# Patient Record
Sex: MALE | Race: WHITE | HISPANIC OR LATINO | URBAN - METROPOLITAN AREA
[De-identification: names, ages, dates, MRNs, and addresses within clinical notes are randomized per-mention and may not be internally consistent; named-entity substitution may affect disease eponyms.]

---

## 2022-11-26 ENCOUNTER — EMERGENCY (EMERGENCY)
Facility: HOSPITAL | Age: 77
LOS: 1 days | Discharge: HOME | End: 2022-11-26
Attending: STUDENT IN AN ORGANIZED HEALTH CARE EDUCATION/TRAINING PROGRAM | Admitting: STUDENT IN AN ORGANIZED HEALTH CARE EDUCATION/TRAINING PROGRAM

## 2022-11-26 VITALS
SYSTOLIC BLOOD PRESSURE: 149 MMHG | DIASTOLIC BLOOD PRESSURE: 75 MMHG | RESPIRATION RATE: 16 BRPM | HEART RATE: 65 BPM | TEMPERATURE: 97 F | OXYGEN SATURATION: 100 %

## 2022-11-26 VITALS
HEART RATE: 72 BPM | TEMPERATURE: 97 F | RESPIRATION RATE: 16 BRPM | OXYGEN SATURATION: 100 % | SYSTOLIC BLOOD PRESSURE: 147 MMHG | DIASTOLIC BLOOD PRESSURE: 60 MMHG

## 2022-11-26 LAB
ALBUMIN SERPL ELPH-MCNC: 4 G/DL — SIGNIFICANT CHANGE UP (ref 3.3–5)
ALP SERPL-CCNC: 148 U/L — HIGH (ref 40–120)
ALT FLD-CCNC: 23 U/L — SIGNIFICANT CHANGE UP (ref 4–41)
ANION GAP SERPL CALC-SCNC: 10 MMOL/L — SIGNIFICANT CHANGE UP (ref 7–14)
AST SERPL-CCNC: 20 U/L — SIGNIFICANT CHANGE UP (ref 4–40)
BASOPHILS # BLD AUTO: 0.02 K/UL — SIGNIFICANT CHANGE UP (ref 0–0.2)
BASOPHILS NFR BLD AUTO: 0.4 % — SIGNIFICANT CHANGE UP (ref 0–2)
BILIRUB SERPL-MCNC: 0.9 MG/DL — SIGNIFICANT CHANGE UP (ref 0.2–1.2)
BUN SERPL-MCNC: 12 MG/DL — SIGNIFICANT CHANGE UP (ref 7–23)
CALCIUM SERPL-MCNC: 9.5 MG/DL — SIGNIFICANT CHANGE UP (ref 8.4–10.5)
CHLORIDE SERPL-SCNC: 102 MMOL/L — SIGNIFICANT CHANGE UP (ref 98–107)
CO2 SERPL-SCNC: 25 MMOL/L — SIGNIFICANT CHANGE UP (ref 22–31)
CREAT SERPL-MCNC: 0.83 MG/DL — SIGNIFICANT CHANGE UP (ref 0.5–1.3)
EGFR: 90 ML/MIN/1.73M2 — SIGNIFICANT CHANGE UP
EOSINOPHIL # BLD AUTO: 0.05 K/UL — SIGNIFICANT CHANGE UP (ref 0–0.5)
EOSINOPHIL NFR BLD AUTO: 1.1 % — SIGNIFICANT CHANGE UP (ref 0–6)
GLUCOSE SERPL-MCNC: 250 MG/DL — HIGH (ref 70–99)
HCT VFR BLD CALC: 34.3 % — LOW (ref 39–50)
HGB BLD-MCNC: 11.7 G/DL — LOW (ref 13–17)
IANC: 2.47 K/UL — SIGNIFICANT CHANGE UP (ref 1.8–7.4)
IMM GRANULOCYTES NFR BLD AUTO: 0.4 % — SIGNIFICANT CHANGE UP (ref 0–0.9)
LIDOCAIN IGE QN: 60 U/L — SIGNIFICANT CHANGE UP (ref 7–60)
LYMPHOCYTES # BLD AUTO: 1.6 K/UL — SIGNIFICANT CHANGE UP (ref 1–3.3)
LYMPHOCYTES # BLD AUTO: 34.2 % — SIGNIFICANT CHANGE UP (ref 13–44)
MCHC RBC-ENTMCNC: 31.4 PG — SIGNIFICANT CHANGE UP (ref 27–34)
MCHC RBC-ENTMCNC: 34.1 GM/DL — SIGNIFICANT CHANGE UP (ref 32–36)
MCV RBC AUTO: 92 FL — SIGNIFICANT CHANGE UP (ref 80–100)
MONOCYTES # BLD AUTO: 0.52 K/UL — SIGNIFICANT CHANGE UP (ref 0–0.9)
MONOCYTES NFR BLD AUTO: 11.1 % — SIGNIFICANT CHANGE UP (ref 2–14)
NEUTROPHILS # BLD AUTO: 2.47 K/UL — SIGNIFICANT CHANGE UP (ref 1.8–7.4)
NEUTROPHILS NFR BLD AUTO: 52.8 % — SIGNIFICANT CHANGE UP (ref 43–77)
NRBC # BLD: 0 /100 WBCS — SIGNIFICANT CHANGE UP (ref 0–0)
NRBC # FLD: 0 K/UL — SIGNIFICANT CHANGE UP (ref 0–0)
NT-PROBNP SERPL-SCNC: 152 PG/ML — SIGNIFICANT CHANGE UP
PLATELET # BLD AUTO: 203 K/UL — SIGNIFICANT CHANGE UP (ref 150–400)
POTASSIUM SERPL-MCNC: 3.3 MMOL/L — LOW (ref 3.5–5.3)
POTASSIUM SERPL-SCNC: 3.3 MMOL/L — LOW (ref 3.5–5.3)
PROT SERPL-MCNC: 7.4 G/DL — SIGNIFICANT CHANGE UP (ref 6–8.3)
RBC # BLD: 3.73 M/UL — LOW (ref 4.2–5.8)
RBC # FLD: 13.8 % — SIGNIFICANT CHANGE UP (ref 10.3–14.5)
SODIUM SERPL-SCNC: 137 MMOL/L — SIGNIFICANT CHANGE UP (ref 135–145)
TROPONIN T, HIGH SENSITIVITY RESULT: 21 NG/L — SIGNIFICANT CHANGE UP
WBC # BLD: 4.68 K/UL — SIGNIFICANT CHANGE UP (ref 3.8–10.5)
WBC # FLD AUTO: 4.68 K/UL — SIGNIFICANT CHANGE UP (ref 3.8–10.5)

## 2022-11-26 PROCEDURE — 99285 EMERGENCY DEPT VISIT HI MDM: CPT

## 2022-11-26 PROCEDURE — 71046 X-RAY EXAM CHEST 2 VIEWS: CPT | Mod: 26

## 2022-11-26 NOTE — ED PROVIDER NOTE - NSFOLLOWUPCLINICS_GEN_ALL_ED_FT
[facility]  Cardiology  [address] [place] [address]  Phone: [phone]  Fax:   Follow Up Time: 4-6 Days

## 2022-11-26 NOTE — ED PROVIDER NOTE - CLINICAL SUMMARY MEDICAL DECISION MAKING FREE TEXT BOX
77 year old male with a PMHx of HTN, HLD, Diabetes, CHF presenting for medical evaluation after months of CP, SOB found to have leaky valve in [facility]. Not currently having symptoms.

## 2022-11-26 NOTE — ED PROVIDER NOTE - PATIENT PORTAL LINK FT
You can access the FollowMyHealth Patient Portal offered by [facility] by registering at the following website: [email or web address]. By joining [employer] [facility]’s FollowMyHealth portal, you will also be able to view your health information using other applications (apps) compatible with our system.

## 2022-11-26 NOTE — ED ADULT NURSE NOTE - OBJECTIVE STATEMENT
Pt presents to ED ambulatory with steady gait accompanied by son c/o Pt presents to ED ambulatory with steady gait accompanied by son c/o intermittent CP and sob x6 months. He was diagnosed with a leaky valve in [facility] [place]. Pt currently denies any acute medical complaints.

## 2022-11-26 NOTE — ED ADULT NURSE NOTE - BP NONINVASIVE SYSTOLIC (MM HG)
[FreeTextEntry1] : Available notes, and pertinent labs & imaging in EMR reviewed. Pertinent labs and imaging summarized in HPI. 
149

## 2022-11-26 NOTE — ED PROVIDER NOTE - ATTENDING CONTRIBUTION TO CARE
Dr. Vanessa, Attending Physician-  I performed a face to face bedside interview with patient regarding history of present illness, review of symptoms and past medical history. I completed an independent physical exam.  I have discussed patient's plan of care with the resident.    77M, HTN, HLD, DM, "leaky valve" who presents to [facility] care. Recently came from [facility]. No acute complaints. Has chronic intermittent CP/SOB for the past 6 months. Was dxed with a "leaky valve." No headaches, fevers, chills, cough ,sputum, belly pain nvd, dysuria, hematuria, light headedness, syncope. Ambulatory without issues. Denies PND/orthopnea. Physical: afebrile, well appearing, rrr, ctabl, adbomen sfot, no le edema. Plan: EKG, screening labs - anticipate D/C with cards f/u.

## 2022-11-26 NOTE — ED PROVIDER NOTE - NSFOLLOWUPINSTRUCTIONS_ED_ALL_ED_FT
Please follow up with a cardiologist within the next 4-6 days.    There are no signs of emergency conditions requiring admission to the hospital on today's workup. Based on the evaluation, a presumptive diagnosis was made, however, further evaluation may be required by your primary care physician or a specialist for a more definitive diagnosis. Please bring a copy of your discharge paperwork (including any test results) to your doctor. Therefore, please follow-up as directed or return to the Emergency Department if your symptoms change or worsen.     Please return to the emergency department if you experience any of the following symptoms:    Fever  Chest pain  Difficulty breathing  Abdominal pain  Nausea  Vomiting

## 2022-11-26 NOTE — ED PROVIDER NOTE - OBJECTIVE STATEMENT
77 year old male with a PMHx of HTN, HLD, Diabetes, CHF presenting for medical evaluation. Patient has been experiencing intermittent CP, sob for 6 months. He was diagnosed with a leaky valve in [facility] and [place] the [place]. He currently has no acute complaints. At the moment, denies cp, sob, abdominal pain, nausea, vomiting.

## 2022-11-26 NOTE — ED ADULT NURSE NOTE - CHPI ED NUR SYMPTOMS NEG
no back pain/no chills/no diaphoresis/no fever/no nausea/no shortness of breath/no syncope/no vomiting

## 2022-11-28 PROBLEM — E11.9 TYPE 2 DIABETES MELLITUS WITHOUT COMPLICATIONS: Chronic | Status: ACTIVE | Noted: 2022-11-26

## 2022-11-28 PROBLEM — I50.9 HEART FAILURE, UNSPECIFIED: Chronic | Status: ACTIVE | Noted: 2022-11-26

## 2022-11-28 PROBLEM — Z00.00 ENCOUNTER FOR PREVENTIVE HEALTH EXAMINATION: Status: ACTIVE | Noted: 2022-11-28

## 2022-11-28 PROBLEM — I10 ESSENTIAL (PRIMARY) HYPERTENSION: Chronic | Status: ACTIVE | Noted: 2022-11-26

## 2022-12-01 ENCOUNTER — OUTPATIENT (OUTPATIENT)
Dept: OUTPATIENT SERVICES | Facility: HOSPITAL | Age: 77
LOS: 1 days | End: 2022-12-01
Payer: SELF-PAY

## 2022-12-01 ENCOUNTER — APPOINTMENT (OUTPATIENT)
Dept: CARDIOLOGY | Facility: HOSPITAL | Age: 77
End: 2022-12-01

## 2022-12-01 ENCOUNTER — INPATIENT (INPATIENT)
Facility: HOSPITAL | Age: 77
LOS: 7 days | Discharge: HOME | DRG: 286 | End: 2022-12-09
Attending: INTERNAL MEDICINE | Admitting: INTERNAL MEDICINE
Payer: MEDICAID

## 2022-12-01 VITALS
HEART RATE: 64 BPM | TEMPERATURE: 98 F | DIASTOLIC BLOOD PRESSURE: 74 MMHG | OXYGEN SATURATION: 99 % | SYSTOLIC BLOOD PRESSURE: 123 MMHG | RESPIRATION RATE: 18 BRPM

## 2022-12-01 VITALS
SYSTOLIC BLOOD PRESSURE: 131 MMHG | WEIGHT: 171 LBS | OXYGEN SATURATION: 98 % | DIASTOLIC BLOOD PRESSURE: 76 MMHG | BODY MASS INDEX: 28.49 KG/M2 | HEIGHT: 65 IN | HEART RATE: 69 BPM

## 2022-12-01 DIAGNOSIS — I25.10 ATHEROSCLEROTIC HEART DISEASE OF NATIVE CORONARY ARTERY WITHOUT ANGINA PECTORIS: ICD-10-CM

## 2022-12-01 DIAGNOSIS — R07.9 CHEST PAIN, UNSPECIFIED: ICD-10-CM

## 2022-12-01 LAB
ALBUMIN SERPL ELPH-MCNC: 4.4 G/DL — SIGNIFICANT CHANGE UP (ref 3.3–5)
ALP SERPL-CCNC: 135 U/L — HIGH (ref 40–120)
ALT FLD-CCNC: 40 U/L — SIGNIFICANT CHANGE UP (ref 10–45)
ANION GAP SERPL CALC-SCNC: 13 MMOL/L — SIGNIFICANT CHANGE UP (ref 5–17)
APTT BLD: 31.7 SEC — SIGNIFICANT CHANGE UP (ref 27.5–35.5)
AST SERPL-CCNC: 38 U/L — SIGNIFICANT CHANGE UP (ref 10–40)
BASOPHILS # BLD AUTO: 0.02 K/UL — SIGNIFICANT CHANGE UP (ref 0–0.2)
BASOPHILS NFR BLD AUTO: 0.3 % — SIGNIFICANT CHANGE UP (ref 0–2)
BILIRUB SERPL-MCNC: 1 MG/DL — SIGNIFICANT CHANGE UP (ref 0.2–1.2)
BUN SERPL-MCNC: 14 MG/DL — SIGNIFICANT CHANGE UP (ref 7–23)
CALCIUM SERPL-MCNC: 10.1 MG/DL — SIGNIFICANT CHANGE UP (ref 8.4–10.5)
CHLORIDE SERPL-SCNC: 98 MMOL/L — SIGNIFICANT CHANGE UP (ref 96–108)
CO2 SERPL-SCNC: 27 MMOL/L — SIGNIFICANT CHANGE UP (ref 22–31)
CREAT SERPL-MCNC: 0.86 MG/DL — SIGNIFICANT CHANGE UP (ref 0.5–1.3)
EGFR: 89 ML/MIN/1.73M2 — SIGNIFICANT CHANGE UP
EOSINOPHIL # BLD AUTO: 0.14 K/UL — SIGNIFICANT CHANGE UP (ref 0–0.5)
EOSINOPHIL NFR BLD AUTO: 2.3 % — SIGNIFICANT CHANGE UP (ref 0–6)
GLUCOSE SERPL-MCNC: 112 MG/DL — HIGH (ref 70–99)
HCT VFR BLD CALC: 36.9 % — LOW (ref 39–50)
HGB BLD-MCNC: 12.4 G/DL — LOW (ref 13–17)
IMM GRANULOCYTES NFR BLD AUTO: 0.3 % — SIGNIFICANT CHANGE UP (ref 0–0.9)
INR BLD: 1.08 RATIO — SIGNIFICANT CHANGE UP (ref 0.88–1.16)
LYMPHOCYTES # BLD AUTO: 2.28 K/UL — SIGNIFICANT CHANGE UP (ref 1–3.3)
LYMPHOCYTES # BLD AUTO: 36.8 % — SIGNIFICANT CHANGE UP (ref 13–44)
MCHC RBC-ENTMCNC: 31.2 PG — SIGNIFICANT CHANGE UP (ref 27–34)
MCHC RBC-ENTMCNC: 33.6 GM/DL — SIGNIFICANT CHANGE UP (ref 32–36)
MCV RBC AUTO: 92.7 FL — SIGNIFICANT CHANGE UP (ref 80–100)
MONOCYTES # BLD AUTO: 0.57 K/UL — SIGNIFICANT CHANGE UP (ref 0–0.9)
MONOCYTES NFR BLD AUTO: 9.2 % — SIGNIFICANT CHANGE UP (ref 2–14)
NEUTROPHILS # BLD AUTO: 3.17 K/UL — SIGNIFICANT CHANGE UP (ref 1.8–7.4)
NEUTROPHILS NFR BLD AUTO: 51.1 % — SIGNIFICANT CHANGE UP (ref 43–77)
NRBC # BLD: 0 /100 WBCS — SIGNIFICANT CHANGE UP (ref 0–0)
NT-PROBNP SERPL-SCNC: 89 PG/ML — SIGNIFICANT CHANGE UP (ref 0–300)
PLATELET # BLD AUTO: 222 K/UL — SIGNIFICANT CHANGE UP (ref 150–400)
POTASSIUM SERPL-MCNC: 3.7 MMOL/L — SIGNIFICANT CHANGE UP (ref 3.5–5.3)
POTASSIUM SERPL-SCNC: 3.7 MMOL/L — SIGNIFICANT CHANGE UP (ref 3.5–5.3)
PROT SERPL-MCNC: 7.6 G/DL — SIGNIFICANT CHANGE UP (ref 6–8.3)
PROTHROM AB SERPL-ACNC: 12.5 SEC — SIGNIFICANT CHANGE UP (ref 10.5–13.4)
RBC # BLD: 3.98 M/UL — LOW (ref 4.2–5.8)
RBC # FLD: 13.7 % — SIGNIFICANT CHANGE UP (ref 10.3–14.5)
SARS-COV-2 RNA SPEC QL NAA+PROBE: SIGNIFICANT CHANGE UP
SODIUM SERPL-SCNC: 138 MMOL/L — SIGNIFICANT CHANGE UP (ref 135–145)
TROPONIN T, HIGH SENSITIVITY RESULT: 19 NG/L — SIGNIFICANT CHANGE UP (ref 0–51)
TROPONIN T, HIGH SENSITIVITY RESULT: 20 NG/L — SIGNIFICANT CHANGE UP (ref 0–51)
WBC # BLD: 6.2 K/UL — SIGNIFICANT CHANGE UP (ref 3.8–10.5)
WBC # FLD AUTO: 6.2 K/UL — SIGNIFICANT CHANGE UP (ref 3.8–10.5)

## 2022-12-01 PROCEDURE — G0463: CPT

## 2022-12-01 PROCEDURE — 99285 EMERGENCY DEPT VISIT HI MDM: CPT

## 2022-12-01 PROCEDURE — 71045 X-RAY EXAM CHEST 1 VIEW: CPT | Mod: 26

## 2022-12-01 RX ORDER — SODIUM CHLORIDE 9 MG/ML
1000 INJECTION, SOLUTION INTRAVENOUS
Refills: 0 | Status: DISCONTINUED | OUTPATIENT
Start: 2022-12-01 | End: 2022-12-09

## 2022-12-01 RX ORDER — DEXTROSE 50 % IN WATER 50 %
12.5 SYRINGE (ML) INTRAVENOUS ONCE
Refills: 0 | Status: DISCONTINUED | OUTPATIENT
Start: 2022-12-01 | End: 2022-12-09

## 2022-12-01 RX ORDER — ENOXAPARIN SODIUM 100 MG/ML
40 INJECTION SUBCUTANEOUS EVERY 24 HOURS
Refills: 0 | Status: DISCONTINUED | OUTPATIENT
Start: 2022-12-01 | End: 2022-12-09

## 2022-12-01 RX ORDER — GLUCAGON INJECTION, SOLUTION 0.5 MG/.1ML
1 INJECTION, SOLUTION SUBCUTANEOUS ONCE
Refills: 0 | Status: DISCONTINUED | OUTPATIENT
Start: 2022-12-01 | End: 2022-12-09

## 2022-12-01 RX ORDER — DEXTROSE 50 % IN WATER 50 %
15 SYRINGE (ML) INTRAVENOUS ONCE
Refills: 0 | Status: DISCONTINUED | OUTPATIENT
Start: 2022-12-01 | End: 2022-12-09

## 2022-12-01 RX ORDER — INSULIN LISPRO 100/ML
VIAL (ML) SUBCUTANEOUS
Refills: 0 | Status: DISCONTINUED | OUTPATIENT
Start: 2022-12-01 | End: 2022-12-09

## 2022-12-01 RX ORDER — DEXTROSE 50 % IN WATER 50 %
25 SYRINGE (ML) INTRAVENOUS ONCE
Refills: 0 | Status: DISCONTINUED | OUTPATIENT
Start: 2022-12-01 | End: 2022-12-09

## 2022-12-01 RX ORDER — PANTOPRAZOLE SODIUM 20 MG/1
40 TABLET, DELAYED RELEASE ORAL
Refills: 0 | Status: DISCONTINUED | OUTPATIENT
Start: 2022-12-01 | End: 2022-12-09

## 2022-12-01 RX ORDER — ASPIRIN/CALCIUM CARB/MAGNESIUM 324 MG
81 TABLET ORAL DAILY
Refills: 0 | Status: DISCONTINUED | OUTPATIENT
Start: 2022-12-01 | End: 2022-12-09

## 2022-12-01 RX ORDER — METOPROLOL TARTRATE 50 MG
25 TABLET ORAL DAILY
Refills: 0 | Status: DISCONTINUED | OUTPATIENT
Start: 2022-12-01 | End: 2022-12-09

## 2022-12-01 RX ORDER — LOSARTAN POTASSIUM 100 MG/1
25 TABLET, FILM COATED ORAL DAILY
Refills: 0 | Status: DISCONTINUED | OUTPATIENT
Start: 2022-12-01 | End: 2022-12-09

## 2022-12-01 RX ORDER — ASPIRIN/CALCIUM CARB/MAGNESIUM 324 MG
162 TABLET ORAL ONCE
Refills: 0 | Status: COMPLETED | OUTPATIENT
Start: 2022-12-01 | End: 2022-12-01

## 2022-12-01 RX ADMIN — Medication 162 MILLIGRAM(S): at 14:53

## 2022-12-01 NOTE — ED PROVIDER NOTE - NS ED ROS FT
General: denies fever, chills  HENT: denies nasal congestion, rhinorrhea  Eyes: denies visual changes, blurred vision  CV: chest pain  Resp: + SOB  Abdominal: denies nausea, vomiting, diarrhea, abdominal pain  : denies urinary pain or discharge  MSK: denies muscle aches, leg swelling  Neuro: denies headaches, numbness, tingling  Skin: denies rashes, bruises

## 2022-12-01 NOTE — ED PROVIDER NOTE - ATTENDING CONTRIBUTION TO CARE
77 M w/ PMHx of HTN, HLD, Diabetes, CHF who was recently in the ED on 11/26/22, discharged, saw Cardiologist Dr. Stevens who recommended for patient to return to the ED for further evaluation due to chest pain and SOB, cards fellow, ekg with no changes, cardiac work up ordered, currently chest pain free likely here for admission.

## 2022-12-01 NOTE — ED PROVIDER NOTE - PHYSICAL EXAMINATION
GENERAL: well appearing in no acute distress, non-toxic appearing  HEAD: normocephalic, atraumatic  HENT: airway intact  EYES:  normal conjunctiva  CARDIAC: regular rate and rhythm, normal S1S2, no appreciable murmurs, 2+ pulses in UE/LE b/l  PULM: normal breath sounds, clear to ascultation bilaterally, no rales, rhonchi, wheezing  GI: abdomen nondistended, soft, nontender, no guarding, rebound tenderness  NEURO: no focal motor or sensory deficits  MSK: no peripheral edema  SKIN: well-perfused, extremities warm, no visible rashes

## 2022-12-01 NOTE — ED PROVIDER NOTE - RAPID ASSESSMENT
77 M w/ PMHx of HTN, HLD, Diabetes, CHF who was recently in the ED on 11/26/22, discharged, saw Cardiologist Dr. Stevens who recommended for patient to return to the ED for further evaluation due to chest pain and SOB, which has been worsening since last visit.     *** I, Giovany Croft MD, performed an initial face to face bedside interview with this patient regarding history of present illness and determined that the patient should be evaluated in the main ED. A physical exam was not performed due to private space availability. This patient's evaluation is NOT COMPLETE and only preliminary. The full assessment, management, and reassessment of this patient, including but not limited to the follow up of ordered laboratory and radiologic testing, is deferred to the main ED provider. ***

## 2022-12-01 NOTE — ED PROVIDER NOTE - OBJECTIVE STATEMENT
76yo M w/ history of HTN, HLD, Diabetes, CHF coming in for worsening exertional chest pain. Patient reports worsening symptoms and unable to walk more than a block without symptoms now associated w/ SOB. Was referred by outpatient [facility] cards clinic for an ischemic evaluation. Patient has otherwise been in his normal state of health.

## 2022-12-01 NOTE — H&P ADULT - NSHPPHYSICALEXAM_GEN_ALL_CORE
Vital Signs Last 24 Hrs  T(C): 36.7 (01 Dec 2022 17:48), Max: 36.8 (01 Dec 2022 15:26)  T(F): 98 (01 Dec 2022 17:48), Max: 98.3 (01 Dec 2022 16:23)  HR: 58 (01 Dec 2022 17:48) (58 - 64)  BP: 173/78 (01 Dec 2022 17:48) (102/64 - 173/78)  BP(mean): 109 (01 Dec 2022 17:48) (109 - 109)  RR: 20 (01 Dec 2022 17:48) (18 - 20)  SpO2: 99% (01 Dec 2022 17:48) (99% - 100%)    Parameters below as of 01 Dec 2022 17:48  Patient On (Oxygen Delivery Method): room air pt. seen and examined, NAD    Vital Signs Last 24 Hrs  T(C): 36.7 (01 Dec 2022 17:48), Max: 36.8 (01 Dec 2022 15:26)  T(F): 98 (01 Dec 2022 17:48), Max: 98.3 (01 Dec 2022 16:23)  HR: 58 (01 Dec 2022 17:48) (58 - 64)  BP: 173/78 (01 Dec 2022 17:48) (102/64 - 173/78)  BP(mean): 109 (01 Dec 2022 17:48) (109 - 109)  RR: 20 (01 Dec 2022 17:48) (18 - 20)  SpO2: 99% (01 Dec 2022 17:48) (99% - 100%)    Parameters below as of 01 Dec 2022 17:48  Patient On (Oxygen Delivery Method): room air    heent : nc/at   neck : supple, no JVD  lungs : B/l fair A/E , basilar rales   heart: systolic murmur , s1s2 nml  abd : soft, NABS, NT/Nd  ext : no e/c/c, pulses 1 +  neuro: aaox3 , no focal deficit

## 2022-12-01 NOTE — CONSULT NOTE ADULT - ASSESSMENT
Mr. Lopez is a 77M with a PMH T2DM and recently diagnosed CHF? who initially presented to clinic today to establish care but due to anginal symptoms was referred to the ED.     1. Exertional CP - has history of newly diagnosed T2DM and HTN EKG nonischemic with initial trop 20  2. Exertional SOB - could be ischemia vs. underlying valvular heart disease  3. Murmur - likely aortic stenosis murmur      RECOMMENDATIONS:  - No concern for ACS at this time  - Would repeat another set of troponin  - Admit to telemetry  - Formal TTE to assess LVEF and assess for underlying valvular heart disease  - Would check A1C, lipid profile and TSH  - Will likely need ischemic evaluation stress test vs. left heart catheterization      Note not final until signed by attending       Warren Villanueva MD  Cardiology Fellow PGY-5  Phone: [phone]    For all New Consults  [email or web address]   Login: [email or web address]

## 2022-12-01 NOTE — H&P ADULT - ASSESSMENT
A/P     Chest pain / CHF exacerbation   likely ac on chronic diastolic failure   seen by cardiology   -repeat TTE   -will need ischemic w/u     Cardiac murmur   -check TTE   started on lasix   serial CE     DM-2 :  -c/w Insulin / FSBS  check A1c     HLD   -check lipid profile   started on statin     HTN :  -c/w home meds

## 2022-12-01 NOTE — H&P ADULT - NSHPLABSRESULTS_GEN_ALL_CORE
12.4   6.20  )-----------( 222      ( 01 Dec 2022 16:45 )             36.9   12-01    138  |  98  |  14  ----------------------------<  112<H>  3.7   |  27  |  0.86    Ca    10.1      01 Dec 2022 16:44    TPro  7.6  /  Alb  4.4  /  TBili  1.0  /  DBili  x   /  AST  38  /  ALT  40  /  AlkPhos  135<H>  12-01

## 2022-12-01 NOTE — CONSULT NOTE ADULT - SUBJECTIVE AND OBJECTIVE BOX
HPI:  Mr. Lopez is a 77M with a PMH T2DM and recently diagnosed CHF? who initially presented to clinic today to establish care but due to anginal symptoms was referred to the ED. Patient states he has been having chest pain with exertion that has been worsening for the past couple of months that has been getting worse over the last few weeks. The chest pain is substernal, worse with exertion, better with rest. The pain starts after 2 blocks associated with shortness of breath, palpitations, dizziness, and gets better after a few minutes of relaxation. Denies orthopnea, PND, JOHANSEN and LE edema. He was evaluated in [facility] 2 months ago and had an echo and x-ray. Patient was started on Bumex and ASA. TTE report is in Hebrew but LVEF ~50% with mild diastolic dysfunction and moderate aortic insufficiency. He has never had a stress test or angiogram. His symptoms continued to worsen so [place] the [place] went to ED about 5 days ago had initial chest pain work up with troponin 21 and EKG non-ischemic and he was discharged with follow up with Cardiology.      PMH:   Chronic CHF    HTN (hypertension)    Diabetes      PSH:     Medications:     Allergies:  No Known Allergies    FAMILY HISTORY:    Social History:  Smoking: denies  Alcohol: denies  Drugs: denies    Review of Systems:  Constitutional: [ ] Fever [ ] Chills [ ] Fatigue [ ] Weight change   HEENT: [ ] Blurred vision [ ] Eye Pain [ ] Headache [ ] Runny nose [ ] Sore Throat   Respiratory: [ ] Cough [ ] Wheezing [ ] Shortness of breath  Cardiovascular: [ ] Chest Pain [ ] Palpitations [ ] JOHANSEN [ ] PND [ ] Orthopnea  Gastrointestinal: [ ] Abdominal Pain [ ] Diarrhea [ ] Constipation [ ] Hemorrhoids [ ] Nausea [ ] Vomiting  Genitourinary: [ ] Nocturia [ ] Dysuria [ ] Incontinence  Extremities: [ ] Swelling [ ] Joint Pain  Neurologic: [ ] Focal deficit [ ] Paresthesias [ ] Syncope  Lymphatic: [ ] Swelling [ ] Lymphadenopathy   Skin: [ ] Rash [ ] Ecchymoses [ ] Wounds [ ] Lesions  Psychiatry: [ ] Depression [ ] Suicidal/Homicidal Ideation [ ] Anxiety [ ] Sleep Disturbances  [ ] 10 point review of systems is otherwise negative except as mentioned above            [ ]Unable to obtain    Physical Exam:  T(C): 36.7 (12-01-22 @ 17:48), Max: 36.8 (12-01-22 @ 15:26)  HR: 58 (12-01-22 @ 17:48) (58 - 64)  BP: 173/78 (12-01-22 @ 17:48) (102/64 - 173/78)  RR: 20 (12-01-22 @ 17:48) (18 - 20)  SpO2: 99% (12-01-22 @ 17:48) (99% - 100%)  Wt(kg): --    Daily     Daily     Appearance: NAD  Eyes: PERRL, EOMI  HENT: Normal oral mucosa, NC/AT  Cardiovascular: normal S1 and S2, RRR, no m/r/g, no edema, normal JVP  Procedural Access Site:  No hematoma, non-tender to palpation, 2+ pulses distally, no bruit, no ecchymosis  Respiratory: Clear to auscultation bilaterally  Gastrointestinal: Soft, non-tender, non-distended, BS+  Musculoskeletal: No clubbing, no joint deformity   Neurologic: Non-focal  Lymphatic: No lymphadenopathy  Psychiatry: AAOx3, mood & affect appropriate  Skin: No rashes, no ecchymoses, no cyanosis    Cardiovascular Diagnostic Testing:  ECG:    Echo:    Stress Testing:    Cath:    Interpretation of Telemetry:    Imaging:    Labs:                        12.4   6.20  )-----------( 222      ( 01 Dec 2022 16:45 )             36.9     12-01    138  |  98  |  14  ----------------------------<  112<H>  3.7   |  27  |  0.86    Ca    10.1      01 Dec 2022 16:44    TPro  7.6  /  Alb  4.4  /  TBili  1.0  /  DBili  x   /  AST  38  /  ALT  40  /  AlkPhos  135<H>  12-01    PT/INR - ( 01 Dec 2022 16:43 )   PT: 12.5 sec;   INR: 1.08 ratio         PTT - ( 01 Dec 2022 16:43 )  PTT:31.7 sec      Serum Pro-Brain Natriuretic Peptide: 89 pg/mL (12-01 @ 16:44)  Serum Pro-Brain Natriuretic Peptide: 152 pg/mL (11-26 @ 10:29)           HPI:  Mr. Lopez is a 77M with a PMH T2DM and recently diagnosed CHF? who initially presented to clinic today to establish care but due to anginal symptoms was referred to the ED. Daughter is at bedside and able to translate. Patient states he has been having chest pain with exertion that has been worsening for the past couple of months that has been getting worse over the last few weeks. The chest pain is substernal, worse with exertion, better with rest. The pain starts after 2 blocks associated with shortness of breath, palpitations, dizziness, and gets better after a few minutes of relaxation. Denies orthopnea, PND, JOHANSEN and LE edema. He was evaluated in [facility] 2 months ago and had an echo and x-ray. Patient was started on Bumex and ASA. TTE report is in Irish but LVEF ~50% with mild diastolic dysfunction and moderate aortic insufficiency. He has never had a stress test or angiogram. His symptoms continued to worsen so [place] went to ED about 5 days ago had initial chest pain work up with troponin 21 and EKG non-ischemic and he was discharged with follow up with Cardiology.      PMH:   Chronic CHF    HTN (hypertension)    Diabetes      PSH:     Medications:     Allergies:  No Known Allergies    FAMILY HISTORY:    Social History:  Smoking: denies  Alcohol: denies  Drugs: denies    Review of Systems:  Constitutional: [ ] Fever [ ] Chills [ ] Fatigue [ ] Weight change   HEENT: [ ] Blurred vision [ ] Eye Pain [ ] Headache [ ] Runny nose [ ] Sore Throat   Respiratory: [ ] Cough [ ] Wheezing [ ] Shortness of breath  Cardiovascular: [ ] Chest Pain [ ] Palpitations [ ] JOHANSEN [ ] PND [ ] Orthopnea  Gastrointestinal: [ ] Abdominal Pain [ ] Diarrhea [ ] Constipation [ ] Hemorrhoids [ ] Nausea [ ] Vomiting  Genitourinary: [ ] Nocturia [ ] Dysuria [ ] Incontinence  Extremities: [ ] Swelling [ ] Joint Pain  Neurologic: [ ] Focal deficit [ ] Paresthesias [ ] Syncope  Lymphatic: [ ] Swelling [ ] Lymphadenopathy   Skin: [ ] Rash [ ] Ecchymoses [ ] Wounds [ ] Lesions  Psychiatry: [ ] Depression [ ] Suicidal/Homicidal Ideation [ ] Anxiety [ ] Sleep Disturbances  [ X] 10 point review of systems is otherwise negative except as mentioned above            [ ]Unable to obtain    Physical Exam:  T(C): 36.7 (12-01-22 @ 17:48), Max: 36.8 (12-01-22 @ 15:26)  HR: 58 (12-01-22 @ 17:48) (58 - 64)  BP: 173/78 (12-01-22 @ 17:48) (102/64 - 173/78)  RR: 20 (12-01-22 @ 17:48) (18 - 20)  SpO2: 99% (12-01-22 @ 17:48) (99% - 100%)  Wt(kg): --    Daily     Daily     Appearance: NAD  Eyes: PERRL, EOMI  HENT: Normal oral mucosa, NC/AT  Cardiovascular: normal S1 and S2, RRR, +crescendo-decresendo murmur RUSB late peaking with L carotid radiation no edema, normal JVP  Respiratory: Clear to auscultation bilaterally  Gastrointestinal: Soft, non-tender, non-distended, BS+  Musculoskeletal: No clubbing, no joint deformity   Neurologic: Non-focal  Lymphatic: No lymphadenopathy  Psychiatry: AAOx3, mood & affect appropriate  Skin: No rashes, no ecchymoses, no cyanosis    Cardiovascular Diagnostic Testing:    Labs:                        12.4   6.20  )-----------( 222      ( 01 Dec 2022 16:45 )             36.9     12-01    138  |  98  |  14  ----------------------------<  112<H>  3.7   |  27  |  0.86    Ca    10.1      01 Dec 2022 16:44    TPro  7.6  /  Alb  4.4  /  TBili  1.0  /  DBili  x   /  AST  38  /  ALT  40  /  AlkPhos  135<H>  12-01    PT/INR - ( 01 Dec 2022 16:43 )   PT: 12.5 sec;   INR: 1.08 ratio         PTT - ( 01 Dec 2022 16:43 )  PTT:31.7 sec      Serum Pro-Brain Natriuretic Peptide: 89 pg/mL (12-01 @ 16:44)  Serum Pro-Brain Natriuretic Peptide: 152 pg/mL (11-26 @ 10:29)

## 2022-12-01 NOTE — ED ADULT NURSE REASSESSMENT NOTE - NS ED NURSE REASSESS COMMENT FT1
Report received from ASMITA Sewell.  ID [id number]. Pt endorsing JOHANSEN and at rest, denies chest pain, back pain, lightheadedness at this time. Pt endorses intermittent episodes of "hiccups." VSS, NAD noted. Breathing non-labored with RR and O2 sat within normal limits on room air. Plan of care discussed with pt and verbalizes understanding. Safety and comfort measures maintained. Line study - normal.    Tolerated Chemo.  Swelling at left neck/submandibular way down.      No special treatment is required at this time.  Please call us as needed and have her check in with Dr. Cade in 2-3 weeks to check up on the wounds and make sure all is well.

## 2022-12-01 NOTE — ED ADULT NURSE NOTE - OBJECTIVE STATEMENT
77M aaox4 ambulatory German speaking was sent here from Cardiologist office for further evaluation secondary to Chest pain and SOB for 4 years now, patient was seen at [facility] a week ago and was dc and was told to ff-up with Cardiology which the patient did but instead patient was sent here to be firther evaluated. Patient with h/o Chronic CHF, HTN and DM, patient denies any chest pain at the moment but c/o JOHANSEN which has been ongoing for years. VS WDL, labs doen at [facility] area pending results. Patient pending CXR.

## 2022-12-02 ENCOUNTER — APPOINTMENT (OUTPATIENT)
Dept: CARDIOLOGY | Facility: CLINIC | Age: 77
End: 2022-12-02

## 2022-12-02 DIAGNOSIS — I20.9 ANGINA PECTORIS, UNSPECIFIED: ICD-10-CM

## 2022-12-02 DIAGNOSIS — I35.0 NONRHEUMATIC AORTIC (VALVE) STENOSIS: ICD-10-CM

## 2022-12-02 LAB
A1C WITH ESTIMATED AVERAGE GLUCOSE RESULT: 9.4 % — HIGH (ref 4–5.6)
ALBUMIN SERPL ELPH-MCNC: 4.2 G/DL — SIGNIFICANT CHANGE UP (ref 3.3–5)
ALP SERPL-CCNC: 155 U/L — HIGH (ref 40–120)
ALT FLD-CCNC: 38 U/L — SIGNIFICANT CHANGE UP (ref 10–45)
ANION GAP SERPL CALC-SCNC: 9 MMOL/L — SIGNIFICANT CHANGE UP (ref 5–17)
AST SERPL-CCNC: 32 U/L — SIGNIFICANT CHANGE UP (ref 10–40)
BILIRUB SERPL-MCNC: 1 MG/DL — SIGNIFICANT CHANGE UP (ref 0.2–1.2)
BUN SERPL-MCNC: 15 MG/DL — SIGNIFICANT CHANGE UP (ref 7–23)
CALCIUM SERPL-MCNC: 9.6 MG/DL — SIGNIFICANT CHANGE UP (ref 8.4–10.5)
CHLORIDE SERPL-SCNC: 99 MMOL/L — SIGNIFICANT CHANGE UP (ref 96–108)
CHOLEST SERPL-MCNC: 185 MG/DL — SIGNIFICANT CHANGE UP
CO2 SERPL-SCNC: 28 MMOL/L — SIGNIFICANT CHANGE UP (ref 22–31)
CREAT SERPL-MCNC: 0.76 MG/DL — SIGNIFICANT CHANGE UP (ref 0.5–1.3)
EGFR: 93 ML/MIN/1.73M2 — SIGNIFICANT CHANGE UP
ESTIMATED AVERAGE GLUCOSE: 223 MG/DL — HIGH (ref 68–114)
GLUCOSE BLDC GLUCOMTR-MCNC: 207 MG/DL — HIGH (ref 70–99)
GLUCOSE BLDC GLUCOMTR-MCNC: 244 MG/DL — HIGH (ref 70–99)
GLUCOSE BLDC GLUCOMTR-MCNC: 251 MG/DL — HIGH (ref 70–99)
GLUCOSE BLDC GLUCOMTR-MCNC: 253 MG/DL — HIGH (ref 70–99)
GLUCOSE SERPL-MCNC: 256 MG/DL — HIGH (ref 70–99)
HCT VFR BLD CALC: 37.3 % — LOW (ref 39–50)
HCV AB S/CO SERPL IA: 0.1 S/CO — SIGNIFICANT CHANGE UP (ref 0–0.99)
HCV AB SERPL-IMP: SIGNIFICANT CHANGE UP
HDLC SERPL-MCNC: 34 MG/DL — LOW
HGB BLD-MCNC: 12.7 G/DL — LOW (ref 13–17)
LIPID PNL WITH DIRECT LDL SERPL: 110 MG/DL — HIGH
MCHC RBC-ENTMCNC: 31.5 PG — SIGNIFICANT CHANGE UP (ref 27–34)
MCHC RBC-ENTMCNC: 34 GM/DL — SIGNIFICANT CHANGE UP (ref 32–36)
MCV RBC AUTO: 92.6 FL — SIGNIFICANT CHANGE UP (ref 80–100)
NON HDL CHOLESTEROL: 151 MG/DL — HIGH
NRBC # BLD: 0 /100 WBCS — SIGNIFICANT CHANGE UP (ref 0–0)
PLATELET # BLD AUTO: 196 K/UL — SIGNIFICANT CHANGE UP (ref 150–400)
POTASSIUM SERPL-MCNC: 3.7 MMOL/L — SIGNIFICANT CHANGE UP (ref 3.5–5.3)
POTASSIUM SERPL-SCNC: 3.7 MMOL/L — SIGNIFICANT CHANGE UP (ref 3.5–5.3)
PROT SERPL-MCNC: 7.4 G/DL — SIGNIFICANT CHANGE UP (ref 6–8.3)
RBC # BLD: 4.03 M/UL — LOW (ref 4.2–5.8)
RBC # FLD: 13.6 % — SIGNIFICANT CHANGE UP (ref 10.3–14.5)
SODIUM SERPL-SCNC: 136 MMOL/L — SIGNIFICANT CHANGE UP (ref 135–145)
TRIGL SERPL-MCNC: 208 MG/DL — HIGH
TSH SERPL-MCNC: 3.63 UIU/ML — SIGNIFICANT CHANGE UP (ref 0.27–4.2)
WBC # BLD: 4.68 K/UL — SIGNIFICANT CHANGE UP (ref 3.8–10.5)
WBC # FLD AUTO: 4.68 K/UL — SIGNIFICANT CHANGE UP (ref 3.8–10.5)

## 2022-12-02 PROCEDURE — 99254 IP/OBS CNSLTJ NEW/EST MOD 60: CPT

## 2022-12-02 PROCEDURE — 93880 EXTRACRANIAL BILAT STUDY: CPT | Mod: 26

## 2022-12-02 PROCEDURE — 99223 1ST HOSP IP/OBS HIGH 75: CPT

## 2022-12-02 PROCEDURE — 93306 TTE W/DOPPLER COMPLETE: CPT | Mod: 26

## 2022-12-02 RX ADMIN — Medication 81 MILLIGRAM(S): at 12:38

## 2022-12-02 RX ADMIN — ENOXAPARIN SODIUM 40 MILLIGRAM(S): 100 INJECTION SUBCUTANEOUS at 21:59

## 2022-12-02 RX ADMIN — Medication 4: at 18:18

## 2022-12-02 RX ADMIN — Medication 6: at 12:38

## 2022-12-02 RX ADMIN — LOSARTAN POTASSIUM 25 MILLIGRAM(S): 100 TABLET, FILM COATED ORAL at 05:49

## 2022-12-02 RX ADMIN — Medication 25 MILLIGRAM(S): at 05:50

## 2022-12-02 RX ADMIN — Medication 4: at 08:19

## 2022-12-02 RX ADMIN — PANTOPRAZOLE SODIUM 40 MILLIGRAM(S): 20 TABLET, DELAYED RELEASE ORAL at 06:58

## 2022-12-02 NOTE — CONSULT NOTE ADULT - ASSESSMENT
Mr Lopez is a 78y/o male with a PMHx significant for DM2 and HTN admitted with progressively worsening exertional angina.  Found on echo to have aortic stenosis.

## 2022-12-02 NOTE — PATIENT PROFILE ADULT - SAFE PLACE TO LIVE
Patient did not report to Lab on 6/22/2017 to complete testing, original orders were cancelled.  Please review pended orders, complete necessary items, and sign.  If testing is not needed, please delete order.  Please contact patient with follow-up instructions as needed.  Thank you.     
no

## 2022-12-02 NOTE — CONSULT NOTE ADULT - PROBLEM SELECTOR RECOMMENDATION 9
- reviewed TTE with Dr. Estee Mendez (Director of Structural Echocardiography)  -- AS was found to be in the moderate range with an SOURAV of 1, peak and mean gradients of 31 and 17 with a peak velocity of 2.7.  DVI calculated to be 0.29 with a normal EF and stroke volume  - recommend further cardiology workup of exertional SSCP  - will arrange for follow up with echo in Structural Heart clinic in 4 months - reviewed TTE with Dr. Estee Mendez (Director of Structural Echocardiography)  -- AS was found to be in the moderate range with an SOURAV of 1, peak and mean gradients of 31 and 17 with a peak velocity of 2.7.  DVI calculated to be 0.29 with a normal EF and stroke volume  - recommend further cardiology workup of exertional SSCP (LHC would be appropriate given his history of being told he had a "blocked artery"  - will arrange for follow up with echo in Structural Heart clinic in 4 months

## 2022-12-02 NOTE — PROGRESS NOTE ADULT - ASSESSMENT
A/P     Chest pain / CHF exacerbation   likely ac on chronic diastolic failure   seen by cardiology   -repeat TTE   structural heart team consulted     Cardiac murmur   -check TTE   started on lasix   serial CE     DM-2 :  -c/w Insulin / FSBS  check A1c     HLD   -check lipid profile   started on statin     HTN :  -c/w home meds

## 2022-12-02 NOTE — PATIENT PROFILE ADULT - FUNCTIONAL ASSESSMENT - BASIC MOBILITY 6.
Quality 110: Preventive Care And Screening: Influenza Immunization: Influenza Immunization Administered during Influenza season Detail Level: Detailed 3 = A little assistance

## 2022-12-02 NOTE — PROGRESS NOTE ADULT - SUBJECTIVE AND OBJECTIVE BOX
Patient is a 77y old  Male who presents with a chief complaint of JOHANSEN / SOB (02 Dec 2022 14:16)      INTERVAL HPI/OVERNIGHT EVENTS:  T(C): 37.1 (12-02-22 @ 12:34), Max: 37.1 (12-02-22 @ 12:34)  HR: 58 (12-02-22 @ 12:34) (56 - 60)  BP: 110/58 (12-02-22 @ 12:34) (106/63 - 128/69)  RR: 18 (12-02-22 @ 12:34) (16 - 18)  SpO2: 98% (12-02-22 @ 12:34) (97% - 98%)  Wt(kg): --  I&O's Summary      PAST MEDICAL & SURGICAL HISTORY:  Chronic CHF      HTN (hypertension)      Diabetes      No significant past surgical history          SOCIAL HISTORY  Alcohol:  Tobacco:  Illicit substance use:    FAMILY HISTORY:    REVIEW OF SYSTEMS:  CONSTITUTIONAL: No fever, weight loss, or fatigue  EYES: No eye pain, visual disturbances, or discharge  ENMT:  No difficulty hearing, tinnitus, vertigo; No sinus or throat pain  NECK: No pain or stiffness  RESPIRATORY: No cough, wheezing, chills or hemoptysis; No shortness of breath  CARDIOVASCULAR: No chest pain, palpitations, dizziness, or leg swelling  GASTROINTESTINAL: No abdominal or epigastric pain. No nausea, vomiting, or hematemesis; No diarrhea or constipation. No melena or hematochezia.  GENITOURINARY: No dysuria, frequency, hematuria, or incontinence  NEUROLOGICAL: No headaches, memory loss, loss of strength, numbness, or tremors  SKIN: No itching, burning, rashes, or lesions   LYMPH NODES: No enlarged glands  ENDOCRINE: No heat or cold intolerance; No hair loss  MUSCULOSKELETAL: No joint pain or swelling; No muscle, back, or extremity pain  PSYCHIATRIC: No depression, anxiety, mood swings, or difficulty sleeping  HEME/LYMPH: No easy bruising, or bleeding gums  ALLERY AND IMMUNOLOGIC: No hives or eczema    RADIOLOGY & ADDITIONAL TESTS:    Imaging Personally Reviewed:  [ ] YES  [ ] NO    Consultant(s) Notes Reviewed:  [ ] YES  [ ] NO    PHYSICAL EXAM:  GENERAL: NAD, well-groomed, well-developed  HEAD:  Atraumatic, Normocephalic  EYES: EOMI, PERRLA, conjunctiva and sclera clear  ENMT: No tonsillar erythema, exudates, or enlargement; Moist mucous membranes, Good dentition, No lesions  NECK: Supple, No JVD, Normal thyroid  NERVOUS SYSTEM:  Alert & Oriented X3, Good concentration; Motor Strength 5/5 B/L upper and lower extremities; DTRs 2+ intact and symmetric  CHEST/LUNG: Clear to percussion bilaterally; No rales, rhonchi, wheezing, or rubs  HEART: Regular rate and rhythm; No murmurs, rubs, or gallops  ABDOMEN: Soft, Nontender, Nondistended; Bowel sounds present  EXTREMITIES:  2+ Peripheral Pulses, No clubbing, cyanosis, or edema  LYMPH: No lymphadenopathy noted  SKIN: No rashes or lesions    LABS:                        12.7   4.68  )-----------( 196      ( 02 Dec 2022 05:54 )             37.3     12-02    136  |  99  |  15  ----------------------------<  256<H>  3.7   |  28  |  0.76    Ca    9.6      02 Dec 2022 05:54    TPro  7.4  /  Alb  4.2  /  TBili  1.0  /  DBili  x   /  AST  32  /  ALT  38  /  AlkPhos  155<H>  12-02    PT/INR - ( 01 Dec 2022 16:43 )   PT: 12.5 sec;   INR: 1.08 ratio         PTT - ( 01 Dec 2022 16:43 )  PTT:31.7 sec    CAPILLARY BLOOD GLUCOSE      POCT Blood Glucose.: 244 mg/dL (02 Dec 2022 18:16)  POCT Blood Glucose.: 251 mg/dL (02 Dec 2022 12:33)  POCT Blood Glucose.: 207 mg/dL (02 Dec 2022 08:14)            MEDICATIONS  (STANDING):  aspirin enteric coated 81 milliGRAM(s) Oral daily  dextrose 5%. 1000 milliLiter(s) (100 mL/Hr) IV Continuous <Continuous>  dextrose 5%. 1000 milliLiter(s) (50 mL/Hr) IV Continuous <Continuous>  dextrose 50% Injectable 25 Gram(s) IV Push once  dextrose 50% Injectable 12.5 Gram(s) IV Push once  dextrose 50% Injectable 25 Gram(s) IV Push once  enoxaparin Injectable 40 milliGRAM(s) SubCutaneous every 24 hours  glucagon  Injectable 1 milliGRAM(s) IntraMuscular once  insulin lispro (ADMELOG) corrective regimen sliding scale   SubCutaneous three times a day before meals  losartan 25 milliGRAM(s) Oral daily  metoprolol succinate ER 25 milliGRAM(s) Oral daily  pantoprazole    Tablet 40 milliGRAM(s) Oral before breakfast    MEDICATIONS  (PRN):  dextrose Oral Gel 15 Gram(s) Oral once PRN Blood Glucose LESS THAN 70 milliGRAM(s)/deciliter      Care Discussed with Consultants/Other Providers [ ] YES  [ ] NO Patient is a 77y old  Male who presents with a chief complaint of JOHANSEN / SOB (02 Dec 2022 14:16)      INTERVAL HPI/OVERNIGHT EVENTS: seen and examined, NAD  T(C): 37.1 (12-02-22 @ 12:34), Max: 37.1 (12-02-22 @ 12:34)  HR: 58 (12-02-22 @ 12:34) (56 - 60)  BP: 110/58 (12-02-22 @ 12:34) (106/63 - 128/69)  RR: 18 (12-02-22 @ 12:34) (16 - 18)  SpO2: 98% (12-02-22 @ 12:34) (97% - 98%)  Wt(kg): --  I&O's Summary      PAST MEDICAL & SURGICAL HISTORY:  Chronic CHF      HTN (hypertension)      Diabetes      No significant past surgical history          SOCIAL HISTORY  Alcohol:  Tobacco:  Illicit substance use:    FAMILY HISTORY:    REVIEW OF SYSTEMS:  CONSTITUTIONAL: No fever, weight loss, or fatigue  EYES: No eye pain, visual disturbances, or discharge  ENMT:  No difficulty hearing, tinnitus, vertigo; No sinus or throat pain  NECK: No pain or stiffness  RESPIRATORY: No cough, wheezing, chills or hemoptysis; No shortness of breath  CARDIOVASCULAR: No chest pain, palpitations, dizziness, or leg swelling  GASTROINTESTINAL: No abdominal or epigastric pain. No nausea, vomiting, or hematemesis; No diarrhea or constipation. No melena or hematochezia.  GENITOURINARY: No dysuria, frequency, hematuria, or incontinence  NEUROLOGICAL: No headaches, memory loss, loss of strength, numbness, or tremors  SKIN: No itching, burning, rashes, or lesions   LYMPH NODES: No enlarged glands  ENDOCRINE: No heat or cold intolerance; No hair loss  MUSCULOSKELETAL: No joint pain or swelling; No muscle, back, or extremity pain  PSYCHIATRIC: No depression, anxiety, mood swings, or difficulty sleeping  HEME/LYMPH: No easy bruising, or bleeding gums  ALLERY AND IMMUNOLOGIC: No hives or eczema    RADIOLOGY & ADDITIONAL TESTS:    Imaging Personally Reviewed:  [ ] YES  [ ] NO    Consultant(s) Notes Reviewed:  [ ] YES  [ ] NO    PHYSICAL EXAM:  GENERAL: NAD, well-groomed, well-developed  HEAD:  Atraumatic, Normocephalic  EYES: EOMI, PERRLA, conjunctiva and sclera clear  ENMT: No tonsillar erythema, exudates, or enlargement; Moist mucous membranes, Good dentition, No lesions  NECK: Supple, No JVD, Normal thyroid  NERVOUS SYSTEM:  Alert & Oriented X3, Good concentration; Motor Strength 5/5 B/L upper and lower extremities; DTRs 2+ intact and symmetric  CHEST/LUNG: Clear to percussion bilaterally; No rales, rhonchi, wheezing, or rubs  HEART: Regular rate and rhythm; No murmurs, rubs, or gallops  ABDOMEN: Soft, Nontender, Nondistended; Bowel sounds present  EXTREMITIES:  2+ Peripheral Pulses, No clubbing, cyanosis, or edema  LYMPH: No lymphadenopathy noted  SKIN: No rashes or lesions    LABS:                        12.7   4.68  )-----------( 196      ( 02 Dec 2022 05:54 )             37.3     12-02    136  |  99  |  15  ----------------------------<  256<H>  3.7   |  28  |  0.76    Ca    9.6      02 Dec 2022 05:54    TPro  7.4  /  Alb  4.2  /  TBili  1.0  /  DBili  x   /  AST  32  /  ALT  38  /  AlkPhos  155<H>  12-02    PT/INR - ( 01 Dec 2022 16:43 )   PT: 12.5 sec;   INR: 1.08 ratio         PTT - ( 01 Dec 2022 16:43 )  PTT:31.7 sec    CAPILLARY BLOOD GLUCOSE      POCT Blood Glucose.: 244 mg/dL (02 Dec 2022 18:16)  POCT Blood Glucose.: 251 mg/dL (02 Dec 2022 12:33)  POCT Blood Glucose.: 207 mg/dL (02 Dec 2022 08:14)            MEDICATIONS  (STANDING):  aspirin enteric coated 81 milliGRAM(s) Oral daily  dextrose 5%. 1000 milliLiter(s) (100 mL/Hr) IV Continuous <Continuous>  dextrose 5%. 1000 milliLiter(s) (50 mL/Hr) IV Continuous <Continuous>  dextrose 50% Injectable 25 Gram(s) IV Push once  dextrose 50% Injectable 12.5 Gram(s) IV Push once  dextrose 50% Injectable 25 Gram(s) IV Push once  enoxaparin Injectable 40 milliGRAM(s) SubCutaneous every 24 hours  glucagon  Injectable 1 milliGRAM(s) IntraMuscular once  insulin lispro (ADMELOG) corrective regimen sliding scale   SubCutaneous three times a day before meals  losartan 25 milliGRAM(s) Oral daily  metoprolol succinate ER 25 milliGRAM(s) Oral daily  pantoprazole    Tablet 40 milliGRAM(s) Oral before breakfast    MEDICATIONS  (PRN):  dextrose Oral Gel 15 Gram(s) Oral once PRN Blood Glucose LESS THAN 70 milliGRAM(s)/deciliter      Care Discussed with Consultants/Other Providers [ ] YES  [ ] NO

## 2022-12-02 NOTE — CONSULT NOTE ADULT - SUBJECTIVE AND OBJECTIVE BOX
Structural Heart Team    HPI:  77M with a PMH T2DM and recently diagnosed CHF? who initially presented to clinic today to establish care but due to anginal symptoms was referred to the ED. Daughter is at bedside and able to translate. Patient states he has been having chest pain with exertion that has been worsening for the past couple of months that has been getting worse over the last few weeks. The chest pain is substernal, worse with exertion, better with rest. The pain starts after 2 blocks associated with shortness of breath, palpitations, dizziness, and gets better after a few minutes of relaxation. Denies orthopnea, PND, JOHANSEN and LE edema. He was evaluated in [facility] 2 months ago and had an echo and x-ray. Patient was started on Bumex and ASA. TTE report is in [place]ian but LVEF ~50% with mild diastolic dysfunction and moderate aortic insufficiency. He has never had a stress test or angiogram. His symptoms continued to worsen so [place] the [place] went to ED about 5 days ago had initial chest pain work up with troponin 21 and EKG non-ischemic and he was discharged with follow up with Cardiology.      Allergies    No Known Allergies    Intolerances      PAST MEDICAL & SURGICAL HISTORY:  Chronic CHF    HTN (hypertension)    Diabetes    No significant past surgical history      MEDICATIONS  (STANDING):  aspirin enteric coated 81 milliGRAM(s) Oral daily  dextrose 5%. 1000 milliLiter(s) (100 mL/Hr) IV Continuous <Continuous>  dextrose 5%. 1000 milliLiter(s) (50 mL/Hr) IV Continuous <Continuous>  dextrose 50% Injectable 25 Gram(s) IV Push once  dextrose 50% Injectable 12.5 Gram(s) IV Push once  dextrose 50% Injectable 25 Gram(s) IV Push once  enoxaparin Injectable 40 milliGRAM(s) SubCutaneous every 24 hours  glucagon  Injectable 1 milliGRAM(s) IntraMuscular once  insulin lispro (ADMELOG) corrective regimen sliding scale   SubCutaneous three times a day before meals  losartan 25 milliGRAM(s) Oral daily  metoprolol succinate ER 25 milliGRAM(s) Oral daily  pantoprazole    Tablet 40 milliGRAM(s) Oral before breakfast      REVIEW OF SYSTEMS:    CONSTITUTIONAL: No weakness, fevers or chills  EYES/ENT: No visual changes;  No vertigo or throat pain   NECK: No pain or stiffness  RESPIRATORY: No cough, wheezing, hemoptysis; No shortness of breath  CARDIOVASCULAR: No chest pain or palpitations  GASTROINTESTINAL: No abdominal or epigastric pain. No nausea, vomiting, or hematemesis; No diarrhea or constipation. No melena or hematochezia.  GENITOURINARY: No dysuria, frequency or hematuria  NEUROLOGICAL: No numbness or weakness  SKIN: No itching, rashes      Vital Signs Last 24 Hrs  T(C): 37.1 (02 Dec 2022 12:34), Max: 37.1 (02 Dec 2022 12:34)  T(F): 98.7 (02 Dec 2022 12:34), Max: 98.7 (02 Dec 2022 12:34)  HR: 58 (02 Dec 2022 12:34) (56 - 64)  BP: 110/58 (02 Dec 2022 12:34) (102/64 - 173/78)  BP(mean): 76 (02 Dec 2022 00:03) (76 - 109)  RR: 18 (02 Dec 2022 12:34) (16 - 20)  SpO2: 98% (02 Dec 2022 12:34) (97% - 100%)    Parameters below as of 02 Dec 2022 12:34  Patient On (Oxygen Delivery Method): room air                              12.7   4.68  )-----------( 196      ( 02 Dec 2022 05:54 )             37.3   12-02    136  |  99  |  15  ----------------------------<  256<H>  3.7   |  28  |  0.76    Ca    9.6      02 Dec 2022 05:54    TPro  7.4  /  Alb  4.2  /  TBili  1.0  /  DBili  x   /  AST  32  /  ALT  38  /  AlkPhos  155<H>  12-02    PT/INR - ( 01 Dec 2022 16:43 )   PT: 12.5 sec;   INR: 1.08 ratio         PTT - ( 01 Dec 2022 16:43 )  PTT:31.7 sec    I&O's Summary        Physical Exam  General:  Cardiac: s1s2, RR,  /   murmur  Pulmonary:   Gastrointestinal: soft abdomen, nontender, nondistended, + bowel sounds throughout  Extremities:   Neuro:  EKG:      < from: Transthoracic Echocardiogram (12.02.22 @ 07:06) >  LA:     4.1    2.0 - 4.0 cm  Ao:     2.9    2.0 - 3.8 cm  SEPTUM: 0.8    0.6 - 1.2 cm  PWT:    0.9    0.6 -1.1 cm  LVIDd:  4.7    3.0 - 5.6 cm  LVIDs:  3.0    1.8 - 4.0 cm  Derived variables:  LVMI: 72 g/m2  RWT: 0.38  Fractional short: 36 %  EF (Visual Estimate):  %  EF (Campbell Rule): 60 %Doppler Peak Velocity (m/sec):  AoV=3.0  ------------------------------------------------------------------------  Observations:  Mitral Valve: Normal mitral valve. Minimal mitral  regurgitation.  Aortic Valve/Aorta: Calcified trileaflet aortic valve with  decreased opening. Peak transaortic valve gradient equals  36 mm Hg, mean transaortic valve gradient equals 20 mm Hg,  estimated aortic valve area equals 0.9 sqcm (by continuity  equation), aortic valve velocity time integral equals 78  cm, consistent with low gradient, severe aortic stenosis  with preserved ejection fraction. Calculated SVi=41 mL/sqm.  Dimensionless Index=0.28. Visually, aortic valve appears  severely stenotic with significant color flow turbulence  across the valve. Consider additional imaging of the aortic  valve such as with cardiac CT or MORIAH if clinically  indicated. Mild aortic regurgitation. Peak left ventricular  outflow tract gradient equals 3 mm Hg, mean gradient is  equal to 2 mm Hg, LVOT velocity time integral equals 22 cm.  Aortic Root: 2.9 cm.  Ascending Aorta: 3.2 cm.  LVOT diameter: 2.1 cm.  Left Atrium: Normal left atrium.  LA volume index = 23  cc/m2.  Left Ventricle: Normal left ventricular systolic function.  No segmental wall motion abnormalities. Normal left  ventricular internal dimensions and wall thicknesses.  Indeterminate diastolic function.  Right Heart: Normal right atrium. Normal right ventricular  size and function. Normal tricuspid valve. Mild tricuspid  regurgitation. Normal pulmonic valve. Minimal pulmonic  regurgitation.  Pericardium/Pleura: Normal pericardium with no pericardial  effusion.  Hemodynamic: Estimated right atrial pressure equals 3 mmHg.  Estimated right ventricular systolic pressure equals 30 mm  Hg, assuming right atrial pressure equals 3 mm Hg,  consistent with normal pulmonary pressures.  ------------------------------------------------------------------------  Conclusions:  1. Normal mitral valve. Minimal mitral regurgitation.  2. Calcified trileaflet aortic valve with decreased  opening. Peak transaortic valve gradient equals 36 mm Hg,  mean transaortic valve gradient equals 20 mm Hg, estimated  aortic valve area equals 0.9 sqcm (by continuity equation),  aortic valve velocity time integral equals 78 cm,  consistent with low gradient, severe aortic stenosis with  preserved ejection fraction. Calculated SVi=41 mL/sqm.  Dimensionless Index=0.28. Visually, aortic valve appears  severely stenotic with significant color flow turbulence  across the valve. Consider additional imaging of the aortic  valve such as with cardiac CT or MORIAH if clinically  indicated. Mild aortic regurgitation.  3. Normal left ventricular systolic function. No segmental  wall motion abnormalities.  4. Normal right ventricular size and function.  *** No previous Echo exam.    < end of copied text >   Structural Heart Team    HPI:  77M with a PMH T2DM and recently diagnosed CHF? who initially presented to clinic today to establish care but due to anginal symptoms was referred to the ED. Daughter is at bedside and able to translate. Patient states he has been having chest pain with exertion that has been worsening for the past couple of months that has been getting worse over the last few weeks. The chest pain is substernal, worse with exertion, better with rest. The pain starts after 2 blocks associated with shortness of breath, palpitations, dizziness, and gets better after a few minutes of relaxation. Denies orthopnea, PND, JOHANSEN and LE edema. He was evaluated in [facility] 2 months ago and had an echo and x-ray. Patient was started on Bumex and ASA. TTE report is in [place] but LVEF ~50% with mild diastolic dysfunction and moderate aortic insufficiency. He has never had a stress test or angiogram. His symptoms continued to worsen so [place] the [place] went to ED about 5 days ago had initial chest pain work up with troponin 21 and EKG non-ischemic and he was discharged with follow up with Cardiology.       videophone #[id number]  Extended history was taken which revealed that Mr Lopez has been experiencing sob and intermittent chest pain for 4 years, reportedly at rest and with exertion.  He was evaluated a few months ago in [facility] and told that he had a valve problem and a single artery blockage.  When asked why no intervention was performed he said that they did not have the capacity to treat him.  He is accompanied by his wife.  They state that there is paperwork with the results of the testing he has had but they do not have that paperwork with them here.        Allergies    No Known Allergies    Intolerances      PAST MEDICAL & SURGICAL HISTORY:  Chronic CHF    HTN (hypertension)    Diabetes    No significant past surgical history      MEDICATIONS  (STANDING):  aspirin enteric coated 81 milliGRAM(s) Oral daily  dextrose 5%. 1000 milliLiter(s) (100 mL/Hr) IV Continuous <Continuous>  dextrose 5%. 1000 milliLiter(s) (50 mL/Hr) IV Continuous <Continuous>  dextrose 50% Injectable 25 Gram(s) IV Push once  dextrose 50% Injectable 12.5 Gram(s) IV Push once  dextrose 50% Injectable 25 Gram(s) IV Push once  enoxaparin Injectable 40 milliGRAM(s) SubCutaneous every 24 hours  glucagon  Injectable 1 milliGRAM(s) IntraMuscular once  insulin lispro (ADMELOG) corrective regimen sliding scale   SubCutaneous three times a day before meals  losartan 25 milliGRAM(s) Oral daily  metoprolol succinate ER 25 milliGRAM(s) Oral daily  pantoprazole    Tablet 40 milliGRAM(s) Oral before breakfast      REVIEW OF SYSTEMS:    CONSTITUTIONAL: No weakness, fevers or chills  EYES/ENT: No visual changes;  No vertigo or throat pain   NECK: No pain or stiffness  RESPIRATORY: No cough, wheezing, hemoptysis; No shortness of breath  CARDIOVASCULAR: No chest pain or palpitations  GASTROINTESTINAL: No abdominal or epigastric pain. No nausea, vomiting, or hematemesis; No diarrhea or constipation. No melena or hematochezia.  GENITOURINARY: No dysuria, frequency or hematuria  NEUROLOGICAL: No numbness or weakness  SKIN: No itching, rashes      Vital Signs Last 24 Hrs  T(C): 37.1 (02 Dec 2022 12:34), Max: 37.1 (02 Dec 2022 12:34)  T(F): 98.7 (02 Dec 2022 12:34), Max: 98.7 (02 Dec 2022 12:34)  HR: 58 (02 Dec 2022 12:34) (56 - 64)  BP: 110/58 (02 Dec 2022 12:34) (102/64 - 173/78)  BP(mean): 76 (02 Dec 2022 00:03) (76 - 109)  RR: 18 (02 Dec 2022 12:34) (16 - 20)  SpO2: 98% (02 Dec 2022 12:34) (97% - 100%)    Parameters below as of 02 Dec 2022 12:34  Patient On (Oxygen Delivery Method): room air                              12.7   4.68  )-----------( 196      ( 02 Dec 2022 05:54 )             37.3   12-02    136  |  99  |  15  ----------------------------<  256<H>  3.7   |  28  |  0.76    Ca    9.6      02 Dec 2022 05:54    TPro  7.4  /  Alb  4.2  /  TBili  1.0  /  DBili  x   /  AST  32  /  ALT  38  /  AlkPhos  155<H>  12-02    PT/INR - ( 01 Dec 2022 16:43 )   PT: 12.5 sec;   INR: 1.08 ratio         PTT - ( 01 Dec 2022 16:43 )  PTT:31.7 sec    I&O's Summary        Physical Exam  General: NAD, WDWN  Cardiac: s1s2, RRR, II/VI systolic murmur  Pulmonary: CTA b/l, no w/r/r  Gastrointestinal: soft abdomen, nontender, nondistended, + bowel sounds throughout  Extremities: no edema, 2+ DP pulses  Neuro: A&Ox3, nonfocal  EKG: SR      < from: Transthoracic Echocardiogram (12.02.22 @ 07:06) >  LA:     4.1    2.0 - 4.0 cm  Ao:     2.9    2.0 - 3.8 cm  SEPTUM: 0.8    0.6 - 1.2 cm  PWT:    0.9    0.6 -1.1 cm  LVIDd:  4.7    3.0 - 5.6 cm  LVIDs:  3.0    1.8 - 4.0 cm  Derived variables:  LVMI: 72 g/m2  RWT: 0.38  Fractional short: 36 %  EF (Visual Estimate):  %  EF (Campbell Rule): 60 %Doppler Peak Velocity (m/sec):  AoV=3.0  ------------------------------------------------------------------------  Observations:  Mitral Valve: Normal mitral valve. Minimal mitral  regurgitation.  Aortic Valve/Aorta: Calcified trileaflet aortic valve with  decreased opening. Peak transaortic valve gradient equals  36 mm Hg, mean transaortic valve gradient equals 20 mm Hg,  estimated aortic valve area equals 0.9 sqcm (by continuity  equation), aortic valve velocity time integral equals 78  cm, consistent with low gradient, severe aortic stenosis  with preserved ejection fraction. Calculated SVi=41 mL/sqm.  Dimensionless Index=0.28. Visually, aortic valve appears  severely stenotic with significant color flow turbulence  across the valve. Consider additional imaging of the aortic  valve such as with cardiac CT or MORIAH if clinically  indicated. Mild aortic regurgitation. Peak left ventricular  outflow tract gradient equals 3 mm Hg, mean gradient is  equal to 2 mm Hg, LVOT velocity time integral equals 22 cm.  Aortic Root: 2.9 cm.  Ascending Aorta: 3.2 cm.  LVOT diameter: 2.1 cm.  Left Atrium: Normal left atrium.  LA volume index = 23  cc/m2.  Left Ventricle: Normal left ventricular systolic function.  No segmental wall motion abnormalities. Normal left  ventricular internal dimensions and wall thicknesses.  Indeterminate diastolic function.  Right Heart: Normal right atrium. Normal right ventricular  size and function. Normal tricuspid valve. Mild tricuspid  regurgitation. Normal pulmonic valve. Minimal pulmonic  regurgitation.  Pericardium/Pleura: Normal pericardium with no pericardial  effusion.  Hemodynamic: Estimated right atrial pressure equals 3 mmHg.  Estimated right ventricular systolic pressure equals 30 mm  Hg, assuming right atrial pressure equals 3 mm Hg,  consistent with normal pulmonary pressures.  ------------------------------------------------------------------------  Conclusions:  1. Normal mitral valve. Minimal mitral regurgitation.  2. Calcified trileaflet aortic valve with decreased  opening. Peak transaortic valve gradient equals 36 mm Hg,  mean transaortic valve gradient equals 20 mm Hg, estimated  aortic valve area equals 0.9 sqcm (by continuity equation),  aortic valve velocity time integral equals 78 cm,  consistent with low gradient, severe aortic stenosis with  preserved ejection fraction. Calculated SVi=41 mL/sqm.  Dimensionless Index=0.28. Visually, aortic valve appears  severely stenotic with significant color flow turbulence  across the valve. Consider additional imaging of the aortic  valve such as with cardiac CT or MORIAH if clinically  indicated. Mild aortic regurgitation.  3. Normal left ventricular systolic function. No segmental  wall motion abnormalities.  4. Normal right ventricular size and function.  *** No previous Echo exam.    < end of copied text >

## 2022-12-02 NOTE — PATIENT PROFILE ADULT - FALL HARM RISK - HARM RISK INTERVENTIONS

## 2022-12-02 NOTE — CONSULT NOTE ADULT - NS ATTEND AMEND GEN_ALL_CORE FT
Events that transpired leading to the patient's current hospitalization were reviewed.  The patient's hospital course was gone over.  Past medical history/surgical history/family history/social history was reviewed.  14 point review of systems and physical examination as noted above.    The patient reports that over the last 4 years he has been experiencing left-sided chest discomfort which she has primarily noted upon exertion and carrying out activities.  This left-sided chest discomfort has gradually gotten worse in nature and it can be associated with shortness of breath and fatigue.  Denies any lower extremity swelling.  Denies any change in orthopnea or paroxysmal nocturnal dyspnea.  No palpitations or lucy syncope.  He sometimes notes that when he is working or carrying something heavy he may feel lightheaded and dizzy.  TTE was reviewed by structural imager/Dr. Mendez.  Patient was found to have moderate aortic valve stenosis with aortic valve area 1 cm², DVI 0.29 with peak gradient 31 and mean gradient 17 mmHg.  Peak velocity is equal to 2.7 m/s with LV stroke-volume index of 40ml/meter squared.  Normal EF.    Mr. Lopez reports that he has undergone a thorough work-up in [facility] which includes echocardiogram imaging and stress test imaging.  He was told that he has aortic valve stenosis and severe one-vessel obstructive coronary artery disease.    At this time it is recommended that the patient undergo a MORIAH to further assess the severity of his aortic valve.  Additionally, a right and left heart cardiac catheterization should be performed.  If the patient is found to have severe obstructive coronary artery disease he should be evaluated by the cardiac surgery team for CABG/SAVR.      Carotid ultrasound from 12/2/2002 demonstrated calcified plaque in the proximal right internal carotid artery creating hemodynamically significant stenosis.  The degree of luminal narrowing is estimated at 70 to 99% by diameter.  It is recommended that the patient be seen by vascular surgery to evaluate severe stenosis of right internal carotid artery.    All question concerns of the patient and his wife were addressed.  They were asked to please bring in outpatient work-up that was performed by his cardiologist and medical team in [facility].    Findings and plan were discussed with cardiology/Dr. Joe.     #[id number] was used throughout the entire assessment/examination.

## 2022-12-03 LAB
GLUCOSE BLDC GLUCOMTR-MCNC: 209 MG/DL — HIGH (ref 70–99)
GLUCOSE BLDC GLUCOMTR-MCNC: 215 MG/DL — HIGH (ref 70–99)
GLUCOSE BLDC GLUCOMTR-MCNC: 241 MG/DL — HIGH (ref 70–99)
GLUCOSE BLDC GLUCOMTR-MCNC: 288 MG/DL — HIGH (ref 70–99)
SARS-COV-2 RNA SPEC QL NAA+PROBE: SIGNIFICANT CHANGE UP

## 2022-12-03 RX ADMIN — PANTOPRAZOLE SODIUM 40 MILLIGRAM(S): 20 TABLET, DELAYED RELEASE ORAL at 05:58

## 2022-12-03 RX ADMIN — ENOXAPARIN SODIUM 40 MILLIGRAM(S): 100 INJECTION SUBCUTANEOUS at 21:28

## 2022-12-03 RX ADMIN — Medication 25 MILLIGRAM(S): at 05:58

## 2022-12-03 RX ADMIN — Medication 81 MILLIGRAM(S): at 11:56

## 2022-12-03 RX ADMIN — Medication 4: at 09:15

## 2022-12-03 RX ADMIN — LOSARTAN POTASSIUM 25 MILLIGRAM(S): 100 TABLET, FILM COATED ORAL at 05:58

## 2022-12-03 RX ADMIN — Medication 6: at 18:01

## 2022-12-03 RX ADMIN — Medication 4: at 13:08

## 2022-12-03 NOTE — CONSULT NOTE ADULT - SUBJECTIVE AND OBJECTIVE BOX
HPI:  77M with a PMH T2DM and recently diagnosed CHF? who initially presented to clinic today to establish care but due to anginal symptoms was referred to the ED. Daughter is at bedside and able to translate. Patient states he has been having chest pain with exertion pain, recieved TTE and will now require cardiac catheterization and MORIAH in the next few days per primary team. As a part of workup, patient received  carotid duplex as a part of workup and was found to have 70-99% R ICA, vascular consulted for recommendations         PAST MEDICAL & SURGICAL HISTORY:  Chronic CHF      HTN (hypertension)      Diabetes      No significant past surgical history        Allergies    No Known Allergies    Intolerances      Home Medications:    MEDICATIONS  (STANDING):  aspirin enteric coated 81 milliGRAM(s) Oral daily  dextrose 5%. 1000 milliLiter(s) (100 mL/Hr) IV Continuous <Continuous>  dextrose 5%. 1000 milliLiter(s) (50 mL/Hr) IV Continuous <Continuous>  dextrose 50% Injectable 25 Gram(s) IV Push once  dextrose 50% Injectable 12.5 Gram(s) IV Push once  dextrose 50% Injectable 25 Gram(s) IV Push once  enoxaparin Injectable 40 milliGRAM(s) SubCutaneous every 24 hours  glucagon  Injectable 1 milliGRAM(s) IntraMuscular once  insulin lispro (ADMELOG) corrective regimen sliding scale   SubCutaneous three times a day before meals  losartan 25 milliGRAM(s) Oral daily  metoprolol succinate ER 25 milliGRAM(s) Oral daily  pantoprazole    Tablet 40 milliGRAM(s) Oral before breakfast      SOCIAL HISTORY:  FAMILY HISTORY:      ___________________________________________  OBJECTIVE:  Vital Signs Last 24 Hrs  T(C): 36.8 (03 Dec 2022 12:10), Max: 36.8 (03 Dec 2022 12:10)  T(F): 98.3 (03 Dec 2022 12:10), Max: 98.3 (03 Dec 2022 12:10)  HR: 71 (03 Dec 2022 12:10) (61 - 71)  BP: 100/61 (03 Dec 2022 12:10) (100/61 - 159/73)  BP(mean): --  RR: 18 (03 Dec 2022 12:10) (18 - 18)  SpO2: 100% (03 Dec 2022 12:10) (96% - 100%)    Parameters below as of 03 Dec 2022 12:10  Patient On (Oxygen Delivery Method): room air    CAPILLARY BLOOD GLUCOSE      POCT Blood Glucose.: 209 mg/dL (03 Dec 2022 13:03)    I&O's Detail    02 Dec 2022 07:01  -  03 Dec 2022 07:00  --------------------------------------------------------  IN:    Oral Fluid: 120 mL  Total IN: 120 mL    OUT:  Total OUT: 0 mL    Total NET: 120 mL      03 Dec 2022 07:01  -  03 Dec 2022 14:17  --------------------------------------------------------  IN:    Oral Fluid: 600 mL  Total IN: 600 mL    OUT:    Voided (mL): 200 mL  Total OUT: 200 mL    Total NET: 400 mL        General: Well developed, well nourished, NAD  Neuro: Alert and oriented, no focal deficits, moves all extremities spontaneously  CNII-IV intact  HEENT: NCAT, EOMI, anicteric, mucosa moist  Respiratory: Airway patent, respirations unlabored  CVS: Regular rate and rhythm  Abdomen: Soft, nontender, nondistended  Extremities: No edema, sensation and movement grossly intact  BL DP/PT palpable   Skin: Warm, dry, appropriate color  ____________________________________________  LABS:  CBC Full  -  ( 02 Dec 2022 05:54 )  WBC Count : 4.68 K/uL  RBC Count : 4.03 M/uL  Hemoglobin : 12.7 g/dL  Hematocrit : 37.3 %  Platelet Count - Automated : 196 K/uL  Mean Cell Volume : 92.6 fl  Mean Cell Hemoglobin : 31.5 pg  Mean Cell Hemoglobin Concentration : 34.0 gm/dL  Auto Neutrophil # : x  Auto Lymphocyte # : x  Auto Monocyte # : x  Auto Eosinophil # : x  Auto Basophil # : x  Auto Neutrophil % : x  Auto Lymphocyte % : x  Auto Monocyte % : x  Auto Eosinophil % : x  Auto Basophil % : x    12-02    136  |  99  |  15  ----------------------------<  256<H>  3.7   |  28  |  0.76    Ca    9.6      02 Dec 2022 05:54    TPro  7.4  /  Alb  4.2  /  TBili  1.0  /  DBili  x   /  AST  32  /  ALT  38  /  AlkPhos  155<H>  12-02    LIVER FUNCTIONS - ( 02 Dec 2022 05:54 )  Alb: 4.2 g/dL / Pro: 7.4 g/dL / ALK PHOS: 155 U/L / ALT: 38 U/L / AST: 32 U/L / GGT: x           PT/INR - ( 01 Dec 2022 16:43 )   PT: 12.5 sec;   INR: 1.08 ratio         PTT - ( 01 Dec 2022 16:43 )  PTT:31.7 sec          ____________________________________________  MICRO:  RECENT CULTURES:    ____________________________________________  RADIOLOGY:

## 2022-12-03 NOTE — CONSULT NOTE ADULT - ASSESSMENT
77M with a PMH T2DM and recently diagnosed CHF? who initially presented to clinic today to establish care but due to anginal symptoms was referred to the ED. Daughter is at bedside and able to translate. Patient states he has been having chest pain with exertion pain, recieved TTE and will now require cardiac catheterization and MORIAH in the next few days per primary team. As a part of workup, patient received  carotid duplex as a part of workup and was found to have 70-99% R ICA, vascular consulted for recommendations       Note incomplete  77M with a PMH T2DM and recently diagnosed CHF? who initially presented to clinic today to establish care but due to anginal symptoms was referred to the ED. Daughter is at bedside and able to translate. Patient states he has been having chest pain with exertion pain, recieved TTE and will now require cardiac catheterization and MORIAH in the next few days per primary team. As a part of workup, patient received  carotid duplex as a part of workup and was found to have 70-99% R ICA, vascular consulted for recommendations       Plan  - C/w ASA, statin  - No acute surgical intervention  - F/u outpatient    NOTE INCOMPLETE 77M with a PMH T2DM and recently diagnosed CHF? who initially presented to clinic today to establish care but due to anginal symptoms was referred to the ED. Daughter is at bedside and able to translate. Patient states he has been having chest pain with exertion pain, recieved TTE and will now require cardiac catheterization and MORIAH in the next few days per primary team. As a part of workup, patient received  carotid duplex as a part of workup and was found to have 70-99% R ICA, vascular consulted for recommendations       Plan  - C/w ASA, statin  - Please obtain BL neck CTA to assess vessel patency  - Will follow    D/w fellow on behalf of attending  Vascular  [id number]

## 2022-12-03 NOTE — PROGRESS NOTE ADULT - SUBJECTIVE AND OBJECTIVE BOX
Patient is a 77y old  Male who presents with a chief complaint of JOHANSEN / SOB (03 Dec 2022 14:16)      INTERVAL HPI/OVERNIGHT EVENTS: seen and examined, denies any CP  T(C): 36.5 (12-03-22 @ 20:09), Max: 36.8 (12-03-22 @ 12:10)  HR: 68 (12-03-22 @ 20:09) (61 - 71)  BP: 104/65 (12-03-22 @ 20:09) (100/61 - 159/73)  RR: 18 (12-03-22 @ 20:09) (18 - 18)  SpO2: 97% (12-03-22 @ 20:09) (96% - 100%)  Wt(kg): --  I&O's Summary    02 Dec 2022 07:01  -  03 Dec 2022 07:00  --------------------------------------------------------  IN: 120 mL / OUT: 0 mL / NET: 120 mL    03 Dec 2022 07:01  -  03 Dec 2022 22:19  --------------------------------------------------------  IN: 600 mL / OUT: 200 mL / NET: 400 mL        PAST MEDICAL & SURGICAL HISTORY:  Chronic CHF      HTN (hypertension)      Diabetes      No significant past surgical history          SOCIAL HISTORY  Alcohol:  Tobacco:  Illicit substance use:    FAMILY HISTORY:    REVIEW OF SYSTEMS:  CONSTITUTIONAL: No fever, weight loss, or fatigue  EYES: No eye pain, visual disturbances, or discharge  ENMT:  No difficulty hearing, tinnitus, vertigo; No sinus or throat pain  NECK: No pain or stiffness  RESPIRATORY: No cough, wheezing, chills or hemoptysis; No shortness of breath  CARDIOVASCULAR: No chest pain, palpitations, dizziness, or leg swelling  GASTROINTESTINAL: No abdominal or epigastric pain. No nausea, vomiting, or hematemesis; No diarrhea or constipation. No melena or hematochezia.  GENITOURINARY: No dysuria, frequency, hematuria, or incontinence  NEUROLOGICAL: No headaches, memory loss, loss of strength, numbness, or tremors  SKIN: No itching, burning, rashes, or lesions   LYMPH NODES: No enlarged glands  ENDOCRINE: No heat or cold intolerance; No hair loss  MUSCULOSKELETAL: No joint pain or swelling; No muscle, back, or extremity pain  PSYCHIATRIC: No depression, anxiety, mood swings, or difficulty sleeping  HEME/LYMPH: No easy bruising, or bleeding gums  ALLERY AND IMMUNOLOGIC: No hives or eczema    RADIOLOGY & ADDITIONAL TESTS:    Imaging Personally Reviewed:  [ ] YES  [ ] NO    Consultant(s) Notes Reviewed:  [ ] YES  [ ] NO    PHYSICAL EXAM:  GENERAL: NAD, well-groomed, well-developed  HEAD:  Atraumatic, Normocephalic  EYES: EOMI, PERRLA, conjunctiva and sclera clear  ENMT: No tonsillar erythema, exudates, or enlargement; Moist mucous membranes, Good dentition, No lesions  NECK: Supple, No JVD, Normal thyroid  NERVOUS SYSTEM:  Alert & Oriented X3, Good concentration; Motor Strength 5/5 B/L upper and lower extremities; DTRs 2+ intact and symmetric  CHEST/LUNG: Clear to percussion bilaterally; No rales, rhonchi, wheezing, or rubs  HEART: Regular rate and rhythm; No murmurs, rubs, or gallops  ABDOMEN: Soft, Nontender, Nondistended; Bowel sounds present  EXTREMITIES:  2+ Peripheral Pulses, No clubbing, cyanosis, or edema  LYMPH: No lymphadenopathy noted  SKIN: No rashes or lesions    LABS:                        12.7   4.68  )-----------( 196      ( 02 Dec 2022 05:54 )             37.3     12-02    136  |  99  |  15  ----------------------------<  256<H>  3.7   |  28  |  0.76    Ca    9.6      02 Dec 2022 05:54    TPro  7.4  /  Alb  4.2  /  TBili  1.0  /  DBili  x   /  AST  32  /  ALT  38  /  AlkPhos  155<H>  12-02        CAPILLARY BLOOD GLUCOSE      POCT Blood Glucose.: 241 mg/dL (03 Dec 2022 21:21)  POCT Blood Glucose.: 288 mg/dL (03 Dec 2022 17:14)  POCT Blood Glucose.: 209 mg/dL (03 Dec 2022 13:03)  POCT Blood Glucose.: 215 mg/dL (03 Dec 2022 09:07)  POCT Blood Glucose.: 253 mg/dL (02 Dec 2022 22:25)            MEDICATIONS  (STANDING):  aspirin enteric coated 81 milliGRAM(s) Oral daily  dextrose 5%. 1000 milliLiter(s) (100 mL/Hr) IV Continuous <Continuous>  dextrose 5%. 1000 milliLiter(s) (50 mL/Hr) IV Continuous <Continuous>  dextrose 50% Injectable 25 Gram(s) IV Push once  dextrose 50% Injectable 12.5 Gram(s) IV Push once  dextrose 50% Injectable 25 Gram(s) IV Push once  enoxaparin Injectable 40 milliGRAM(s) SubCutaneous every 24 hours  glucagon  Injectable 1 milliGRAM(s) IntraMuscular once  insulin lispro (ADMELOG) corrective regimen sliding scale   SubCutaneous three times a day before meals  losartan 25 milliGRAM(s) Oral daily  metoprolol succinate ER 25 milliGRAM(s) Oral daily  pantoprazole    Tablet 40 milliGRAM(s) Oral before breakfast    MEDICATIONS  (PRN):  dextrose Oral Gel 15 Gram(s) Oral once PRN Blood Glucose LESS THAN 70 milliGRAM(s)/deciliter      Care Discussed with Consultants/Other Providers [ ] YES  [ ] NO

## 2022-12-03 NOTE — PROGRESS NOTE ADULT - ASSESSMENT
A/P     Chest pain / CHF exacerbation   likely ac on chronic diastolic failure   seen by cardiology   -repeat TTE   structural heart team consulted     Rt. ICA stenosis   -seen by vascular team   advised B/l CTA neck to check patency of carotid vessels     Cardiac murmur   -check TTE   started on lasix   serial CE     DM-2 :  -c/w Insulin / FSBS  check A1c     HLD   -check lipid profile   started on statin     HTN :  -c/w home meds     dispo: CTA B/L neck

## 2022-12-03 NOTE — CONSULT NOTE ADULT - ATTENDING COMMENTS
Presents with JOHANSEN and chest pain. Exam c/w severe AS. No overt volume overload. No MI by EKG or trop. Echo, followed by R/L heart cath. Structural heart to see patient. Fully discussed with patient/family via .
Full consult note as above; discussed with vascular surgery fellow.  He has CAD and during his work-up was found to have a severe 70 to 99% right internal carotid artery stenosis.  He has never had a stroke or TIA.  He had a CTA that also noted a significant carotid stenosis.  The report is not yet available.  If the report confirms a severe stenosis then the carotid lesion could be treated with either surgery or a stent.  The final plan will be determined by the results of his cardiac work-up.

## 2022-12-04 LAB
GLUCOSE BLDC GLUCOMTR-MCNC: 174 MG/DL — HIGH (ref 70–99)
GLUCOSE BLDC GLUCOMTR-MCNC: 192 MG/DL — HIGH (ref 70–99)
GLUCOSE BLDC GLUCOMTR-MCNC: 231 MG/DL — HIGH (ref 70–99)
GLUCOSE BLDC GLUCOMTR-MCNC: 262 MG/DL — HIGH (ref 70–99)
HGB FLD-MCNC: 11.9 G/DL — LOW (ref 12.6–17.4)
OXYHGB MFR BLDMV: 68.2 % — LOW (ref 90–95)
SAO2 % BLD: 68.8 % — SIGNIFICANT CHANGE UP (ref 60–90)

## 2022-12-04 PROCEDURE — 93571 IV DOP VEL&/PRESS C FLO 1ST: CPT | Mod: 26,52,LD

## 2022-12-04 PROCEDURE — 99232 SBSQ HOSP IP/OBS MODERATE 35: CPT | Mod: GC

## 2022-12-04 PROCEDURE — 99152 MOD SED SAME PHYS/QHP 5/>YRS: CPT

## 2022-12-04 PROCEDURE — 93456 R HRT CORONARY ARTERY ANGIO: CPT | Mod: 26

## 2022-12-04 PROCEDURE — 70498 CT ANGIOGRAPHY NECK: CPT | Mod: 26

## 2022-12-04 RX ADMIN — Medication 2: at 09:18

## 2022-12-04 RX ADMIN — LOSARTAN POTASSIUM 25 MILLIGRAM(S): 100 TABLET, FILM COATED ORAL at 05:37

## 2022-12-04 RX ADMIN — PANTOPRAZOLE SODIUM 40 MILLIGRAM(S): 20 TABLET, DELAYED RELEASE ORAL at 05:37

## 2022-12-04 RX ADMIN — Medication 2: at 17:54

## 2022-12-04 RX ADMIN — Medication 25 MILLIGRAM(S): at 05:37

## 2022-12-04 RX ADMIN — Medication 81 MILLIGRAM(S): at 07:46

## 2022-12-04 RX ADMIN — Medication 4: at 13:45

## 2022-12-04 NOTE — PROGRESS NOTE ADULT - SUBJECTIVE AND OBJECTIVE BOX
Patient is a 77y old  Male who presents with a chief complaint of JOHANSEN / SOB (04 Dec 2022 06:55)      INTERVAL HPI/OVERNIGHT EVENTS: seen and examined , s/p cardiac cath : shows significant LAD dis   T(C): 36.4 (12-04-22 @ 20:08), Max: 36.5 (12-04-22 @ 05:08)  HR: 65 (12-04-22 @ 20:08) (56 - 70)  BP: 105/64 (12-04-22 @ 20:08) (105/64 - 156/67)  RR: 18 (12-04-22 @ 20:08) (11 - 18)  SpO2: 96% (12-04-22 @ 20:08) (93% - 98%)  Wt(kg): --  I&O's Summary    03 Dec 2022 07:01  -  04 Dec 2022 07:00  --------------------------------------------------------  IN: 960 mL / OUT: 200 mL / NET: 760 mL    04 Dec 2022 07:01  -  04 Dec 2022 22:21  --------------------------------------------------------  IN: 300 mL / OUT: 0 mL / NET: 300 mL        PAST MEDICAL & SURGICAL HISTORY:  Chronic CHF      HTN (hypertension)      Diabetes      No significant past surgical history          SOCIAL HISTORY  Alcohol:  Tobacco:  Illicit substance use:    FAMILY HISTORY:    REVIEW OF SYSTEMS:  CONSTITUTIONAL: No fever, weight loss, or fatigue  EYES: No eye pain, visual disturbances, or discharge  ENMT:  No difficulty hearing, tinnitus, vertigo; No sinus or throat pain  NECK: No pain or stiffness  RESPIRATORY: No cough, wheezing, chills or hemoptysis; No shortness of breath  CARDIOVASCULAR: No chest pain, palpitations, dizziness, or leg swelling  GASTROINTESTINAL: No abdominal or epigastric pain. No nausea, vomiting, or hematemesis; No diarrhea or constipation. No melena or hematochezia.  GENITOURINARY: No dysuria, frequency, hematuria, or incontinence  NEUROLOGICAL: No headaches, memory loss, loss of strength, numbness, or tremors  SKIN: No itching, burning, rashes, or lesions   LYMPH NODES: No enlarged glands  ENDOCRINE: No heat or cold intolerance; No hair loss  MUSCULOSKELETAL: No joint pain or swelling; No muscle, back, or extremity pain  PSYCHIATRIC: No depression, anxiety, mood swings, or difficulty sleeping  HEME/LYMPH: No easy bruising, or bleeding gums  ALLERY AND IMMUNOLOGIC: No hives or eczema    RADIOLOGY & ADDITIONAL TESTS:    Imaging Personally Reviewed:  [ ] YES  [ ] NO    Consultant(s) Notes Reviewed:  [ ] YES  [ ] NO    PHYSICAL EXAM:  GENERAL: NAD, well-groomed, well-developed  HEAD:  Atraumatic, Normocephalic  EYES: EOMI, PERRLA, conjunctiva and sclera clear  ENMT: No tonsillar erythema, exudates, or enlargement; Moist mucous membranes, Good dentition, No lesions  NECK: Supple, No JVD, Normal thyroid  NERVOUS SYSTEM:  Alert & Oriented X3, Good concentration; Motor Strength 5/5 B/L upper and lower extremities; DTRs 2+ intact and symmetric  CHEST/LUNG: Clear to percussion bilaterally; No rales, rhonchi, wheezing, or rubs  HEART: Regular rate and rhythm; No murmurs, rubs, or gallops  ABDOMEN: Soft, Nontender, Nondistended; Bowel sounds present  EXTREMITIES:  2+ Peripheral Pulses, No clubbing, cyanosis, or edema  LYMPH: No lymphadenopathy noted  SKIN: No rashes or lesions    LABS:              CAPILLARY BLOOD GLUCOSE      POCT Blood Glucose.: 262 mg/dL (04 Dec 2022 21:42)  POCT Blood Glucose.: 174 mg/dL (04 Dec 2022 17:29)  POCT Blood Glucose.: 231 mg/dL (04 Dec 2022 13:42)  POCT Blood Glucose.: 192 mg/dL (04 Dec 2022 08:49)            MEDICATIONS  (STANDING):  aspirin enteric coated 81 milliGRAM(s) Oral daily  dextrose 5%. 1000 milliLiter(s) (100 mL/Hr) IV Continuous <Continuous>  dextrose 5%. 1000 milliLiter(s) (50 mL/Hr) IV Continuous <Continuous>  dextrose 50% Injectable 25 Gram(s) IV Push once  dextrose 50% Injectable 12.5 Gram(s) IV Push once  dextrose 50% Injectable 25 Gram(s) IV Push once  enoxaparin Injectable 40 milliGRAM(s) SubCutaneous every 24 hours  glucagon  Injectable 1 milliGRAM(s) IntraMuscular once  insulin lispro (ADMELOG) corrective regimen sliding scale   SubCutaneous three times a day before meals  losartan 25 milliGRAM(s) Oral daily  metoprolol succinate ER 25 milliGRAM(s) Oral daily  pantoprazole    Tablet 40 milliGRAM(s) Oral before breakfast    MEDICATIONS  (PRN):  dextrose Oral Gel 15 Gram(s) Oral once PRN Blood Glucose LESS THAN 70 milliGRAM(s)/deciliter      Care Discussed with Consultants/Other Providers [ ] YES  [ ] NO

## 2022-12-04 NOTE — PROGRESS NOTE ADULT - SUBJECTIVE AND OBJECTIVE BOX
Patient seen and examined at bedside.    Overnight Events:     No AEON     Denies any chest pain, palpitations, has SOB on exertion             Current Meds:  aspirin enteric coated 81 milliGRAM(s) Oral daily  dextrose 5%. 1000 milliLiter(s) IV Continuous <Continuous>  dextrose 5%. 1000 milliLiter(s) IV Continuous <Continuous>  dextrose 50% Injectable 25 Gram(s) IV Push once  dextrose 50% Injectable 12.5 Gram(s) IV Push once  dextrose 50% Injectable 25 Gram(s) IV Push once  dextrose Oral Gel 15 Gram(s) Oral once PRN  enoxaparin Injectable 40 milliGRAM(s) SubCutaneous every 24 hours  glucagon  Injectable 1 milliGRAM(s) IntraMuscular once  insulin lispro (ADMELOG) corrective regimen sliding scale   SubCutaneous three times a day before meals  losartan 25 milliGRAM(s) Oral daily  metoprolol succinate ER 25 milliGRAM(s) Oral daily  pantoprazole    Tablet 40 milliGRAM(s) Oral before breakfast      Vitals:  T(F): 97.7 (12-04), Max: 98.3 (12-03)  HR: 69 (12-04) (68 - 71)  BP: 156/67 (12-04) (100/61 - 156/67)  RR: 18 (12-04)  SpO2: 98% (12-04)  I&O's Summary    02 Dec 2022 07:01  -  03 Dec 2022 07:00  --------------------------------------------------------  IN: 120 mL / OUT: 0 mL / NET: 120 mL    03 Dec 2022 07:01  -  04 Dec 2022 06:55  --------------------------------------------------------  IN: 960 mL / OUT: 200 mL / NET: 760 mL        Physical Exam:  Appearance: No acute distress; well appearing  Cardiovascular: RRR, S1, S2, systolic murmur; no edema; no JVD  Respiratory: Clear to auscultation bilaterally  Musculoskeletal: No clubbing; no joint deformity   Neurologic: Non-focal  Psychiatry: AAOx3, mood & affect appropriate  Skin: No rashes, ecchymoses, or cyanosis              CARDIAC MARKERS ( 01 Dec 2022 18:35 )  19 ng/L / x     / x     / x     / x     / x      CARDIAC MARKERS ( 01 Dec 2022 16:44 )  20 ng/L / x     / x     / x     / x     / x          Serum Pro-Brain Natriuretic Peptide: 89 pg/mL (12-01 @ 16:44)          New ECG(s): Personally reviewed    Echo:    Stress Testing:     Cath:    Imaging:    Interpretation of Telemetry:

## 2022-12-04 NOTE — PROGRESS NOTE ADULT - ASSESSMENT
A/P     Chest pain / CHF exacerbation   likely ac on chronic diastolic failure   seen by cardiology   -repeat TTE   structural heart team consulted     Rt. ICA stenosis   -seen by vascular team   advised B/l CTA neck to check patency of carotid vessels     Cardiac murmur   -check TTE   started on lasix   serial CE     DM-2 :  -c/w Insulin / FSBS  check A1c     HLD   -check lipid profile   started on statin     HTN :  -c/w home meds     dipso: no need for MORIAH in am , no need to be NPO. need to call CT surgery in am for significant LAD dis and aortic valve disease. Please call on call CT surgery team in morning   f/u CTA B/L neck for Carotid stenosis eval as per vascular team

## 2022-12-04 NOTE — PROGRESS NOTE ADULT - ASSESSMENT
Mr. Lopez is a 77M with a PMH T2DM and recently diagnosed CHF? who initially presented to clinic today to establish care but due to anginal symptoms was referred to the ED.     1. Severe Aortic Stenosis     RECOMMENDATIONS:  - No concern for ACS at this time  - Please order MORIAH for Monday, NPO Midnight Sunday   - Plan for RHC/LHC   - Appreciate structural and vascular recs  Mr. Lopez is a 77M with a PMH T2DM and recently diagnosed CHF? who initially presented to clinic today to establish care but due to anginal symptoms was referred to the ED.     1. Severe Aortic Stenosis     RECOMMENDATIONS:  - No concern for ACS at this time  - Please order MORIAH for Monday, NPO Midnight Sunday   - Plan for RHC/LHC today   - Appreciate structural and vascular recs  Mr. Lopez is a 77M with a PMH T2DM and recently diagnosed CHF? who initially presented to clinic today to establish care but due to anginal symptoms was referred to the ED.     1. Severe Aortic Stenosis     RECOMMENDATIONS:  - No concern for ACS at this time  - No need for MORIAH per Dr. Fung as patient is to get surgical AV replacement given LAD disease found on cath    - Plan for RHC/LHC today   - Appreciate structural and vascular recs

## 2022-12-05 DIAGNOSIS — E11.9 TYPE 2 DIABETES MELLITUS WITHOUT COMPLICATIONS: ICD-10-CM

## 2022-12-05 LAB
APPEARANCE UR: CLEAR — SIGNIFICANT CHANGE UP
BILIRUB UR-MCNC: NEGATIVE — SIGNIFICANT CHANGE UP
COLOR SPEC: SIGNIFICANT CHANGE UP
DIFF PNL FLD: NEGATIVE — SIGNIFICANT CHANGE UP
GLUCOSE BLDC GLUCOMTR-MCNC: 197 MG/DL — HIGH (ref 70–99)
GLUCOSE BLDC GLUCOMTR-MCNC: 223 MG/DL — HIGH (ref 70–99)
GLUCOSE BLDC GLUCOMTR-MCNC: 230 MG/DL — HIGH (ref 70–99)
GLUCOSE BLDC GLUCOMTR-MCNC: 253 MG/DL — HIGH (ref 70–99)
GLUCOSE UR QL: ABNORMAL
KETONES UR-MCNC: NEGATIVE — SIGNIFICANT CHANGE UP
LEUKOCYTE ESTERASE UR-ACNC: NEGATIVE — SIGNIFICANT CHANGE UP
MRSA PCR RESULT.: SIGNIFICANT CHANGE UP
NITRITE UR-MCNC: NEGATIVE — SIGNIFICANT CHANGE UP
PH UR: 6 — SIGNIFICANT CHANGE UP (ref 5–8)
PROT UR-MCNC: NEGATIVE — SIGNIFICANT CHANGE UP
S AUREUS DNA NOSE QL NAA+PROBE: SIGNIFICANT CHANGE UP
SP GR SPEC: 1.01 — SIGNIFICANT CHANGE UP (ref 1.01–1.02)
UROBILINOGEN FLD QL: NEGATIVE — SIGNIFICANT CHANGE UP

## 2022-12-05 PROCEDURE — 99232 SBSQ HOSP IP/OBS MODERATE 35: CPT

## 2022-12-05 PROCEDURE — 94010 BREATHING CAPACITY TEST: CPT | Mod: 26

## 2022-12-05 PROCEDURE — 99253 IP/OBS CNSLTJ NEW/EST LOW 45: CPT

## 2022-12-05 PROCEDURE — 99252 IP/OBS CONSLTJ NEW/EST SF 35: CPT | Mod: 57

## 2022-12-05 RX ORDER — ATORVASTATIN CALCIUM 80 MG/1
20 TABLET, FILM COATED ORAL AT BEDTIME
Refills: 0 | Status: DISCONTINUED | OUTPATIENT
Start: 2022-12-05 | End: 2022-12-09

## 2022-12-05 RX ADMIN — Medication 4: at 12:44

## 2022-12-05 RX ADMIN — Medication 2: at 08:59

## 2022-12-05 RX ADMIN — ENOXAPARIN SODIUM 40 MILLIGRAM(S): 100 INJECTION SUBCUTANEOUS at 21:29

## 2022-12-05 RX ADMIN — LOSARTAN POTASSIUM 25 MILLIGRAM(S): 100 TABLET, FILM COATED ORAL at 05:58

## 2022-12-05 RX ADMIN — Medication 25 MILLIGRAM(S): at 05:58

## 2022-12-05 RX ADMIN — ATORVASTATIN CALCIUM 20 MILLIGRAM(S): 80 TABLET, FILM COATED ORAL at 21:32

## 2022-12-05 RX ADMIN — Medication 81 MILLIGRAM(S): at 12:44

## 2022-12-05 RX ADMIN — PANTOPRAZOLE SODIUM 40 MILLIGRAM(S): 20 TABLET, DELAYED RELEASE ORAL at 05:58

## 2022-12-05 RX ADMIN — Medication 4: at 18:05

## 2022-12-05 NOTE — PHYSICAL EXAM
[Normal] : alert and oriented, normal memory [de-identified] : Crescendo/Decrescendo systolic murmur heard best in the second intercostalR parasternal space.

## 2022-12-05 NOTE — CONSULT NOTE ADULT - PROBLEM SELECTOR RECOMMENDATION 9
Continue pre-op cardiac surgery workup  Check PFTs and preop lab work  Continue asa and beta-blocker  Recommend starting statin  Pt tentatively scheduled for CABG/AVR on Wednesday with Dr. Bowles - pt states he is currently undecided on going to OR for [facility] and would like to discuss options with his family Continue pre-op cardiac surgery workup  Check PFTs and preop lab work  Continue asa and beta-blocker  Recommend starting statin  US Carotids/CTA Neck with 70 - 99% AI stenosis - Vascular Surgery following  Pt tentatively scheduled for CABG/AVR on Wednesday with Dr. Bowles - pt states he is currently undecided on going to OR for [facility] and would like to discuss options with his family

## 2022-12-05 NOTE — CONSULT NOTE ADULT - SUBJECTIVE AND OBJECTIVE BOX
History of Present Illness:  77M with a PMH T2DM and recently diagnosed CHF? who initially presented to clinic today to establish care but due to anginal symptoms was referred to the ED. Daughter is at bedside and able to translate. Patient states he has been having chest pain with exertion that has been worsening for the past couple of months that has been getting worse over the last few weeks. The chest pain is substernal, worse with exertion, better with rest. The pain starts after 2 blocks associated with shortness of breath, palpitations, dizziness, and gets better after a few minutes of relaxation. Denies orthopnea, PND, JOHANSEN and LE edema. He was evaluated in [facility] 2 months ago and had an echo and x-ray. Patient was started on Bumex and ASA. TTE report is in Slovenian but LVEF ~50% with mild diastolic dysfunction and moderate aortic insufficiency. He has never had a stress test or angiogram. His symptoms continued to worsen so [place] the [place] went to ED about 5 days ago had initial chest pain work up with troponin 21 and EKG non-ischemic and he was discharged with follow up with Cardiology.    seen by cardio in ED  (01 Dec 2022 16:42)       Past Medical History  Chronic CHF    HTN (hypertension)    Diabetes        Past Surgical History  No significant past surgical history        MEDICATIONS  (STANDING):  aspirin enteric coated 81 milliGRAM(s) Oral daily  enoxaparin Injectable 40 milliGRAM(s) SubCutaneous every 24 hours  glucagon  Injectable 1 milliGRAM(s) IntraMuscular once  insulin lispro (ADMELOG) corrective regimen sliding scale   SubCutaneous three times a day before meals  losartan 25 milliGRAM(s) Oral daily  metoprolol succinate ER 25 milliGRAM(s) Oral daily  pantoprazole    Tablet 40 milliGRAM(s) Oral before breakfast      Vital Signs Last 24 Hrs  T(C): 36.6 (12-05-22 @ 05:55), Max: 36.6 (12-05-22 @ 05:55)  T(F): 97.9 (12-05-22 @ 05:55), Max: 97.9 (12-05-22 @ 05:55)  HR: 66 (12-05-22 @ 05:55) (56 - 70)  BP: 115/71 (12-05-22 @ 05:55) (105/64 - 138/64)  RR: 18 (12-05-22 @ 05:55) (15 - 18)  SpO2: 95% (12-05-22 @ 05:55) (94% - 97%)             Height (cm): 162.6    Weight (kg): 83.1    BMI (kg/m2): 31.4   (03 Dec 2022 04:51)      Allergies: No Known Allergies      SOCIAL HISTORY:  Pt traveling from [facility], staying with daughter, independent with ADLs  Smoker: [ ] Yes  [X] No                  Pt denies  ETOH use: [ ] Yes  [X] No               Pt denies  Ilicit Drug use:  [ ] Yes  [X] No        Pt denies    Review of Systems  GENERAL:  no weakness, fatigue, fevers or chills  NEURO: no dizziness, numbness, tingling or weakness  SKIN: no itching, burning, rashes, or lesions   HEENT: no visual changes;  no headache, no vertigo, no recent colds  RESPIRATORY: no shortness of breath, no cough, sputum, wheezing  CARDIOVASCULAR:  no chest pain,  or palpitations  GI: no abd pain. no N/V/D.  PERIPHERAL VASCULAR: no swelling, no tenderness, no erythema      PHYSICAL EXAM  General: Well nourished, well developed, NAD.                                              Neuro: Normal exam oriented to person/place & time with no focal motor or sensory  deficits.                    Eyes: Normal exam of conjunctiva & lids, pupils equally reactive.   ENT: Normal exam of nasal/oral mucosa with absence of cyanosis.   Neck: Normal exam of jugular veins, trachea & thyroid.   Chest: Normal lung exam with good air movement absence of wheezes, rales, or rhonchi.                                                                         CV:  Auscultation: normal S1S2, RRR   Carotids: No Bruits[X]  Abdominal Aorta: normal [X] nonpalpable[X]                                                                         GI: Normal exam of abdomen with no noted masses or tenderness. +[id number]                                                                                            Extremities: Normal no evidence of cyanosis or deformity, Edema: none  Lower Extremity Pulses: Right[+2DP] Left[+2DP] Varicosities[none]  SKIN : Normal exam to inspection & palpation. Right femoral incision CDI AVINASH, no bleeding, no hematoma.                                                            < from: Cardiac Catheterization (12.04.22 @ 10:04) >    Coronary Angiography   The coronary circulation is left dominant.      LM   Left main artery: Angiography shows mild atherosclerosis.      LAD   Proximal left anterior descending: At the site of a large D1. There is  a 70 % calcified stenosis. EDITA Flow 3. Instant wave-free  ratio was performed with a calculated value of 0.87. Based on the  results, the lesion was judged to be significant.    CX   Circumflex: Angiography shows mild atherosclerosis.      RCA   Right coronary artery: Angiography shows mild atherosclerosis.        < from: Transthoracic Echocardiogram (12.02.22 @ 07:06) >  Observations:  Mitral Valve: Normal mitral valve. Minimal mitral  regurgitation.  Aortic Valve/Aorta: Calcified trileaflet aortic valve with  decreased opening. Peak transaortic valve gradient equals  36 mm Hg, mean transaortic valve gradient equals 20 mm Hg,  estimated aortic valve area equals 0.9 sqcm (by continuity  equation), aortic valve velocity time integral equals 78  cm, consistent with low gradient, severe aortic stenosis  with preserved ejection fraction. Calculated SVi=41 mL/sqm.  Dimensionless Index=0.28. Visually, aortic valve appears  severely stenotic with significant color flow turbulence  across the valve. Consider additional imaging of the aortic  valve such as with cardiac CT or MORIAH if clinically  indicated. Mild aortic regurgitation. Peak left ventricular  outflow tract gradient equals 3 mm Hg, mean gradient is  equal to 2 mm Hg, LVOT velocity time integral equals 22 cm.  Aortic Root: 2.9 cm.  Ascending Aorta: 3.2 cm.  LVOT diameter: 2.1 cm.  Left Atrium: Normal left atrium.  LA volume index = 23  cc/m2.  Left Ventricle: Normal left ventricular systolic function.  No segmental wall motion abnormalities. Normal left  ventricular internal dimensions and wall thicknesses.  Indeterminate diastolic function.  Right Heart: Normal right atrium. Normal right ventricular  size and function. Normal tricuspid valve. Mild tricuspid  regurgitation. Normal pulmonic valve. Minimal pulmonic  regurgitation.  Pericardium/Pleura: Normal pericardium with no pericardial  effusion.  Hemodynamic: Estimated right atrial pressure equals 3 mmHg.  Estimated right ventricular systolic pressure equals 30 mm  Hg, assuming right atrial pressure equals 3 mm Hg,  consistent with normal pulmonary pressures.

## 2022-12-05 NOTE — DISCUSSION/SUMMARY
[FreeTextEntry1] : 76 YO M with DM and ?HF who presents to establish care.\par \par #chest pain concerning for escalating angina. \par - EKG without acute ischemic changes\par - GIven concern for escalating angnal and likely cardiac chest pain, pt was walked down to the ED. and will be admitted for further  evaluation with TTE and will need an ischemic evaluation.

## 2022-12-05 NOTE — PROGRESS NOTE ADULT - SUBJECTIVE AND OBJECTIVE BOX
Subjective  No acute events reported overnight.  The patient is not having any cardiopulmonary complaints.  No fevers, chills or sweats.    Telemetry  Sinus rhythm/bradycardia with rates in the 50s to 90s    Review of systems  14 point review of systems is otherwise unremarkable separate described above in history of present illness    Physical examination  No apparent distress, alert and oriented x3, appropriate affect  JVD is not elevated, supple, no lymphadenopathy  Clear to auscultation bilaterally with no wheezing rhonchi crackles  Regular rate and rhythm with 3 out of 6 crescendo decrescendo murmur heard loudest at the right upper sternal border   Positive bowel sound, soft, nontender, nondistended, no masses guarding rebound tenderness  Moving all extremities spontaneously  2+ DP/PT pulse  No focal deficits    Assessment/plan  77-year-old man with a past medical history significant for    Type 2 diabetes mellitus    Who came [place] from [facility] as per his medical team for definitive therapy.  The patient reports that he has been experiencing increasing chest pain with associated shortness of breath upon exertion which has gradually worsened over the last 4 years.  Also notes worsening fatigue.    -- Patient has noted moderate–severe aortic valve stenosis with severe one-vessel obstructive coronary artery disease.  Multidisciplinary heart team conversation was had and it is recommended that the patient be evaluated by the cardiac surgical team for SAVR/CABG.   -- Patient is also noted to have episodes of feeling lightheaded.  Found to have a severe 70 to 99% right internal carotid artery stenosis.  Appreciate vascular surgical team assessment and recommendations.  -- Cardiac catheterization on 12/4/2022 demonstrated moderate to severe proximal LAD disease which was proven to be IFR positive at 0.87.  RA 5, RV 36/10, PA 36/12/21, PA sat 68%, cardiac index 2.98.  -- Continue aspirin 81 mg daily.  -- Continue losartan 25 mg daily metoprolol succinate 25 mg daily.  -- Would recommend the patient be started on a statin.  -- Continue telemetry monitoring.  --Aim for potassium greater than 4 magnesium greater than 2.    Findings and plan discussed with cardiac surgery/Dr. Bowles and hospitalist teams.    Attestation Statements:   Time-based billing (NON-critical care).     25 minutes spent on total encounter; more than 50% of the visit was spent counseling and / or coordinating care by the attending physician.  The necessity of the time spent during the encounter on this date of service was due to:   education, assessment and coordination of care.

## 2022-12-05 NOTE — CONSULT NOTE ADULT - ASSESSMENT
This is a 78y/o Jamaican speaking Male traveling from [facility] with PMHx of T2DM and recently diagnosed CHF? who initially presented to clinic today to establish care but due to anginal symptoms was referred to the [facility] ED. Patient states he has been having chest pain with exertion that has been worsening for the past couple of months that has been getting worse over the last few weeks. He was evaluated in [facility] 2 months ago and had an echo and x-ray. Patient was started on Bumex and ASA. TTE report is in Guinean but LVEF ~50% with mild diastolic dysfunction and moderate aortic insufficiency. Pt had repeat TTE here with NL LV/RV and severe AS SOURAV 0.9sqcm -Structural Heart was consulted for TAVR evaluation. Pt now s/p C 12/4 with prox LAD 70% stenosis and CT Surgery consulted for CABG/SAVR evaluation.  This is a 76y/o Kyrgyz speaking Male traveling from [facility] with PMHx of T2DM and recently diagnosed CHF? who initially presented to clinic today to establish care but due to anginal symptoms was referred to the [facility] ED. Patient states he has been having chest pain with exertion that has been worsening for the past couple of months that has been getting worse over the last few weeks. He was evaluated in [facility] 2 months ago and had an echo and x-ray. Patient was started on Bumex and ASA. TTE report is in Mongolian but LVEF ~50% with mild diastolic dysfunction and moderate aortic insufficiency. Pt had repeat TTE here with NL LV/RV and severe AS SOURAV 0.9sqcm -Structural Heart was consulted for TAVR evaluation. Pt now s/p C 12/4 with prox LAD 70% stenosis and CT Surgery consulted for CABG/SAVR evaluation.     Addendum:    Mr. Lopez has moderate-severe aortic stenosis and significant LAD disease that would require bifurcation PCI. I think CABG AVR is a good option for him given his presentation with chest pain.    I discussed the rationale, risks and benefits of surgery with him. He seemed a bit confused and it was apparent that he thought that his diagnostic catheterization was supposed to represent a definitive procedure to fix his AS and LAD disease. I explained to him that a definitive procedure is now required. He apparently had some tests in [facility] and knew about his condition.    I explained that our recommended course of action is surgery with AVR and CABG.     He said he would consider it and let me know in due course.    Ranjeet Bowles  Cardiovascular Surgeon

## 2022-12-05 NOTE — ASSESSMENT
[FreeTextEntry1] : 78 YO M with DM and ?HF who presents to establish care and for evaluation of chest pain\par \par #Chest pain\par -Pt with anginal chest pain, worsening over the past couple of months, even worse for the past few weeks. \par -His angina is a/w palpitations, SOB, light headedness. \par -Given his worsening anginal symptoms, pt will present to ED for further evaluation (TTE and ischemic Eval).\par \par Case discussed with Dr. Nguyen

## 2022-12-05 NOTE — PROGRESS NOTE ADULT - SUBJECTIVE AND OBJECTIVE BOX
Patient is a 77y old  Male who presents with a chief complaint of JOHANSEN / SOB (05 Dec 2022 14:01)      INTERVAL HPI/OVERNIGHT EVENTS:  T(C): 36.6 (22 @ 20:13), Max: 36.6 (22 @ 05:55)  HR: 71 (22 @ 20:13) (63 - 71)  BP: 125/68 (22 @ 20:13) (115/71 - 138/75)  RR: 18 (22 @ 20:13) (17 - 18)  SpO2: 96% (22 @ 20:13) (95% - 98%)  Wt(kg): --  I&O's Summary    04 Dec 2022 07:01  -  05 Dec 2022 07:00  --------------------------------------------------------  IN: 540 mL / OUT: 0 mL / NET: 540 mL    05 Dec 2022 07:01  -  06 Dec 2022 01:26  --------------------------------------------------------  IN: 360 mL / OUT: 0 mL / NET: 360 mL        PAST MEDICAL & SURGICAL HISTORY:  Chronic CHF      HTN (hypertension)      Diabetes      No significant past surgical history          SOCIAL HISTORY  Alcohol:  Tobacco:  Illicit substance use:    FAMILY HISTORY:    REVIEW OF SYSTEMS:  CONSTITUTIONAL: No fever, weight loss, or fatigue  EYES: No eye pain, visual disturbances, or discharge  ENMT:  No difficulty hearing, tinnitus, vertigo; No sinus or throat pain  NECK: No pain or stiffness  RESPIRATORY: No cough, wheezing, chills or hemoptysis; No shortness of breath  CARDIOVASCULAR: No chest pain, palpitations, dizziness, or leg swelling  GASTROINTESTINAL: No abdominal or epigastric pain. No nausea, vomiting, or hematemesis; No diarrhea or constipation. No melena or hematochezia.  GENITOURINARY: No dysuria, frequency, hematuria, or incontinence  NEUROLOGICAL: No headaches, memory loss, loss of strength, numbness, or tremors  SKIN: No itching, burning, rashes, or lesions   LYMPH NODES: No enlarged glands  ENDOCRINE: No heat or cold intolerance; No hair loss  MUSCULOSKELETAL: No joint pain or swelling; No muscle, back, or extremity pain  PSYCHIATRIC: No depression, anxiety, mood swings, or difficulty sleeping  HEME/LYMPH: No easy bruising, or bleeding gums  ALLERY AND IMMUNOLOGIC: No hives or eczema    RADIOLOGY & ADDITIONAL TESTS:    Imaging Personally Reviewed:  [ ] YES  [ ] NO    Consultant(s) Notes Reviewed:  [ ] YES  [ ] NO    PHYSICAL EXAM:  GENERAL: NAD, well-groomed, well-developed  HEAD:  Atraumatic, Normocephalic  EYES: EOMI, PERRLA, conjunctiva and sclera clear  ENMT: No tonsillar erythema, exudates, or enlargement; Moist mucous membranes, Good dentition, No lesions  NECK: Supple, No JVD, Normal thyroid  NERVOUS SYSTEM:  Alert & Oriented X3, Good concentration; Motor Strength 5/5 B/L upper and lower extremities; DTRs 2+ intact and symmetric  CHEST/LUNG: Clear to percussion bilaterally; No rales, rhonchi, wheezing, or rubs  HEART: Regular rate and rhythm; No murmurs, rubs, or gallops  ABDOMEN: Soft, Nontender, Nondistended; Bowel sounds present  EXTREMITIES:  2+ Peripheral Pulses, No clubbing, cyanosis, or edema  LYMPH: No lymphadenopathy noted  SKIN: No rashes or lesions    LABS:            Urinalysis Basic - ( 05 Dec 2022 15:36 )    Color: Light Yellow / Appearance: Clear / S.010 / pH: x  Gluc: x / Ketone: Negative  / Bili: Negative / Urobili: Negative   Blood: x / Protein: Negative / Nitrite: Negative   Leuk Esterase: Negative / RBC: x / WBC x   Sq Epi: x / Non Sq Epi: x / Bacteria: x      CAPILLARY BLOOD GLUCOSE      POCT Blood Glucose.: 253 mg/dL (05 Dec 2022 21:40)  POCT Blood Glucose.: 230 mg/dL (05 Dec 2022 17:12)  POCT Blood Glucose.: 223 mg/dL (05 Dec 2022 12:33)  POCT Blood Glucose.: 197 mg/dL (05 Dec 2022 08:43)        Urinalysis Basic - ( 05 Dec 2022 15:36 )    Color: Light Yellow / Appearance: Clear / S.010 / pH: x  Gluc: x / Ketone: Negative  / Bili: Negative / Urobili: Negative   Blood: x / Protein: Negative / Nitrite: Negative   Leuk Esterase: Negative / RBC: x / WBC x   Sq Epi: x / Non Sq Epi: x / Bacteria: x        MEDICATIONS  (STANDING):  aspirin enteric coated 81 milliGRAM(s) Oral daily  atorvastatin 20 milliGRAM(s) Oral at bedtime  dextrose 5%. 1000 milliLiter(s) (100 mL/Hr) IV Continuous <Continuous>  dextrose 5%. 1000 milliLiter(s) (50 mL/Hr) IV Continuous <Continuous>  dextrose 50% Injectable 25 Gram(s) IV Push once  dextrose 50% Injectable 12.5 Gram(s) IV Push once  dextrose 50% Injectable 25 Gram(s) IV Push once  enoxaparin Injectable 40 milliGRAM(s) SubCutaneous every 24 hours  glucagon  Injectable 1 milliGRAM(s) IntraMuscular once  insulin lispro (ADMELOG) corrective regimen sliding scale   SubCutaneous three times a day before meals  losartan 25 milliGRAM(s) Oral daily  metoprolol succinate ER 25 milliGRAM(s) Oral daily  pantoprazole    Tablet 40 milliGRAM(s) Oral before breakfast    MEDICATIONS  (PRN):  dextrose Oral Gel 15 Gram(s) Oral once PRN Blood Glucose LESS THAN 70 milliGRAM(s)/deciliter      Care Discussed with Consultants/Other Providers [ ] YES  [ ] NO

## 2022-12-05 NOTE — PROGRESS NOTE ADULT - ASSESSMENT
A/P     Chest pain / CHF exacerbation   likely ac on chronic diastolic failure   seen by cardiology   -repeat TTE   structural heart team consulted     Rt. ICA stenosis   -seen by vascular team   advised B/l CTA neck to check patency of carotid vessels     Cardiac murmur   -check TTE   started on lasix   serial CE     DM-2 :  -c/w Insulin / FSBS  check A1c     HLD   -check lipid profile   started on statin     HTN :  -c/w home meds     dipso: seen by CT surgery , f/u recommendation

## 2022-12-05 NOTE — REASON FOR VISIT
[Interpreters_IDNumber] : 3 [TWNoteComboBox1] : Senegalese [FreeTextEntry1] : 78 YO M with DM and ?HF who presents to establish care. Pt states that he has been chest pain with exertion that has been getting worse of the past couple of months, even worse over the past few weeks. The pain is substernal, worse with exertion, better with rest. At this time, the pain starts after 2 blocks, a/w shortness of breath, palpitations, dizziness, and gets better after 5 minutes of relaxation and heavy breathing. He states that he was evaluated in [facility] 2 months ago with TTE and xray, and he was started on bumex and aspirin. TTE report in Monegasque, but it appears LVEF =50%, mild diastolic dysfunction, mod AI. He did not have a stress test or coronary  angiography. The symptoms continued to get worse, so he went to the ED when he arrived in the [place] 5 days ago , during which he had HStrops =21, and EKG without acute ischemia. Pt was discharged to f/u with cardiology clinic. \par He denies any orthopnea, PND, JOHANSEN, or LE edema.\par \par Med Hx \par DM\par \par Meds\par Vidagliptin/metformin\par Aspirin \par vitamins\par bumex 0.5mg BID\par \par \par ALL\par none\par \par Surgeries \par None\par Fam hx\par Non-contributory\par \par Social Hx\par denies etoh, smoking, or illicit drugs. \par

## 2022-12-06 LAB
ANION GAP SERPL CALC-SCNC: 12 MMOL/L — SIGNIFICANT CHANGE UP (ref 5–17)
BUN SERPL-MCNC: 20 MG/DL — SIGNIFICANT CHANGE UP (ref 7–23)
CALCIUM SERPL-MCNC: 9.5 MG/DL — SIGNIFICANT CHANGE UP (ref 8.4–10.5)
CHLORIDE SERPL-SCNC: 101 MMOL/L — SIGNIFICANT CHANGE UP (ref 96–108)
CO2 SERPL-SCNC: 25 MMOL/L — SIGNIFICANT CHANGE UP (ref 22–31)
CREAT SERPL-MCNC: 0.78 MG/DL — SIGNIFICANT CHANGE UP (ref 0.5–1.3)
EGFR: 92 ML/MIN/1.73M2 — SIGNIFICANT CHANGE UP
GLUCOSE BLDC GLUCOMTR-MCNC: 217 MG/DL — HIGH (ref 70–99)
GLUCOSE BLDC GLUCOMTR-MCNC: 240 MG/DL — HIGH (ref 70–99)
GLUCOSE BLDC GLUCOMTR-MCNC: 246 MG/DL — HIGH (ref 70–99)
GLUCOSE BLDC GLUCOMTR-MCNC: 323 MG/DL — HIGH (ref 70–99)
GLUCOSE SERPL-MCNC: 232 MG/DL — HIGH (ref 70–99)
HCT VFR BLD CALC: 35 % — LOW (ref 39–50)
HGB BLD-MCNC: 12.4 G/DL — LOW (ref 13–17)
MCHC RBC-ENTMCNC: 32.2 PG — SIGNIFICANT CHANGE UP (ref 27–34)
MCHC RBC-ENTMCNC: 35.4 GM/DL — SIGNIFICANT CHANGE UP (ref 32–36)
MCV RBC AUTO: 90.9 FL — SIGNIFICANT CHANGE UP (ref 80–100)
NRBC # BLD: 0 /100 WBCS — SIGNIFICANT CHANGE UP (ref 0–0)
PLATELET # BLD AUTO: 195 K/UL — SIGNIFICANT CHANGE UP (ref 150–400)
POTASSIUM SERPL-MCNC: 4.1 MMOL/L — SIGNIFICANT CHANGE UP (ref 3.5–5.3)
POTASSIUM SERPL-SCNC: 4.1 MMOL/L — SIGNIFICANT CHANGE UP (ref 3.5–5.3)
RBC # BLD: 3.85 M/UL — LOW (ref 4.2–5.8)
RBC # FLD: 13.7 % — SIGNIFICANT CHANGE UP (ref 10.3–14.5)
SARS-COV-2 RNA SPEC QL NAA+PROBE: DETECTED
SARS-COV-2 RNA SPEC QL NAA+PROBE: SIGNIFICANT CHANGE UP
SODIUM SERPL-SCNC: 138 MMOL/L — SIGNIFICANT CHANGE UP (ref 135–145)
WBC # BLD: 4.7 K/UL — SIGNIFICANT CHANGE UP (ref 3.8–10.5)
WBC # FLD AUTO: 4.7 K/UL — SIGNIFICANT CHANGE UP (ref 3.8–10.5)

## 2022-12-06 RX ADMIN — PANTOPRAZOLE SODIUM 40 MILLIGRAM(S): 20 TABLET, DELAYED RELEASE ORAL at 06:15

## 2022-12-06 RX ADMIN — Medication 4: at 09:31

## 2022-12-06 RX ADMIN — ENOXAPARIN SODIUM 40 MILLIGRAM(S): 100 INJECTION SUBCUTANEOUS at 22:14

## 2022-12-06 RX ADMIN — Medication 25 MILLIGRAM(S): at 06:15

## 2022-12-06 RX ADMIN — ATORVASTATIN CALCIUM 20 MILLIGRAM(S): 80 TABLET, FILM COATED ORAL at 22:20

## 2022-12-06 RX ADMIN — LOSARTAN POTASSIUM 25 MILLIGRAM(S): 100 TABLET, FILM COATED ORAL at 06:15

## 2022-12-06 RX ADMIN — Medication 8: at 17:04

## 2022-12-06 RX ADMIN — Medication 4: at 13:17

## 2022-12-06 RX ADMIN — Medication 81 MILLIGRAM(S): at 14:13

## 2022-12-06 NOTE — PROGRESS NOTE ADULT - ASSESSMENT
A/P     Chest pain / CHF exacerbation   likely ac on chronic diastolic failure   seen by cardiology   -repeat TTE   structural heart team following     Rt. ICA stenosis   -seen by vascular team   advised B/l CTA neck to check patency of carotid vessels     Cardiac murmur   -check TTE   started on lasix   serial CE     DM-2 :  -c/w Insulin / FSBS  check A1c     HLD   -check lipid profile   started on statin     HTN :  -c/w home meds     dipso: pt. need TAVR and CEA , but not deciding on surgery , plan for d/c home if decided against surgery and f/u with cardiology as out pt

## 2022-12-06 NOTE — CHART NOTE - NSCHARTNOTEFT_GEN_A_CORE
Vascular Surgery will plan for likely CEA during CABG by CTS. Planned for 12/7 however pt currently undecided on surgery. Please contact vascular surgery if patient will proceed with surgery    vascular  [id number]

## 2022-12-06 NOTE — PROGRESS NOTE ADULT - SUBJECTIVE AND OBJECTIVE BOX
Patient is a 77y old  Male who presents with a chief complaint of JOHANSEN / SOB (05 Dec 2022 21:25)      INTERVAL HPI/OVERNIGHT EVENTS:  T(C): 36.6 (22 @ 19:56), Max: 36.6 (22 @ 04:01)  HR: 71 (22 @ 19:56) (59 - 71)  BP: 103/64 (22 @ 19:56) (103/64 - 124/73)  RR: 18 (22 @ 19:56) (18 - 18)  SpO2: 98% (22 @ 19:56) (98% - 99%)  Wt(kg): --  I&O's Summary    05 Dec 2022 07:01  -  06 Dec 2022 07:00  --------------------------------------------------------  IN: 360 mL / OUT: 0 mL / NET: 360 mL    06 Dec 2022 07:01  -  07 Dec 2022 00:19  --------------------------------------------------------  IN: 360 mL / OUT: 0 mL / NET: 360 mL        PAST MEDICAL & SURGICAL HISTORY:  Chronic CHF      HTN (hypertension)      Diabetes      No significant past surgical history          SOCIAL HISTORY  Alcohol:  Tobacco:  Illicit substance use:    FAMILY HISTORY:    REVIEW OF SYSTEMS:  CONSTITUTIONAL: No fever, weight loss, or fatigue  EYES: No eye pain, visual disturbances, or discharge  ENMT:  No difficulty hearing, tinnitus, vertigo; No sinus or throat pain  NECK: No pain or stiffness  RESPIRATORY: No cough, wheezing, chills or hemoptysis; No shortness of breath  CARDIOVASCULAR: No chest pain, palpitations, dizziness, or leg swelling  GASTROINTESTINAL: No abdominal or epigastric pain. No nausea, vomiting, or hematemesis; No diarrhea or constipation. No melena or hematochezia.  GENITOURINARY: No dysuria, frequency, hematuria, or incontinence  NEUROLOGICAL: No headaches, memory loss, loss of strength, numbness, or tremors  SKIN: No itching, burning, rashes, or lesions   LYMPH NODES: No enlarged glands  ENDOCRINE: No heat or cold intolerance; No hair loss  MUSCULOSKELETAL: No joint pain or swelling; No muscle, back, or extremity pain  PSYCHIATRIC: No depression, anxiety, mood swings, or difficulty sleeping  HEME/LYMPH: No easy bruising, or bleeding gums  ALLERY AND IMMUNOLOGIC: No hives or eczema    RADIOLOGY & ADDITIONAL TESTS:    Imaging Personally Reviewed:  [ ] YES  [ ] NO    Consultant(s) Notes Reviewed:  [ ] YES  [ ] NO    PHYSICAL EXAM:  GENERAL: NAD, well-groomed, well-developed  HEAD:  Atraumatic, Normocephalic  EYES: EOMI, PERRLA, conjunctiva and sclera clear  ENMT: No tonsillar erythema, exudates, or enlargement; Moist mucous membranes, Good dentition, No lesions  NECK: Supple, No JVD, Normal thyroid  NERVOUS SYSTEM:  Alert & Oriented X3, Good concentration; Motor Strength 5/5 B/L upper and lower extremities; DTRs 2+ intact and symmetric  CHEST/LUNG: Clear to percussion bilaterally; No rales, rhonchi, wheezing, or rubs  HEART: Regular rate and rhythm; No murmurs, rubs, or gallops  ABDOMEN: Soft, Nontender, Nondistended; Bowel sounds present  EXTREMITIES:  2+ Peripheral Pulses, No clubbing, cyanosis, or edema  LYMPH: No lymphadenopathy noted  SKIN: No rashes or lesions    LABS:                        12.4   4.70  )-----------( 195      ( 06 Dec 2022 07:04 )             35.0     12-    138  |  101  |  20  ----------------------------<  232<H>  4.1   |  25  |  0.78    Ca    9.5      06 Dec 2022 06:59        Urinalysis Basic - ( 05 Dec 2022 15:36 )    Color: Light Yellow / Appearance: Clear / S.010 / pH: x  Gluc: x / Ketone: Negative  / Bili: Negative / Urobili: Negative   Blood: x / Protein: Negative / Nitrite: Negative   Leuk Esterase: Negative / RBC: x / WBC x   Sq Epi: x / Non Sq Epi: x / Bacteria: x      CAPILLARY BLOOD GLUCOSE      POCT Blood Glucose.: 217 mg/dL (06 Dec 2022 21:20)  POCT Blood Glucose.: 323 mg/dL (06 Dec 2022 17:04)  POCT Blood Glucose.: 246 mg/dL (06 Dec 2022 12:41)  POCT Blood Glucose.: 240 mg/dL (06 Dec 2022 08:58)        Urinalysis Basic - ( 05 Dec 2022 15:36 )    Color: Light Yellow / Appearance: Clear / S.010 / pH: x  Gluc: x / Ketone: Negative  / Bili: Negative / Urobili: Negative   Blood: x / Protein: Negative / Nitrite: Negative   Leuk Esterase: Negative / RBC: x / WBC x   Sq Epi: x / Non Sq Epi: x / Bacteria: x        MEDICATIONS  (STANDING):  aspirin enteric coated 81 milliGRAM(s) Oral daily  atorvastatin 20 milliGRAM(s) Oral at bedtime  dextrose 5%. 1000 milliLiter(s) (100 mL/Hr) IV Continuous <Continuous>  dextrose 5%. 1000 milliLiter(s) (50 mL/Hr) IV Continuous <Continuous>  dextrose 50% Injectable 25 Gram(s) IV Push once  dextrose 50% Injectable 12.5 Gram(s) IV Push once  dextrose 50% Injectable 25 Gram(s) IV Push once  enoxaparin Injectable 40 milliGRAM(s) SubCutaneous every 24 hours  glucagon  Injectable 1 milliGRAM(s) IntraMuscular once  insulin lispro (ADMELOG) corrective regimen sliding scale   SubCutaneous three times a day before meals  losartan 25 milliGRAM(s) Oral daily  metoprolol succinate ER 25 milliGRAM(s) Oral daily  pantoprazole    Tablet 40 milliGRAM(s) Oral before breakfast    MEDICATIONS  (PRN):  dextrose Oral Gel 15 Gram(s) Oral once PRN Blood Glucose LESS THAN 70 milliGRAM(s)/deciliter      Care Discussed with Consultants/Other Providers [ ] YES  [ ] NO Patient is a 77y old  Male who presents with a chief complaint of JOHANSEN / SOB (05 Dec 2022 21:25)      INTERVAL HPI/OVERNIGHT EVENTS: pt. undecided about surgery   T(C): 36.6 (22 @ 19:56), Max: 36.6 (22 @ 04:01)  HR: 71 (22 @ 19:56) (59 - 71)  BP: 103/64 (22 @ 19:56) (103/64 - 124/73)  RR: 18 (22 @ 19:56) (18 - 18)  SpO2: 98% (22 @ 19:56) (98% - 99%)  Wt(kg): --  I&O's Summary    05 Dec 2022 07:01  -  06 Dec 2022 07:00  --------------------------------------------------------  IN: 360 mL / OUT: 0 mL / NET: 360 mL    06 Dec 2022 07:01  -  07 Dec 2022 00:19  --------------------------------------------------------  IN: 360 mL / OUT: 0 mL / NET: 360 mL        PAST MEDICAL & SURGICAL HISTORY:  Chronic CHF      HTN (hypertension)      Diabetes      No significant past surgical history          SOCIAL HISTORY  Alcohol:  Tobacco:  Illicit substance use:    FAMILY HISTORY:    REVIEW OF SYSTEMS:  CONSTITUTIONAL: No fever, weight loss, or fatigue  EYES: No eye pain, visual disturbances, or discharge  ENMT:  No difficulty hearing, tinnitus, vertigo; No sinus or throat pain  NECK: No pain or stiffness  RESPIRATORY: No cough, wheezing, chills or hemoptysis; No shortness of breath  CARDIOVASCULAR: No chest pain, palpitations, dizziness, or leg swelling  GASTROINTESTINAL: No abdominal or epigastric pain. No nausea, vomiting, or hematemesis; No diarrhea or constipation. No melena or hematochezia.  GENITOURINARY: No dysuria, frequency, hematuria, or incontinence  NEUROLOGICAL: No headaches, memory loss, loss of strength, numbness, or tremors  SKIN: No itching, burning, rashes, or lesions   LYMPH NODES: No enlarged glands  ENDOCRINE: No heat or cold intolerance; No hair loss  MUSCULOSKELETAL: No joint pain or swelling; No muscle, back, or extremity pain  PSYCHIATRIC: No depression, anxiety, mood swings, or difficulty sleeping  HEME/LYMPH: No easy bruising, or bleeding gums  ALLERY AND IMMUNOLOGIC: No hives or eczema    RADIOLOGY & ADDITIONAL TESTS:    Imaging Personally Reviewed:  [ ] YES  [ ] NO    Consultant(s) Notes Reviewed:  [ ] YES  [ ] NO    PHYSICAL EXAM:  GENERAL: NAD, well-groomed, well-developed  HEAD:  Atraumatic, Normocephalic  EYES: EOMI, PERRLA, conjunctiva and sclera clear  ENMT: No tonsillar erythema, exudates, or enlargement; Moist mucous membranes, Good dentition, No lesions  NECK: Supple, No JVD, Normal thyroid  NERVOUS SYSTEM:  Alert & Oriented X3, Good concentration; Motor Strength 5/5 B/L upper and lower extremities; DTRs 2+ intact and symmetric  CHEST/LUNG: Clear to percussion bilaterally; No rales, rhonchi, wheezing, or rubs  HEART: Regular rate and rhythm; No murmurs, rubs, or gallops  ABDOMEN: Soft, Nontender, Nondistended; Bowel sounds present  EXTREMITIES:  2+ Peripheral Pulses, No clubbing, cyanosis, or edema  LYMPH: No lymphadenopathy noted  SKIN: No rashes or lesions    LABS:                        12.4   4.70  )-----------( 195      ( 06 Dec 2022 07:04 )             35.0     12-    138  |  101  |  20  ----------------------------<  232<H>  4.1   |  25  |  0.78    Ca    9.5      06 Dec 2022 06:59        Urinalysis Basic - ( 05 Dec 2022 15:36 )    Color: Light Yellow / Appearance: Clear / S.010 / pH: x  Gluc: x / Ketone: Negative  / Bili: Negative / Urobili: Negative   Blood: x / Protein: Negative / Nitrite: Negative   Leuk Esterase: Negative / RBC: x / WBC x   Sq Epi: x / Non Sq Epi: x / Bacteria: x      CAPILLARY BLOOD GLUCOSE      POCT Blood Glucose.: 217 mg/dL (06 Dec 2022 21:20)  POCT Blood Glucose.: 323 mg/dL (06 Dec 2022 17:04)  POCT Blood Glucose.: 246 mg/dL (06 Dec 2022 12:41)  POCT Blood Glucose.: 240 mg/dL (06 Dec 2022 08:58)        Urinalysis Basic - ( 05 Dec 2022 15:36 )    Color: Light Yellow / Appearance: Clear / S.010 / pH: x  Gluc: x / Ketone: Negative  / Bili: Negative / Urobili: Negative   Blood: x / Protein: Negative / Nitrite: Negative   Leuk Esterase: Negative / RBC: x / WBC x   Sq Epi: x / Non Sq Epi: x / Bacteria: x        MEDICATIONS  (STANDING):  aspirin enteric coated 81 milliGRAM(s) Oral daily  atorvastatin 20 milliGRAM(s) Oral at bedtime  dextrose 5%. 1000 milliLiter(s) (100 mL/Hr) IV Continuous <Continuous>  dextrose 5%. 1000 milliLiter(s) (50 mL/Hr) IV Continuous <Continuous>  dextrose 50% Injectable 25 Gram(s) IV Push once  dextrose 50% Injectable 12.5 Gram(s) IV Push once  dextrose 50% Injectable 25 Gram(s) IV Push once  enoxaparin Injectable 40 milliGRAM(s) SubCutaneous every 24 hours  glucagon  Injectable 1 milliGRAM(s) IntraMuscular once  insulin lispro (ADMELOG) corrective regimen sliding scale   SubCutaneous three times a day before meals  losartan 25 milliGRAM(s) Oral daily  metoprolol succinate ER 25 milliGRAM(s) Oral daily  pantoprazole    Tablet 40 milliGRAM(s) Oral before breakfast    MEDICATIONS  (PRN):  dextrose Oral Gel 15 Gram(s) Oral once PRN Blood Glucose LESS THAN 70 milliGRAM(s)/deciliter      Care Discussed with Consultants/Other Providers [ ] YES  [ ] NO

## 2022-12-07 LAB
ALBUMIN SERPL ELPH-MCNC: 3.9 G/DL — SIGNIFICANT CHANGE UP (ref 3.3–5)
ALP SERPL-CCNC: 136 U/L — HIGH (ref 40–120)
ALT FLD-CCNC: 54 U/L — HIGH (ref 10–45)
ANION GAP SERPL CALC-SCNC: 11 MMOL/L — SIGNIFICANT CHANGE UP (ref 5–17)
APTT BLD: 31.8 SEC — SIGNIFICANT CHANGE UP (ref 27.5–35.5)
AST SERPL-CCNC: 42 U/L — HIGH (ref 10–40)
BILIRUB SERPL-MCNC: 1.3 MG/DL — HIGH (ref 0.2–1.2)
BLD GP AB SCN SERPL QL: NEGATIVE — SIGNIFICANT CHANGE UP
BUN SERPL-MCNC: 25 MG/DL — HIGH (ref 7–23)
CALCIUM SERPL-MCNC: 9.6 MG/DL — SIGNIFICANT CHANGE UP (ref 8.4–10.5)
CHLORIDE SERPL-SCNC: 99 MMOL/L — SIGNIFICANT CHANGE UP (ref 96–108)
CO2 SERPL-SCNC: 24 MMOL/L — SIGNIFICANT CHANGE UP (ref 22–31)
CREAT SERPL-MCNC: 0.89 MG/DL — SIGNIFICANT CHANGE UP (ref 0.5–1.3)
CRP SERPL-MCNC: 3 MG/L — SIGNIFICANT CHANGE UP (ref 0–4)
D DIMER BLD IA.RAPID-MCNC: 358 NG/ML DDU — HIGH
EGFR: 88 ML/MIN/1.73M2 — SIGNIFICANT CHANGE UP
GLUCOSE BLDC GLUCOMTR-MCNC: 195 MG/DL — HIGH (ref 70–99)
GLUCOSE BLDC GLUCOMTR-MCNC: 206 MG/DL — HIGH (ref 70–99)
GLUCOSE BLDC GLUCOMTR-MCNC: 207 MG/DL — HIGH (ref 70–99)
GLUCOSE BLDC GLUCOMTR-MCNC: 245 MG/DL — HIGH (ref 70–99)
GLUCOSE BLDC GLUCOMTR-MCNC: 259 MG/DL — HIGH (ref 70–99)
GLUCOSE SERPL-MCNC: 256 MG/DL — HIGH (ref 70–99)
HCT VFR BLD CALC: 34.5 % — LOW (ref 39–50)
HGB BLD-MCNC: 11.8 G/DL — LOW (ref 13–17)
INR BLD: 1.04 RATIO — SIGNIFICANT CHANGE UP (ref 0.88–1.16)
MCHC RBC-ENTMCNC: 31.4 PG — SIGNIFICANT CHANGE UP (ref 27–34)
MCHC RBC-ENTMCNC: 34.2 GM/DL — SIGNIFICANT CHANGE UP (ref 32–36)
MCV RBC AUTO: 91.8 FL — SIGNIFICANT CHANGE UP (ref 80–100)
NRBC # BLD: 0 /100 WBCS — SIGNIFICANT CHANGE UP (ref 0–0)
NT-PROBNP SERPL-SCNC: 63 PG/ML — SIGNIFICANT CHANGE UP (ref 0–300)
PLATELET # BLD AUTO: 195 K/UL — SIGNIFICANT CHANGE UP (ref 150–400)
POTASSIUM SERPL-MCNC: 3.8 MMOL/L — SIGNIFICANT CHANGE UP (ref 3.5–5.3)
POTASSIUM SERPL-SCNC: 3.8 MMOL/L — SIGNIFICANT CHANGE UP (ref 3.5–5.3)
PROT SERPL-MCNC: 7.2 G/DL — SIGNIFICANT CHANGE UP (ref 6–8.3)
PROTHROM AB SERPL-ACNC: 12.1 SEC — SIGNIFICANT CHANGE UP (ref 10.5–13.4)
RBC # BLD: 3.76 M/UL — LOW (ref 4.2–5.8)
RBC # FLD: 13.7 % — SIGNIFICANT CHANGE UP (ref 10.3–14.5)
RH IG SCN BLD-IMP: POSITIVE — SIGNIFICANT CHANGE UP
SARS-COV-2 RNA SPEC QL NAA+PROBE: DETECTED
SODIUM SERPL-SCNC: 134 MMOL/L — LOW (ref 135–145)
T3 SERPL-MCNC: 100 NG/DL — SIGNIFICANT CHANGE UP (ref 80–200)
T4 AB SER-ACNC: 6.7 UG/DL — SIGNIFICANT CHANGE UP (ref 4.6–12)
WBC # BLD: 5.17 K/UL — SIGNIFICANT CHANGE UP (ref 3.8–10.5)
WBC # FLD AUTO: 5.17 K/UL — SIGNIFICANT CHANGE UP (ref 3.8–10.5)

## 2022-12-07 PROCEDURE — 99232 SBSQ HOSP IP/OBS MODERATE 35: CPT

## 2022-12-07 PROCEDURE — 99253 IP/OBS CNSLTJ NEW/EST LOW 45: CPT

## 2022-12-07 RX ORDER — ASCORBIC ACID 60 MG
2000 TABLET,CHEWABLE ORAL ONCE
Refills: 0 | Status: DISCONTINUED | OUTPATIENT
Start: 2022-12-07 | End: 2022-12-07

## 2022-12-07 RX ORDER — GABAPENTIN 400 MG/1
300 CAPSULE ORAL ONCE
Refills: 0 | Status: DISCONTINUED | OUTPATIENT
Start: 2022-12-07 | End: 2022-12-07

## 2022-12-07 RX ORDER — CEFUROXIME AXETIL 250 MG
1500 TABLET ORAL ONCE
Refills: 0 | Status: DISCONTINUED | OUTPATIENT
Start: 2022-12-07 | End: 2022-12-07

## 2022-12-07 RX ORDER — CHLORHEXIDINE GLUCONATE 213 G/1000ML
1 SOLUTION TOPICAL ONCE
Refills: 0 | Status: DISCONTINUED | OUTPATIENT
Start: 2022-12-07 | End: 2022-12-07

## 2022-12-07 RX ORDER — MUPIROCIN 20 MG/G
1 OINTMENT TOPICAL
Refills: 0 | Status: DISCONTINUED | OUTPATIENT
Start: 2022-12-07 | End: 2022-12-07

## 2022-12-07 RX ORDER — CHLORHEXIDINE GLUCONATE 213 G/1000ML
30 SOLUTION TOPICAL ONCE
Refills: 0 | Status: DISCONTINUED | OUTPATIENT
Start: 2022-12-07 | End: 2022-12-07

## 2022-12-07 RX ORDER — ACETAMINOPHEN 500 MG
1000 TABLET ORAL ONCE
Refills: 0 | Status: DISCONTINUED | OUTPATIENT
Start: 2022-12-07 | End: 2022-12-07

## 2022-12-07 RX ADMIN — Medication 2: at 19:00

## 2022-12-07 RX ADMIN — ENOXAPARIN SODIUM 40 MILLIGRAM(S): 100 INJECTION SUBCUTANEOUS at 20:20

## 2022-12-07 RX ADMIN — Medication 25 MILLIGRAM(S): at 05:54

## 2022-12-07 RX ADMIN — Medication 4: at 09:29

## 2022-12-07 RX ADMIN — PANTOPRAZOLE SODIUM 40 MILLIGRAM(S): 20 TABLET, DELAYED RELEASE ORAL at 05:54

## 2022-12-07 RX ADMIN — ATORVASTATIN CALCIUM 20 MILLIGRAM(S): 80 TABLET, FILM COATED ORAL at 20:20

## 2022-12-07 RX ADMIN — LOSARTAN POTASSIUM 25 MILLIGRAM(S): 100 TABLET, FILM COATED ORAL at 05:54

## 2022-12-07 RX ADMIN — Medication 6: at 13:30

## 2022-12-07 RX ADMIN — Medication 81 MILLIGRAM(S): at 13:29

## 2022-12-07 NOTE — PROGRESS NOTE ADULT - ASSESSMENT
77M with a PMH T2DM and recently diagnosed CHF? who initially presented to clinic today to establish care but due to anginal symptoms was referred to the ED. Daughter is at bedside and able to translate. Patient states he has been having chest pain with exertion pain, recieved TTE and will now require cardiac catheterization and MORIAH in the next few days per primary team. As a part of workup, patient received  carotid duplex as a part of workup and was found to have 70-99% R ICA, vascular consulted for recommendations       Plan  - OR tomorrow for L CEA with CT surgery CABG  - Will consent  - C/w ASA, statin  - CTA noted  - Will follow      Vascular  [id number] 77M with a PMH T2DM and recently diagnosed CHF? who initially presented to clinic today to establish care but due to anginal symptoms was referred to the ED. Daughter is at bedside and able to translate. Patient states he has been having chest pain with exertion pain, recieved TTE and will now require cardiac catheterization and MORIAH in the next few days per primary team. As a part of workup, patient received  carotid duplex as a part of workup and was found to have 70-99% R ICA, vascular consulted for recommendations       Plan  - OR tomorrow for R CEA with CT surgery CABG  - Will consent  - C/w ASA, statin  - CTA noted  - Will follow      Vascular  [id number]

## 2022-12-07 NOTE — CONSULT NOTE ADULT - ASSESSMENT
77M with a PMH T2DM and recently diagnosed CHF? who initially presented to clinic today to establish care but due to anginal symptoms was referred to the ED. Daughter is at bedside and able to translate. Patient states he has been having chest pain with exertion that has been worsening for the past couple of months that has been getting worse over the last few weeks. The chest pain is substernal, worse with exertion, better with rest. The pain starts after 2 blocks associated with shortness of breath, palpitations, dizziness, and gets better after a few minutes of relaxation. Denies orthopnea, PND, JOHANSEN and LE edema. He was evaluated in [facility] 2 months ago and had an echo and x-ray. Patient was started on Bumex and ASA. TTE report is in [place] but LVEF ~50% with mild diastolic dysfunction and moderate aortic insufficiency. He has never had a stress test or angiogram. His symptoms continued to worsen so [place] the [place] went to ED about 5 days ago had initial chest pain work up with troponin 21 and EKG non-ischemic and he was discharged with follow up with Cardiology.    seen by cardio in ED  (01 Dec 2022 16:42)     TTE report is in [place] but LVEF ~50% with mild diastolic dysfunction and moderate aortic insufficiency. Pt had repeat TTE here with NL LV/RV and severe AS SOURAV 0.9sqcm -Structural Heart was consulted for TAVR evaluation. Pt now s/p [facility] 12/4 with prox LAD 70% stenosis and CT Surgery consulted for CABG/SAVR evaluation.     Pt had a pre-op Covid swab that was positive. THis was repeated and was negative and then another was positive again.   Pt denies any systemic sxs. He has no fever, vough, URI sxs, diarrhea, myalgias, or headache He said he had a bad cold in October. Pt has received two covid shots in his country. He cane to [place] on November 24th with the hope of having heart [place].       A/P    #COVID -19    Pt found to be positive for Coivd -19  Pt denies any sxs related to this diagnosis   would check d-dimer, CRP, ferritin  pt does not qualify for any treatment such as remdesivir or steroids at this point in time    As for cardiac surgery, it depends on the urgency of the procedure  If urgent, then , as patient is asymptomatic, can proceed with surgery with appropriate isolation procedures    If not urgent, and depending on the status of the patient, can discharge patient and bring him back when he is covid negative    Interesting that patient had a "bad cold" in October , perhaps he had Covid infection then and this is just viral remnants but unclear. Perhaps a cycle threshold would help     Antonietta Atkinson M.D. ,   please reach via teams   If no answer, or after 5PM/ weekends,  then please call  [phone]    Assessment and plan discussed with the primary team .

## 2022-12-07 NOTE — CHART NOTE - NSCHARTNOTEFT_GEN_A_CORE
Patient is scheduled for AVR/CABG with Dr. Bowles in AM 12/8 but now with +COVID PCR x2 on 12/6, currently asymptomatic, no fevers on Airborne Isolation, Dr. Bowles aware. ID consulted for further evaluation and medical clearance, may need to postpone OR, will follow up recs. Continue medical management per primary team. Patient is scheduled for AVR/CABG with Dr. Bowles in AM 12/8 but now with +COVID PCR x2 on 12/6, currently asymptomatic, no fevers on Airborne Isolation, Dr. Bowles aware. ID consulted for further evaluation and medical clearance, may need to postpone OR, will follow up recs. Continue medical management per primary team.    Addendum: OR cancelled for tomorrow as per Dr. Bowles, recommend waiting 10 days after positive covid and will reschedule OR date.

## 2022-12-07 NOTE — PROGRESS NOTE ADULT - SUBJECTIVE AND OBJECTIVE BOX
SUBJECTIVE  The patient was seen and examined. No acute events overnight.    OBJECTIVE  ___________________________________________________  VITAL SIGNS / I&O's   Vital Signs Last 24 Hrs  T(C): 36.6 (07 Dec 2022 05:28), Max: 36.6 (06 Dec 2022 12:02)  T(F): 97.8 (07 Dec 2022 05:28), Max: 97.9 (06 Dec 2022 19:56)  HR: 65 (07 Dec 2022 05:28) (59 - 71)  BP: 126/88 (07 Dec 2022 05:28) (103/64 - 126/88)  BP(mean): --  RR: 18 (07 Dec 2022 05:28) (18 - 18)  SpO2: 100% (07 Dec 2022 05:28) (98% - 100%)    Parameters below as of 07 Dec 2022 05:28  Patient On (Oxygen Delivery Method): room air          06 Dec 2022 07:01  -  07 Dec 2022 07:00  --------------------------------------------------------  IN:    Oral Fluid: 360 mL  Total IN: 360 mL    OUT:  Total OUT: 0 mL    Total NET: 360 mL        ___________________________________________________  PHYSICAL EXAM    General: Well developed, well nourished, NAD  Neuro: Alert and oriented, no focal deficits, moves all extremities spontaneously  CNII-IV intact  HEENT: NCAT, EOMI, anicteric, mucosa moist  Respiratory: Airway patent, respirations unlabored  CVS: Regular rate and rhythm  Abdomen: Soft, nontender, nondistended  Extremities: No edema, sensation and movement grossly intact  BL DP/PT palpable   Skin: Warm, dry, appropriate color  __________________________________________    ___________________________________________________  LABS                        12.4   4.70  )-----------( 195      ( 06 Dec 2022 07:04 )             35.0     06 Dec 2022 06:59    138    |  101    |  20     ----------------------------<  232    4.1     |  25     |  0.78     Ca    9.5        06 Dec 2022 06:59        CAPILLARY BLOOD GLUCOSE      POCT Blood Glucose.: 217 mg/dL (06 Dec 2022 21:20)  POCT Blood Glucose.: 323 mg/dL (06 Dec 2022 17:04)  POCT Blood Glucose.: 246 mg/dL (06 Dec 2022 12:41)  POCT Blood Glucose.: 240 mg/dL (06 Dec 2022 08:58)        Urinalysis Basic - ( 05 Dec 2022 15:36 )    Color: Light Yellow / Appearance: Clear / S.010 / pH: x  Gluc: x / Ketone: Negative  / Bili: Negative / Urobili: Negative   Blood: x / Protein: Negative / Nitrite: Negative   Leuk Esterase: Negative / RBC: x / WBC x   Sq Epi: x / Non Sq Epi: x / Bacteria: x      ___________________________________________________  MICRO  Recent Cultures:    ___________________________________________________  MEDICATIONS  (STANDING):  aspirin enteric coated 81 milliGRAM(s) Oral daily  atorvastatin 20 milliGRAM(s) Oral at bedtime  dextrose 5%. 1000 milliLiter(s) (100 mL/Hr) IV Continuous <Continuous>  dextrose 5%. 1000 milliLiter(s) (50 mL/Hr) IV Continuous <Continuous>  dextrose 50% Injectable 25 Gram(s) IV Push once  dextrose 50% Injectable 12.5 Gram(s) IV Push once  dextrose 50% Injectable 25 Gram(s) IV Push once  enoxaparin Injectable 40 milliGRAM(s) SubCutaneous every 24 hours  glucagon  Injectable 1 milliGRAM(s) IntraMuscular once  insulin lispro (ADMELOG) corrective regimen sliding scale   SubCutaneous three times a day before meals  losartan 25 milliGRAM(s) Oral daily  metoprolol succinate ER 25 milliGRAM(s) Oral daily  pantoprazole    Tablet 40 milliGRAM(s) Oral before breakfast    MEDICATIONS  (PRN):  dextrose Oral Gel 15 Gram(s) Oral once PRN Blood Glucose LESS THAN 70 milliGRAM(s)/deciliter

## 2022-12-07 NOTE — PROGRESS NOTE ADULT - ASSESSMENT
A/P     covid-19 pos   -asymptomatic   air borne precaution / isolation   seen by ID   no treatment indicated , monitor O2 sat  pt. do NOT require any oxygen     Chest pain / CHF exacerbation   likely ac on chronic diastolic failure   seen by cardiology   -repeat TTE   structural heart team following     Rt. ICA stenosis   -seen by vascular team   advised B/l CTA neck to check patency of carotid vessels     Cardiac murmur   -check TTE   started on lasix   serial CE     DM-2 :  -c/w Insulin / FSBS  check A1c     HLD   -check lipid profile   started on statin     HTN :  -c/w home meds     dipso: pt. was scheduled for OR tomorrow by CT surgery , but now postponed . as per ID if no urgent surgery needed then pt. can be d/c home and cane be scheduled for OR as out pt. after 10 days

## 2022-12-07 NOTE — PROGRESS NOTE ADULT - SUBJECTIVE AND OBJECTIVE BOX
Patient is a 77y old  Male who presents with a chief complaint of JOHANSEN / SOB (07 Dec 2022 13:38)      INTERVAL HPI/OVERNIGHT EVENTS: covid -19 pos , asymptomatic   T(C): 36.5 (12-07-22 @ 20:29), Max: 36.6 (12-07-22 @ 05:28)  HR: 68 (12-07-22 @ 20:29) (60 - 68)  BP: 111/53 (12-07-22 @ 20:29) (111/53 - 126/88)  RR: 18 (12-07-22 @ 20:29) (16 - 18)  SpO2: 97% (12-07-22 @ 20:29) (95% - 100%)  Wt(kg): --  I&O's Summary    06 Dec 2022 07:01  -  07 Dec 2022 07:00  --------------------------------------------------------  IN: 360 mL / OUT: 0 mL / NET: 360 mL    07 Dec 2022 07:01  -  08 Dec 2022 01:38  --------------------------------------------------------  IN: 480 mL / OUT: 300 mL / NET: 180 mL        PAST MEDICAL & SURGICAL HISTORY:  Chronic CHF      HTN (hypertension)      Diabetes      No significant past surgical history          SOCIAL HISTORY  Alcohol:  Tobacco:  Illicit substance use:    FAMILY HISTORY:    REVIEW OF SYSTEMS:  CONSTITUTIONAL: No fever, weight loss, or fatigue  EYES: No eye pain, visual disturbances, or discharge  ENMT:  No difficulty hearing, tinnitus, vertigo; No sinus or throat pain  NECK: No pain or stiffness  RESPIRATORY: No cough, wheezing, chills or hemoptysis; No shortness of breath  CARDIOVASCULAR: No chest pain, palpitations, dizziness, or leg swelling  GASTROINTESTINAL: No abdominal or epigastric pain. No nausea, vomiting, or hematemesis; No diarrhea or constipation. No melena or hematochezia.  GENITOURINARY: No dysuria, frequency, hematuria, or incontinence  NEUROLOGICAL: No headaches, memory loss, loss of strength, numbness, or tremors  SKIN: No itching, burning, rashes, or lesions   LYMPH NODES: No enlarged glands  ENDOCRINE: No heat or cold intolerance; No hair loss  MUSCULOSKELETAL: No joint pain or swelling; No muscle, back, or extremity pain  PSYCHIATRIC: No depression, anxiety, mood swings, or difficulty sleeping  HEME/LYMPH: No easy bruising, or bleeding gums  ALLERY AND IMMUNOLOGIC: No hives or eczema    RADIOLOGY & ADDITIONAL TESTS:    Imaging Personally Reviewed:  [ ] YES  [ ] NO    Consultant(s) Notes Reviewed:  [ ] YES  [ ] NO    PHYSICAL EXAM:  GENERAL: NAD, well-groomed, well-developed  HEAD:  Atraumatic, Normocephalic  EYES: EOMI, PERRLA, conjunctiva and sclera clear  ENMT: No tonsillar erythema, exudates, or enlargement; Moist mucous membranes, Good dentition, No lesions  NECK: Supple, No JVD, Normal thyroid  NERVOUS SYSTEM:  Alert & Oriented X3, Good concentration; Motor Strength 5/5 B/L upper and lower extremities; DTRs 2+ intact and symmetric  CHEST/LUNG: Clear to percussion bilaterally; No rales, rhonchi, wheezing, or rubs  HEART: Regular rate and rhythm; No murmurs, rubs, or gallops  ABDOMEN: Soft, Nontender, Nondistended; Bowel sounds present  EXTREMITIES:  2+ Peripheral Pulses, No clubbing, cyanosis, or edema  LYMPH: No lymphadenopathy noted  SKIN: No rashes or lesions    LABS:                        11.8   5.17  )-----------( 195      ( 07 Dec 2022 13:19 )             34.5     12-07    134<L>  |  99  |  25<H>  ----------------------------<  256<H>  3.8   |  24  |  0.89    Ca    9.6      07 Dec 2022 13:19    TPro  7.2  /  Alb  3.9  /  TBili  1.3<H>  /  DBili  x   /  AST  42<H>  /  ALT  54<H>  /  AlkPhos  136<H>  12-07    PT/INR - ( 07 Dec 2022 13:19 )   PT: 12.1 sec;   INR: 1.04 ratio         PTT - ( 07 Dec 2022 13:19 )  PTT:31.8 sec    CAPILLARY BLOOD GLUCOSE      POCT Blood Glucose.: 245 mg/dL (07 Dec 2022 22:25)  POCT Blood Glucose.: 195 mg/dL (07 Dec 2022 18:58)  POCT Blood Glucose.: 206 mg/dL (07 Dec 2022 17:31)  POCT Blood Glucose.: 259 mg/dL (07 Dec 2022 12:49)  POCT Blood Glucose.: 207 mg/dL (07 Dec 2022 08:49)            MEDICATIONS  (STANDING):  aspirin enteric coated 81 milliGRAM(s) Oral daily  atorvastatin 20 milliGRAM(s) Oral at bedtime  dextrose 5%. 1000 milliLiter(s) (100 mL/Hr) IV Continuous <Continuous>  dextrose 5%. 1000 milliLiter(s) (50 mL/Hr) IV Continuous <Continuous>  dextrose 50% Injectable 25 Gram(s) IV Push once  dextrose 50% Injectable 12.5 Gram(s) IV Push once  dextrose 50% Injectable 25 Gram(s) IV Push once  enoxaparin Injectable 40 milliGRAM(s) SubCutaneous every 24 hours  glucagon  Injectable 1 milliGRAM(s) IntraMuscular once  insulin lispro (ADMELOG) corrective regimen sliding scale   SubCutaneous three times a day before meals  losartan 25 milliGRAM(s) Oral daily  metoprolol succinate ER 25 milliGRAM(s) Oral daily  pantoprazole    Tablet 40 milliGRAM(s) Oral before breakfast    MEDICATIONS  (PRN):  dextrose Oral Gel 15 Gram(s) Oral once PRN Blood Glucose LESS THAN 70 milliGRAM(s)/deciliter      Care Discussed with Consultants/Other Providers [ ] YES  [ ] NO

## 2022-12-07 NOTE — CONSULT NOTE ADULT - SUBJECTIVE AND OBJECTIVE BOX
Patient is a 77y old  Male who presents with a chief complaint of JOHANSEN / SOB (07 Dec 2022 07:34)      HPI:  77M with a PMH T2DM and recently diagnosed CHF? who initially presented to clinic today to establish care but due to anginal symptoms was referred to the ED. Daughter is at bedside and able to translate. Patient states he has been having chest pain with exertion that has been worsening for the past couple of months that has been getting worse over the last few weeks. The chest pain is substernal, worse with exertion, better with rest. The pain starts after 2 blocks associated with shortness of breath, palpitations, dizziness, and gets better after a few minutes of relaxation. Denies orthopnea, PND, JOHANSEN and LE edema. He was evaluated in [facility] 2 months ago and had an echo and x-ray. Patient was started on Bumex and ASA. TTE report is in [place] but LVEF ~50% with mild diastolic dysfunction and moderate aortic insufficiency. He has never had a stress test or angiogram. His symptoms continued to worsen so [place] the [place] went to [place] about 5 days ago had initial chest pain work up with troponin 21 and EKG non-ischemic and he was discharged with follow up with Cardiology.    seen by cardio in ED  (01 Dec 2022 16:42)      PAST MEDICAL & SURGICAL HISTORY:  Chronic CHF      HTN (hypertension)      Diabetes      No significant past surgical history          Social history:   Social History:    Marital Status:   Occupation:   Lives with:     Substance Use :  Tobacco Usage:  (   ) never smoked   (   ) former smoker   (   ) current smoker  (     ) pack year  (        ) last tobacco use date  Alcohol Usage:  Travel:  Pets:          FAMILY HISTORY:      REVIEW OF SYSTEMS  General:	Denies any malaise fatigue or chills. Fevers absent    Skin:No rash  	  Ophthalmologic:Denies any visual complaints,discharge redness or photophobia  	  ENMT:No nasal discharge,headache,sinus congestion or throat pain.No dental complaints    Respiratory and Thorax:No cough,sputum or chest pain.Denies shortness of breath  	  Cardiovascular:	No chest pain,palpitaions or dizziness    Gastrointestinal:	NO nausea,abdominal pain or diarrhea.    Genitourinary:	No dysuria,frequency. No flank pain    Musculoskeletal:	No joint swelling or pain.No weakness    Neurological:No confusion,diziness.No extremity weakness.No bladder or bowel incontinence	    Psychiatric:No delusions or hallucinations	    Hematology/Lymphatics:	No LN swelling.No gum bleeding     Endocrine:	No recent weight gain or loss.No abnormal heat/cold intolerance    Allergic/Immunologic:	No hives or rash     Allergies    No Known Allergies    Intolerances        Antimicrobials:       MEDICATIONS  (prior antimicrobials ):             cefuroxime  IVPB 1500 milliGRAM(s) IV Intermittent once      MEDICATIONS  (STANDING):  acetaminophen     Tablet .. 1000 milliGRAM(s) Oral once  ascorbic acid 2000 milliGRAM(s) Oral once  aspirin enteric coated 81 milliGRAM(s) Oral daily  atorvastatin 20 milliGRAM(s) Oral at bedtime  cefuroxime  IVPB 1500 milliGRAM(s) IV Intermittent once  chlorhexidine 0.12% Liquid 30 milliLiter(s) Swish and Spit once  chlorhexidine 4% Liquid 1 Application(s) Topical once  dextrose 5%. 1000 milliLiter(s) (100 mL/Hr) IV Continuous <Continuous>  dextrose 5%. 1000 milliLiter(s) (50 mL/Hr) IV Continuous <Continuous>  dextrose 50% Injectable 25 Gram(s) IV Push once  dextrose 50% Injectable 12.5 Gram(s) IV Push once  dextrose 50% Injectable 25 Gram(s) IV Push once  enoxaparin Injectable 40 milliGRAM(s) SubCutaneous every 24 hours  gabapentin 300 milliGRAM(s) Oral once  glucagon  Injectable 1 milliGRAM(s) IntraMuscular once  insulin lispro (ADMELOG) corrective regimen sliding scale   SubCutaneous three times a day before meals  losartan 25 milliGRAM(s) Oral daily  metoprolol succinate ER 25 milliGRAM(s) Oral daily  mupirocin 2% Ointment 1 Application(s) Both Nostrils two times a day  pantoprazole    Tablet 40 milliGRAM(s) Oral before breakfast    MEDICATIONS  (PRN):  dextrose Oral Gel 15 Gram(s) Oral once PRN Blood Glucose LESS THAN 70 milliGRAM(s)/deciliter        Vital Signs Last 24 Hrs  T(C): 36.6 (07 Dec 2022 12:05), Max: 36.6 (06 Dec 2022 19:56)  T(F): 97.9 (07 Dec 2022 12:05), Max: 97.9 (06 Dec 2022 19:56)  HR: 60 (07 Dec 2022 12:05) (60 - 71)  BP: 112/64 (07 Dec 2022 12:05) (103/64 - 126/88)  BP(mean): --  RR: 18 (07 Dec 2022 12:05) (18 - 18)  SpO2: 95% (07 Dec 2022 12:05) (95% - 100%)    Parameters below as of 07 Dec 2022 12:05  Patient On (Oxygen Delivery Method): room air        PHYSICAL EXAM:Pleasant patient in no acute distress.      Constitutional:Comfortable.Awake and alert  No cachexia     Eyes:PERRL EOMI.NO discharge or conjunctival injection    ENMT:No sinus tenderness.No thrush.No pharyngeal exudate or erythema.Fair dental hygiene    Neck:Supple,No LN,no JVD      Respiratory:Good air entry bilaterally,CTA    Cardiovascular:S1 S2 wnl, No murmurs,rub or gallops    Gastrointestinal:Soft BS(+) no tenderness no masses ,No rebound or guarding    Genitourinary:No CVA tendereness     Rectal:    Extremities:No cyanosis,clubbing or edema.    Vascular:peripheral pulses felt    Neurological:AAO X 3,No grossly focal deficits    Skin:No rash     Lymph Nodes:No palpable LNs    Musculoskeletal:No joint swelling or LOM    Psychiatric:Affect normal.                                12.4   4.70  )-----------( 195      ( 06 Dec 2022 07:04 )             35.0         12-06    138  |  101  |  20  ----------------------------<  232<H>  4.1   |  25  |  0.78    Ca    9.5      06 Dec 2022 06:59            Urinalysis Basic - ( 05 Dec 2022 15:36 )    Color: Light Yellow / Appearance: Clear / S.010 / pH: x  Gluc: x / Ketone: Negative  / Bili: Negative / Urobili: Negative   Blood: x / Protein: Negative / Nitrite: Negative   Leuk Esterase: Negative / RBC: x / WBC x   Sq Epi: x / Non Sq Epi: x / Bacteria: x        RECENT CULTURES:                    MICROBIOLOGY:  COVID-19 PCR . (22 @ 02:24)    COVID-19 PCR: Detected: You can help in the fight against COVID-19. [facility] may contact  you to see if you are interested in voluntarily participating in one of  our clinical trials.  Testing is performed using polymerase chain reaction (PCR) or  transcription mediated amplification (TMA). This COVID-19 (SARS-CoV-2)  nucleic acid amplification test was validated by [employer] and is  in use under the FDA Emergency Use Authorization (EUA) for clinical labs  CLIA-certified to perform high complexity testing. Test results should be  correlated with clinical presentation, patient history, and epidemiology.      COVID-19 PCR . (22 @ 14:29)    COVID-19 PCR: NotDetec: You can help in the fight against COVID-19. [facility] may contact  you to see if you are interested in voluntarily participating in one of  our clinical trials.  Testing is performed using polymerase chain reaction (PCR) or  transcription mediated amplification (TMA). This COVID-19 (SARS-CoV-2)  nucleic acid amplification test was validated by [employer] and is  in use under the FDA Emergency Use Authorization (EUA) for clinical labs  CLIA-certified to perform high complexity testing. Test results should be  correlated with clinical presentation, patient history, and epidemiology.    COVID-19 PCR . (22 @ 07:50)    COVID-19 PCR: Detected: You can help in the fight against COVID-19. [facility] may contact  you to see if you are interested in voluntarily participating in one of  our clinical trials.  Testing is performed using polymerase chain reaction (PCR) or  transcription mediated amplification (TMA). This COVID-19 (SARS-CoV-2)  nucleic acid amplification test was validated by [employer] and is  in use under the FDA Emergency Use Authorization (EUA) for clinical labs  CLIA-certified to perform high complexity testing. Test results should be  correlated with clinical presentation, patient history, and epidemiology.            Radiology:           Patient is a 77y old  Male who presents with a chief complaint of JOHANSEN / SOB (07 Dec 2022 07:34)  HX taken with a     HPI:  77M with a PMH T2DM and recently diagnosed CHF? who initially presented to clinic today to establish care but due to anginal symptoms was referred to the ED. Daughter is at bedside and able to translate. Patient states he has been having chest pain with exertion that has been worsening for the past couple of months that has been getting worse over the last few weeks. The chest pain is substernal, worse with exertion, better with rest. The pain starts after 2 blocks associated with shortness of breath, palpitations, dizziness, and gets better after a few minutes of relaxation. Denies orthopnea, PND, JOHANSEN and LE edema. He was evaluated in [facility] 2 months ago and had an echo and x-ray. Patient was started on Bumex and ASA. TTE report is in [place] but LVEF ~50% with mild diastolic dysfunction and moderate aortic insufficiency. He has never had a stress test or angiogram. His symptoms continued to worsen so [place] about 5 days ago had initial chest pain work up with troponin 21 and EKG non-ischemic and he was discharged with follow up with Cardiology.    seen by cardio in ED  (01 Dec 2022 16:42)     TTE report is in [place] but LVEF ~50% with mild diastolic dysfunction and moderate aortic insufficiency. Pt had repeat TTE here with NL LV/RV and severe AS SOURAV 0.9sqcm -Structural Heart was consulted for TAVR evaluation. Pt now s/p [facility]  with prox LAD 70% stenosis and CT Surgery consulted for CABG/SAVR evaluation.     Pt had a pre-op Covid swab that was positive. THis was repeated and was negative and then another was positive again.   Pt denies any systemic sxs. He has no fever, vough, URI sxs, diarrhea, myalgias, or headache He said he had a bad cold in October. Pt has received two covid shots in his country. He cane to [place] on  with the hope of having heart [place].           PAST MEDICAL & SURGICAL HISTORY:  Chronic CHF      HTN (hypertension)      Diabetes      No significant past surgical history          Social history:     Marital Status:   Occupation: agriculture  Lives with: currently staying with his son    Substance Use : denies  Tobacco Usage:  ( x  ) never smoked   (   ) former smoker   (   ) current smoker  (     ) pack year  (        ) last tobacco use date  Alcohol Usage: denies  Travel: [facility]  Pets: denies           FAMILY HISTORY:  father? cancer- trouble with swallowing  mother -  from a MI    REVIEW OF SYSTEMS  General:	Denies any malaise fatigue or chills. Fevers absent    Skin:No rash  	  Ophthalmologic:Denies any visual complaints,discharge redness or photophobia  	  ENMT:No nasal discharge,headache,sinus congestion or throat pain.No dental complaints    Respiratory and Thorax:No cough,sputum   	  Cardiovascular:	chest pain as above     Gastrointestinal:	NO nausea,abdominal pain or diarrhea.    Genitourinary:	No dysuria,frequency. No flank pain    Musculoskeletal:	No joint swelling or pain.No weakness    Neurological:No confusion,diziness.No extremity weakness.No bladder or bowel incontinence	    Psychiatric:No delusions or hallucinations	    Hematology/Lymphatics:	No LN swelling.No gum bleeding     Endocrine:	No recent weight gain or loss.No abnormal heat/cold intolerance    Allergic/Immunologic:	No hives or rash     Allergies    No Known Allergies    Intolerances        Antimicrobials:       MEDICATIONS  (prior antimicrobials ):             cefuroxime  IVPB 1500 milliGRAM(s) IV Intermittent once      MEDICATIONS  (STANDING):  acetaminophen     Tablet .. 1000 milliGRAM(s) Oral once  ascorbic acid 2000 milliGRAM(s) Oral once  aspirin enteric coated 81 milliGRAM(s) Oral daily  atorvastatin 20 milliGRAM(s) Oral at bedtime  cefuroxime  IVPB 1500 milliGRAM(s) IV Intermittent once  chlorhexidine 0.12% Liquid 30 milliLiter(s) Swish and Spit once  chlorhexidine 4% Liquid 1 Application(s) Topical once  dextrose 5%. 1000 milliLiter(s) (100 mL/Hr) IV Continuous <Continuous>  dextrose 5%. 1000 milliLiter(s) (50 mL/Hr) IV Continuous <Continuous>  dextrose 50% Injectable 25 Gram(s) IV Push once  dextrose 50% Injectable 12.5 Gram(s) IV Push once  dextrose 50% Injectable 25 Gram(s) IV Push once  enoxaparin Injectable 40 milliGRAM(s) SubCutaneous every 24 hours  gabapentin 300 milliGRAM(s) Oral once  glucagon  Injectable 1 milliGRAM(s) IntraMuscular once  insulin lispro (ADMELOG) corrective regimen sliding scale   SubCutaneous three times a day before meals  losartan 25 milliGRAM(s) Oral daily  metoprolol succinate ER 25 milliGRAM(s) Oral daily  mupirocin 2% Ointment 1 Application(s) Both Nostrils two times a day  pantoprazole    Tablet 40 milliGRAM(s) Oral before breakfast    MEDICATIONS  (PRN):  dextrose Oral Gel 15 Gram(s) Oral once PRN Blood Glucose LESS THAN 70 milliGRAM(s)/deciliter        Vital Signs Last 24 Hrs  T(C): 36.6 (07 Dec 2022 12:05), Max: 36.6 (06 Dec 2022 19:56)  T(F): 97.9 (07 Dec 2022 12:05), Max: 97.9 (06 Dec 2022 19:56)  HR: 60 (07 Dec 2022 12:05) (60 - 71)  BP: 112/64 (07 Dec 2022 12:05) (103/64 - 126/88)  BP(mean): --  RR: 18 (07 Dec 2022 12:05) (18 - 18)  SpO2: 95% (07 Dec 2022 12:05) (95% - 100%)    Parameters below as of 07 Dec 2022 12:05  Patient On (Oxygen Delivery Method): room air        PHYSICAL EXAM:Pleasant patient in no acute distress.      Constitutional:Comfortable.Awake and alert  No cachexia     Eyes:PERRL EOMI.NO discharge or conjunctival injection    ENMT:No sinus tenderness.No thrush.No pharyngeal exudate or erythema.Fair dental hygiene    Neck:Supple,No LN,no JVD      Respiratory:Good air entry bilaterally,CTA    Cardiovascular:S1 S2     Gastrointestinal:Soft BS(+) no tenderness     Genitourinary:No CVA tendereness     Extremities:No cyanosis,clubbing or edema.    Vascular:peripheral pulses felt    Neurological:AAO X 3,No grossly focal deficits    Skin:No rash     Lymph Nodes:No palpable LNs    Musculoskeletal:No joint swelling or LOM    Psychiatric:Affect normal.                                12.4   4.70  )-----------( 195      ( 06 Dec 2022 07:04 )             35.0         12-    138  |  101  |  20  ----------------------------<  232<H>  4.1   |  25  |  0.78    Ca    9.5      06 Dec 2022 06:59            Urinalysis Basic - ( 05 Dec 2022 15:36 )    Color: Light Yellow / Appearance: Clear / S.010 / pH: x  Gluc: x / Ketone: Negative  / Bili: Negative / Urobili: Negative   Blood: x / Protein: Negative / Nitrite: Negative   Leuk Esterase: Negative / RBC: x / WBC x   Sq Epi: x / Non Sq Epi: x / Bacteria: x        < from: Transthoracic Echocardiogram (22 @ 07:06) >  Conclusions:  1. Normal mitral valve. Minimal mitral regurgitation.  2. Calcified trileaflet aortic valve with decreased  opening. Peak transaortic valve gradient equals 36 mm Hg,  mean transaortic valve gradient equals 20 mm Hg, estimated  aortic valve area equals 0.9 sqcm (by continuity equation),  aortic valve velocity time integral equals 78 cm,  consistent with low gradient, severe aortic stenosis with  preserved ejection fraction. Calculated SVi=41 mL/sqm.  Dimensionless Index=0.28. Visually, aortic valve appears  severely stenotic with significant color flow turbulence  across the valve. Consider additional imaging of the aortic  valve such as with cardiac CT or MORIAH if clinically  indicated. Mild aortic regurgitation.  3. Normal left ventricular systolic function. No segmental  wall motion abnormalities.  4. Normal right ventricular size and function.  *** No previous Echo exam.  ------------------------------------------------------------------------  Confirmed on  2022 - 10:15:58 by Dom Liang M.D.  ------------------------------------------------------------------------    < end of copied text >        Urinalysis (22 @ 15:36)    Glucose Qualitative, Urine: Trace    Blood, Urine: Negative    pH Urine: 6.0    Color: Light Yellow    Urine Appearance: Clear    Bilirubin: Negative    Ketone - Urine: Negative    Specific Gravity: 1.010    Protein, Urine: Negative    Urobilinogen: Negative    Nitrite: Negative    Leukocyte Esterase Concentration: Negative        < from: Xray Chest 1 View- PORTABLE-Urgent (Xray Chest 1 View- PORTABLE-Urgent .) (22 @ 18:18) >    ACC: [id number] EXAM:  XR CHEST PORTABLE URGENT 1V                          PROCEDURE DATE:  2022          INTERPRETATION:  EXAMINATION: XR CHEST URGENT    CLINICAL INDICATION: Chest pain, shortness of breath.    TECHNIQUE: Single frontal, portable view of the chest was obtained.    COMPARISON: Chest x-ray 2022.    FINDINGS:  The heart is normal in size.  The lungs are clear.  No pneumothorax or pleural effusion.  No acute osseous abnormalities.    IMPRESSION:  Clear lungs.    --- Endof Report ---           GODFREY LAND MD; Resident Radiologist  This document has been electronically signed.  MINI DE LEON MD; Attending Radiologist  This document has been electronically signed. Dec  2 2022  9:52AM    < end of copied text >            MICROBIOLOGY:  COVID-19 PCR . (22 @ 02:24)    COVID-19 PCR: Detected: You can help in the fight against COVID-19. [facility] may contact  you to see if you are interested in voluntarily participating in one of  our clinical trials.  Testing is performed using polymerase chain reaction (PCR) or  transcription mediated amplification (TMA). This COVID-19 (SARS-CoV-2)  nucleic acid amplification test was validated by [employer] and is  in use under the FDA Emergency Use Authorization (EUA) for clinical labs  CLIA-certified to perform high complexity testing. Test results should be  correlated with clinical presentation, patient history, and epidemiology.      COVID-19 PCR . (22 @ 14:29)    COVID-19 PCR: NotDetec: You can help in the fight against COVID-19. [facility] may contact  you to see if you are interested in voluntarily participating in one of  our clinical trials.  Testing is performed using polymerase chain reaction (PCR) or  transcription mediated amplification (TMA). This COVID-19 (SARS-CoV-2)  nucleic acid amplification test was validated by [facility] and is  in use under the FDA Emergency Use Authorization (EUA) for clinical labs  CLIA-certified to perform high complexity testing. Test results should be  correlated with clinical presentation, patient history, and epidemiology.    COVID-19 PCR . (22 @ 07:50)    COVID-19 PCR: Detected: You can help in the fight against COVID-19. [facility] may contact  you to see if you are interested in voluntarily participating in one of  our clinical trials.  Testing is performed using polymerase chain reaction (PCR) or  transcription mediated amplification (TMA). This COVID-19 (SARS-CoV-2)  nucleic acid amplification test was validated by [employer] and is  in use under the FDA Emergency Use Authorization (EUA) for clinical labs  CLIA-certified to perform high complexity testing. Test results should be  correlated with clinical presentation, patient history, and epidemiology.            Radiology:

## 2022-12-07 NOTE — PROGRESS NOTE ADULT - ATTENDING COMMENTS
late entry  78 yo M with high grade right ICA stenosis  pre-op for CABG   planned for combined CEA/CABG  however, pt now positive for covid  discussed with CT surgery and ID  will hold off on surgical intervention for now  will follow for dispo versus surgical plan
CTS evaluation for AS and obstructive LAD plaque (based on iFR).    25 minutes spent on total patient encounter. More than 50% of the encounter was spent counseling and/or coordination care. The necessity of the time spent on this encounter was due to: time spent evaluating the patient as well as time spent reviewing labs, imaging, and discussing the case with a multidisciplinary team.

## 2022-12-08 LAB
ALBUMIN SERPL ELPH-MCNC: 3.9 G/DL — SIGNIFICANT CHANGE UP (ref 3.3–5)
ALP SERPL-CCNC: 119 U/L — SIGNIFICANT CHANGE UP (ref 40–120)
ALT FLD-CCNC: 49 U/L — HIGH (ref 10–45)
ANION GAP SERPL CALC-SCNC: 13 MMOL/L — SIGNIFICANT CHANGE UP (ref 5–17)
AST SERPL-CCNC: 35 U/L — SIGNIFICANT CHANGE UP (ref 10–40)
BILIRUB SERPL-MCNC: 1.6 MG/DL — HIGH (ref 0.2–1.2)
BUN SERPL-MCNC: 20 MG/DL — SIGNIFICANT CHANGE UP (ref 7–23)
CALCIUM SERPL-MCNC: 9.7 MG/DL — SIGNIFICANT CHANGE UP (ref 8.4–10.5)
CHLORIDE SERPL-SCNC: 103 MMOL/L — SIGNIFICANT CHANGE UP (ref 96–108)
CO2 SERPL-SCNC: 25 MMOL/L — SIGNIFICANT CHANGE UP (ref 22–31)
CREAT SERPL-MCNC: 0.88 MG/DL — SIGNIFICANT CHANGE UP (ref 0.5–1.3)
EGFR: 89 ML/MIN/1.73M2 — SIGNIFICANT CHANGE UP
FERRITIN SERPL-MCNC: 395 NG/ML — SIGNIFICANT CHANGE UP (ref 30–400)
GLUCOSE BLDC GLUCOMTR-MCNC: 183 MG/DL — HIGH (ref 70–99)
GLUCOSE BLDC GLUCOMTR-MCNC: 190 MG/DL — HIGH (ref 70–99)
GLUCOSE BLDC GLUCOMTR-MCNC: 250 MG/DL — HIGH (ref 70–99)
GLUCOSE BLDC GLUCOMTR-MCNC: 265 MG/DL — HIGH (ref 70–99)
GLUCOSE SERPL-MCNC: 177 MG/DL — HIGH (ref 70–99)
HCT VFR BLD CALC: 33.8 % — LOW (ref 39–50)
HGB BLD-MCNC: 11.7 G/DL — LOW (ref 13–17)
MCHC RBC-ENTMCNC: 31.6 PG — SIGNIFICANT CHANGE UP (ref 27–34)
MCHC RBC-ENTMCNC: 34.6 GM/DL — SIGNIFICANT CHANGE UP (ref 32–36)
MCV RBC AUTO: 91.4 FL — SIGNIFICANT CHANGE UP (ref 80–100)
NRBC # BLD: 0 /100 WBCS — SIGNIFICANT CHANGE UP (ref 0–0)
PLATELET # BLD AUTO: 193 K/UL — SIGNIFICANT CHANGE UP (ref 150–400)
POTASSIUM SERPL-MCNC: 4 MMOL/L — SIGNIFICANT CHANGE UP (ref 3.5–5.3)
POTASSIUM SERPL-SCNC: 4 MMOL/L — SIGNIFICANT CHANGE UP (ref 3.5–5.3)
PROT SERPL-MCNC: 7.1 G/DL — SIGNIFICANT CHANGE UP (ref 6–8.3)
RBC # BLD: 3.7 M/UL — LOW (ref 4.2–5.8)
RBC # FLD: 13.8 % — SIGNIFICANT CHANGE UP (ref 10.3–14.5)
SODIUM SERPL-SCNC: 141 MMOL/L — SIGNIFICANT CHANGE UP (ref 135–145)
WBC # BLD: 5.48 K/UL — SIGNIFICANT CHANGE UP (ref 3.8–10.5)
WBC # FLD AUTO: 5.48 K/UL — SIGNIFICANT CHANGE UP (ref 3.8–10.5)

## 2022-12-08 RX ORDER — INSULIN GLARGINE 100 [IU]/ML
10 INJECTION, SOLUTION SUBCUTANEOUS AT BEDTIME
Refills: 0 | Status: DISCONTINUED | OUTPATIENT
Start: 2022-12-08 | End: 2022-12-09

## 2022-12-08 RX ORDER — INSULIN LISPRO 100/ML
5 VIAL (ML) SUBCUTANEOUS
Refills: 0 | Status: DISCONTINUED | OUTPATIENT
Start: 2022-12-08 | End: 2022-12-09

## 2022-12-08 RX ADMIN — Medication 81 MILLIGRAM(S): at 12:21

## 2022-12-08 RX ADMIN — ENOXAPARIN SODIUM 40 MILLIGRAM(S): 100 INJECTION SUBCUTANEOUS at 21:54

## 2022-12-08 RX ADMIN — Medication 6: at 12:20

## 2022-12-08 RX ADMIN — Medication 5 UNIT(S): at 12:20

## 2022-12-08 RX ADMIN — PANTOPRAZOLE SODIUM 40 MILLIGRAM(S): 20 TABLET, DELAYED RELEASE ORAL at 05:10

## 2022-12-08 RX ADMIN — INSULIN GLARGINE 10 UNIT(S): 100 INJECTION, SOLUTION SUBCUTANEOUS at 21:54

## 2022-12-08 RX ADMIN — Medication 25 MILLIGRAM(S): at 05:10

## 2022-12-08 RX ADMIN — ATORVASTATIN CALCIUM 20 MILLIGRAM(S): 80 TABLET, FILM COATED ORAL at 21:55

## 2022-12-08 RX ADMIN — LOSARTAN POTASSIUM 25 MILLIGRAM(S): 100 TABLET, FILM COATED ORAL at 05:10

## 2022-12-08 RX ADMIN — Medication 5 UNIT(S): at 17:58

## 2022-12-08 RX ADMIN — Medication 2: at 17:58

## 2022-12-08 RX ADMIN — Medication 2: at 09:23

## 2022-12-08 NOTE — CONSULT NOTE ADULT - PROBLEM SELECTOR RECOMMENDATION 9
Will start Lantus 10 units at bed time.  Will start Admelog 5 units before each meal in addition to Admelog correction scale coverage.  Patient counseled for compliance with consistent low carb diet.  .

## 2022-12-08 NOTE — CHART NOTE - NSCHARTNOTEFT_GEN_A_CORE
PT was scheduled for CEA with CTS, however covid positive and surgery will be postponed. Will monitor for dispo plan vs OR.    Vascular  [id number]

## 2022-12-08 NOTE — CONSULT NOTE ADULT - SUBJECTIVE AND OBJECTIVE BOX
HPI:  77M with a PMH T2DM and recently diagnosed CHF? who initially presented to clinic today to establish care but due to anginal symptoms was referred to the ED. Daughter is at bedside and able to translate. Patient states he has been having chest pain with exertion that has been worsening for the past couple of months that has been getting worse over the last few weeks. The chest pain is substernal, worse with exertion, better with rest. The pain starts after 2 blocks associated with shortness of breath, palpitations, dizziness, and gets better after a few minutes of relaxation. Denies orthopnea, PND, JOHANSEN and LE edema. He was evaluated in [facility] 2 months ago and had an echo and x-ray. Patient was started on Bumex and ASA. TTE report is in [place] but LVEF ~50% with mild diastolic dysfunction and moderate aortic insufficiency. He has never had a stress test or angiogram. His symptoms continued to worsen so [place] the [place] went to ED about 5 days ago had initial chest pain work up with troponin 21 and EKG non-ischemic and he was discharged with follow up with Cardiology.    seen by cardio in ED  (01 Dec 2022 16:42)  Patient has history of diabetes, on oral meds at home, no recent hypoglycemic episodes, no polyuria polydipsia. Patient follows up with PCP for diabetes management.    PAST MEDICAL & SURGICAL HISTORY:  Chronic CHF      HTN (hypertension)      Diabetes      No significant past surgical history          FAMILY HISTORY:      Social History:    Outpatient Medications:    MEDICATIONS  (STANDING):  aspirin enteric coated 81 milliGRAM(s) Oral daily  atorvastatin 20 milliGRAM(s) Oral at bedtime  dextrose 5%. 1000 milliLiter(s) (50 mL/Hr) IV Continuous <Continuous>  dextrose 5%. 1000 milliLiter(s) (100 mL/Hr) IV Continuous <Continuous>  dextrose 50% Injectable 25 Gram(s) IV Push once  dextrose 50% Injectable 12.5 Gram(s) IV Push once  dextrose 50% Injectable 25 Gram(s) IV Push once  enoxaparin Injectable 40 milliGRAM(s) SubCutaneous every 24 hours  glucagon  Injectable 1 milliGRAM(s) IntraMuscular once  insulin glargine Injectable (LANTUS) 10 Unit(s) SubCutaneous at bedtime  insulin lispro (ADMELOG) corrective regimen sliding scale   SubCutaneous three times a day before meals  insulin lispro Injectable (ADMELOG) 5 Unit(s) SubCutaneous three times a day before meals  losartan 25 milliGRAM(s) Oral daily  metoprolol succinate ER 25 milliGRAM(s) Oral daily  pantoprazole    Tablet 40 milliGRAM(s) Oral before breakfast    MEDICATIONS  (PRN):  dextrose Oral Gel 15 Gram(s) Oral once PRN Blood Glucose LESS THAN 70 milliGRAM(s)/deciliter      Allergies    No Known Allergies    Intolerances      Review of Systems:  Constitutional: No fever, no chills  Eyes: No blurry vision  Neuro: No tremors  HEENT: No pain, no neck swelling  Cardiovascular: No chest pain, no palpitations  Respiratory: No SOB, no cough  GI: No nausea, vomiting, abdominal pain  : No dysuria  Skin: no rash  MSK: Has leg swelling.  Psych: no depression  Endocrine: no polyuria, polydipsia    UNABLE TO OBTAIN    ALL OTHER SYSTEMS REVIEWED AND NEGATIVE        PHYSICAL EXAM:  VITALS: T(C): 36.4 (12-08-22 @ 05:10)  T(F): 97.6 (12-08-22 @ 05:10), Max: 97.9 (12-07-22 @ 12:05)  HR: 63 (12-08-22 @ 05:10) (60 - 68)  BP: 121/68 (12-08-22 @ 05:10) (111/53 - 121/68)  RR:  (16 - 18)  SpO2:  (95% - 99%)  Wt(kg): --  GENERAL: NAD, well-groomed, well-developed  EYES: No proptosis, no lid lag  HEENT:  Atraumatic, Normocephalic  THYROID: Normal size, no palpable nodules  RESPIRATORY: Clear to auscultation bilaterally; No rales, rhonchi, wheezing  CARDIOVASCULAR: Si S2, No murmurs;  GI: Soft, non distended, normal bowel sounds  SKIN: Dry, intact, No rashes or lesions  MUSCULOSKELETAL: Has BL lower extremity edema.  NEURO:  no tremor, sensation decreased in feet BL,    POCT Blood Glucose.: 245 mg/dL (12-07-22 @ 22:25)  POCT Blood Glucose.: 195 mg/dL (12-07-22 @ 18:58)  POCT Blood Glucose.: 206 mg/dL (12-07-22 @ 17:31)  POCT Blood Glucose.: 259 mg/dL (12-07-22 @ 12:49)  POCT Blood Glucose.: 207 mg/dL (12-07-22 @ 08:49)  POCT Blood Glucose.: 217 mg/dL (12-06-22 @ 21:20)  POCT Blood Glucose.: 323 mg/dL (12-06-22 @ 17:04)  POCT Blood Glucose.: 246 mg/dL (12-06-22 @ 12:41)  POCT Blood Glucose.: 240 mg/dL (12-06-22 @ 08:58)  POCT Blood Glucose.: 253 mg/dL (12-05-22 @ 21:40)  POCT Blood Glucose.: 230 mg/dL (12-05-22 @ 17:12)  POCT Blood Glucose.: 223 mg/dL (12-05-22 @ 12:33)  POCT Blood Glucose.: 197 mg/dL (12-05-22 @ 08:43)                            11.7   5.48  )-----------( 193      ( 08 Dec 2022 05:54 )             33.8       12-08    141  |  103  |  20  ----------------------------<  177<H>  4.0   |  25  |  0.88    eGFR: 89    Ca    9.7      12-08    TPro  7.1  /  Alb  3.9  /  TBili  1.6<H>  /  DBili  x   /  AST  35  /  ALT  49<H>  /  AlkPhos  119  12-08      Thyroid Function Tests:  12-07 @ 13:19 TSH -- FreeT4 -- T3 100 Anti TPO -- Anti Thyroglobulin Ab -- TSI --  12-02 @ 05:54 TSH 3.63 FreeT4 -- T3 -- Anti TPO -- Anti Thyroglobulin Ab -- TSI --          12-02 Chol 185 Direct LDL -- LDL calculated 110<H> HDL 34<L> Trig 208<H>    Radiology:

## 2022-12-08 NOTE — CONSULT NOTE ADULT - PROBLEM SELECTOR RECOMMENDATION 2
On medications,  no chest pain, stable, monitored and followed up by primary cardiothoracic team/cardiology team
HgbA1c 9.4 -Recommend Endocrine consult  Continue Admelog ISS and FS AC/HS  Continue diabetic diet

## 2022-12-08 NOTE — PROGRESS NOTE ADULT - SUBJECTIVE AND OBJECTIVE BOX
Patient is a 77y old  Male who presents with a chief complaint of JOHANSEN / SOB (08 Dec 2022 08:22)      INTERVAL HPI/OVERNIGHT EVENTS:  T(C): 36.5 (12-08-22 @ 12:45), Max: 36.5 (12-08-22 @ 12:45)  HR: 60 (12-08-22 @ 20:30) (59 - 63)  BP: 132/71 (12-08-22 @ 20:30) (121/65 - 132/71)  RR: 16 (12-08-22 @ 20:30) (16 - 18)  SpO2: 99% (12-08-22 @ 20:30) (97% - 99%)  Wt(kg): --  I&O's Summary    07 Dec 2022 07:01  -  08 Dec 2022 07:00  --------------------------------------------------------  IN: 480 mL / OUT: 300 mL / NET: 180 mL    08 Dec 2022 07:01  -  08 Dec 2022 23:05  --------------------------------------------------------  IN: 600 mL / OUT: 0 mL / NET: 600 mL        PAST MEDICAL & SURGICAL HISTORY:  Chronic CHF      HTN (hypertension)      Diabetes      No significant past surgical history          SOCIAL HISTORY  Alcohol:  Tobacco:  Illicit substance use:    FAMILY HISTORY:    REVIEW OF SYSTEMS:  CONSTITUTIONAL: No fever, weight loss, or fatigue  EYES: No eye pain, visual disturbances, or discharge  ENMT:  No difficulty hearing, tinnitus, vertigo; No sinus or throat pain  NECK: No pain or stiffness  RESPIRATORY: No cough, wheezing, chills or hemoptysis; No shortness of breath  CARDIOVASCULAR: No chest pain, palpitations, dizziness, or leg swelling  GASTROINTESTINAL: No abdominal or epigastric pain. No nausea, vomiting, or hematemesis; No diarrhea or constipation. No melena or hematochezia.  GENITOURINARY: No dysuria, frequency, hematuria, or incontinence  NEUROLOGICAL: No headaches, memory loss, loss of strength, numbness, or tremors  SKIN: No itching, burning, rashes, or lesions   LYMPH NODES: No enlarged glands  ENDOCRINE: No heat or cold intolerance; No hair loss  MUSCULOSKELETAL: No joint pain or swelling; No muscle, back, or extremity pain  PSYCHIATRIC: No depression, anxiety, mood swings, or difficulty sleeping  HEME/LYMPH: No easy bruising, or bleeding gums  ALLERY AND IMMUNOLOGIC: No hives or eczema    RADIOLOGY & ADDITIONAL TESTS:    Imaging Personally Reviewed:  [ ] YES  [ ] NO    Consultant(s) Notes Reviewed:  [ ] YES  [ ] NO    PHYSICAL EXAM:  GENERAL: NAD, well-groomed, well-developed  HEAD:  Atraumatic, Normocephalic  EYES: EOMI, PERRLA, conjunctiva and sclera clear  ENMT: No tonsillar erythema, exudates, or enlargement; Moist mucous membranes, Good dentition, No lesions  NECK: Supple, No JVD, Normal thyroid  NERVOUS SYSTEM:  Alert & Oriented X3, Good concentration; Motor Strength 5/5 B/L upper and lower extremities; DTRs 2+ intact and symmetric  CHEST/LUNG: Clear to percussion bilaterally; No rales, rhonchi, wheezing, or rubs  HEART: Regular rate and rhythm; No murmurs, rubs, or gallops  ABDOMEN: Soft, Nontender, Nondistended; Bowel sounds present  EXTREMITIES:  2+ Peripheral Pulses, No clubbing, cyanosis, or edema  LYMPH: No lymphadenopathy noted  SKIN: No rashes or lesions    LABS:                        11.7   5.48  )-----------( 193      ( 08 Dec 2022 05:54 )             33.8     12-08    141  |  103  |  20  ----------------------------<  177<H>  4.0   |  25  |  0.88    Ca    9.7      08 Dec 2022 05:53    TPro  7.1  /  Alb  3.9  /  TBili  1.6<H>  /  DBili  x   /  AST  35  /  ALT  49<H>  /  AlkPhos  119  12-08    PT/INR - ( 07 Dec 2022 13:19 )   PT: 12.1 sec;   INR: 1.04 ratio         PTT - ( 07 Dec 2022 13:19 )  PTT:31.8 sec    CAPILLARY BLOOD GLUCOSE      POCT Blood Glucose.: 250 mg/dL (08 Dec 2022 21:53)  POCT Blood Glucose.: 190 mg/dL (08 Dec 2022 17:17)  POCT Blood Glucose.: 265 mg/dL (08 Dec 2022 11:46)  POCT Blood Glucose.: 183 mg/dL (08 Dec 2022 08:29)            MEDICATIONS  (STANDING):  aspirin enteric coated 81 milliGRAM(s) Oral daily  atorvastatin 20 milliGRAM(s) Oral at bedtime  dextrose 5%. 1000 milliLiter(s) (100 mL/Hr) IV Continuous <Continuous>  dextrose 5%. 1000 milliLiter(s) (50 mL/Hr) IV Continuous <Continuous>  dextrose 50% Injectable 25 Gram(s) IV Push once  dextrose 50% Injectable 12.5 Gram(s) IV Push once  dextrose 50% Injectable 25 Gram(s) IV Push once  enoxaparin Injectable 40 milliGRAM(s) SubCutaneous every 24 hours  glucagon  Injectable 1 milliGRAM(s) IntraMuscular once  insulin glargine Injectable (LANTUS) 10 Unit(s) SubCutaneous at bedtime  insulin lispro (ADMELOG) corrective regimen sliding scale   SubCutaneous three times a day before meals  insulin lispro Injectable (ADMELOG) 5 Unit(s) SubCutaneous three times a day before meals  losartan 25 milliGRAM(s) Oral daily  metoprolol succinate ER 25 milliGRAM(s) Oral daily  pantoprazole    Tablet 40 milliGRAM(s) Oral before breakfast    MEDICATIONS  (PRN):  dextrose Oral Gel 15 Gram(s) Oral once PRN Blood Glucose LESS THAN 70 milliGRAM(s)/deciliter      Care Discussed with Consultants/Other Providers [ ] YES  [ ] NO Patient is a 77y old  Male who presents with a chief complaint of JOHANSEN / SOB (08 Dec 2022 08:22)      INTERVAL HPI/OVERNIGHT EVENTS: covid pos , doing well , no SOB   T(C): 36.5 (12-08-22 @ 12:45), Max: 36.5 (12-08-22 @ 12:45)  HR: 60 (12-08-22 @ 20:30) (59 - 63)  BP: 132/71 (12-08-22 @ 20:30) (121/65 - 132/71)  RR: 16 (12-08-22 @ 20:30) (16 - 18)  SpO2: 99% (12-08-22 @ 20:30) (97% - 99%)  Wt(kg): --  I&O's Summary    07 Dec 2022 07:01  -  08 Dec 2022 07:00  --------------------------------------------------------  IN: 480 mL / OUT: 300 mL / NET: 180 mL    08 Dec 2022 07:01  -  08 Dec 2022 23:05  --------------------------------------------------------  IN: 600 mL / OUT: 0 mL / NET: 600 mL        PAST MEDICAL & SURGICAL HISTORY:  Chronic CHF      HTN (hypertension)      Diabetes      No significant past surgical history          SOCIAL HISTORY  Alcohol:  Tobacco:  Illicit substance use:    FAMILY HISTORY:    REVIEW OF SYSTEMS:  CONSTITUTIONAL: No fever, weight loss, or fatigue  EYES: No eye pain, visual disturbances, or discharge  ENMT:  No difficulty hearing, tinnitus, vertigo; No sinus or throat pain  NECK: No pain or stiffness  RESPIRATORY: No cough, wheezing, chills or hemoptysis; No shortness of breath  CARDIOVASCULAR: No chest pain, palpitations, dizziness, or leg swelling  GASTROINTESTINAL: No abdominal or epigastric pain. No nausea, vomiting, or hematemesis; No diarrhea or constipation. No melena or hematochezia.  GENITOURINARY: No dysuria, frequency, hematuria, or incontinence  NEUROLOGICAL: No headaches, memory loss, loss of strength, numbness, or tremors  SKIN: No itching, burning, rashes, or lesions   LYMPH NODES: No enlarged glands  ENDOCRINE: No heat or cold intolerance; No hair loss  MUSCULOSKELETAL: No joint pain or swelling; No muscle, back, or extremity pain  PSYCHIATRIC: No depression, anxiety, mood swings, or difficulty sleeping  HEME/LYMPH: No easy bruising, or bleeding gums  ALLERY AND IMMUNOLOGIC: No hives or eczema    RADIOLOGY & ADDITIONAL TESTS:    Imaging Personally Reviewed:  [ ] YES  [ ] NO    Consultant(s) Notes Reviewed:  [ ] YES  [ ] NO    PHYSICAL EXAM:  GENERAL: NAD, well-groomed, well-developed  HEAD:  Atraumatic, Normocephalic  EYES: EOMI, PERRLA, conjunctiva and sclera clear  ENMT: No tonsillar erythema, exudates, or enlargement; Moist mucous membranes, Good dentition, No lesions  NECK: Supple, No JVD, Normal thyroid  NERVOUS SYSTEM:  Alert & Oriented X3, Good concentration; Motor Strength 5/5 B/L upper and lower extremities; DTRs 2+ intact and symmetric  CHEST/LUNG: Clear to percussion bilaterally; No rales, rhonchi, wheezing, or rubs  HEART: Regular rate and rhythm; No murmurs, rubs, or gallops  ABDOMEN: Soft, Nontender, Nondistended; Bowel sounds present  EXTREMITIES:  2+ Peripheral Pulses, No clubbing, cyanosis, or edema  LYMPH: No lymphadenopathy noted  SKIN: No rashes or lesions    LABS:                        11.7   5.48  )-----------( 193      ( 08 Dec 2022 05:54 )             33.8     12-08    141  |  103  |  20  ----------------------------<  177<H>  4.0   |  25  |  0.88    Ca    9.7      08 Dec 2022 05:53    TPro  7.1  /  Alb  3.9  /  TBili  1.6<H>  /  DBili  x   /  AST  35  /  ALT  49<H>  /  AlkPhos  119  12-08    PT/INR - ( 07 Dec 2022 13:19 )   PT: 12.1 sec;   INR: 1.04 ratio         PTT - ( 07 Dec 2022 13:19 )  PTT:31.8 sec    CAPILLARY BLOOD GLUCOSE      POCT Blood Glucose.: 250 mg/dL (08 Dec 2022 21:53)  POCT Blood Glucose.: 190 mg/dL (08 Dec 2022 17:17)  POCT Blood Glucose.: 265 mg/dL (08 Dec 2022 11:46)  POCT Blood Glucose.: 183 mg/dL (08 Dec 2022 08:29)            MEDICATIONS  (STANDING):  aspirin enteric coated 81 milliGRAM(s) Oral daily  atorvastatin 20 milliGRAM(s) Oral at bedtime  dextrose 5%. 1000 milliLiter(s) (100 mL/Hr) IV Continuous <Continuous>  dextrose 5%. 1000 milliLiter(s) (50 mL/Hr) IV Continuous <Continuous>  dextrose 50% Injectable 25 Gram(s) IV Push once  dextrose 50% Injectable 12.5 Gram(s) IV Push once  dextrose 50% Injectable 25 Gram(s) IV Push once  enoxaparin Injectable 40 milliGRAM(s) SubCutaneous every 24 hours  glucagon  Injectable 1 milliGRAM(s) IntraMuscular once  insulin glargine Injectable (LANTUS) 10 Unit(s) SubCutaneous at bedtime  insulin lispro (ADMELOG) corrective regimen sliding scale   SubCutaneous three times a day before meals  insulin lispro Injectable (ADMELOG) 5 Unit(s) SubCutaneous three times a day before meals  losartan 25 milliGRAM(s) Oral daily  metoprolol succinate ER 25 milliGRAM(s) Oral daily  pantoprazole    Tablet 40 milliGRAM(s) Oral before breakfast    MEDICATIONS  (PRN):  dextrose Oral Gel 15 Gram(s) Oral once PRN Blood Glucose LESS THAN 70 milliGRAM(s)/deciliter      Care Discussed with Consultants/Other Providers [ ] YES  [ ] NO

## 2022-12-08 NOTE — CONSULT NOTE ADULT - CONSULT REQUESTED DATE/TIME
01-Dec-2022 18:26
07-Dec-2022 13:38
03-Dec-2022 14:16
02-Dec-2022 14:16
05-Dec-2022 10:00
08-Dec-2022 08:22

## 2022-12-08 NOTE — CONSULT NOTE ADULT - ASSESSMENT
Assessment  DMT2: 77y Male with DM T2 with hyperglycemia admitted with chest pain, planing surgery, now on insulin coverage, blood sugars running high, no hypoglycemic episode,  eating meals,  compliant with low carb diet.  Patient is high risk with high level decision making due to uncontrolled diabetes, has increased risk for complications. Tight sugar control is not recommended for this patient.  CAD: Planing CABG, on medications, monitored, asymptomatic.  HTN: On antihypertensive medications, monitored, asymptomatic.            Jey Pitt MD  Cell:  [phone]  Office: [phone]

## 2022-12-09 ENCOUNTER — TRANSCRIPTION ENCOUNTER (OUTPATIENT)
Age: 77
End: 2022-12-09

## 2022-12-09 VITALS
RESPIRATION RATE: 18 BRPM | TEMPERATURE: 98 F | SYSTOLIC BLOOD PRESSURE: 111 MMHG | HEART RATE: 67 BPM | DIASTOLIC BLOOD PRESSURE: 66 MMHG

## 2022-12-09 DIAGNOSIS — R07.9 CHEST PAIN, UNSPECIFIED: ICD-10-CM

## 2022-12-09 LAB
GLUCOSE BLDC GLUCOMTR-MCNC: 190 MG/DL — HIGH (ref 70–99)
GLUCOSE BLDC GLUCOMTR-MCNC: 223 MG/DL — HIGH (ref 70–99)
GLUCOSE BLDC GLUCOMTR-MCNC: 225 MG/DL — HIGH (ref 70–99)

## 2022-12-09 PROCEDURE — 94010 BREATHING CAPACITY TEST: CPT

## 2022-12-09 PROCEDURE — 83036 HEMOGLOBIN GLYCOSYLATED A1C: CPT

## 2022-12-09 PROCEDURE — 85379 FIBRIN DEGRADATION QUANT: CPT

## 2022-12-09 PROCEDURE — 80053 COMPREHEN METABOLIC PANEL: CPT

## 2022-12-09 PROCEDURE — 87635 SARS-COV-2 COVID-19 AMP PRB: CPT

## 2022-12-09 PROCEDURE — 86850 RBC ANTIBODY SCREEN: CPT

## 2022-12-09 PROCEDURE — 93456 R HRT CORONARY ARTERY ANGIO: CPT

## 2022-12-09 PROCEDURE — 82962 GLUCOSE BLOOD TEST: CPT

## 2022-12-09 PROCEDURE — 86900 BLOOD TYPING SEROLOGIC ABO: CPT

## 2022-12-09 PROCEDURE — 93306 TTE W/DOPPLER COMPLETE: CPT

## 2022-12-09 PROCEDURE — U0005: CPT

## 2022-12-09 PROCEDURE — 84443 ASSAY THYROID STIM HORMONE: CPT

## 2022-12-09 PROCEDURE — 36415 COLL VENOUS BLD VENIPUNCTURE: CPT

## 2022-12-09 PROCEDURE — 86140 C-REACTIVE PROTEIN: CPT

## 2022-12-09 PROCEDURE — 99285 EMERGENCY DEPT VISIT HI MDM: CPT | Mod: 25

## 2022-12-09 PROCEDURE — 82728 ASSAY OF FERRITIN: CPT

## 2022-12-09 PROCEDURE — 85384 FIBRINOGEN ACTIVITY: CPT

## 2022-12-09 PROCEDURE — 84436 ASSAY OF TOTAL THYROXINE: CPT

## 2022-12-09 PROCEDURE — 87641 MR-STAPH DNA AMP PROBE: CPT

## 2022-12-09 PROCEDURE — 71045 X-RAY EXAM CHEST 1 VIEW: CPT

## 2022-12-09 PROCEDURE — C1760: CPT

## 2022-12-09 PROCEDURE — 70498 CT ANGIOGRAPHY NECK: CPT

## 2022-12-09 PROCEDURE — 81003 URINALYSIS AUTO W/O SCOPE: CPT

## 2022-12-09 PROCEDURE — 93571 IV DOP VEL&/PRESS C FLO 1ST: CPT | Mod: 52,LD

## 2022-12-09 PROCEDURE — 82803 BLOOD GASES ANY COMBINATION: CPT

## 2022-12-09 PROCEDURE — 85610 PROTHROMBIN TIME: CPT

## 2022-12-09 PROCEDURE — 80061 LIPID PANEL: CPT

## 2022-12-09 PROCEDURE — C1769: CPT

## 2022-12-09 PROCEDURE — U0003: CPT

## 2022-12-09 PROCEDURE — C1894: CPT

## 2022-12-09 PROCEDURE — 93880 EXTRACRANIAL BILAT STUDY: CPT

## 2022-12-09 PROCEDURE — 87640 STAPH A DNA AMP PROBE: CPT

## 2022-12-09 PROCEDURE — 86901 BLOOD TYPING SEROLOGIC RH(D): CPT

## 2022-12-09 PROCEDURE — 84480 ASSAY TRIIODOTHYRONINE (T3): CPT

## 2022-12-09 PROCEDURE — 85025 COMPLETE CBC W/AUTO DIFF WBC: CPT

## 2022-12-09 PROCEDURE — 85730 THROMBOPLASTIN TIME PARTIAL: CPT

## 2022-12-09 PROCEDURE — 80048 BASIC METABOLIC PNL TOTAL CA: CPT

## 2022-12-09 PROCEDURE — 86803 HEPATITIS C AB TEST: CPT

## 2022-12-09 PROCEDURE — 84484 ASSAY OF TROPONIN QUANT: CPT

## 2022-12-09 PROCEDURE — C1887: CPT

## 2022-12-09 PROCEDURE — 85027 COMPLETE CBC AUTOMATED: CPT

## 2022-12-09 PROCEDURE — 83880 ASSAY OF NATRIURETIC PEPTIDE: CPT

## 2022-12-09 RX ORDER — ASPIRIN/CALCIUM CARB/MAGNESIUM 324 MG
1 TABLET ORAL
Qty: 30 | Refills: 0
Start: 2022-12-09 | End: 2023-01-07

## 2022-12-09 RX ORDER — ATORVASTATIN CALCIUM 80 MG/1
2 TABLET, FILM COATED ORAL
Qty: 60 | Refills: 0
Start: 2022-12-09 | End: 2023-01-07

## 2022-12-09 RX ORDER — METFORMIN HYDROCHLORIDE 850 MG/1
1 TABLET ORAL
Qty: 60 | Refills: 0
Start: 2022-12-09 | End: 2023-02-09

## 2022-12-09 RX ORDER — ASPIRIN/CALCIUM CARB/MAGNESIUM 324 MG
1 TABLET ORAL
Qty: 30 | Refills: 0
Start: 2022-12-09 | End: 2023-02-09

## 2022-12-09 RX ORDER — ISOPROPYL ALCOHOL, BENZOCAINE .7; .06 ML/ML; ML/ML
1 SWAB TOPICAL
Qty: 100 | Refills: 1
Start: 2022-12-09 | End: 2023-03-01

## 2022-12-09 RX ORDER — PANTOPRAZOLE SODIUM 20 MG/1
1 TABLET, DELAYED RELEASE ORAL
Qty: 30 | Refills: 0
Start: 2022-12-09 | End: 2023-01-07

## 2022-12-09 RX ORDER — ISOPROPYL ALCOHOL, BENZOCAINE .7; .06 ML/ML; ML/ML
1 SWAB TOPICAL
Qty: 100 | Refills: 1
Start: 2022-12-09 | End: 2023-01-27

## 2022-12-09 RX ORDER — METOPROLOL TARTRATE 50 MG
1 TABLET ORAL
Qty: 30 | Refills: 0
Start: 2022-12-09 | End: 2023-02-09

## 2022-12-09 RX ORDER — METFORMIN HYDROCHLORIDE 850 MG/1
1 TABLET ORAL
Qty: 60 | Refills: 0
Start: 2022-12-09 | End: 2023-01-07

## 2022-12-09 RX ORDER — OLMESARTAN MEDOXOMIL 5 MG/1
2 TABLET, FILM COATED ORAL
Qty: 60 | Refills: 0
Start: 2022-12-09 | End: 2023-01-07

## 2022-12-09 RX ORDER — METOPROLOL TARTRATE 50 MG
1 TABLET ORAL
Qty: 30 | Refills: 0
Start: 2022-12-09 | End: 2023-01-07

## 2022-12-09 RX ADMIN — Medication 5 UNIT(S): at 12:02

## 2022-12-09 RX ADMIN — Medication 5 UNIT(S): at 08:54

## 2022-12-09 RX ADMIN — PANTOPRAZOLE SODIUM 40 MILLIGRAM(S): 20 TABLET, DELAYED RELEASE ORAL at 05:19

## 2022-12-09 RX ADMIN — Medication 81 MILLIGRAM(S): at 12:28

## 2022-12-09 RX ADMIN — Medication 4: at 17:58

## 2022-12-09 RX ADMIN — Medication 5 UNIT(S): at 17:57

## 2022-12-09 RX ADMIN — LOSARTAN POTASSIUM 25 MILLIGRAM(S): 100 TABLET, FILM COATED ORAL at 05:19

## 2022-12-09 RX ADMIN — Medication 2: at 08:55

## 2022-12-09 RX ADMIN — Medication 25 MILLIGRAM(S): at 05:19

## 2022-12-09 RX ADMIN — Medication 4: at 12:02

## 2022-12-09 NOTE — DISCHARGE NOTE PROVIDER - NSDCMRMEDTOKEN_GEN_ALL_CORE_FT
alcohol swabs : Apply topically to affected area 4 times a day   aspirin 81 mg oral delayed release tablet: 1 tab(s) orally once a day  atorvastatin 10 mg oral tablet: 2 tab(s) orally once a day (at bedtime)   glucometer (per patient&#x27;s insurance): Test blood sugars four times a day. Dispense #1 glucometer.  lancets: 1 application subcutaneously 4 times a day   metFORMIN 1000 mg oral tablet: 1 tab(s) orally 2 times a day   metoprolol succinate 25 mg oral tablet, extended release: 1 tab(s) orally once a day  olmesartan 5 mg oral tablet: 2 tab(s) orally once a day   pantoprazole 40 mg oral delayed release tablet: 1 tab(s) orally once a day (before a meal)  test strips (per patient&#x27;s insurance): 1 application subcutaneously 4 times a day. ** Compatible with patient&#x27;s glucometer **

## 2022-12-09 NOTE — DISCHARGE NOTE PROVIDER - NSDCFUADDAPPT_GEN_ALL_CORE_FT
Please follow up with CT surgery.  You will need to isolate for 10 days and a negative Covid Test before being scheduled for OR.  Follow up with your PCP after isolation.  If symptoms progress or worsen, seek immediate medical attention.  Please follow up with CT surgery.  You will need to isolate for 10 days and will need a negative Covid Test before being scheduled for OR.

## 2022-12-09 NOTE — DISCHARGE NOTE NURSING/CASE MANAGEMENT/SOCIAL WORK - NSDCPEFALRISK_GEN_ALL_CORE
For information on Fall & Injury Prevention, visit: [email or web address] OR  [email or web address] OR  https://www.cdc.gov/steadi/patient.html

## 2022-12-09 NOTE — PROGRESS NOTE ADULT - REASON FOR ADMISSION
JOHANSEN / SOB

## 2022-12-09 NOTE — DISCHARGE NOTE PROVIDER - NSDCFUSCHEDAPPT_GEN_ALL_CORE_FT
Ranjeet Bowles Physician [facility]  [facility] [address]  Scheduled Appointment: 12/15/2022

## 2022-12-09 NOTE — PROGRESS NOTE ADULT - PROVIDER SPECIALTY LIST ADULT
Internal Medicine
CT Surgery
Internal Medicine
Structural Heart
Vascular Surgery
Cardiology
Internal Medicine
Internal Medicine

## 2022-12-09 NOTE — PROGRESS NOTE ADULT - SUBJECTIVE AND OBJECTIVE BOX
Mr Lopez explained to me that he wishes to go home.    I explained to him and his granddaughter who in turn spoke to other relatives that if he was to go home he will likely not be able to access emergency medicaid and thus not be able to obtain care.    They indicated to me that they are committed to staying in hospital and wait for surgery.    Ranjeet Bowles  Cardiac Surgeon

## 2022-12-09 NOTE — DISCHARGE NOTE NURSING/CASE MANAGEMENT/SOCIAL WORK - PATIENT PORTAL LINK FT
You can access the FollowMyHealth Patient Portal offered by [facility] by registering at the following website: [email or web address]. By joining [email or web address]’s FollowMyHealth portal, you will also be able to view your health information using other applications (apps) compatible with our system.

## 2022-12-09 NOTE — DISCHARGE NOTE NURSING/CASE MANAGEMENT/SOCIAL WORK - NSDCFUADDAPPT_GEN_ALL_CORE_FT
Follow up with your PCP after isolation.  If symptoms progress or worsen, seek immediate medical attention.  Please follow up with CT surgery.  You will need to isolate for 10 days and will need a negative Covid Test before being scheduled for OR.

## 2022-12-09 NOTE — DISCHARGE NOTE NURSING/CASE MANAGEMENT/SOCIAL WORK - HAS THE PATIENT RECEIVED THE INFLUENZA VACCINE THIS SEASON?
Post Acute Care Discharge Phone Assessment     Review purpose of telephone call with: Alex Potter  Date: 10/27/17    - To evaluate the client after hospital discharge  - To ensure the client has received and understands self care instructions  - To identify and prevent potential adverse events  - To provide additional education and initiate post acute services       Nury Lechuga   : 1952 Phone #: [phone] (home)    1. Hospital Information    [place] [address]    Discharge to [place]  Discharge Date: 10/26    Admission Date: 10/24    Non-face-to-face services provided:  Scheduled appointment with PCP- ([address] [place] [address])    Hospital records: obtained and reviewed    Was instructed to follow up in: 14 days    Hospital Diagnosis: Grade 1 Endometrial cancer       Hospital Consultants:  gyn    Initial contact Date: 10/27/2017  Type of Contact:  by phone      2. Assessment of Current Condition      Questions: How have you felt since discharge from the hospital:     better    Did you receive a discharge summary from the hospital? yes    Is there any lingering or new fever? No    Are you eating and drinking OK? yes    Are there any new complaints of pain? No    If you have a wound - is the dressing clean, dry, and intact? Yes    Reinforce Education    Does the patient know -   1. What to do if her symptoms worsen? yes   2. For what symptoms she should call the doctor?  yes   3. What symptoms she should get help with right away? Yes    4. Does she know how to contact her physician?  yes    Are there any questions about the patients medications? No   Does the patient have a list of all the medications that were prescribed to be taken after discharge? yes   Does the patient have all the medications that were prescribed?  yes   Is the patient taking all the prescribed medications?   yes   Does the patient understand what all the medications are taken for? yes      ( Update medication list and refresh yes...

## 2022-12-09 NOTE — DISCHARGE NOTE PROVIDER - NSDCCPCAREPLAN_GEN_ALL_CORE_FT
PRINCIPAL DISCHARGE DIAGNOSIS  Diagnosis: Chest pain  Assessment and Plan of Treatment: You tested positive for COVID on 12/6/22.  Isolate yourself for 10 days.  After isolation, you will be scheduled for OR for coronary artery bypass graft, aortic valve replacement, and right carotid endarterectomy.          SECONDARY DISCHARGE DIAGNOSES  Diagnosis: Aortic stenosis  Assessment and Plan of Treatment: You tested positive for COVID on 12/6/22.  Isolate yourself for 10 days.  After isolation, you will be scheduled for OR for coronary artery bypass graft, aortic valve replacement, and right carotid endarterectomy.    Diagnosis: 2019 novel coronavirus disease (COVID-19)  Assessment and Plan of Treatment: You tested positive for COVID on 12/6/22.  Isolate yourself for 10 days.  You have tested POSITIVE for the novel coronavirus (COVID-19). You no longer require hospitalization.  Follow up with your doctor within 2-3 days.   - Return to the ED for any new or worsening symptoms including but not limited to difficulty breathing, severe weakness, confusion, etc.  - Take Tylenol (Acetaminophen) 650mg as needed for pain or fever  - Stay well hydrated.   - Avoid contact with anybody who is sick OR at risk populations including elderly, young, pregnant patients, immune compromised people  - Wash hands frequently in warm soapy water for at least 20 seconds   - If you would like an appointment to be tested for COVID-19 (coronavirus) at one of the testing sites, contact [phone] for an appointment.    Viral Respiratory Infection  A viral respiratory infection is an illness that affects parts of the body used for breathing, like the lungs, nose, and throat. It is caused by a germ called a virus. Symptoms can include runny nose, coughing, sneezing, fatigue, body aches, sore throat, fever, or headache. Over the counter medicine can be used to manage the symptoms but the infection typically goes away on its own in 5 to 10 days.   SEEK IMMEDIATE MEDICAL CARE IF YOU HAVE ANY OF THE FOLLOWING SYMPTOMS: shortness of breath, chest pain, fever over 10 days, or lightheadedness/dizziness.      Diagnosis: Stenosis of right carotid artery  Assessment and Plan of Treatment: You tested positive for COVID on 12/6/22.  Isolate yourself for 10 days.  After isolation, you will be scheduled for OR for coronary artery bypass graft, aortic valve replacement, and right carotid endarterectomy.     PRINCIPAL DISCHARGE DIAGNOSIS  Diagnosis: Chest pain  Assessment and Plan of Treatment: You tested positive for COVID on 12/6/22.  Isolate yourself for 10 days.  After isolation, you will  need to follow up with CT surgery for OR for coronary artery bypass graft, aortic valve replacement, and right carotid endarterectomy.          SECONDARY DISCHARGE DIAGNOSES  Diagnosis: Aortic stenosis  Assessment and Plan of Treatment: You tested positive for COVID on 12/6/22.  Isolate yourself for 10 days.  After isolation, you will need to follow up with CT surgery to be scheduled for OR for coronary artery bypass graft, aortic valve replacement, and right carotid endarterectomy.    Diagnosis: 2019 novel coronavirus disease (COVID-19)  Assessment and Plan of Treatment: You tested positive for COVID on 12/6/22.  Isolate yourself for 10 days.  You have tested POSITIVE for the novel coronavirus (COVID-19). You no longer require hospitalization.  Follow up with your doctor within 2-3 days.   - Return to the ED for any new or worsening symptoms including but not limited to difficulty breathing, severe weakness, confusion, etc.  - Take Tylenol (Acetaminophen) 650mg as needed for pain or fever  - Stay well hydrated.   - Avoid contact with anybody who is sick OR at risk populations including elderly, young, pregnant patients, immune compromised people  - Wash hands frequently in warm soapy water for at least 20 seconds   - If you would like an appointment to be tested for COVID-19 (coronavirus) at one of the testing sites, contact [phone] for an appointment.    Viral Respiratory Infection  A viral respiratory infection is an illness that affects parts of the body used for breathing, like the lungs, nose, and throat. It is caused by a germ called a virus. Symptoms can include runny nose, coughing, sneezing, fatigue, body aches, sore throat, fever, or headache. Over the counter medicine can be used to manage the symptoms but the infection typically goes away on its own in 5 to 10 days.   SEEK IMMEDIATE MEDICAL CARE IF YOU HAVE ANY OF THE FOLLOWING SYMPTOMS: shortness of breath, chest pain, fever over 10 days, or lightheadedness/dizziness.      Diagnosis: Stenosis of right carotid artery  Assessment and Plan of Treatment: You tested positive for COVID on 12/6/22.  Isolate yourself for 10 days.  After isolation, you will need to follow up with CT surgery to be scheduled for OR for coronary artery bypass graft, aortic valve replacement, and right carotid endarterectomy.

## 2022-12-09 NOTE — PHARMACOTHERAPY INTERVENTION NOTE - COMMENTS
Counseled patient's son via telephone ([place]  [place], [place] [id number]) on discharge medication doses, indications, and possible side effects. Answered all questions to the best of my ability. Patient has no insurance, so with the help of social work was enrolled in [facility]'s dispensary of hope program to get most oral medications at no cost. Appropriate therapeutic interchanges were made with current inpatient medications depending on medication availability at [place]. Informed son that atorvastatin, olmesartan, metoprolol, pantoprazole, and metformin will be ready at [facility] at no cost within an hour, and aspirin and glucometer with supplies at [facility] for a copay of $22. Son exhibited understanding of discharge medication regimen, is agreeable to pay, and will  all medications at [facility] here and at [facility] today.    Camilla Oneill, PharmD, [facility]  Clinical Pharmacy Specialist  [phone] or Teams

## 2022-12-09 NOTE — DISCHARGE NOTE PROVIDER - CARE PROVIDER_API CALL
Ranjeet Bowles)  [facility] Surgery  [address] [place] [address]  Phone: [phone]  Fax: [phone]  Follow Up Time: 2 weeks

## 2022-12-09 NOTE — DISCHARGE NOTE PROVIDER - HOSPITAL COURSE
77M with a PMH T2DM and recently diagnosed CHF? who initially presented to clinic today to establish care but due to anginal symptoms was referred to the ED. Daughter is at bedside and able to translate. Patient states he has been having chest pain with exertion that has been worsening for the past couple of months that has been getting worse over the last few weeks. The chest pain is substernal, worse with exertion, better with rest. The pain starts after 2 blocks associated with shortness of breath, palpitations, dizziness, and gets better after a few minutes of relaxation. Denies orthopnea, PND, JOHANSEN and LE edema. He was evaluated in [facility] 2 months ago and had an echo and x-ray. Patient was started on Bumex and ASA. TTE report is in Greek but LVEF ~50% with mild diastolic dysfunction and moderate aortic insufficiency. He has never had a stress test or angiogram. His symptoms continued to worsen so [place] the [place] went to ED about 5 days ago had initial chest pain work up with troponin 21 and EKG non-ischemic and he was discharged with follow up with Cardiology.    covid-19 pos   -asymptomatic   air borne precaution / isolation   seen by ID   no treatment indicated , monitor O2 sat  pt. do NOT require any oxygen     Chest pain / CHF exacerbation   likely ac on chronic diastolic failure   seen by cardiology   -repeat TTE   structural heart team following     Rt. ICA stenosis   -seen by vascular team   advised B/l CTA neck to check patency of carotid vessels     Cardiac murmur   -check TTE   started on lasix   serial CE     DM-2 :  -c/w Insulin / FSBS  check A1c     HLD   -check lipid profile   started on statin     HTN :  -c/w home meds     Medically cleared by Dr. Moura for discharge.  Dipso: pt. was scheduled for OR tomorrow by CT surgery , but now postponed . as per ID if no urgent surgery needed then pt. can be d/c home and can be scheduled for OR as out pt. after 10 days      77M with a PMH T2DM and recently diagnosed CHF? who initially presented to clinic today to establish care but due to anginal symptoms was referred to the ED. Daughter is at bedside and able to translate. Patient states he has been having chest pain with exertion that has been worsening for the past couple of months that has been getting worse over the last few weeks. The chest pain is substernal, worse with exertion, better with rest. The pain starts after 2 blocks associated with shortness of breath, palpitations, dizziness, and gets better after a few minutes of relaxation. Denies orthopnea, PND, JOHANSEN and LE edema. He was evaluated in [facility] 2 months ago and had an echo and x-ray. Patient was started on Bumex and ASA. TTE report is in Ukrainian but LVEF ~50% with mild diastolic dysfunction and moderate aortic insufficiency. He has never had a stress test or angiogram. His symptoms continued to worsen so [place] went to ED about 5 days ago had initial chest pain work up with troponin 21 and EKG non-ischemic and he was discharged with follow up with Cardiology.    covid-19 pos   -asymptomatic   air borne precaution / isolation   seen by ID   no treatment indicated , monitor O2 sat  pt. do NOT require any oxygen     Chest pain / CHF exacerbation   likely ac on chronic diastolic failure   seen by cardiology   -repeat TTE   structural heart team following     Rt. ICA stenosis   -seen by vascular team   advised B/l CTA neck to check patency of carotid vessels     Cardiac murmur   -check TTE   started on lasix   serial CE     DM-2 :  -c/w Insulin / FSBS  check A1c     HLD   -check lipid profile   started on statin     HTN :  -c/w home meds     Medically cleared by Dr. Moura for discharge.  Dispo: pt. was scheduled for OR tomorrow by CT surgery , but now postponed . as per ID if no urgent surgery needed then pt. can be d/c home and can be scheduled for OR as out pt. after 10 days

## 2022-12-19 ENCOUNTER — INPATIENT (INPATIENT)
Facility: HOSPITAL | Age: 77
LOS: 22 days | Discharge: HOME HEALTH | DRG: 219 | End: 2023-01-11
Attending: STUDENT IN AN ORGANIZED HEALTH CARE EDUCATION/TRAINING PROGRAM | Admitting: STUDENT IN AN ORGANIZED HEALTH CARE EDUCATION/TRAINING PROGRAM
Payer: MEDICAID

## 2022-12-19 VITALS
TEMPERATURE: 98 F | RESPIRATION RATE: 20 BRPM | DIASTOLIC BLOOD PRESSURE: 75 MMHG | SYSTOLIC BLOOD PRESSURE: 122 MMHG | OXYGEN SATURATION: 98 % | HEIGHT: 64 IN | WEIGHT: 171.96 LBS | HEART RATE: 89 BPM

## 2022-12-19 DIAGNOSIS — E11.9 TYPE 2 DIABETES MELLITUS WITHOUT COMPLICATIONS: ICD-10-CM

## 2022-12-19 DIAGNOSIS — I35.0 NONRHEUMATIC AORTIC (VALVE) STENOSIS: ICD-10-CM

## 2022-12-19 DIAGNOSIS — R07.9 CHEST PAIN, UNSPECIFIED: ICD-10-CM

## 2022-12-19 DIAGNOSIS — I25.10 ATHEROSCLEROTIC HEART DISEASE OF NATIVE CORONARY ARTERY WITHOUT ANGINA PECTORIS: ICD-10-CM

## 2022-12-19 LAB
ALBUMIN SERPL ELPH-MCNC: 4 G/DL — SIGNIFICANT CHANGE UP (ref 3.3–5)
ALP SERPL-CCNC: 104 U/L — SIGNIFICANT CHANGE UP (ref 40–120)
ALT FLD-CCNC: 28 U/L — SIGNIFICANT CHANGE UP (ref 10–45)
ANION GAP SERPL CALC-SCNC: 12 MMOL/L — SIGNIFICANT CHANGE UP (ref 5–17)
APTT BLD: 28.6 SEC — SIGNIFICANT CHANGE UP (ref 27.5–35.5)
AST SERPL-CCNC: 20 U/L — SIGNIFICANT CHANGE UP (ref 10–40)
BASOPHILS # BLD AUTO: 0.02 K/UL — SIGNIFICANT CHANGE UP (ref 0–0.2)
BASOPHILS NFR BLD AUTO: 0.3 % — SIGNIFICANT CHANGE UP (ref 0–2)
BILIRUB SERPL-MCNC: 1.3 MG/DL — HIGH (ref 0.2–1.2)
BUN SERPL-MCNC: 17 MG/DL — SIGNIFICANT CHANGE UP (ref 7–23)
CALCIUM SERPL-MCNC: 9.3 MG/DL — SIGNIFICANT CHANGE UP (ref 8.4–10.5)
CHLORIDE SERPL-SCNC: 103 MMOL/L — SIGNIFICANT CHANGE UP (ref 96–108)
CO2 SERPL-SCNC: 23 MMOL/L — SIGNIFICANT CHANGE UP (ref 22–31)
CREAT SERPL-MCNC: 0.87 MG/DL — SIGNIFICANT CHANGE UP (ref 0.5–1.3)
EGFR: 89 ML/MIN/1.73M2 — SIGNIFICANT CHANGE UP
EOSINOPHIL # BLD AUTO: 0.07 K/UL — SIGNIFICANT CHANGE UP (ref 0–0.5)
EOSINOPHIL NFR BLD AUTO: 1.1 % — SIGNIFICANT CHANGE UP (ref 0–6)
GLUCOSE BLDC GLUCOMTR-MCNC: 206 MG/DL — HIGH (ref 70–99)
GLUCOSE BLDC GLUCOMTR-MCNC: 231 MG/DL — HIGH (ref 70–99)
GLUCOSE SERPL-MCNC: 367 MG/DL — HIGH (ref 70–99)
HCT VFR BLD CALC: 37.6 % — LOW (ref 39–50)
HGB BLD-MCNC: 13.2 G/DL — SIGNIFICANT CHANGE UP (ref 13–17)
IMM GRANULOCYTES NFR BLD AUTO: 0.2 % — SIGNIFICANT CHANGE UP (ref 0–0.9)
INR BLD: 1.13 RATIO — SIGNIFICANT CHANGE UP (ref 0.88–1.16)
LYMPHOCYTES # BLD AUTO: 1.15 K/UL — SIGNIFICANT CHANGE UP (ref 1–3.3)
LYMPHOCYTES # BLD AUTO: 18.8 % — SIGNIFICANT CHANGE UP (ref 13–44)
MCHC RBC-ENTMCNC: 31.7 PG — SIGNIFICANT CHANGE UP (ref 27–34)
MCHC RBC-ENTMCNC: 35.1 GM/DL — SIGNIFICANT CHANGE UP (ref 32–36)
MCV RBC AUTO: 90.4 FL — SIGNIFICANT CHANGE UP (ref 80–100)
MONOCYTES # BLD AUTO: 0.36 K/UL — SIGNIFICANT CHANGE UP (ref 0–0.9)
MONOCYTES NFR BLD AUTO: 5.9 % — SIGNIFICANT CHANGE UP (ref 2–14)
NEUTROPHILS # BLD AUTO: 4.5 K/UL — SIGNIFICANT CHANGE UP (ref 1.8–7.4)
NEUTROPHILS NFR BLD AUTO: 73.7 % — SIGNIFICANT CHANGE UP (ref 43–77)
NRBC # BLD: 0 /100 WBCS — SIGNIFICANT CHANGE UP (ref 0–0)
NT-PROBNP SERPL-SCNC: 1214 PG/ML — HIGH (ref 0–300)
PLATELET # BLD AUTO: 200 K/UL — SIGNIFICANT CHANGE UP (ref 150–400)
POTASSIUM SERPL-MCNC: 3.9 MMOL/L — SIGNIFICANT CHANGE UP (ref 3.5–5.3)
POTASSIUM SERPL-SCNC: 3.9 MMOL/L — SIGNIFICANT CHANGE UP (ref 3.5–5.3)
PROT SERPL-MCNC: 6.9 G/DL — SIGNIFICANT CHANGE UP (ref 6–8.3)
PROTHROM AB SERPL-ACNC: 13.1 SEC — SIGNIFICANT CHANGE UP (ref 10.5–13.4)
RBC # BLD: 4.16 M/UL — LOW (ref 4.2–5.8)
RBC # FLD: 13.3 % — SIGNIFICANT CHANGE UP (ref 10.3–14.5)
SARS-COV-2 RNA SPEC QL NAA+PROBE: SIGNIFICANT CHANGE UP
SODIUM SERPL-SCNC: 138 MMOL/L — SIGNIFICANT CHANGE UP (ref 135–145)
TROPONIN T, HIGH SENSITIVITY RESULT: 22 NG/L — SIGNIFICANT CHANGE UP (ref 0–51)
TROPONIN T, HIGH SENSITIVITY RESULT: 22 NG/L — SIGNIFICANT CHANGE UP (ref 0–51)
WBC # BLD: 6.11 K/UL — SIGNIFICANT CHANGE UP (ref 3.8–10.5)
WBC # FLD AUTO: 6.11 K/UL — SIGNIFICANT CHANGE UP (ref 3.8–10.5)

## 2022-12-19 PROCEDURE — 93010 ELECTROCARDIOGRAM REPORT: CPT

## 2022-12-19 PROCEDURE — 99285 EMERGENCY DEPT VISIT HI MDM: CPT

## 2022-12-19 PROCEDURE — 71045 X-RAY EXAM CHEST 1 VIEW: CPT | Mod: 26

## 2022-12-19 PROCEDURE — 99223 1ST HOSP IP/OBS HIGH 75: CPT

## 2022-12-19 RX ORDER — ASPIRIN/CALCIUM CARB/MAGNESIUM 324 MG
324 TABLET ORAL ONCE
Refills: 0 | Status: COMPLETED | OUTPATIENT
Start: 2022-12-19 | End: 2022-12-19

## 2022-12-19 RX ORDER — ATORVASTATIN CALCIUM 80 MG/1
80 TABLET, FILM COATED ORAL AT BEDTIME
Refills: 0 | Status: DISCONTINUED | OUTPATIENT
Start: 2022-12-19 | End: 2022-12-22

## 2022-12-19 RX ORDER — SODIUM CHLORIDE 9 MG/ML
3 INJECTION INTRAMUSCULAR; INTRAVENOUS; SUBCUTANEOUS EVERY 8 HOURS
Refills: 0 | Status: DISCONTINUED | OUTPATIENT
Start: 2022-12-19 | End: 2022-12-22

## 2022-12-19 RX ORDER — ASPIRIN/CALCIUM CARB/MAGNESIUM 324 MG
81 TABLET ORAL DAILY
Refills: 0 | Status: DISCONTINUED | OUTPATIENT
Start: 2022-12-19 | End: 2022-12-22

## 2022-12-19 RX ORDER — METOPROLOL TARTRATE 50 MG
25 TABLET ORAL DAILY
Refills: 0 | Status: DISCONTINUED | OUTPATIENT
Start: 2022-12-19 | End: 2022-12-22

## 2022-12-19 RX ORDER — INSULIN LISPRO 100/ML
3 VIAL (ML) SUBCUTANEOUS
Refills: 0 | Status: DISCONTINUED | OUTPATIENT
Start: 2022-12-19 | End: 2022-12-20

## 2022-12-19 RX ORDER — PANTOPRAZOLE SODIUM 20 MG/1
40 TABLET, DELAYED RELEASE ORAL
Refills: 0 | Status: DISCONTINUED | OUTPATIENT
Start: 2022-12-19 | End: 2022-12-22

## 2022-12-19 RX ORDER — INSULIN GLARGINE 100 [IU]/ML
15 INJECTION, SOLUTION SUBCUTANEOUS AT BEDTIME
Refills: 0 | Status: DISCONTINUED | OUTPATIENT
Start: 2022-12-19 | End: 2022-12-20

## 2022-12-19 RX ADMIN — Medication 3 UNIT(S): at 17:09

## 2022-12-19 RX ADMIN — INSULIN GLARGINE 15 UNIT(S): 100 INJECTION, SOLUTION SUBCUTANEOUS at 22:54

## 2022-12-19 RX ADMIN — ATORVASTATIN CALCIUM 80 MILLIGRAM(S): 80 TABLET, FILM COATED ORAL at 22:54

## 2022-12-19 RX ADMIN — Medication 324 MILLIGRAM(S): at 10:57

## 2022-12-19 RX ADMIN — SODIUM CHLORIDE 3 MILLILITER(S): 9 INJECTION INTRAMUSCULAR; INTRAVENOUS; SUBCUTANEOUS at 22:08

## 2022-12-19 NOTE — H&P ADULT - NSHPLABSRESULTS_GEN_ALL_CORE
< from: Cardiac Catheterization (12.04.22 @ 10:04) >    Presentation:   77M with a PMH T2DM and severe aortic stenosis in the setting of  angina who presents to cardiac cath lab  for further assessment.      Procedure Narrative:   The risks and alternatives of the procedures and conscious sedation  were explained to the patient and informed consent was  obtained. The patient was brought to the cath lab and placed on the  exam table.  Access   Right femoral artery:   The puncture site was infiltrated with 1% Lidocaine. Vascular access  was obtained using modified seldingertechnique.  Right femoral vein:   The puncture site was infiltrated with 1% Lidocaine. Vascular access  was obtained using modified seldinger technique.      < from: VA Duplex Carotid, Bilat (12.02.22 @ 12:16) >    FINDINGS:    There is diffuse intimal thickening. Calcific plaque is present in the   distal common carotid arteries, carotid bulbs and bifurcations. The   internal carotid arteries are tortuous. Disturbed color flow and elevated   blood flow velocities are present in the proximal right internal carotid   artery.    Peak systolic velocities are as follows:    RIGHT:  PROX CCA = 59 cm/s  DIST CCA = 88 cm/s  PROX ICA = 264cm/s  DIST ICA = 73 cm/s  ECA = 54 cm/s    LEFT:  PROX CCA = 91 cm/s  DIST CCA = 75 cm/s  PROX ICA = 115 cm/s  DIST ICA = 90 cm/s  ECA = 70 cm/s    Antegrade flow is noted within both vertebral arteries.    IMPRESSION: Calcified plaque in the proximal right internal carotid   artery creating a hemodynamically significant stenosis.    The degree of luminal narrowing is estimated at 70-99% by diameter.    Calcified plaque in the left internal carotid artery without duplex   evidence of hemodynamically significant stenosis.    Examination findings were conveyed to physician's assistant Bianca by   vascular technologist Carly at 1215 hours on 12/2/2022 with read back.    Measurement of carotid stenosis is based on velocity parameters that   correlate the residual internal carotid diameter with that of the more   distal vessel in accordance with a method such as the North American   Symptomatic Carotid Endarterectomy Trial (NASCET).    < end of copied text >      Diagnostic Findings:     Patient: CARL CLAYTON          MRN: [id number]  Study Date: 12/04/2022   10:04 AM      Page 1 of 4          Coronary Angiography   The coronary circulation is left dominant.      LM   Left main artery: Angiography shows mild atherosclerosis.      LAD   Proximal left anterior descending: At the site of a large D1. There is  a 70 % calcified stenosis. EDITA Flow 3. Instant wave-free  ratio was performed with a calculated value of 0.87. Based on the  results, the lesion was judged to be significant.    CX   Circumflex: Angiography shows mild atherosclerosis.      RCA   Right coronary artery: Angiography shows mild atherosclerosis.      < end of copied text >      < from: Transthoracic Echocardiogram (12.02.22 @ 07:06) >

## 2022-12-19 NOTE — ED ADULT NURSE NOTE - NSIMPLEMENTINTERV_GEN_ALL_ED
Implemented All Fall with Harm Risk Interventions:  [place] to call system. Call bell, personal items and telephone within reach. Instruct patient to call for assistance. Room bathroom lighting operational. Non-slip footwear when patient is off stretcher. Physically safe environment: no spills, clutter or unnecessary equipment. Stretcher in lowest position, wheels locked, appropriate side rails in place. Provide visual cue, wrist band, yellow gown, etc. Monitor gait and stability. Monitor for mental status changes and reorient to person, place, and time. Review medications for side effects contributing to fall risk. Reinforce activity limits and safety measures with patient and family. Provide visual clues: red socks.

## 2022-12-19 NOTE — ED PROVIDER NOTE - NS ED ROS FT
GENERAL: No fever or chills  EYES: no change in vision   HEENT: no trouble swallowing or speaking   CARDIAC: + chest pain   PULMONARY: no cough, +SOB  GI:  No abdominal pain  : No changes in urination   SKIN: no rashes   NEURO: no headache   MSK: No joint pain     All other ROS negative unless otherwise specified in HPI.

## 2022-12-19 NOTE — ED ADULT NURSE NOTE - NS ED NURSE REPORT GIVEN DT
Brief Postoperative Note      Patient: Alejandro Saeed  YOB: 1946  MRN: [id number]    Date of Procedure: 8/28/2020    Pre-Op Diagnosis: OSTEOARTHRITIS Right knee    Post-Op Diagnosis: Same       Procedure(s):  RIGHT KNEE TOTAL ARTHROPLASTY    ++RAYMOND++   ++PNB++     ++LATEX ALLERGY++   ++IODINE ALLERGY++    Surgeon(s):  Shahnaz Nelson MD    Assistant:  Physician Assistant: SUZY Knight    Anesthesia: Spinal    Estimated Blood Loss (mL): less than 50     Complications: None    Specimens:   ID Type Source Tests Collected by Time Destination   A : RIGHT KNEE BONE Bone Bone SURGICAL PATHOLOGY Shahnaz Nelson MD 8/28/2020 9122        Implants:  Implant Name Type Inv.  Item Serial No.  Lot No. LRB No. Used Action   CEMENT BARIUM RADIOPQ FULL 40GR Cement CEMENT BARIUM RADIOPQ FULL 40GR  RAYMOND: ORTHOPA[employer] [id number] Right 1 Implanted   CEMENT BARIUM RADIOPQ FULL 40GR Cement CEMENT BARIUM RADIOPQ FULL 40GR  RAYMOND: ORTHOPAEDICS [id number] Right 1 Implanted   IMPL KNEE TIB BASEPLT PRIME SZ 3 Knee IMPL KNEE TIB BASEPLT PRIME SZ 3  RAYMOND: ORTHOPAEDICS [id number] Right 1 Implanted   IMPL KNEE TIB INSRT TRIATHLON SZ 3 11MM Knee IMPL KNEE TIB INSRT TRIATHLON SZ 3 11MM  RAYMOND: ORTHOPAEDICS [id number] Right 1 Implanted   IMPL KNEE FEM COMP CMNTD SZ 2 Knee IMPL KNEE FEM COMP CMNTD SZ 2  RAYMOND: ORTHOPAEDICS [id number] Right 1 Implanted   IMPL KNEE PATELLA ASYM 9X29MM Knee IMPL KNEE PATELLA ASYM 9X29MM  RAYMOND: ORTHOPAEDICS [id number] Right 1 Implanted         Drains:   Gastrostomy/Enterostomy PEG-jejunostomy RLQ (Active)       Urethral Catheter Non-latex;Straight-tip (Active)       Findings: OA    Electronically signed by Shahnaz Nelson MD on 8/28/2020 at 8:45 AM
19-Dec-2022 17:51

## 2022-12-19 NOTE — H&P ADULT - HISTORY OF PRESENT ILLNESS
77M with a PMH T2DM and recently diagnosed CHF? who had recent admission for cardiac work up. Pt found to have CAD and aortic stenosis. At that time, pt tested positive for COVID, however grossly asymptomatic. Pt was d/c home for outpatient follow up with Dr Bowles. Now presents to ED with chest pain starting yesterday afternoon.     seen by cardio in ED  (01 Dec 2022 16:42)    Pt now s/p [facility] 12/4 with prox LAD 70% stenosis and CT Surgery consulted for CABG/SAVR evaluation at that time.     < from: Transthoracic Echocardiogram (12.02.22 @ 07:06) >  ------------------------------------------------------------------------  Conclusions:  1. Normal mitral valve. Minimal mitral regurgitation.  2. Calcified trileaflet aortic valve with decreased  opening. Peak transaortic valve gradient equals 36 mm Hg,  mean transaortic valve gradient equals 20 mm Hg, estimated  aortic valve area equals 0.9 sqcm (by continuity equation),  aortic valve velocity time integral equals 78 cm,  consistent with low gradient, severe aortic stenosis with  preserved ejection fraction. Calculated SVi=41 mL/sqm.  Dimensionless Index=0.28. Visually, aortic valve appears  severely stenotic with significant color flow turbulence  across the valve. Consider additional imaging of the aortic  valve such as with cardiac CT or MORIAH if clinically  indicated. Mild aortic regurgitation.  3. Normal left ventricular systolic function. No segmental  wall motion abnormalities.  4. Normal right ventricular size and function.  *** No previous Echo exam.  ------------------------------------------------------------------------  Confirmed on  12/2/2022 - 10:15:58 by Dom Liang M.D.    < end of copied text >

## 2022-12-19 NOTE — H&P ADULT - NSHPPHYSICALEXAM_GEN_ALL_CORE
heent : nc/at   neck : supple, no JVD  lungs : B/l fair A/E , basilar rales   heart: systolic murmur , s1s2 nml  abd : soft, NABS, NT/Nd  ext : no e/c/c, pulses 1 +  neuro: aaox3 , no focal deficit  no edema

## 2022-12-19 NOTE — ED PROVIDER NOTE - PHYSICAL EXAMINATION
GENERAL: NAD  HEENT:  Atraumatic  CHEST/LUNG: Chest rise equal bilaterally  HEART: Regular rate and rhythm  ABDOMEN: Soft, Nontender, Nondistended  EXTREMITIES:  Extremities warm  PSYCH: A&Ox3  SKIN: No obvious rashes or lesions   NEUROLOGY: strength and sensation intact in all extremities

## 2022-12-19 NOTE — ED PROVIDER NOTE - CLINICAL SUMMARY MEDICAL DECISION MAKING FREE TEXT BOX
76 y/o male w/ PMH HTN, DM, CHF (recent echo showed 50% LVEF), recent cath showed stenosis c/o 1 day history of intermittent localized pressure-like midsternal chest pain and SOB. Pt believes that the pain is progressively worsening ever since hospital discharge. Denies fevers, chills, nausea, vomiting, dizziness, abdominal pain, dysuria, hematuria.    Pt's has a cardiac history and is concerning w/ high HEART score. Will screen for ACS (troponin), anemia/electrolytes, and chest x ray to screen for cardiopulmonary pathology. Pt was scheduled for a CABG 2/2 positive cath during last admission but CABG was not performed 2/2 COVID.

## 2022-12-19 NOTE — H&P ADULT - PROBLEM SELECTOR PLAN 2
will repeat inpatient TTE  BNP noted elevated  CT Surgery evaluation for CABG/SAVR underway   OR date TBD

## 2022-12-19 NOTE — H&P ADULT - PROBLEM SELECTOR PLAN 3
consistent carb diet  lantus 15 qhs  premeal 3 units admelog   hold home metformin  ISS admelog ac/hs

## 2022-12-19 NOTE — H&P ADULT - PROBLEM SELECTOR PLAN 1
admit to tele 2 [facility]  s/p [facility] on 12/4  known mod-severe LAD disease pLAD 70%  now currently w/o chest pain  will place on heparin gtt for ACS  troponins sent - negative so far  check EKG  BNP noted to be elevated  CT Surgery evaluation for CABG/SAVR underway   carotids completed known AI stenosis  OR date admit to tele 2 [facility]  s/p [facility] on 12/4  known mod-severe LAD disease pLAD 70%  now currently w/o chest pain  no chest pain / neg trops - will hold off heparin gtt for ACS for now  discussed w/ Dr Bowles, hold off hep gtt for now  troponins sent - negative so far  check EKG  BNP noted to be elevated  CT Surgery evaluation for CABG/SAVR underway   carotids completed known AI stenosis  OR date TBD

## 2022-12-19 NOTE — ED PROVIDER NOTE - ATTENDING CONTRIBUTION TO CARE
RGUJRAL 78yo male hx listed with recent admission discharged with scheduled CABG/AVR BIB grandson for chest pressure and JOHANSEN. Pt states he has had increasing symptoms since discharge from the hospital. Chest pressure is worse with exertion and improves at rest. Denies any cough, fever, n/v/d, abd pain. No leg swelling or calf pain.  Pt held his own meds for 3 days thinking it may be contributing to his symptoms.   On exam, Patient is awake, alert and oriented x 3.  Patient is well appearing and in no acute distress.  NCAT, PERRL, EOMI.  Neck is supple  Lungs are CTA B/L,+S1S2 + murmur  Abdomen:Soft nd/nt+bs no rebound or guarding.  Extremity no edema or calf tender.  Skin with no rash.  Neuro CN3-12 intact. Strength 5/5 in upper and lower extremities. Nml Sensation.  Check labs, xray chest to eval for ACS. CT surgery consult.

## 2022-12-19 NOTE — H&P ADULT - ASSESSMENT
77M with a PMH T2DM and recently diagnosed CHF? who had recent admission for cardiac work up. Pt found to have CAD and aortic stenosis. At that time, pt tested positive for COVID, however grossly asymptomatic. Pt was d/c home for outpatient follow up with Dr Bowles. Now presents to ED with chest pain starting yesterday afternoon. During previous admission, s/p [facility] on 12/4. Of note, pt had covid + pcr, asymptomatic and completed 10 day isolation.     cardiac work up:  12/2 R ICA significant stenosis (carotid duplex)   12/2 TTE severe AS preserved EF   12/4 with prox LAD 70% stenosis and CT Surgery evaluation for CABG/SAVR.    77M with a PMH T2DM and recently diagnosed CHF? who had recent admission for cardiac work up. Pt found to have CAD and aortic stenosis. At that time, pt tested positive for COVID, however grossly asymptomatic. Pt was d/c home for outpatient follow up with Dr Bowles. Now presents to ED with chest pain starting yesterday afternoon. During previous admission, s/p [facility] on 12/4. Of note, pt had covid + pcr, asymptomatic and completed 10 day isolation.     cardiac work up:  12/2 R ICA significant stenosis (carotid duplex)   12/2 TTE severe AS preserved EF   12/4 with prox LAD 70% stenosis and CT Surgery evaluation for CABG/SAVR.     Addendum:    Mr Lopez requires AVR and CABG as deemed from a previous admission before he went home.    He is scheduled for thursday.    I have explained the rationale for this to his daughter Anderson    Ranjeet Bowles  Cardiovascular Surgeon

## 2022-12-19 NOTE — H&P ADULT - NSHPREVIEWOFSYSTEMS_GEN_ALL_CORE
REVIEW OF SYSTEMS:  CONSTITUTIONAL: Denies fever, weight loss, or fatigue  EYES: Denies eye pain, visual disturbances, or discharge  RESPIRATORY: Denies SOB, cough, wheezing, chills or hemoptysis  CARDIOVASCULAR: +chest pain x1.5 days   GASTROINTESTINAL: Denies abdominal pain, nausea, vomiting, hematemesis, diarrhea, melena  GENITOURINARY: Denies dysuria, frequency, hematuria, or incontinence  NEUROLOGICAL: Denies headaches, memory loss, loss of strength, numbness, or tremors  SKIN: Denies itching, burning, rashes, or lesions   ENDOCRINE: Denies heat or cold intolerance, hair loss  MUSCULOSKELETAL: Denies joint pain or swelling, muscle, back, or extremity pain  PSYCHIATRIC: Denies depression, anxiety, mood swings, or difficulty sleeping  HEME/LYMPH: Denies easy bruising, denies bleeding gums and mucous membranes

## 2022-12-19 NOTE — ED PROVIDER NOTE - OBJECTIVE STATEMENT
76 y/o male w/ PMH HTN, DM, CHF (recent echo showed 50% LVEF), recent cath showed stenosis c/o 1 day history of intermittent localized pressure-like midsternal chest pain and SOB. Pt believes that the pain is progressively worsening ever since hospital discharge. Denies fevers, chills, nausea, vomiting, dizziness, abdominal pain, dysuria, hematuria.

## 2022-12-19 NOTE — ED ADULT NURSE NOTE - NSFALLRSKASSESSDT_ED_ALL_ED
Maggi called requesting to speak to Morenita.  She states patient will not be able to make it to today's appointment but she would like to discuss the reason why with Morenita. Maggi declined to tell writer the reason.  Writer will cancel appointment today.   Please call back to discuss. HOME: [phone]   19-Dec-2022 17:51

## 2022-12-20 DIAGNOSIS — I10 ESSENTIAL (PRIMARY) HYPERTENSION: ICD-10-CM

## 2022-12-20 DIAGNOSIS — E11.65 TYPE 2 DIABETES MELLITUS WITH HYPERGLYCEMIA: ICD-10-CM

## 2022-12-20 DIAGNOSIS — E78.5 HYPERLIPIDEMIA, UNSPECIFIED: ICD-10-CM

## 2022-12-20 LAB
ALBUMIN SERPL ELPH-MCNC: 4 G/DL — SIGNIFICANT CHANGE UP (ref 3.3–5)
ALP SERPL-CCNC: 102 U/L — SIGNIFICANT CHANGE UP (ref 40–120)
ALT FLD-CCNC: 25 U/L — SIGNIFICANT CHANGE UP (ref 10–45)
ANION GAP SERPL CALC-SCNC: 11 MMOL/L — SIGNIFICANT CHANGE UP (ref 5–17)
APPEARANCE UR: CLEAR — SIGNIFICANT CHANGE UP
AST SERPL-CCNC: 18 U/L — SIGNIFICANT CHANGE UP (ref 10–40)
BILIRUB SERPL-MCNC: 1.3 MG/DL — HIGH (ref 0.2–1.2)
BILIRUB UR-MCNC: NEGATIVE — SIGNIFICANT CHANGE UP
BUN SERPL-MCNC: 15 MG/DL — SIGNIFICANT CHANGE UP (ref 7–23)
CALCIUM SERPL-MCNC: 9.4 MG/DL — SIGNIFICANT CHANGE UP (ref 8.4–10.5)
CHLORIDE SERPL-SCNC: 101 MMOL/L — SIGNIFICANT CHANGE UP (ref 96–108)
CK MB CFR SERPL CALC: 1.4 NG/ML — SIGNIFICANT CHANGE UP (ref 0–6.7)
CK SERPL-CCNC: 58 U/L — SIGNIFICANT CHANGE UP (ref 30–200)
CO2 SERPL-SCNC: 23 MMOL/L — SIGNIFICANT CHANGE UP (ref 22–31)
COLOR SPEC: SIGNIFICANT CHANGE UP
CREAT SERPL-MCNC: 0.87 MG/DL — SIGNIFICANT CHANGE UP (ref 0.5–1.3)
DIFF PNL FLD: NEGATIVE — SIGNIFICANT CHANGE UP
EGFR: 89 ML/MIN/1.73M2 — SIGNIFICANT CHANGE UP
GLUCOSE BLDC GLUCOMTR-MCNC: 139 MG/DL — HIGH (ref 70–99)
GLUCOSE BLDC GLUCOMTR-MCNC: 162 MG/DL — HIGH (ref 70–99)
GLUCOSE BLDC GLUCOMTR-MCNC: 180 MG/DL — HIGH (ref 70–99)
GLUCOSE BLDC GLUCOMTR-MCNC: 248 MG/DL — HIGH (ref 70–99)
GLUCOSE BLDC GLUCOMTR-MCNC: 286 MG/DL — HIGH (ref 70–99)
GLUCOSE SERPL-MCNC: 232 MG/DL — HIGH (ref 70–99)
GLUCOSE UR QL: ABNORMAL
HCT VFR BLD CALC: 32.1 % — LOW (ref 39–50)
HGB BLD-MCNC: 11 G/DL — LOW (ref 13–17)
KETONES UR-MCNC: NEGATIVE — SIGNIFICANT CHANGE UP
LEUKOCYTE ESTERASE UR-ACNC: NEGATIVE — SIGNIFICANT CHANGE UP
MCHC RBC-ENTMCNC: 31.6 PG — SIGNIFICANT CHANGE UP (ref 27–34)
MCHC RBC-ENTMCNC: 34.3 GM/DL — SIGNIFICANT CHANGE UP (ref 32–36)
MCV RBC AUTO: 92.2 FL — SIGNIFICANT CHANGE UP (ref 80–100)
MRSA PCR RESULT.: SIGNIFICANT CHANGE UP
NITRITE UR-MCNC: NEGATIVE — SIGNIFICANT CHANGE UP
NRBC # BLD: 0 /100 WBCS — SIGNIFICANT CHANGE UP (ref 0–0)
PA ADP PRP-ACNC: 272 PRU — SIGNIFICANT CHANGE UP (ref 194–417)
PH UR: 6.5 — SIGNIFICANT CHANGE UP (ref 5–8)
PLATELET # BLD AUTO: 168 K/UL — SIGNIFICANT CHANGE UP (ref 150–400)
POTASSIUM SERPL-MCNC: 3.7 MMOL/L — SIGNIFICANT CHANGE UP (ref 3.5–5.3)
POTASSIUM SERPL-SCNC: 3.7 MMOL/L — SIGNIFICANT CHANGE UP (ref 3.5–5.3)
PROT SERPL-MCNC: 6.7 G/DL — SIGNIFICANT CHANGE UP (ref 6–8.3)
PROT UR-MCNC: NEGATIVE — SIGNIFICANT CHANGE UP
RBC # BLD: 3.48 M/UL — LOW (ref 4.2–5.8)
RBC # FLD: 13.4 % — SIGNIFICANT CHANGE UP (ref 10.3–14.5)
S AUREUS DNA NOSE QL NAA+PROBE: SIGNIFICANT CHANGE UP
SODIUM SERPL-SCNC: 135 MMOL/L — SIGNIFICANT CHANGE UP (ref 135–145)
SP GR SPEC: 1.01 — SIGNIFICANT CHANGE UP (ref 1.01–1.02)
TROPONIN T, HIGH SENSITIVITY RESULT: 17 NG/L — SIGNIFICANT CHANGE UP (ref 0–51)
UROBILINOGEN FLD QL: NEGATIVE — SIGNIFICANT CHANGE UP
WBC # BLD: 5.29 K/UL — SIGNIFICANT CHANGE UP (ref 3.8–10.5)
WBC # FLD AUTO: 5.29 K/UL — SIGNIFICANT CHANGE UP (ref 3.8–10.5)

## 2022-12-20 PROCEDURE — 99232 SBSQ HOSP IP/OBS MODERATE 35: CPT

## 2022-12-20 PROCEDURE — 99254 IP/OBS CNSLTJ NEW/EST MOD 60: CPT

## 2022-12-20 PROCEDURE — 93306 TTE W/DOPPLER COMPLETE: CPT | Mod: 26

## 2022-12-20 PROCEDURE — 99222 1ST HOSP IP/OBS MODERATE 55: CPT

## 2022-12-20 RX ORDER — ISOSORBIDE MONONITRATE 60 MG/1
30 TABLET, EXTENDED RELEASE ORAL ONCE
Refills: 0 | Status: COMPLETED | OUTPATIENT
Start: 2022-12-20 | End: 2022-12-20

## 2022-12-20 RX ORDER — INSULIN LISPRO 100/ML
VIAL (ML) SUBCUTANEOUS
Refills: 0 | Status: DISCONTINUED | OUTPATIENT
Start: 2022-12-20 | End: 2022-12-20

## 2022-12-20 RX ORDER — INSULIN GLARGINE 100 [IU]/ML
15 INJECTION, SOLUTION SUBCUTANEOUS AT BEDTIME
Refills: 0 | Status: DISCONTINUED | OUTPATIENT
Start: 2022-12-20 | End: 2022-12-21

## 2022-12-20 RX ORDER — INSULIN LISPRO 100/ML
5 VIAL (ML) SUBCUTANEOUS
Refills: 0 | Status: DISCONTINUED | OUTPATIENT
Start: 2022-12-20 | End: 2022-12-21

## 2022-12-20 RX ORDER — ENOXAPARIN SODIUM 100 MG/ML
40 INJECTION SUBCUTANEOUS EVERY 24 HOURS
Refills: 0 | Status: DISCONTINUED | OUTPATIENT
Start: 2022-12-20 | End: 2022-12-22

## 2022-12-20 RX ORDER — INSULIN LISPRO 100/ML
VIAL (ML) SUBCUTANEOUS AT BEDTIME
Refills: 0 | Status: DISCONTINUED | OUTPATIENT
Start: 2022-12-20 | End: 2022-12-22

## 2022-12-20 RX ORDER — INSULIN LISPRO 100/ML
VIAL (ML) SUBCUTANEOUS
Refills: 0 | Status: DISCONTINUED | OUTPATIENT
Start: 2022-12-20 | End: 2022-12-22

## 2022-12-20 RX ADMIN — PANTOPRAZOLE SODIUM 40 MILLIGRAM(S): 20 TABLET, DELAYED RELEASE ORAL at 05:51

## 2022-12-20 RX ADMIN — ENOXAPARIN SODIUM 40 MILLIGRAM(S): 100 INJECTION SUBCUTANEOUS at 13:15

## 2022-12-20 RX ADMIN — Medication 25 MILLIGRAM(S): at 05:51

## 2022-12-20 RX ADMIN — SODIUM CHLORIDE 3 MILLILITER(S): 9 INJECTION INTRAMUSCULAR; INTRAVENOUS; SUBCUTANEOUS at 05:27

## 2022-12-20 RX ADMIN — Medication 3 UNIT(S): at 08:11

## 2022-12-20 RX ADMIN — Medication 3 UNIT(S): at 11:36

## 2022-12-20 RX ADMIN — SODIUM CHLORIDE 3 MILLILITER(S): 9 INJECTION INTRAMUSCULAR; INTRAVENOUS; SUBCUTANEOUS at 14:53

## 2022-12-20 RX ADMIN — INSULIN GLARGINE 15 UNIT(S): 100 INJECTION, SOLUTION SUBCUTANEOUS at 22:00

## 2022-12-20 RX ADMIN — SODIUM CHLORIDE 3 MILLILITER(S): 9 INJECTION INTRAMUSCULAR; INTRAVENOUS; SUBCUTANEOUS at 21:21

## 2022-12-20 RX ADMIN — ATORVASTATIN CALCIUM 80 MILLIGRAM(S): 80 TABLET, FILM COATED ORAL at 21:22

## 2022-12-20 RX ADMIN — Medication 81 MILLIGRAM(S): at 11:35

## 2022-12-20 RX ADMIN — ISOSORBIDE MONONITRATE 30 MILLIGRAM(S): 60 TABLET, EXTENDED RELEASE ORAL at 00:29

## 2022-12-20 RX ADMIN — Medication 5 UNIT(S): at 17:04

## 2022-12-20 RX ADMIN — Medication 2: at 11:35

## 2022-12-20 NOTE — PROGRESS NOTE ADULT - PROBLEM SELECTOR PLAN 1
admit to tele 2 [facility]  s/p [facility] on 12/4  known mod-severe LAD disease pLAD 70%  now currently w/o chest pain  no chest pain / neg trops - will hold off heparin gtt for ACS for now  discussed w/ Dr Bowles, hold off hep gtt for now  troponins sent - negative so far  check EKG  BNP noted to be elevated  CT Surgery evaluation for CABG/SAVR underway   carotids completed known AI stenosis  OR date TBD preop workup in progress   s/p LHC on 12/4  known mod-severe LAD disease pLAD 70%  now currently w/o chest pain  no chest pain / neg trops - will hold off heparin gtt for ACS for now  discussed w/ Dr Bowles, hold off hep gtt for now  troponins sent - negative so far  continue asa/ bb / statin/ dvt prophylaxis    carotids completed known AI stenosis  CABG/ AVR 12/22 with Dr. Bowles

## 2022-12-20 NOTE — PROGRESS NOTE ADULT - PROBLEM SELECTOR PLAN 2
will repeat inpatient TTE  BNP noted elevated  CT Surgery evaluation for CABG/SAVR underway   OR date TBD repeat echo done this am 12/20  avr/cabg thur 12/22

## 2022-12-20 NOTE — CONSULT NOTE ADULT - SUBJECTIVE AND OBJECTIVE BOX
HPI: Patient is a 77 M with recently diagnosed T2D, CHF, CAD s/p LHC  with prox LAD 70% stenosis, aortic stenosis here for CT surgery evaluation for CABG/SAVR. Endocrine consulted for hyperglycemia management.     Diabetes history:  [place] intepreter used [id number].     Patient seen at the bedside with his wife. He told me that he was diagnosed with T2D about 2 weeks ago. At that time he was admitted to the hospital for chest pain and underwent LHC with finding of proximal LAD with 70% stenosis. At that time seen by Dr. Jey Pitt while admitted and was discharged on Metformin 1000 mg BID. Does endorse blurry vision, never had an eye exam. Endorses numbness at the bottom of his feet. Denies having CVA. Does nto check glucose at home. Reports taking Metformin 1000 mg BID as instructed.       Most recent A1C 9.4     Home DM medications:  - Metformin 1000 mg BID       Current inpatient DM Meds:   - LDSS   - Lantus 15 units QHS  - Admelog 3 units TIDAC     FH:  DM: brother has diabetes     SH:  Smoking: denies  Etoh: denies  Recreational Drugs: denies  Social Life: lives at home with wife and daughter     PAST MEDICAL & SURGICAL HISTORY:  Chronic CHF      HTN (hypertension)      Diabetes      Aortic stenosis      No significant past surgical history              Current Meds:  aspirin enteric coated 81 milliGRAM(s) Oral daily  atorvastatin 80 milliGRAM(s) Oral at bedtime  enoxaparin Injectable 40 milliGRAM(s) SubCutaneous every 24 hours  insulin glargine Injectable (LANTUS) 15 Unit(s) SubCutaneous at bedtime  insulin lispro (ADMELOG) corrective regimen sliding scale   SubCutaneous Before meals and at bedtime  insulin lispro Injectable (ADMELOG) 3 Unit(s) SubCutaneous three times a day with meals  metoprolol succinate ER 25 milliGRAM(s) Oral daily  pantoprazole    Tablet 40 milliGRAM(s) Oral before breakfast  sodium chloride 0.9% lock flush 3 milliLiter(s) IV Push every 8 hours      Allergies:  No Known Allergies      ROS:     General: Denies weight loss/weight gain, decreased appetite, fatigue  Eyes: intermittent Blurry vision, double vision   ENT: Denies Throat pain    CV: Denies palpitations  Resp: Denies SOB, CP, cough  GI: Denies NVD, difficulty swallowing, abdominal pain  : Denies polyuria, dysuria   MSK: Denies weakness, joint pain  Skin: Denies rash, dryness, diaphoresis  Heme: Denies Easy bruising or bleeding  Neuro: Denies HA, dizziness, lightheadedness, numbness tingling  Psych: Denies Anxiety, Depression    Vital Signs Last 24 Hrs  T(C): 36.5 (20 Dec 2022 11:35), Max: 36.7 (19 Dec 2022 16:16)  T(F): 97.7 (20 Dec 2022 11:35), Max: 98.1 (19 Dec 2022 16:16)  HR: 57 (20 Dec 2022 11:35) (57 - 72)  BP: 122/71 (20 Dec 2022 11:35) (110/68 - 134/75)  BP(mean): 90 (20 Dec 2022 03:59) (89 - 90)  RR: 18 (20 Dec 2022 11:35) (17 - 18)  SpO2: 98% (20 Dec 2022 11:35) (94% - 99%)    Parameters below as of 20 Dec 2022 11:35  Patient On (Oxygen Delivery Method): room air      Height (cm): 162.6 ( @ 10:10)  Weight (kg): 78 ( @ 10:10)  BMI (kg/m2): 29.5 (- @ 10:10)      Constitutional: wn/wd in NAD.   HEENT:  EOMI,  no proptosis or lid retraction  Neck: no thyromegaly    Respiratory: nonlabored breathing  Cardiovascular:  no peripheral edema  GI: soft, NT/ND, no masses/HSM appreciated.  Neurology: no tremors   Skin: no visible rashes/lesions  Psychiatric: AAO x 3, normal affect/mood.  Ext: extremities warm, no cyanosis, clubbing or edema.       LABS:                        11.0   5.29  )-----------( 168      ( 20 Dec 2022 10:18 )             32.1     12-    135  |  101  |  15  ----------------------------<  232<H>  3.7   |  23  |  0.87    Ca    9.4      20 Dec 2022 10:19    TPro  6.7  /  Alb  4.0  /  TBili  1.3<H>  /  DBili  x   /  AST  18  /  ALT  25  /  AlkPhos  102  12-20    PT/INR - ( 19 Dec 2022 11:56 )   PT: 13.1 sec;   INR: 1.13 ratio         PTT - ( 19 Dec 2022 11:56 )  PTT:28.6 sec  Urinalysis Basic - ( 20 Dec 2022 00:47 )    Color: Light Yellow / Appearance: Clear / S.011 / pH: x  Gluc: x / Ketone: Negative  / Bili: Negative / Urobili: Negative   Blood: x / Protein: Negative / Nitrite: Negative   Leuk Esterase: Negative / RBC: x / WBC x   Sq Epi: x / Non Sq Epi: x / Bacteria: x        Thyroid Stimulating Hormone, Serum: 3.63 ( @ 05:54)      RADIOLOGY & ADDITIONAL STUDIES:  CAPILLARY BLOOD GLUCOSE      POCT Blood Glucose.: 248 mg/dL (20 Dec 2022 11:24)  POCT Blood Glucose.: 286 mg/dL (20 Dec 2022 11:23)  POCT Blood Glucose.: 162 mg/dL (20 Dec 2022 07:33)  POCT Blood Glucose.: 206 mg/dL (19 Dec 2022 22:08)  POCT Blood Glucose.: 231 mg/dL (19 Dec 2022 16:51)

## 2022-12-20 NOTE — PROGRESS NOTE ADULT - PROBLEM SELECTOR PLAN 3
consistent carb diet  lantus 15 qhs  premeal 3 units admelog   hold home metformin  ISS admelog ac/hs consistent carb diet  accjuless theresa and stu Plasencia consult called this am  continue lantus/ ademlog as per endo

## 2022-12-20 NOTE — CONSULT NOTE ADULT - ASSESSMENT
Patient is a 77 M with recently diagnosed T2D, CHF, CAD s/p [facility] 12/4 with prox LAD 70% stenosis, aortic stenosis here for CT surgery evaluation for CABG/SAVR. Endocrine consulted for hyperglycemia management.     1.  T2DM   - Most recent Hemoglobin A1C 9.2  - Current FS ranges from 160-250  - Current diet: CHO  - Please monitor blood glucose values TID AC & QHS while eating regular meals and Q6H while NPO  - Blood glucose goals pre-meal less than 140 mg/dL and random blood glucose less than 180 mg/dL  - Recommendations:  - Fasting at goal continue with insulin Glargine 15 units QHS  - Postprandial glucose elevated, increase insulin Lispro to 5 TID with meals, hold if NPO or if eating less than 50% of meals  - Continue with low dose Correctional scale TID with meals  - Start low dose Correctional scale QHS    2. HTN  - BP goal 130/80  - Manage per primary team     3. HLD  - Continue with high intensity statin atorvastatin 80 mg daily   - Manage as outpatient      4. CAD  - Manage per CT surgery team   - Optimize BP and glucose prior to CABG    Discharge planning:  - Current home DM meds: Metformin 1000 mg BID  - Discharge DM meds: Metformin 1000 mg BID+ GLP1RA + SGLT2I  - Patient will follow up with Dr. Pitt as outpatient   - Patient will need opthalmology and podiatry follow up as outpatient     Thank you for the consult. Will be followed by Dr. Jey Pitt's team tomorrow.      Agnes Campbell MD  Department of Endocrinology, Diabetes and metabolism   Pager [phone]

## 2022-12-20 NOTE — PROVIDER CONTACT NOTE (OTHER) - ASSESSMENT
Pt sitting upright. c/o chest pain rating it "5/10". Pt describes it as "pressure". Pt c/o SOB.  HR 63 bpm.  /67.  O2 sats 98% on room air.

## 2022-12-20 NOTE — PROGRESS NOTE ADULT - SUBJECTIVE AND OBJECTIVE BOX
VITAL SIGNS    Telemetry:    Vital Signs Last 24 Hrs  T(C): 36.5 (22 @ 03:59), Max: 36.7 (22 @ 16:16)  T(F): 97.7 (22 @ 03:59), Max: 98.1 (22 @ 16:16)  HR: 62 (22 @ 03:59) (62 - 72)  BP: 134/68 (22 @ 03:59) (110/68 - 134/75)  RR: 18 (22 @ 03:59) (17 - 18)  SpO2: 99% (22 @ 03:59) (94% - 99%)             @ 07:01  -   @ 07:00  --------------------------------------------------------  IN: 100 mL / OUT: 1000 mL / NET: -900 mL     @ 07:01  -   @ 11:21  --------------------------------------------------------  IN: 240 mL / OUT: 300 mL / NET: -60 mL       Daily     Daily Weight in k.6 (20 Dec 2022 08:00)  Admit Wt: Drug Dosing Weight  Height (cm): 162.6 (19 Dec 2022 10:10)  Weight (kg): 78 (19 Dec 2022 10:10)  BMI (kg/m2): 29.5 (19 Dec 2022 10:10)  BSA (m2): 1.83 (19 Dec 2022 10:10)    Bilirubin Total, Serum: 1.3 mg/dL ( @ 10:19)  Bilirubin Total, Serum: 1.3 mg/dL ( @ 11:56)    CAPILLARY BLOOD GLUCOSE      POCT Blood Glucose.: 162 mg/dL (20 Dec 2022 07:33)  POCT Blood Glucose.: 206 mg/dL (19 Dec 2022 22:08)  POCT Blood Glucose.: 231 mg/dL (19 Dec 2022 16:51)          aspirin enteric coated 81 milliGRAM(s) Oral daily  atorvastatin 80 milliGRAM(s) Oral at bedtime  insulin glargine Injectable (LANTUS) 15 Unit(s) SubCutaneous at bedtime  insulin lispro Injectable (ADMELOG) 3 Unit(s) SubCutaneous three times a day with meals  metoprolol succinate ER 25 milliGRAM(s) Oral daily  pantoprazole    Tablet 40 milliGRAM(s) Oral before breakfast  sodium chloride 0.9% lock flush 3 milliLiter(s) IV Push every 8 hours      PHYSICAL EXAM    Subjective: "Hi.   Neurology: alert and oriented x 3, nonfocal, no gross deficits  CV : tele:  RSR  Sternal Wound :  CDI with dressing , Stable  Lungs: clear. RR easy, unlabored   Abdomen: soft, nontender, nondistended, positive bowel sounds, bowel movement   Neg N/V/D   :  pt voiding without difficulty   Extremities:   CERDA; edema, neg calf tenderness.   PPP bilaterally      PW:  Chest tubes:                 VITAL SIGNS    Telemetry:  rsr 60-85  Vital Signs Last 24 Hrs  T(C): 36.5 (22 @ 03:59), Max: 36.7 (22 @ 16:16)  T(F): 97.7 (22 @ 03:59), Max: 98.1 (22 @ 16:16)  HR: 62 (22 @ 03:59) (62 - 72)  BP: 134/68 (22 @ 03:59) (110/68 - 134/75)  RR: 18 (22 @ 03:59) (17 - 18)  SpO2: 99% (22 @ 03:59) (94% - 99%)             @ 07:01  -   @ 07:00  --------------------------------------------------------  IN: 100 mL / OUT: 1000 mL / NET: -900 mL     @ 07:01  -   @ 11:21  --------------------------------------------------------  IN: 240 mL / OUT: 300 mL / NET: -60 mL       Daily     Daily Weight in k.6 (20 Dec 2022 08:00)  Admit Wt: Drug Dosing Weight  Height (cm): 162.6 (19 Dec 2022 10:10)  Weight (kg): 78 (19 Dec 2022 10:10)  BMI (kg/m2): 29.5 (19 Dec 2022 10:10)  BSA (m2): 1.83 (19 Dec 2022 10:10)    Bilirubin Total, Serum: 1.3 mg/dL ( @ 10:19)  Bilirubin Total, Serum: 1.3 mg/dL ( @ 11:56)    CAPILLARY BLOOD GLUCOSE      POCT Blood Glucose.: 162 mg/dL (20 Dec 2022 07:33)  POCT Blood Glucose.: 206 mg/dL (19 Dec 2022 22:08)  POCT Blood Glucose.: 231 mg/dL (19 Dec 2022 16:51)          aspirin enteric coated 81 milliGRAM(s) Oral daily  atorvastatin 80 milliGRAM(s) Oral at bedtime  insulin glargine Injectable (LANTUS) 15 Unit(s) SubCutaneous at bedtime  insulin lispro Injectable (ADMELOG) 3 Unit(s) SubCutaneous three times a day with meals  metoprolol succinate ER 25 milliGRAM(s) Oral daily  pantoprazole    Tablet 40 milliGRAM(s) Oral before breakfast  sodium chloride 0.9% lock flush 3 milliLiter(s) IV Push every 8 hours      PHYSICAL EXAM    Subjective: "Martir."   Neurology: alert and oriented x 3, nonfocal, no gross deficits  CV : tele:  RSR 60-85  Lungs: clear. RR easy, unlabored   Abdomen: soft, nontender, nondistended, positive bowel sounds, + bowel movement   Neg N/V/D   :  pt voiding without difficulty   Extremities:   CERDA; neg LE edema, neg calf tenderness.   PPP bilaterally

## 2022-12-20 NOTE — PROGRESS NOTE ADULT - ASSESSMENT
77M with a PMH T2DM and recently diagnosed CHF? who had recent admission for cardiac work up. Pt found to have CAD and aortic stenosis. At that time, pt tested positive for COVID, however grossly asymptomatic. Pt was d/c home for outpatient follow up with Dr Bowles. Now presents to ED with chest pain starting yesterday afternoon. During previous admission, s/p LHC on 12/4. Of note, pt had covid + pcr, asymptomatic and completed 10 day isolation.     cardiac work up:  12/2 R ICA significant stenosis (carotid duplex)   12/2 TTE severe AS preserved EF   12/4 with prox LAD 70% stenosis and CT Surgery evaluation for CABG/SAVR.    77M with a PMH T2DM and recently diagnosed CHF? who had recent admission for cardiac work up. Pt found to have CAD and aortic stenosis. At that time, pt tested positive for COVID, however grossly asymptomatic. Pt was d/c home for outpatient follow up with Dr Bowles. Now presents to ED with chest pain starting yesterday afternoon. During previous admission, s/p LHC on 12/4. Of note, pt had covid + pcr, asymptomatic and completed 10 day isolation.     cardiac work up:  12/2 R ICA significant stenosis (carotid duplex)   12/2 TTE severe AS preserved EF   12/4 with prox LAD 70% stenosis and CT Surgery evaluation for CABG/SAVR.   12/20 VSS; RSR 60-80; pt denies cp/ sob/ echo done ef 55-60%; minimal MR; Mod AS; OR thur w/ Dr. Bowles    77M with a PMH T2DM and recently diagnosed CHF? who had recent admission for cardiac work up. Pt found to have CAD and aortic stenosis. At that time, pt tested positive for COVID, however grossly asymptomatic. Pt was d/c home for outpatient follow up with Dr Bowles. Now presents to ED with chest pain starting yesterday afternoon. During previous admission, s/p LHC on 12/4. Of note, pt had covid + pcr, asymptomatic and completed 10 day isolation.     cardiac work up:  12/2 R ICA significant stenosis (carotid duplex)   12/2 TTE severe AS preserved EF   12/4 with prox LAD 70% stenosis and CT Surgery evaluation for CABG/SAVR.   12/20 VSS; RSR 60-80; pt denies cp/ sob/ echo done ef 55-60%; minimal MR; Mod AS; H. Endo consult called for uncontrolled dm2; OR [facility] gil/ Dr. Bowles

## 2022-12-21 ENCOUNTER — TRANSCRIPTION ENCOUNTER (OUTPATIENT)
Age: 77
End: 2022-12-21

## 2022-12-21 LAB
BLD GP AB SCN SERPL QL: NEGATIVE — SIGNIFICANT CHANGE UP
GLUCOSE BLDC GLUCOMTR-MCNC: 153 MG/DL — HIGH (ref 70–99)
GLUCOSE BLDC GLUCOMTR-MCNC: 159 MG/DL — HIGH (ref 70–99)
GLUCOSE BLDC GLUCOMTR-MCNC: 167 MG/DL — HIGH (ref 70–99)
GLUCOSE BLDC GLUCOMTR-MCNC: 189 MG/DL — HIGH (ref 70–99)
HCT VFR BLD CALC: 34.4 % — LOW (ref 39–50)
HGB BLD-MCNC: 11.7 G/DL — LOW (ref 13–17)
MCHC RBC-ENTMCNC: 30.9 PG — SIGNIFICANT CHANGE UP (ref 27–34)
MCHC RBC-ENTMCNC: 34 GM/DL — SIGNIFICANT CHANGE UP (ref 32–36)
MCV RBC AUTO: 90.8 FL — SIGNIFICANT CHANGE UP (ref 80–100)
NRBC # BLD: 0 /100 WBCS — SIGNIFICANT CHANGE UP (ref 0–0)
PLATELET # BLD AUTO: 157 K/UL — SIGNIFICANT CHANGE UP (ref 150–400)
RBC # BLD: 3.79 M/UL — LOW (ref 4.2–5.8)
RBC # FLD: 13 % — SIGNIFICANT CHANGE UP (ref 10.3–14.5)
RH IG SCN BLD-IMP: POSITIVE — SIGNIFICANT CHANGE UP
SARS-COV-2 RNA SPEC QL NAA+PROBE: SIGNIFICANT CHANGE UP
WBC # BLD: 8.95 K/UL — SIGNIFICANT CHANGE UP (ref 3.8–10.5)
WBC # FLD AUTO: 8.95 K/UL — SIGNIFICANT CHANGE UP (ref 3.8–10.5)

## 2022-12-21 PROCEDURE — 99252 IP/OBS CONSLTJ NEW/EST SF 35: CPT | Mod: 57

## 2022-12-21 RX ORDER — CHLORHEXIDINE GLUCONATE 213 G/1000ML
30 SOLUTION TOPICAL ONCE
Refills: 0 | Status: DISCONTINUED | OUTPATIENT
Start: 2022-12-21 | End: 2022-12-22

## 2022-12-21 RX ORDER — GABAPENTIN 400 MG/1
300 CAPSULE ORAL ONCE
Refills: 0 | Status: COMPLETED | OUTPATIENT
Start: 2022-12-21 | End: 2022-12-22

## 2022-12-21 RX ORDER — ACETAMINOPHEN 500 MG
1000 TABLET ORAL ONCE
Refills: 0 | Status: COMPLETED | OUTPATIENT
Start: 2022-12-21 | End: 2022-12-22

## 2022-12-21 RX ORDER — CHLORHEXIDINE GLUCONATE 213 G/1000ML
1 SOLUTION TOPICAL ONCE
Refills: 0 | Status: COMPLETED | OUTPATIENT
Start: 2022-12-21 | End: 2022-12-21

## 2022-12-21 RX ORDER — CEFUROXIME AXETIL 250 MG
1500 TABLET ORAL ONCE
Refills: 0 | Status: DISCONTINUED | OUTPATIENT
Start: 2022-12-21 | End: 2022-12-22

## 2022-12-21 RX ORDER — INSULIN GLARGINE 100 [IU]/ML
20 INJECTION, SOLUTION SUBCUTANEOUS AT BEDTIME
Refills: 0 | Status: DISCONTINUED | OUTPATIENT
Start: 2022-12-21 | End: 2022-12-22

## 2022-12-21 RX ORDER — INSULIN LISPRO 100/ML
7 VIAL (ML) SUBCUTANEOUS
Refills: 0 | Status: DISCONTINUED | OUTPATIENT
Start: 2022-12-21 | End: 2022-12-22

## 2022-12-21 RX ORDER — ASCORBIC ACID 60 MG
2000 TABLET,CHEWABLE ORAL ONCE
Refills: 0 | Status: COMPLETED | OUTPATIENT
Start: 2022-12-21 | End: 2022-12-22

## 2022-12-21 RX ADMIN — Medication 1: at 17:14

## 2022-12-21 RX ADMIN — Medication 81 MILLIGRAM(S): at 12:11

## 2022-12-21 RX ADMIN — SODIUM CHLORIDE 3 MILLILITER(S): 9 INJECTION INTRAMUSCULAR; INTRAVENOUS; SUBCUTANEOUS at 22:01

## 2022-12-21 RX ADMIN — SODIUM CHLORIDE 3 MILLILITER(S): 9 INJECTION INTRAMUSCULAR; INTRAVENOUS; SUBCUTANEOUS at 15:50

## 2022-12-21 RX ADMIN — Medication 7 UNIT(S): at 17:15

## 2022-12-21 RX ADMIN — Medication 25 MILLIGRAM(S): at 05:14

## 2022-12-21 RX ADMIN — ENOXAPARIN SODIUM 40 MILLIGRAM(S): 100 INJECTION SUBCUTANEOUS at 12:11

## 2022-12-21 RX ADMIN — Medication 1: at 08:24

## 2022-12-21 RX ADMIN — ATORVASTATIN CALCIUM 80 MILLIGRAM(S): 80 TABLET, FILM COATED ORAL at 22:15

## 2022-12-21 RX ADMIN — Medication 5 UNIT(S): at 08:24

## 2022-12-21 RX ADMIN — CHLORHEXIDINE GLUCONATE 1 APPLICATION(S): 213 SOLUTION TOPICAL at 22:01

## 2022-12-21 RX ADMIN — Medication 7 UNIT(S): at 12:12

## 2022-12-21 RX ADMIN — PANTOPRAZOLE SODIUM 40 MILLIGRAM(S): 20 TABLET, DELAYED RELEASE ORAL at 05:14

## 2022-12-21 RX ADMIN — Medication 1: at 12:11

## 2022-12-21 RX ADMIN — INSULIN GLARGINE 20 UNIT(S): 100 INJECTION, SOLUTION SUBCUTANEOUS at 22:16

## 2022-12-21 RX ADMIN — SODIUM CHLORIDE 3 MILLILITER(S): 9 INJECTION INTRAMUSCULAR; INTRAVENOUS; SUBCUTANEOUS at 05:12

## 2022-12-21 NOTE — PROGRESS NOTE ADULT - ASSESSMENT
77M with a PMH T2DM and recently diagnosed CHF? who had recent admission for cardiac work up. Pt found to have CAD and aortic stenosis. At that time, pt tested positive for COVID, however grossly asymptomatic. Pt was d/c home for outpatient follow up with Dr Bowles. Now presents to ED with chest pain starting yesterday afternoon. During previous admission, s/p LHC on 12/4. Of note, pt had covid + pcr, asymptomatic and completed 10 day isolation.     cardiac work up:  12/2 R ICA significant stenosis (carotid duplex)   12/2 TTE severe AS preserved EF   12/4 with prox LAD 70% stenosis and CT Surgery evaluation for CABG/SAVR.   12/20 VSS; RSR 60-80; pt denies cp/ sob/ echo done ef 55-60%; minimal MR; Mod AS; H. Endo consult called for uncontrolled dm2; OR [facility] gil/ Dr. Bowles   12/21 VSS  SB/SR 50-70  OR am.  CABG/AVR poss R CEA 77M with a PMH T2DM and recently diagnosed CHF? who had recent admission for cardiac work up. Pt found to have CAD and aortic stenosis. At that time, pt tested positive for COVID, however grossly asymptomatic. Pt was d/c home for outpatient follow up with Dr Bowles. Now presents to ED with chest pain starting yesterday afternoon. During previous admission, s/p LHC on 12/4. Of note, pt had covid + pcr, asymptomatic and completed 10 day isolation.     cardiac work up:  12/2 R ICA significant stenosis (carotid duplex)   12/2 TTE severe AS preserved EF   12/4 with prox LAD 70% stenosis and CT Surgery evaluation for CABG/SAVR.   12/20 VSS; RSR 60-80; pt denies cp/ sob/ echo done ef 55-60%; minimal MR; Mod AS; H. Endo consult called for uncontrolled dm2; OR [facility]/ Dr. Bowles   12/21 VSS  SB/SR 50-70  OR am.  CABG/AVR poss R CEA    Addendum:    I discussed Mr. Lopez's condition once again with him and explained the indication for CABG and AVR tomorrow.    I explained the major risks of 1-2% of stroke, mortality and the additional risks of bleeding, infection, and organ dysfunction.    I explained that surgery represents the best option as recommended by our heart team.    He is agreeing to proceed.    Ranjeet Bowles  Cardiovascular Surgeon

## 2022-12-21 NOTE — CONSULT NOTE ADULT - ASSESSMENT
77M with a PMH T2DM and recently diagnosed CHF? who had recent admission for cardiac work up. Pt found to have CAD and aortic stenosis. On CT neck found to have R ICA stenosis.    - Possible concurrent TCAR/CEA with Dr. Iniguez during CABG/AVR with Dr. Bowles tomorrow pending attending discussion  - Pre-op, needs consent  - Will follow    Vascular Surgery  [id number]

## 2022-12-21 NOTE — CONSULT NOTE ADULT - SUBJECTIVE AND OBJECTIVE BOX
Vascular Surgery Consult Note    HPI:  77M with a PMH T2DM and recently diagnosed CHF? who had recent admission for cardiac work up. Pt found to have CAD and aortic stenosis. At that time, pt tested positive for COVID, however grossly asymptomatic. Pt was d/c home for outpatient follow up with Dr Bowles. Presented to ED with chest pain, now pt now s/p [facility]  with prox LAD 70% stenosis and CT Surgery consulted for CABG/SAVR evaluation at that time. Pt underwent CT neck showing R ICA stenosis, Vascular Surgery consulted for management.    < from: Transthoracic Echocardiogram (22 @ 07:06) >  ------------------------------------------------------------------------  Conclusions:  1. Normal mitral valve. Minimal mitral regurgitation.  2. Calcified trileaflet aortic valve with decreased  opening. Peak transaortic valve gradient equals 36 mm Hg,  mean transaortic valve gradient equals 20 mm Hg, estimated  aortic valve area equals 0.9 sqcm (by continuity equation),  aortic valve velocity time integral equals 78 cm,  consistent with low gradient, severe aortic stenosis with  preserved ejection fraction. Calculated SVi=41 mL/sqm.  Dimensionless Index=0.28. Visually, aortic valve appears  severely stenotic with significant color flow turbulence  across the valve. Consider additional imaging of the aortic  valve such as with cardiac CT or MORIAH if clinically  indicated. Mild aortic regurgitation.  3. Normal left ventricular systolic function. No segmental  wall motion abnormalities.  4. Normal right ventricular size and function.  *** No previous Echo exam.  ------------------------------------------------------------------------  Confirmed on  2022 - 10:15:58 by Dom Liang M.D.    < end of copied text >   (19 Dec 2022 13:52)    PAST MEDICAL & SURGICAL HISTORY:  Chronic CHF      HTN (hypertension)      Diabetes      Aortic stenosis      No significant past surgical history        Allergies    No Known Allergies    Intolerances      Home Medications:    MEDICATIONS  (STANDING):  aspirin enteric coated 81 milliGRAM(s) Oral daily  atorvastatin 80 milliGRAM(s) Oral at bedtime  enoxaparin Injectable 40 milliGRAM(s) SubCutaneous every 24 hours  insulin glargine Injectable (LANTUS) 15 Unit(s) SubCutaneous at bedtime  insulin lispro (ADMELOG) corrective regimen sliding scale   SubCutaneous three times a day before meals  insulin lispro (ADMELOG) corrective regimen sliding scale   SubCutaneous at bedtime  insulin lispro Injectable (ADMELOG) 5 Unit(s) SubCutaneous three times a day with meals  metoprolol succinate ER 25 milliGRAM(s) Oral daily  pantoprazole    Tablet 40 milliGRAM(s) Oral before breakfast  sodium chloride 0.9% lock flush 3 milliLiter(s) IV Push every 8 hours      SOCIAL HISTORY:  FAMILY HISTORY:      ___________________________________________  OBJECTIVE:  Vital Signs Last 24 Hrs  T(C): 36.6 (21 Dec 2022 05:02), Max: 36.7 (20 Dec 2022 19:55)  T(F): 97.9 (21 Dec 2022 05:02), Max: 98.1 (20 Dec 2022 19:55)  HR: 63 (21 Dec 2022 05:02) (55 - 63)  BP: 147/81 (21 Dec 2022 05:02) (122/71 - 147/81)  BP(mean): 103 (21 Dec 2022 05:02) (103 - 103)  RR: 18 (21 Dec 2022 05:02) (18 - 18)  SpO2: 98% (21 Dec 2022 05:02) (98% - 99%)    Parameters below as of 21 Dec 2022 05:02  Patient On (Oxygen Delivery Method): room air    CAPILLARY BLOOD GLUCOSE      POCT Blood Glucose.: 167 mg/dL (21 Dec 2022 07:42)    I&O's Detail    20 Dec 2022 07:01  -  21 Dec 2022 07:00  --------------------------------------------------------  IN:    Oral Fluid: 880 mL  Total IN: 880 mL    OUT:    Voided (mL): 800 mL  Total OUT: 800 mL    Total NET: 80 mL      21 Dec 2022 07:01  -  21 Dec 2022 09:21  --------------------------------------------------------  IN:    Oral Fluid: 240 mL  Total IN: 240 mL    OUT:  Total OUT: 0 mL    Total NET: 240 mL        General: Well developed, well nourished, NAD  Neuro: Alert and oriented, no focal deficits, moves all extremities spontaneously  HEENT: NCAT, EOMI, anicteric, mucosa moist  Respiratory: Airway patent, respirations unlabored  CVS: On tele  Abdomen: Soft, nontender, nondistended  Extremities: No edema, sensation and movement grossly intact  Skin: Warm, dry, appropriate color  ____________________________________________  LABS:  CBC Full  -  ( 21 Dec 2022 05:06 )  WBC Count : 8.95 K/uL  RBC Count : 3.79 M/uL  Hemoglobin : 11.7 g/dL  Hematocrit : 34.4 %  Platelet Count - Automated : 157 K/uL  Mean Cell Volume : 90.8 fl  Mean Cell Hemoglobin : 30.9 pg  Mean Cell Hemoglobin Concentration : 34.0 gm/dL  Auto Neutrophil # : x  Auto Lymphocyte # : x  Auto Monocyte # : x  Auto Eosinophil # : x  Auto Basophil # : x  Auto Neutrophil % : x  Auto Lymphocyte % : x  Auto Monocyte % : x  Auto Eosinophil % : x  Auto Basophil % : x    12-20    135  |  101  |  15  ----------------------------<  232<H>  3.7   |  23  |  0.87    Ca    9.4      20 Dec 2022 10:19    TPro  6.7  /  Alb  4.0  /  TBili  1.3<H>  /  DBili  x   /  AST  18  /  ALT  25  /  AlkPhos  102  12-20    LIVER FUNCTIONS - ( 20 Dec 2022 10:19 )  Alb: 4.0 g/dL / Pro: 6.7 g/dL / ALK PHOS: 102 U/L / ALT: 25 U/L / AST: 18 U/L / GGT: x           PT/INR - ( 19 Dec 2022 11:56 )   PT: 13.1 sec;   INR: 1.13 ratio         PTT - ( 19 Dec 2022 11:56 )  PTT:28.6 sec  Urinalysis Basic - ( 20 Dec 2022 00:47 )    Color: Light Yellow / Appearance: Clear / S.011 / pH: x  Gluc: x / Ketone: Negative  / Bili: Negative / Urobili: Negative   Blood: x / Protein: Negative / Nitrite: Negative   Leuk Esterase: Negative / RBC: x / WBC x   Sq Epi: x / Non Sq Epi: x / Bacteria: x      CARDIAC MARKERS ( 20 Dec 2022 06:58 )  x     / x     / 58 U/L / x     / 1.4 ng/mL        ____________________________________________  MICRO:  RECENT CULTURES:    ____________________________________________  RADIOLOGY:  < from: CT Angio Neck w/ IV Cont (22 @ 16:01) >  CT BRAIN:  No acute intracranial mass effect, hemorrhage, midline shift or   extra-axial fluid collection. Prominent cisterna magna.  Age-related cerebral and cerebellar volume loss/atrophy.    CT ANGIOGRAPHY NECK:  High-grade stenosis right internal carotid artery at the carotid   bifurcation. No significant left internal carotid artery stenosis. Patent   vertebral arteries with left dominant vertebral artery. No evidence of   vascular dissection.    CT ANGIOGRAPHY BRAIN:  Normal intracranial circulation.    < end of copied text >

## 2022-12-21 NOTE — PROGRESS NOTE ADULT - SUBJECTIVE AND OBJECTIVE BOX
Cardiac Surgery Pre-op Note:    CC: Patient is a 77y old  Male who presents with a chief complaint of chest pain x1.5 days (21 Dec 2022 09:20)                                                                                                           Surgeon:  Dr Bowles    Procedure: (Date) (Procedure)  CABG /AVR  possible CEA with vasc    Allergies    No Known Allergies    Intolerances        HPI:  77M with a PMH T2DM and recently diagnosed CHF? who had recent admission for cardiac work up. Pt found to have CAD and aortic stenosis. At that time, pt tested positive for COVID, however grossly asymptomatic. Pt was d/c home for outpatient follow up with Dr Bowles. Now presents to ED with chest pain starting yesterday afternoon.     seen by cardio in ED  (01 Dec 2022 16:42)    Pt now s/p C  with prox LAD 70% stenosis and CT Surgery consulted for CABG/SAVR evaluation at that time.     < from: Transthoracic Echocardiogram (22 @ 07:06) >  ------------------------------------------------------------------------  Conclusions:  1. Normal mitral valve. Minimal mitral regurgitation.  2. Calcified trileaflet aortic valve with decreased  opening. Peak transaortic valve gradient equals 36 mm Hg,  mean transaortic valve gradient equals 20 mm Hg, estimated  aortic valve area equals 0.9 sqcm (by continuity equation),  aortic valve velocity time integral equals 78 cm,  consistent with low gradient, severe aortic stenosis with  preserved ejection fraction. Calculated SVi=41 mL/sqm.  Dimensionless Index=0.28. Visually, aortic valve appears  severely stenotic with significant color flow turbulence  across the valve. Consider additional imaging of the aortic  valve such as with cardiac CT or MORIAH if clinically  indicated. Mild aortic regurgitation.  3. Normal left ventricular systolic function. No segmental  wall motion abnormalities.  4. Normal right ventricular size and function.  *** No previous Echo exam.  ------------------------------------------------------------------------  Confirmed on  2022 - 10:15:58 by Dom Liang M.D.    < end of copied text >   (19 Dec 2022 13:52)      PAST MEDICAL & SURGICAL HISTORY:  Chronic CHF      HTN (hypertension)      Diabetes      Aortic stenosis      No significant past surgical history          MEDICATIONS  (STANDING):  acetaminophen     Tablet .. 1000 milliGRAM(s) Oral once  ascorbic acid 2000 milliGRAM(s) Oral once  aspirin enteric coated 81 milliGRAM(s) Oral daily  atorvastatin 80 milliGRAM(s) Oral at bedtime  chlorhexidine 0.12% Liquid 30 milliLiter(s) Swish and Spit once  chlorhexidine 4% Liquid 1 Application(s) Topical once  enoxaparin Injectable 40 milliGRAM(s) SubCutaneous every 24 hours  gabapentin 300 milliGRAM(s) Oral once  insulin glargine Injectable (LANTUS) 20 Unit(s) SubCutaneous at bedtime  insulin lispro (ADMELOG) corrective regimen sliding scale   SubCutaneous three times a day before meals  insulin lispro (ADMELOG) corrective regimen sliding scale   SubCutaneous at bedtime  insulin lispro Injectable (ADMELOG) 7 Unit(s) SubCutaneous three times a day with meals  metoprolol succinate ER 25 milliGRAM(s) Oral daily  pantoprazole    Tablet 40 milliGRAM(s) Oral before breakfast  sodium chloride 0.9% lock flush 3 milliLiter(s) IV Push every 8 hours    MEDICATIONS  (PRN):  cefuroxime  IVPB 1500 milliGRAM(s) IV Intermittent once PRN prophylaxis        Labs:                        11.7   8.95  )-----------( 157      ( 21 Dec 2022 05:06 )             34.4     12-    135  |  101  |  15  ----------------------------<  232<H>  3.7   |  23  |  0.87    Ca    9.4      20 Dec 2022 10:19    TPro  6.7  /  Alb  4.0  /  TBili  1.3<H>  /  DBili  x   /  AST  18  /  ALT  25  /  AlkPhos  102  12-20        Blood Type: ABO Interpretation: O ( @ 11:23)    HGB A1C:  9.4    Pro-BNP: Serum Pro-Brain Natriuretic Peptide: 1214 pg/mL ( @ 11:56)    Thyroid Panel:   MRSA: MRSA PCR Result.: NotDetec ( @ 06:09)   / MSSA:   Urinalysis Basic - ( 20 Dec 2022 00:47 )    Color: Light Yellow / Appearance: Clear / S.011 / pH: x  Gluc: x / Ketone: Negative  / Bili: Negative / Urobili: Negative   Blood: x / Protein: Negative / Nitrite: Negative   Leuk Esterase: Negative / RBC: x / WBC x   Sq Epi: x / Non Sq Epi: x / Bacteria: x        CXR:  Clear lungs    EKG:  SINUS BRADYCARDIA    Carotid Duplex:  Calcified plaque in the proximal right internal carotid  artery creating a hemodynamically significant stenosis.  The degree of luminal narrowing is estimated at 70-99% by diameter.    PFT's:    Echocardiogram:  see above report    Cardiac catheterization:  report not in system    Gen: WN/WD NAD  Neuro: AAOx3, nonfocal  Pulm: CTA B/L  CV: RRR, S1S2  Abd: Soft, NT, ND +BS  Ext: No edema, + peripheral pulses      Pt has AICD/PPM [ ] Yes  [ x] No             Brand Name:  Pre-op Beta Blocker ordered within 24 hrs of surgery (CABG ONLY)?  [x ] Yes  [ ] No  If not, Why?  Type & Cross  [x ] Yes  [ ] No  NPO after Midnight [x ] Yes  [ ] No  Pre-op ABX ordered, to be taped on chart:  [x ] Yes  [ ] No     Hibiclens/Peridex ordered [x ] Yes  [ ] No   MORIAH [x ]   Consent obtained  [ x] Yes  [ ] No

## 2022-12-22 ENCOUNTER — RESULT REVIEW (OUTPATIENT)
Age: 77
End: 2022-12-22

## 2022-12-22 ENCOUNTER — APPOINTMENT (OUTPATIENT)
Dept: CARDIOTHORACIC SURGERY | Facility: HOSPITAL | Age: 77
End: 2022-12-22

## 2022-12-22 LAB
ALBUMIN SERPL ELPH-MCNC: 3.6 G/DL — SIGNIFICANT CHANGE UP (ref 3.3–5)
ALP SERPL-CCNC: 69 U/L — SIGNIFICANT CHANGE UP (ref 40–120)
ALT FLD-CCNC: 23 U/L — SIGNIFICANT CHANGE UP (ref 10–45)
ANION GAP SERPL CALC-SCNC: 12 MMOL/L — SIGNIFICANT CHANGE UP (ref 5–17)
APTT BLD: 27.9 SEC — SIGNIFICANT CHANGE UP (ref 27.5–35.5)
AST SERPL-CCNC: 63 U/L — HIGH (ref 10–40)
BASE EXCESS BLDV CALC-SCNC: 1.8 MMOL/L — SIGNIFICANT CHANGE UP (ref -2–3)
BASE EXCESS BLDV CALC-SCNC: 2.4 MMOL/L — SIGNIFICANT CHANGE UP (ref -2–3)
BASE EXCESS BLDV CALC-SCNC: 2.5 MMOL/L — SIGNIFICANT CHANGE UP (ref -2–3)
BASE EXCESS BLDV CALC-SCNC: 3.3 MMOL/L — HIGH (ref -2–3)
BASOPHILS # BLD AUTO: 0.02 K/UL — SIGNIFICANT CHANGE UP (ref 0–0.2)
BASOPHILS NFR BLD AUTO: 0.2 % — SIGNIFICANT CHANGE UP (ref 0–2)
BILIRUB SERPL-MCNC: 1.7 MG/DL — HIGH (ref 0.2–1.2)
BLOOD GAS VENOUS - CREATININE: SIGNIFICANT CHANGE UP MG/DL (ref 0.5–1.3)
BUN SERPL-MCNC: 11 MG/DL — SIGNIFICANT CHANGE UP (ref 7–23)
CA-I SERPL-SCNC: 0.97 MMOL/L — LOW (ref 1.15–1.33)
CA-I SERPL-SCNC: 1 MMOL/L — LOW (ref 1.15–1.33)
CA-I SERPL-SCNC: 1.03 MMOL/L — LOW (ref 1.15–1.33)
CA-I SERPL-SCNC: 1.05 MMOL/L — LOW (ref 1.15–1.33)
CALCIUM SERPL-MCNC: 8.3 MG/DL — LOW (ref 8.4–10.5)
CHLORIDE BLDV-SCNC: 101 MMOL/L — SIGNIFICANT CHANGE UP (ref 96–108)
CHLORIDE BLDV-SCNC: 102 MMOL/L — SIGNIFICANT CHANGE UP (ref 96–108)
CHLORIDE BLDV-SCNC: 103 MMOL/L — SIGNIFICANT CHANGE UP (ref 96–108)
CHLORIDE BLDV-SCNC: 105 MMOL/L — SIGNIFICANT CHANGE UP (ref 96–108)
CHLORIDE SERPL-SCNC: 105 MMOL/L — SIGNIFICANT CHANGE UP (ref 96–108)
CK MB BLD-MCNC: 14.9 % — HIGH (ref 0–3.5)
CK MB CFR SERPL CALC: 87.9 NG/ML — HIGH (ref 0–6.7)
CK SERPL-CCNC: 588 U/L — HIGH (ref 30–200)
CO2 BLDV-SCNC: 28 MMOL/L — HIGH (ref 22–26)
CO2 BLDV-SCNC: 28 MMOL/L — HIGH (ref 22–26)
CO2 BLDV-SCNC: 29 MMOL/L — HIGH (ref 22–26)
CO2 BLDV-SCNC: 29 MMOL/L — HIGH (ref 22–26)
CO2 SERPL-SCNC: 24 MMOL/L — SIGNIFICANT CHANGE UP (ref 22–31)
CREAT SERPL-MCNC: 0.72 MG/DL — SIGNIFICANT CHANGE UP (ref 0.5–1.3)
EGFR: 94 ML/MIN/1.73M2 — SIGNIFICANT CHANGE UP
EOSINOPHIL # BLD AUTO: 0.01 K/UL — SIGNIFICANT CHANGE UP (ref 0–0.5)
EOSINOPHIL NFR BLD AUTO: 0.1 % — SIGNIFICANT CHANGE UP (ref 0–6)
FIBRINOGEN PPP-MCNC: 219 MG/DL — SIGNIFICANT CHANGE UP (ref 200–445)
GAS PNL BLDA: SIGNIFICANT CHANGE UP
GAS PNL BLDV: 137 MMOL/L — SIGNIFICANT CHANGE UP (ref 136–145)
GAS PNL BLDV: 137 MMOL/L — SIGNIFICANT CHANGE UP (ref 136–145)
GAS PNL BLDV: 138 MMOL/L — SIGNIFICANT CHANGE UP (ref 136–145)
GAS PNL BLDV: 138 MMOL/L — SIGNIFICANT CHANGE UP (ref 136–145)
GAS PNL BLDV: SIGNIFICANT CHANGE UP
GLUCOSE BLDC GLUCOMTR-MCNC: 108 MG/DL — HIGH (ref 70–99)
GLUCOSE BLDC GLUCOMTR-MCNC: 118 MG/DL — HIGH (ref 70–99)
GLUCOSE BLDC GLUCOMTR-MCNC: 120 MG/DL — HIGH (ref 70–99)
GLUCOSE BLDC GLUCOMTR-MCNC: 120 MG/DL — HIGH (ref 70–99)
GLUCOSE BLDC GLUCOMTR-MCNC: 127 MG/DL — HIGH (ref 70–99)
GLUCOSE BLDC GLUCOMTR-MCNC: 129 MG/DL — HIGH (ref 70–99)
GLUCOSE BLDC GLUCOMTR-MCNC: 138 MG/DL — HIGH (ref 70–99)
GLUCOSE BLDC GLUCOMTR-MCNC: 140 MG/DL — HIGH (ref 70–99)
GLUCOSE BLDC GLUCOMTR-MCNC: 152 MG/DL — HIGH (ref 70–99)
GLUCOSE BLDC GLUCOMTR-MCNC: 173 MG/DL — HIGH (ref 70–99)
GLUCOSE BLDC GLUCOMTR-MCNC: 178 MG/DL — HIGH (ref 70–99)
GLUCOSE BLDV-MCNC: 128 MG/DL — HIGH (ref 70–99)
GLUCOSE BLDV-MCNC: 133 MG/DL — HIGH (ref 70–99)
GLUCOSE BLDV-MCNC: 138 MG/DL — HIGH (ref 70–99)
GLUCOSE BLDV-MCNC: 143 MG/DL — HIGH (ref 70–99)
GLUCOSE SERPL-MCNC: 165 MG/DL — HIGH (ref 70–99)
HCO3 BLDV-SCNC: 27 MMOL/L — SIGNIFICANT CHANGE UP (ref 22–29)
HCO3 BLDV-SCNC: 28 MMOL/L — SIGNIFICANT CHANGE UP (ref 22–29)
HCT VFR BLD CALC: 23.9 % — LOW (ref 39–50)
HCT VFR BLDA CALC: 22 % — LOW (ref 39–51)
HCT VFR BLDA CALC: 23 % — LOW (ref 39–51)
HCT VFR BLDA CALC: 24 % — LOW (ref 39–51)
HCT VFR BLDA CALC: 24 % — LOW (ref 39–51)
HEPARINASE TEG R TIME: 8.7 MIN (ref 4.3–8.3)
HGB BLD CALC-MCNC: 7.4 G/DL — LOW (ref 12.6–17.4)
HGB BLD CALC-MCNC: 7.6 G/DL — LOW (ref 12.6–17.4)
HGB BLD CALC-MCNC: 7.9 G/DL — LOW (ref 12.6–17.4)
HGB BLD CALC-MCNC: 8 G/DL — LOW (ref 12.6–17.4)
HGB BLD-MCNC: 8.5 G/DL — LOW (ref 13–17)
IMM GRANULOCYTES NFR BLD AUTO: 1.1 % — HIGH (ref 0–0.9)
INR BLD: 1.26 RATIO — HIGH (ref 0.88–1.16)
LACTATE BLDV-MCNC: 0.8 MMOL/L — SIGNIFICANT CHANGE UP (ref 0.5–2)
LACTATE BLDV-MCNC: 1 MMOL/L — SIGNIFICANT CHANGE UP (ref 0.5–2)
LACTATE BLDV-MCNC: 1.2 MMOL/L — SIGNIFICANT CHANGE UP (ref 0.5–2)
LACTATE BLDV-MCNC: 1.2 MMOL/L — SIGNIFICANT CHANGE UP (ref 0.5–2)
LYMPHOCYTES # BLD AUTO: 1.22 K/UL — SIGNIFICANT CHANGE UP (ref 1–3.3)
LYMPHOCYTES # BLD AUTO: 11.2 % — LOW (ref 13–44)
MAGNESIUM SERPL-MCNC: 2.6 MG/DL — SIGNIFICANT CHANGE UP (ref 1.6–2.6)
MCHC RBC-ENTMCNC: 31.7 PG — SIGNIFICANT CHANGE UP (ref 27–34)
MCHC RBC-ENTMCNC: 35.6 GM/DL — SIGNIFICANT CHANGE UP (ref 32–36)
MCV RBC AUTO: 89.2 FL — SIGNIFICANT CHANGE UP (ref 80–100)
MONOCYTES # BLD AUTO: 0.63 K/UL — SIGNIFICANT CHANGE UP (ref 0–0.9)
MONOCYTES NFR BLD AUTO: 5.8 % — SIGNIFICANT CHANGE UP (ref 2–14)
NEUTROPHILS # BLD AUTO: 8.93 K/UL — HIGH (ref 1.8–7.4)
NEUTROPHILS NFR BLD AUTO: 81.6 % — HIGH (ref 43–77)
NRBC # BLD: 0 /100 WBCS — SIGNIFICANT CHANGE UP (ref 0–0)
PCO2 BLDV: 42 MMHG — SIGNIFICANT CHANGE UP (ref 42–55)
PCO2 BLDV: 43 MMHG — SIGNIFICANT CHANGE UP (ref 42–55)
PH BLDV: 7.41 — SIGNIFICANT CHANGE UP (ref 7.32–7.43)
PH BLDV: 7.41 — SIGNIFICANT CHANGE UP (ref 7.32–7.43)
PH BLDV: 7.42 — SIGNIFICANT CHANGE UP (ref 7.32–7.43)
PH BLDV: 7.43 — SIGNIFICANT CHANGE UP (ref 7.32–7.43)
PHOSPHATE SERPL-MCNC: 2.9 MG/DL — SIGNIFICANT CHANGE UP (ref 2.5–4.5)
PLATELET # BLD AUTO: 124 K/UL — LOW (ref 150–400)
PO2 BLDV: 58 MMHG — HIGH (ref 25–45)
PO2 BLDV: 62 MMHG — HIGH (ref 25–45)
PO2 BLDV: 65 MMHG — HIGH (ref 25–45)
PO2 BLDV: 83 MMHG — HIGH (ref 25–45)
POTASSIUM BLDV-SCNC: 3.4 MMOL/L — LOW (ref 3.5–5.1)
POTASSIUM BLDV-SCNC: 4.6 MMOL/L — SIGNIFICANT CHANGE UP (ref 3.5–5.1)
POTASSIUM BLDV-SCNC: 4.7 MMOL/L — SIGNIFICANT CHANGE UP (ref 3.5–5.1)
POTASSIUM BLDV-SCNC: 4.8 MMOL/L — SIGNIFICANT CHANGE UP (ref 3.5–5.1)
POTASSIUM SERPL-MCNC: 4.1 MMOL/L — SIGNIFICANT CHANGE UP (ref 3.5–5.3)
POTASSIUM SERPL-SCNC: 4.1 MMOL/L — SIGNIFICANT CHANGE UP (ref 3.5–5.3)
PROT SERPL-MCNC: 5.3 G/DL — LOW (ref 6–8.3)
PROTHROM AB SERPL-ACNC: 14.5 SEC — HIGH (ref 10.5–13.4)
RAPIDTEG MAXIMUM AMPLITUDE: 59.4 MM — SIGNIFICANT CHANGE UP (ref 52–70)
RBC # BLD: 2.68 M/UL — LOW (ref 4.2–5.8)
RBC # FLD: 13.2 % — SIGNIFICANT CHANGE UP (ref 10.3–14.5)
SAO2 % BLDV: 92.5 % — HIGH (ref 67–88)
SAO2 % BLDV: 94.5 % — HIGH (ref 67–88)
SAO2 % BLDV: 94.6 % — HIGH (ref 67–88)
SAO2 % BLDV: 97.2 % — HIGH (ref 67–88)
SODIUM SERPL-SCNC: 141 MMOL/L — SIGNIFICANT CHANGE UP (ref 135–145)
TEG FUNCTIONAL FIBRINOGEN: 19.9 MM — SIGNIFICANT CHANGE UP (ref 15–32)
TEG MAXIMUM AMPLITUDE: 62.8 MM — SIGNIFICANT CHANGE UP (ref 52–69)
TEG REACTION TIME: 9.4 MIN (ref 4.6–9.1)
TROPONIN T, HIGH SENSITIVITY RESULT: 898 NG/L — HIGH (ref 0–51)
WBC # BLD: 10.93 K/UL — HIGH (ref 3.8–10.5)
WBC # FLD AUTO: 10.93 K/UL — HIGH (ref 3.8–10.5)

## 2022-12-22 PROCEDURE — 33405 REPLACEMENT AORTIC VALVE OPN: CPT

## 2022-12-22 PROCEDURE — 88311 DECALCIFY TISSUE: CPT | Mod: 26

## 2022-12-22 PROCEDURE — 33533 CABG ARTERIAL SINGLE: CPT

## 2022-12-22 PROCEDURE — 33533 CABG ARTERIAL SINGLE: CPT | Mod: AS

## 2022-12-22 PROCEDURE — 88305 TISSUE EXAM BY PATHOLOGIST: CPT | Mod: 26

## 2022-12-22 PROCEDURE — 99291 CRITICAL CARE FIRST HOUR: CPT

## 2022-12-22 PROCEDURE — 71045 X-RAY EXAM CHEST 1 VIEW: CPT | Mod: 26

## 2022-12-22 PROCEDURE — 33405 REPLACEMENT AORTIC VALVE OPN: CPT | Mod: AS

## 2022-12-22 DEVICE — LIGATING CLIPS WECK HORIZON MEDIUM (BLUE) 24: Type: IMPLANTABLE DEVICE | Status: FUNCTIONAL

## 2022-12-22 DEVICE — CANNULA ARTERIAL OPTISITE 20FR X 3/8" VENTED: Type: IMPLANTABLE DEVICE | Status: FUNCTIONAL

## 2022-12-22 DEVICE — IMPLANTABLE DEVICE: Type: IMPLANTABLE DEVICE | Status: FUNCTIONAL

## 2022-12-22 DEVICE — CANNULA VESSEL 3MM BLUNT TIP CLEAR 1-WAY VALVE: Type: IMPLANTABLE DEVICE | Status: FUNCTIONAL

## 2022-12-22 DEVICE — CHEST DRAIN THORACIC ARGYLE PVC 32FR STRAIGHT: Type: IMPLANTABLE DEVICE | Status: FUNCTIONAL

## 2022-12-22 DEVICE — SHUNT FLO-THRU INTRALUMINAL1.5MM X 18MM: Type: IMPLANTABLE DEVICE | Status: FUNCTIONAL

## 2022-12-22 DEVICE — LIGATING CLIPS WECK HORIZON SMALL-WIDE (RED) 24: Type: IMPLANTABLE DEVICE | Status: FUNCTIONAL

## 2022-12-22 DEVICE — CANNULA RETROGRADE CARDIOPLEGIA SELF-INFLATING 14FR PRE-SHAPED STYLET/HANDLE: Type: IMPLANTABLE DEVICE | Status: FUNCTIONAL

## 2022-12-22 DEVICE — CANNULA CORONARY STR PERF 4MM: Type: IMPLANTABLE DEVICE | Status: FUNCTIONAL

## 2022-12-22 DEVICE — KIT CVC 2LUM MAC 9FR CHG: Type: IMPLANTABLE DEVICE | Status: FUNCTIONAL

## 2022-12-22 DEVICE — VALVE AORTIC INSPIRIS RESILIA 25MM: Type: IMPLANTABLE DEVICE | Status: FUNCTIONAL

## 2022-12-22 DEVICE — PACING WIRE WHITE M-24 BAREWIRE 37MM X 89MM: Type: IMPLANTABLE DEVICE | Status: FUNCTIONAL

## 2022-12-22 DEVICE — CANNULA AORTIC ROOT WITH VENT LINE 12G X 14CM FLANGED: Type: IMPLANTABLE DEVICE | Status: FUNCTIONAL

## 2022-12-22 DEVICE — CANNULA VENOUS 2 STAGE THIN FLEX 36/46FR X 1/2" WITH RETURN DIAL: Type: IMPLANTABLE DEVICE | Status: FUNCTIONAL

## 2022-12-22 DEVICE — CANNULA IMA 1MM BLUNT TIP: Type: IMPLANTABLE DEVICE | Status: FUNCTIONAL

## 2022-12-22 DEVICE — CATH VENT VENTRICULAR PVC 18FR X 4.25" TIP PERFORATION: Type: IMPLANTABLE DEVICE | Status: FUNCTIONAL

## 2022-12-22 RX ORDER — POLYETHYLENE GLYCOL 3350 17 G/17G
17 POWDER, FOR SOLUTION ORAL DAILY
Refills: 0 | Status: DISCONTINUED | OUTPATIENT
Start: 2022-12-23 | End: 2023-01-03

## 2022-12-22 RX ORDER — CHLORHEXIDINE GLUCONATE 213 G/1000ML
5 SOLUTION TOPICAL
Refills: 0 | Status: DISCONTINUED | OUTPATIENT
Start: 2022-12-22 | End: 2022-12-22

## 2022-12-22 RX ORDER — DEXMEDETOMIDINE HYDROCHLORIDE IN 0.9% SODIUM CHLORIDE 4 UG/ML
0.8 INJECTION INTRAVENOUS
Qty: 200 | Refills: 0 | Status: DISCONTINUED | OUTPATIENT
Start: 2022-12-22 | End: 2022-12-22

## 2022-12-22 RX ORDER — ACETAMINOPHEN 500 MG
650 TABLET ORAL EVERY 6 HOURS
Refills: 0 | Status: DISCONTINUED | OUTPATIENT
Start: 2022-12-25 | End: 2023-01-03

## 2022-12-22 RX ORDER — CEFUROXIME AXETIL 250 MG
1500 TABLET ORAL EVERY 8 HOURS
Refills: 0 | Status: COMPLETED | OUTPATIENT
Start: 2022-12-22 | End: 2022-12-24

## 2022-12-22 RX ORDER — SENNA PLUS 8.6 MG/1
2 TABLET ORAL AT BEDTIME
Refills: 0 | Status: DISCONTINUED | OUTPATIENT
Start: 2022-12-23 | End: 2023-01-03

## 2022-12-22 RX ORDER — DEXTROSE 50 % IN WATER 50 %
50 SYRINGE (ML) INTRAVENOUS
Refills: 0 | Status: DISCONTINUED | OUTPATIENT
Start: 2022-12-22 | End: 2022-12-28

## 2022-12-22 RX ORDER — DEXTROSE 50 % IN WATER 50 %
25 SYRINGE (ML) INTRAVENOUS
Refills: 0 | Status: DISCONTINUED | OUTPATIENT
Start: 2022-12-22 | End: 2022-12-24

## 2022-12-22 RX ORDER — POLYETHYLENE GLYCOL 3350 17 G/17G
17 POWDER, FOR SOLUTION ORAL DAILY
Refills: 0 | Status: DISCONTINUED | OUTPATIENT
Start: 2022-12-22 | End: 2023-01-03

## 2022-12-22 RX ORDER — MEPERIDINE HYDROCHLORIDE 50 MG/ML
25 INJECTION INTRAMUSCULAR; INTRAVENOUS; SUBCUTANEOUS ONCE
Refills: 0 | Status: DISCONTINUED | OUTPATIENT
Start: 2022-12-22 | End: 2022-12-22

## 2022-12-22 RX ORDER — POTASSIUM CHLORIDE 20 MEQ
10 PACKET (EA) ORAL
Refills: 0 | Status: DISCONTINUED | OUTPATIENT
Start: 2022-12-22 | End: 2022-12-23

## 2022-12-22 RX ORDER — SODIUM CHLORIDE 9 MG/ML
1000 INJECTION INTRAMUSCULAR; INTRAVENOUS; SUBCUTANEOUS
Refills: 0 | Status: DISCONTINUED | OUTPATIENT
Start: 2022-12-22 | End: 2023-01-03

## 2022-12-22 RX ORDER — CHLORHEXIDINE GLUCONATE 213 G/1000ML
15 SOLUTION TOPICAL EVERY 12 HOURS
Refills: 0 | Status: DISCONTINUED | OUTPATIENT
Start: 2022-12-22 | End: 2022-12-22

## 2022-12-22 RX ORDER — OXYCODONE HYDROCHLORIDE 5 MG/1
10 TABLET ORAL EVERY 4 HOURS
Refills: 0 | Status: DISCONTINUED | OUTPATIENT
Start: 2022-12-22 | End: 2022-12-28

## 2022-12-22 RX ORDER — ACETAMINOPHEN 500 MG
650 TABLET ORAL EVERY 6 HOURS
Refills: 0 | Status: COMPLETED | OUTPATIENT
Start: 2022-12-22 | End: 2022-12-25

## 2022-12-22 RX ORDER — POTASSIUM CHLORIDE 20 MEQ
10 PACKET (EA) ORAL
Refills: 0 | Status: COMPLETED | OUTPATIENT
Start: 2022-12-22 | End: 2022-12-22

## 2022-12-22 RX ORDER — ASPIRIN/CALCIUM CARB/MAGNESIUM 324 MG
81 TABLET ORAL DAILY
Refills: 0 | Status: DISCONTINUED | OUTPATIENT
Start: 2022-12-22 | End: 2023-01-03

## 2022-12-22 RX ORDER — HYDROMORPHONE HYDROCHLORIDE 2 MG/ML
0.5 INJECTION INTRAMUSCULAR; INTRAVENOUS; SUBCUTANEOUS EVERY 6 HOURS
Refills: 0 | Status: DISCONTINUED | OUTPATIENT
Start: 2022-12-22 | End: 2022-12-24

## 2022-12-22 RX ORDER — PANTOPRAZOLE SODIUM 20 MG/1
40 TABLET, DELAYED RELEASE ORAL DAILY
Refills: 0 | Status: DISCONTINUED | OUTPATIENT
Start: 2022-12-22 | End: 2022-12-25

## 2022-12-22 RX ORDER — AMIODARONE HYDROCHLORIDE 400 MG/1
400 TABLET ORAL
Refills: 0 | Status: DISCONTINUED | OUTPATIENT
Start: 2022-12-22 | End: 2022-12-22

## 2022-12-22 RX ORDER — NOREPINEPHRINE BITARTRATE/D5W 8 MG/250ML
0.05 PLASTIC BAG, INJECTION (ML) INTRAVENOUS
Qty: 8 | Refills: 0 | Status: DISCONTINUED | OUTPATIENT
Start: 2022-12-22 | End: 2022-12-23

## 2022-12-22 RX ORDER — CHLORHEXIDINE GLUCONATE 213 G/1000ML
1 SOLUTION TOPICAL DAILY
Refills: 0 | Status: DISCONTINUED | OUTPATIENT
Start: 2022-12-22 | End: 2023-01-03

## 2022-12-22 RX ORDER — ONDANSETRON 8 MG/1
4 TABLET, FILM COATED ORAL ONCE
Refills: 0 | Status: COMPLETED | OUTPATIENT
Start: 2022-12-22 | End: 2022-12-22

## 2022-12-22 RX ORDER — GABAPENTIN 400 MG/1
100 CAPSULE ORAL EVERY 8 HOURS
Refills: 0 | Status: COMPLETED | OUTPATIENT
Start: 2022-12-22 | End: 2022-12-27

## 2022-12-22 RX ORDER — INSULIN HUMAN 100 [IU]/ML
3 INJECTION, SOLUTION SUBCUTANEOUS
Qty: 100 | Refills: 0 | Status: DISCONTINUED | OUTPATIENT
Start: 2022-12-22 | End: 2022-12-27

## 2022-12-22 RX ORDER — OXYCODONE HYDROCHLORIDE 5 MG/1
5 TABLET ORAL EVERY 4 HOURS
Refills: 0 | Status: DISCONTINUED | OUTPATIENT
Start: 2022-12-22 | End: 2022-12-27

## 2022-12-22 RX ORDER — ATORVASTATIN CALCIUM 80 MG/1
40 TABLET, FILM COATED ORAL AT BEDTIME
Refills: 0 | Status: DISCONTINUED | OUTPATIENT
Start: 2022-12-22 | End: 2023-01-03

## 2022-12-22 RX ORDER — ASPIRIN/CALCIUM CARB/MAGNESIUM 324 MG
300 TABLET ORAL ONCE
Refills: 0 | Status: DISCONTINUED | OUTPATIENT
Start: 2022-12-22 | End: 2022-12-22

## 2022-12-22 RX ORDER — ASCORBIC ACID 60 MG
500 TABLET,CHEWABLE ORAL
Refills: 0 | Status: COMPLETED | OUTPATIENT
Start: 2022-12-22 | End: 2022-12-27

## 2022-12-22 RX ADMIN — GABAPENTIN 100 MILLIGRAM(S): 400 CAPSULE ORAL at 23:10

## 2022-12-22 RX ADMIN — Medication 650 MILLIGRAM(S): at 23:38

## 2022-12-22 RX ADMIN — HYDROMORPHONE HYDROCHLORIDE 0.5 MILLIGRAM(S): 2 INJECTION INTRAMUSCULAR; INTRAVENOUS; SUBCUTANEOUS at 22:20

## 2022-12-22 RX ADMIN — Medication 7.31 MICROGRAM(S)/KG/MIN: at 19:59

## 2022-12-22 RX ADMIN — Medication 100 MILLIEQUIVALENT(S): at 23:12

## 2022-12-22 RX ADMIN — INSULIN HUMAN 3 UNIT(S)/HR: 100 INJECTION, SOLUTION SUBCUTANEOUS at 19:58

## 2022-12-22 RX ADMIN — Medication 25 MILLIGRAM(S): at 05:24

## 2022-12-22 RX ADMIN — Medication 1000 MILLIGRAM(S): at 05:25

## 2022-12-22 RX ADMIN — DEXMEDETOMIDINE HYDROCHLORIDE IN 0.9% SODIUM CHLORIDE 15.6 MICROGRAM(S)/KG/HR: 4 INJECTION INTRAVENOUS at 14:01

## 2022-12-22 RX ADMIN — SODIUM CHLORIDE 3 MILLILITER(S): 9 INJECTION INTRAMUSCULAR; INTRAVENOUS; SUBCUTANEOUS at 06:54

## 2022-12-22 RX ADMIN — Medication 2000 MILLIGRAM(S): at 05:25

## 2022-12-22 RX ADMIN — HYDROMORPHONE HYDROCHLORIDE 0.5 MILLIGRAM(S): 2 INJECTION INTRAMUSCULAR; INTRAVENOUS; SUBCUTANEOUS at 15:05

## 2022-12-22 RX ADMIN — PANTOPRAZOLE SODIUM 40 MILLIGRAM(S): 20 TABLET, DELAYED RELEASE ORAL at 05:26

## 2022-12-22 RX ADMIN — Medication 650 MILLIGRAM(S): at 23:46

## 2022-12-22 RX ADMIN — Medication 7.31 MICROGRAM(S)/KG/MIN: at 14:01

## 2022-12-22 RX ADMIN — GABAPENTIN 300 MILLIGRAM(S): 400 CAPSULE ORAL at 05:24

## 2022-12-22 RX ADMIN — INSULIN HUMAN 3 UNIT(S)/HR: 100 INJECTION, SOLUTION SUBCUTANEOUS at 14:01

## 2022-12-22 RX ADMIN — Medication 100 MILLIGRAM(S): at 20:11

## 2022-12-22 RX ADMIN — CHLORHEXIDINE GLUCONATE 5 MILLILITER(S): 213 SOLUTION TOPICAL at 17:21

## 2022-12-22 RX ADMIN — Medication 81 MILLIGRAM(S): at 23:37

## 2022-12-22 RX ADMIN — HYDROMORPHONE HYDROCHLORIDE 0.5 MILLIGRAM(S): 2 INJECTION INTRAMUSCULAR; INTRAVENOUS; SUBCUTANEOUS at 14:41

## 2022-12-22 RX ADMIN — ONDANSETRON 4 MILLIGRAM(S): 8 TABLET, FILM COATED ORAL at 23:45

## 2022-12-22 RX ADMIN — ATORVASTATIN CALCIUM 40 MILLIGRAM(S): 80 TABLET, FILM COATED ORAL at 23:37

## 2022-12-22 RX ADMIN — Medication 100 MILLIEQUIVALENT(S): at 23:37

## 2022-12-22 RX ADMIN — Medication 100 MILLIEQUIVALENT(S): at 18:57

## 2022-12-22 RX ADMIN — Medication 100 MILLIEQUIVALENT(S): at 17:20

## 2022-12-22 RX ADMIN — Medication 1000 MILLIGRAM(S): at 05:50

## 2022-12-22 RX ADMIN — HYDROMORPHONE HYDROCHLORIDE 0.5 MILLIGRAM(S): 2 INJECTION INTRAMUSCULAR; INTRAVENOUS; SUBCUTANEOUS at 14:30

## 2022-12-22 RX ADMIN — HYDROMORPHONE HYDROCHLORIDE 0.5 MILLIGRAM(S): 2 INJECTION INTRAMUSCULAR; INTRAVENOUS; SUBCUTANEOUS at 14:00

## 2022-12-22 RX ADMIN — HYDROMORPHONE HYDROCHLORIDE 0.5 MILLIGRAM(S): 2 INJECTION INTRAMUSCULAR; INTRAVENOUS; SUBCUTANEOUS at 22:35

## 2022-12-22 NOTE — PRE-ANESTHESIA EVALUATION ADULT - NSANTHPMHFT_GEN_ALL_CORE
77M with CAD (LAD 70%), aortic stenosis, presented with chest pain. Also history of severe right carotid stenosis, HTN.    Normal LVEF

## 2022-12-22 NOTE — BRIEF OPERATIVE NOTE - COMMENTS
I first assisted for the entirety of the case, including sternotomy, cardiopulmonary bypass, placement of Aortic Valve, placement of coronary grafts and closing.    Aortic cross clamp time 105 minutes    No qualified resident available to first assist

## 2022-12-22 NOTE — PRE-ANESTHESIA EVALUATION ADULT - NSRADCARDRESULTSFT_GEN_ALL_CORE
12/20/22 TTE   1. Mitral annular calcification, otherwise normal mitral  valve. Minimal mitral regurgitation.  2. Calcified trileaflet aortic valve with decreased  opening. Peak transaortic valve gradient equals 43 mm Hg,  mean transaortic valve gradient equals 27 mm Hg, estimated  aortic valve area equals 1.1 sqcm (by continuity equation),  aortic valve velocity time integral equals 82 cm,  consistent with moderate aortic stenosis. Dimensionless  Index=0.29. Minimal aortic regurgitation.  3. Normal left ventricular systolic function. No segmental  wall motion abnormalities.  4. Normal right ventricular size and systolic function.  *** Compared with echocardiogram of 12/2/2022, aortic valve  gradients are higher on this study but calculated SOURAV is  increased. Calculated SOURAV was 0.9 cmsq on the prior study.  Consider additional imaging of the aortic valve with MORIAH or  cardiac CT scan if clinically indicated.

## 2022-12-22 NOTE — PROGRESS NOTE ADULT - SUBJECTIVE AND OBJECTIVE BOX
Patient seen and examined at the bedside.    Remained critically ill on continuous ICU monitoring.    OBJECTIVE:  Vital Signs Last 24 Hrs  T(C): 37.1 (22 Dec 2022 16:00), Max: 37.1 (22 Dec 2022 16:00)  T(F): 98.8 (22 Dec 2022 16:00), Max: 98.8 (22 Dec 2022 16:00)  HR: 87 (22 Dec 2022 19:00) (62 - 94)  BP: 69/38 (22 Dec 2022 14:45) (69/38 - 168/65)  BP(mean): 50 (22 Dec 2022 14:45) (50 - 100)  RR: 15 (22 Dec 2022 19:00) (11 - 20)  SpO2: 100% (22 Dec 2022 19:00) (92% - 100%)    Parameters below as of 22 Dec 2022 16:00  Patient On (Oxygen Delivery Method): ventilator    O2 Concentration (%): 40    REVIEW OF SYSTEMS:     [x ] N/A    Physical Exam:  General: intubated multiple lines gtt & tubes   Neurology: sedated   Eyes: bilaeral pupils equal and reactive   ENT/Neck: +ETT midline, Neck supple, trachea midline, No JVD   Respiratory: Rales noted bilaterally   CV: RRR, S1S2, no murmurs        [x] Sternal dressing, [x] Mediastinal CT, [x] Left Pleural CT        [x] Paced rhythm, [x] Temporary pacing- DDD-100  Abdominal: Soft, NT, ND +BS,   Extremities: 1-2+ pedal edema noted, + peripheral pulses   Skin: No Rashes, Hematoma, Ecchymosis                           Assessment:    Plan:   ***Neuro***   [x] Sedated with [x] Precedex   Post operative neuro assessment   Gabapentin, oxy, and Dilaudid for pain    ***Cardiovascular***  Invasive hemodynamic monitoring, assess perfusion indices   W[place] / CVP 9 / MAP 69 /Hct 23.9%/ Lactate 1.0  [x] Levophed 0.05 mcgs  Reassessment of hemodynamics post resuscitation   PO Amiodarone for afib prophylaxis   Monitor chest tube outputs   [] Bleeding ___ / [] Nonbleeding ___   [x] ASA    Serial EKG and cardiac enzymes     ***Pulmonary***  Post op vent management  Titration of FiO2 and PEEP, follow SpO2, CXR, blood gases     Mode: CPAP with PS  FiO2: 50  PEEP: 5  PS: 10            Will plan to wean & extubate once pt is hemodynamically stable and not bleeding    ***GI***   [x] Protonix   NPO  Bowel regimen with Miralax    ***Renal***  GFR 94   Continue to monitor I/Os, BUN/Creatinine.   Replete lytes PRN  Smiley present [x] positive     ***ID***  Perioperative antibiotics     ***Endocrine***  [x] DM2 : HbA1c 9.4%               - [x] Insulin gtt              - Need tight glycemic control to prevent wound infection.    Patient requires continuous monitoring with bedside rhythm monitoring, pulse oximetry monitoring, and continuous central venous and arterial pressure monitoring; and intermittent blood gas analysis. Care plan discussed with the ICU care team.   Patient remained critical, at risk for life threatening decompensation.    I have spent 40 minutes providing critical care management to this patient.    By signing my name below, I, Korina Haider, attest that this documentation has been prepared under the direction and in the presence of Ayaka Durham MD   Electronically signed: Ruby Christensen, 12-22-22 @ 19:18    I, Ayaka Durham, personally performed the services described in this documentation. all medical record entries made by the jillibstone were at my direction and in my presence. I have reviewed the chart and agree that the record reflects my personal performance and is accurate and complete  Electronically signed: Ayaka Durham MD  Patient seen and examined at the bedside.    Remained critically ill on continuous ICU monitoring.    OBJECTIVE:  Vital Signs Last 24 Hrs  T(C): 37.1 (22 Dec 2022 16:00), Max: 37.1 (22 Dec 2022 16:00)  T(F): 98.8 (22 Dec 2022 16:00), Max: 98.8 (22 Dec 2022 16:00)  HR: 87 (22 Dec 2022 19:00) (62 - 94)  BP: 69/38 (22 Dec 2022 14:45) (69/38 - 168/65)  BP(mean): 50 (22 Dec 2022 14:45) (50 - 100)  RR: 15 (22 Dec 2022 19:00) (11 - 20)  SpO2: 100% (22 Dec 2022 19:00) (92% - 100%)    Parameters below as of 22 Dec 2022 16:00  Patient On (Oxygen Delivery Method): ventilator    O2 Concentration (%): 40    REVIEW OF SYSTEMS:     [x ] N/A    Physical Exam:  General: intubated multiple lines gtt & tubes   Neurology: sedated   Eyes: bilaeral pupils equal and reactive   ENT/Neck: +ETT midline, Neck supple, trachea midline, No JVD   Respiratory: Rales noted bilaterally   CV: A-Paced         [x] Sternal dressing, [x] Mediastinal CT, [x] Left Pleural CT        [x] Paced rhythm, [x] Temporary pacing- DDD-88   Abdominal: Soft, NT, ND +BS,   Extremities: 1-2+ pedal edema noted, + peripheral pulses   Skin: No Rashes, Hematoma, Ecchymosis                           Assessment:  77 yr male DM2 / HLD / HTN / Severe AS   S/P Cath multivessel CAD     S/P AVR (T) / CABG X 1 D#0  Post op hypotension related to hypovolemia, bradycardia & anemia   Hyperglycemia     Plan:   ***Neuro***   [x] Sedated with [x] Precedex   Post operative neuro assessment   Gabapentin, oxy, and Dilaudid for pain    ***Cardiovascular***  Post op labile BP hemodynamics requiring Atrial pacing, volume loading titration of Levophed   Invasive hemodynamic monitoring, assess serial  perfusion indices   A- Pacing 88 / CVP 9 / MAP 60-70 /Hct 23.9%/ Lactate 1.0  [x] LR 1000 cc   [x] Levophed 0.02- 0.05 mcg/kg/min   [x]  Amiodarone D/C sec bradycardia   Plan no AV pato blocking drugs   Monitor chest tube outputs   [x] Nonbleeding    [x] ASA  [x] Statins   maintain K >4 / Mg >2   Serial EKG and cardiac enzymes     ***Pulmonary***  Post op vent management  Titration of FiO2 and PEEP, follow SpO2, CXR, blood gases     Mode: CPAP with PS  FiO2: 50  PEEP: 5  PS: 10            Will plan to wean & extubate once pt is hemodynamically stable and not bleeding    ***GI***   [x] Protonix   NPO  Bowel regimen with Miralax    ***Renal***  GFR 94   Continue to monitor I/Os, BUN/Creatinine.   Replete lytes PRN  Smiley present [x] positive     ***ID***  Perioperative antibiotics     ***Endocrine***  [x] DM2 : HbA1c 9.4%               - [x] Insulin gtt              - Need tight glycemic control to prevent wound infection.    Patient requires continuous monitoring with bedside rhythm monitoring, pulse oximetry monitoring, and continuous central venous and arterial pressure monitoring; and intermittent blood gas analysis. Care plan discussed with the ICU care team.   Patient remained critical, at risk for life threatening decompensation.    I have spent 40 minutes providing critical care management to this patient.    By signing my name below, I, Korina Haider, attest that this documentation has been prepared under the direction and in the presence of Ayaka Durham MD   Electronically signed: Ruby Christensen, 12-22-22 @ 19:18    I, Ayaka Durham, personally performed the services described in this documentation. all medical record entries made by the jillibe were at my direction and in my presence. I have reviewed the chart and agree that the record reflects my personal performance and is accurate and complete  Electronically signed: Ayaka Durham MD  Patient seen and examined at the bedside.    Remained critically ill on continuous ICU monitoring.    OBJECTIVE:  Vital Signs Last 24 Hrs  T(C): 37.1 (22 Dec 2022 16:00), Max: 37.1 (22 Dec 2022 16:00)  T(F): 98.8 (22 Dec 2022 16:00), Max: 98.8 (22 Dec 2022 16:00)  HR: 87 (22 Dec 2022 19:00) (62 - 94)  BP: 69/38 (22 Dec 2022 14:45) (69/38 - 168/65)  BP(mean): 50 (22 Dec 2022 14:45) (50 - 100)  RR: 15 (22 Dec 2022 19:00) (11 - 20)  SpO2: 100% (22 Dec 2022 19:00) (92% - 100%)    Parameters below as of 22 Dec 2022 16:00  Patient On (Oxygen Delivery Method): ventilator    O2 Concentration (%): 40    REVIEW OF SYSTEMS:     [x ] N/A    Physical Exam:  General: intubated multiple lines gtt & tubes   Neurology: sedated   Eyes: bilaeral pupils equal and reactive   ENT/Neck: +ETT midline, Neck supple, trachea midline, No JVD   Respiratory: Rales noted bilaterally   CV: A-Paced         [x] Sternal dressing, [x] Mediastinal CT, [x] Left Pleural CT        [x] Paced rhythm, [x] Temporary pacing- DDD-88   Abdominal: Soft, NT, ND +BS,   Extremities: 1-2+ pedal edema noted, + peripheral pulses   Skin: No Rashes, Hematoma, Ecchymosis                           Assessment:  77 yr male DM2 / HLD / HTN / Severe AS   S/P Cath multivessel CAD     S/P AVR (T) / CABG X 1 D#0  Post op hypotension related to hypovolemia, bradycardia & anemia   Hyperglycemia     Plan:   ***Neuro***   [x] Sedated with [x] Precedex   Post operative neuro assessment   Gabapentin, oxy, and Dilaudid for pain    ***Cardiovascular***  Post op labile BP hemodynamics requiring Atrial pacing, volume loading titration of Levophed   Invasive hemodynamic monitoring, assess serial  perfusion indices   A- Pacing 88 / CVP 9 / MAP 60-70 /Hct 23.9%/ Lactate 1.0  [x] LR 1000 cc   [x] Levophed 0.02- 0.05 mcg/kg/min   Transfuse PRBC if Hct <24 & requiring pressor  [x]  Amiodarone D/C sec bradycardia   Plan no AV pato blocking drugs   Monitor chest tube outputs   [x] Nonbleeding    [x] ASA  [x] Statins   maintain K >4 / Mg >2   Serial EKG and cardiac enzymes     ***Pulmonary***  Post op vent management  Titration of FiO2 and PEEP, follow SpO2, CXR, blood gases     Mode: CPAP with PS  FiO2: 50  PEEP: 5  PS: 10            Will plan to wean & extubate once pt is hemodynamically stable and not bleeding    ***GI***   [x] Protonix   NPO  Bowel regimen with Miralax    ***Renal***  GFR 94   Continue to monitor I/Os, BUN/Creatinine.   Replete lytes PRN  Smiley present [x] positive     ***ID***  Perioperative antibiotics     ***Endocrine***  [x] DM2 : HbA1c 9.4%               - [x] Insulin gtt              - Need tight glycemic control to prevent wound infection.    Patient requires continuous monitoring with bedside rhythm monitoring, pulse oximetry monitoring, and continuous central venous and arterial pressure monitoring; and intermittent blood gas analysis. Care plan discussed with the ICU care team.   Patient remained critical, at risk for life threatening decompensation.    I have spent 40 minutes providing critical care management to this patient.    By signing my name below, I, Korina Haider, attest that this documentation has been prepared under the direction and in the presence of Ayaka Durham MD   Electronically signed: Ruby Christensen, 12-22-22 @ 19:18    I, Ayaka Durham, personally performed the services described in this documentation. all medical record entries made by the jillibstone were at my direction and in my presence. I have reviewed the chart and agree that the record reflects my personal performance and is accurate and complete  Electronically signed: Ayaka Durham MD

## 2022-12-22 NOTE — AIRWAY REMOVAL NOTE  ADULT & PEDS - ARTIFICAL AIRWAY REMOVAL COMMENTS
Written order for extubation verified. The patient was identified by full name and birth date compared to the identification band. Present during the procedure was Claribel Hednerson RN.

## 2022-12-23 LAB
ALBUMIN SERPL ELPH-MCNC: 4.2 G/DL — SIGNIFICANT CHANGE UP (ref 3.3–5)
ALP SERPL-CCNC: 68 U/L — SIGNIFICANT CHANGE UP (ref 40–120)
ALT FLD-CCNC: 29 U/L — SIGNIFICANT CHANGE UP (ref 10–45)
ANION GAP SERPL CALC-SCNC: 12 MMOL/L — SIGNIFICANT CHANGE UP (ref 5–17)
APTT BLD: 26.5 SEC — LOW (ref 27.5–35.5)
AST SERPL-CCNC: 85 U/L — HIGH (ref 10–40)
BASE EXCESS BLDV CALC-SCNC: -0.9 MMOL/L — SIGNIFICANT CHANGE UP (ref -2–3)
BILIRUB SERPL-MCNC: 2 MG/DL — HIGH (ref 0.2–1.2)
BUN SERPL-MCNC: 12 MG/DL — SIGNIFICANT CHANGE UP (ref 7–23)
CALCIUM SERPL-MCNC: 8.4 MG/DL — SIGNIFICANT CHANGE UP (ref 8.4–10.5)
CHLORIDE SERPL-SCNC: 104 MMOL/L — SIGNIFICANT CHANGE UP (ref 96–108)
CO2 BLDV-SCNC: 27 MMOL/L — HIGH (ref 22–26)
CO2 SERPL-SCNC: 24 MMOL/L — SIGNIFICANT CHANGE UP (ref 22–31)
CREAT SERPL-MCNC: 0.79 MG/DL — SIGNIFICANT CHANGE UP (ref 0.5–1.3)
EGFR: 92 ML/MIN/1.73M2 — SIGNIFICANT CHANGE UP
FIBRINOGEN PPP-MCNC: 311 MG/DL — SIGNIFICANT CHANGE UP (ref 200–445)
GAS PNL BLDA: SIGNIFICANT CHANGE UP
GAS PNL BLDV: SIGNIFICANT CHANGE UP
GLUCOSE BLDC GLUCOMTR-MCNC: 101 MG/DL — HIGH (ref 70–99)
GLUCOSE BLDC GLUCOMTR-MCNC: 104 MG/DL — HIGH (ref 70–99)
GLUCOSE BLDC GLUCOMTR-MCNC: 105 MG/DL — HIGH (ref 70–99)
GLUCOSE BLDC GLUCOMTR-MCNC: 105 MG/DL — HIGH (ref 70–99)
GLUCOSE BLDC GLUCOMTR-MCNC: 112 MG/DL — HIGH (ref 70–99)
GLUCOSE BLDC GLUCOMTR-MCNC: 114 MG/DL — HIGH (ref 70–99)
GLUCOSE BLDC GLUCOMTR-MCNC: 115 MG/DL — HIGH (ref 70–99)
GLUCOSE BLDC GLUCOMTR-MCNC: 118 MG/DL — HIGH (ref 70–99)
GLUCOSE BLDC GLUCOMTR-MCNC: 122 MG/DL — HIGH (ref 70–99)
GLUCOSE BLDC GLUCOMTR-MCNC: 123 MG/DL — HIGH (ref 70–99)
GLUCOSE BLDC GLUCOMTR-MCNC: 124 MG/DL — HIGH (ref 70–99)
GLUCOSE BLDC GLUCOMTR-MCNC: 134 MG/DL — HIGH (ref 70–99)
GLUCOSE BLDC GLUCOMTR-MCNC: 135 MG/DL — HIGH (ref 70–99)
GLUCOSE BLDC GLUCOMTR-MCNC: 138 MG/DL — HIGH (ref 70–99)
GLUCOSE BLDC GLUCOMTR-MCNC: 144 MG/DL — HIGH (ref 70–99)
GLUCOSE BLDC GLUCOMTR-MCNC: 145 MG/DL — HIGH (ref 70–99)
GLUCOSE BLDC GLUCOMTR-MCNC: 145 MG/DL — HIGH (ref 70–99)
GLUCOSE BLDC GLUCOMTR-MCNC: 147 MG/DL — HIGH (ref 70–99)
GLUCOSE BLDC GLUCOMTR-MCNC: 157 MG/DL — HIGH (ref 70–99)
GLUCOSE BLDC GLUCOMTR-MCNC: 172 MG/DL — HIGH (ref 70–99)
GLUCOSE BLDC GLUCOMTR-MCNC: 181 MG/DL — HIGH (ref 70–99)
GLUCOSE BLDC GLUCOMTR-MCNC: 200 MG/DL — HIGH (ref 70–99)
GLUCOSE SERPL-MCNC: 114 MG/DL — HIGH (ref 70–99)
HCO3 BLDV-SCNC: 26 MMOL/L — SIGNIFICANT CHANGE UP (ref 22–29)
HCT VFR BLD CALC: 26 % — LOW (ref 39–50)
HGB BLD-MCNC: 8.9 G/DL — LOW (ref 13–17)
HOROWITZ INDEX BLDV+IHG-RTO: 44 — SIGNIFICANT CHANGE UP
INR BLD: 1.21 RATIO — HIGH (ref 0.88–1.16)
MAGNESIUM SERPL-MCNC: 2 MG/DL — SIGNIFICANT CHANGE UP (ref 1.6–2.6)
MCHC RBC-ENTMCNC: 31.7 PG — SIGNIFICANT CHANGE UP (ref 27–34)
MCHC RBC-ENTMCNC: 34.2 GM/DL — SIGNIFICANT CHANGE UP (ref 32–36)
MCV RBC AUTO: 92.5 FL — SIGNIFICANT CHANGE UP (ref 80–100)
NRBC # BLD: 0 /100 WBCS — SIGNIFICANT CHANGE UP (ref 0–0)
PCO2 BLDV: 50 MMHG — SIGNIFICANT CHANGE UP (ref 42–55)
PH BLDV: 7.32 — SIGNIFICANT CHANGE UP (ref 7.32–7.43)
PHOSPHATE SERPL-MCNC: 3.7 MG/DL — SIGNIFICANT CHANGE UP (ref 2.5–4.5)
PLATELET # BLD AUTO: 130 K/UL — LOW (ref 150–400)
PO2 BLDV: 43 MMHG — SIGNIFICANT CHANGE UP (ref 25–45)
POTASSIUM SERPL-MCNC: 4.4 MMOL/L — SIGNIFICANT CHANGE UP (ref 3.5–5.3)
POTASSIUM SERPL-SCNC: 4.4 MMOL/L — SIGNIFICANT CHANGE UP (ref 3.5–5.3)
PROT SERPL-MCNC: 6 G/DL — SIGNIFICANT CHANGE UP (ref 6–8.3)
PROTHROM AB SERPL-ACNC: 14 SEC — HIGH (ref 10.5–13.4)
RBC # BLD: 2.81 M/UL — LOW (ref 4.2–5.8)
RBC # FLD: 13.4 % — SIGNIFICANT CHANGE UP (ref 10.3–14.5)
SAO2 % BLDV: 68.8 % — SIGNIFICANT CHANGE UP (ref 67–88)
SODIUM SERPL-SCNC: 140 MMOL/L — SIGNIFICANT CHANGE UP (ref 135–145)
UFH PPP CHRO-ACNC: 0.03 IU/ML — LOW (ref 0.3–0.7)
WBC # BLD: 8.86 K/UL — SIGNIFICANT CHANGE UP (ref 3.8–10.5)
WBC # FLD AUTO: 8.86 K/UL — SIGNIFICANT CHANGE UP (ref 3.8–10.5)

## 2022-12-23 PROCEDURE — 99233 SBSQ HOSP IP/OBS HIGH 50: CPT

## 2022-12-23 PROCEDURE — 99291 CRITICAL CARE FIRST HOUR: CPT

## 2022-12-23 PROCEDURE — 71045 X-RAY EXAM CHEST 1 VIEW: CPT | Mod: 26

## 2022-12-23 PROCEDURE — 93010 ELECTROCARDIOGRAM REPORT: CPT

## 2022-12-23 RX ORDER — ALBUMIN HUMAN 25 %
250 VIAL (ML) INTRAVENOUS ONCE
Refills: 0 | Status: COMPLETED | OUTPATIENT
Start: 2022-12-23 | End: 2022-12-23

## 2022-12-23 RX ORDER — METOPROLOL TARTRATE 50 MG
2.5 TABLET ORAL ONCE
Refills: 0 | Status: COMPLETED | OUTPATIENT
Start: 2022-12-23 | End: 2022-12-23

## 2022-12-23 RX ORDER — POTASSIUM CHLORIDE 20 MEQ
10 PACKET (EA) ORAL
Refills: 0 | Status: COMPLETED | OUTPATIENT
Start: 2022-12-23 | End: 2022-12-23

## 2022-12-23 RX ORDER — HYDROMORPHONE HYDROCHLORIDE 2 MG/ML
0.5 INJECTION INTRAMUSCULAR; INTRAVENOUS; SUBCUTANEOUS ONCE
Refills: 0 | Status: DISCONTINUED | OUTPATIENT
Start: 2022-12-23 | End: 2022-12-23

## 2022-12-23 RX ORDER — INSULIN LISPRO 100/ML
VIAL (ML) SUBCUTANEOUS
Refills: 0 | Status: DISCONTINUED | OUTPATIENT
Start: 2022-12-24 | End: 2023-01-03

## 2022-12-23 RX ORDER — MAGNESIUM SULFATE 500 MG/ML
2 VIAL (ML) INJECTION ONCE
Refills: 0 | Status: COMPLETED | OUTPATIENT
Start: 2022-12-23 | End: 2022-12-23

## 2022-12-23 RX ORDER — INSULIN LISPRO 100/ML
6 VIAL (ML) SUBCUTANEOUS
Refills: 0 | Status: DISCONTINUED | OUTPATIENT
Start: 2022-12-24 | End: 2022-12-26

## 2022-12-23 RX ORDER — AMIODARONE HYDROCHLORIDE 400 MG/1
0.5 TABLET ORAL
Qty: 900 | Refills: 0 | Status: COMPLETED | OUTPATIENT
Start: 2022-12-23 | End: 2022-12-23

## 2022-12-23 RX ORDER — METOPROLOL TARTRATE 50 MG
12.5 TABLET ORAL ONCE
Refills: 0 | Status: COMPLETED | OUTPATIENT
Start: 2022-12-23 | End: 2022-12-23

## 2022-12-23 RX ORDER — HYDROMORPHONE HYDROCHLORIDE 2 MG/ML
1 INJECTION INTRAMUSCULAR; INTRAVENOUS; SUBCUTANEOUS ONCE
Refills: 0 | Status: DISCONTINUED | OUTPATIENT
Start: 2022-12-23 | End: 2022-12-23

## 2022-12-23 RX ORDER — METOPROLOL TARTRATE 50 MG
12.5 TABLET ORAL EVERY 12 HOURS
Refills: 0 | Status: DISCONTINUED | OUTPATIENT
Start: 2022-12-23 | End: 2022-12-24

## 2022-12-23 RX ORDER — FUROSEMIDE 40 MG
40 TABLET ORAL ONCE
Refills: 0 | Status: COMPLETED | OUTPATIENT
Start: 2022-12-23 | End: 2022-12-23

## 2022-12-23 RX ORDER — ACETAMINOPHEN 500 MG
1000 TABLET ORAL ONCE
Refills: 0 | Status: COMPLETED | OUTPATIENT
Start: 2022-12-23 | End: 2022-12-23

## 2022-12-23 RX ORDER — POTASSIUM CHLORIDE 20 MEQ
10 PACKET (EA) ORAL ONCE
Refills: 0 | Status: COMPLETED | OUTPATIENT
Start: 2022-12-23 | End: 2022-12-23

## 2022-12-23 RX ORDER — AMIODARONE HYDROCHLORIDE 400 MG/1
150 TABLET ORAL ONCE
Refills: 0 | Status: COMPLETED | OUTPATIENT
Start: 2022-12-23 | End: 2022-12-23

## 2022-12-23 RX ORDER — INSULIN GLARGINE 100 [IU]/ML
18 INJECTION, SOLUTION SUBCUTANEOUS AT BEDTIME
Refills: 0 | Status: DISCONTINUED | OUTPATIENT
Start: 2022-12-23 | End: 2022-12-25

## 2022-12-23 RX ORDER — ENOXAPARIN SODIUM 100 MG/ML
40 INJECTION SUBCUTANEOUS EVERY 24 HOURS
Refills: 0 | Status: DISCONTINUED | OUTPATIENT
Start: 2022-12-23 | End: 2022-12-26

## 2022-12-23 RX ADMIN — HYDROMORPHONE HYDROCHLORIDE 1 MILLIGRAM(S): 2 INJECTION INTRAMUSCULAR; INTRAVENOUS; SUBCUTANEOUS at 22:45

## 2022-12-23 RX ADMIN — HYDROMORPHONE HYDROCHLORIDE 0.5 MILLIGRAM(S): 2 INJECTION INTRAMUSCULAR; INTRAVENOUS; SUBCUTANEOUS at 06:00

## 2022-12-23 RX ADMIN — Medication 50 MILLIEQUIVALENT(S): at 22:00

## 2022-12-23 RX ADMIN — POLYETHYLENE GLYCOL 3350 17 GRAM(S): 17 POWDER, FOR SOLUTION ORAL at 12:10

## 2022-12-23 RX ADMIN — Medication 650 MILLIGRAM(S): at 17:02

## 2022-12-23 RX ADMIN — HYDROMORPHONE HYDROCHLORIDE 0.5 MILLIGRAM(S): 2 INJECTION INTRAMUSCULAR; INTRAVENOUS; SUBCUTANEOUS at 21:15

## 2022-12-23 RX ADMIN — Medication 400 MILLIGRAM(S): at 06:00

## 2022-12-23 RX ADMIN — SENNA PLUS 2 TABLET(S): 8.6 TABLET ORAL at 21:59

## 2022-12-23 RX ADMIN — Medication 125 MILLILITER(S): at 08:14

## 2022-12-23 RX ADMIN — Medication 100 MILLIGRAM(S): at 20:30

## 2022-12-23 RX ADMIN — Medication 100 MILLIGRAM(S): at 12:35

## 2022-12-23 RX ADMIN — CHLORHEXIDINE GLUCONATE 1 APPLICATION(S): 213 SOLUTION TOPICAL at 13:12

## 2022-12-23 RX ADMIN — AMIODARONE HYDROCHLORIDE 600 MILLIGRAM(S): 400 TABLET ORAL at 13:07

## 2022-12-23 RX ADMIN — Medication 25 GRAM(S): at 13:08

## 2022-12-23 RX ADMIN — ATORVASTATIN CALCIUM 40 MILLIGRAM(S): 80 TABLET, FILM COATED ORAL at 21:59

## 2022-12-23 RX ADMIN — HYDROMORPHONE HYDROCHLORIDE 0.5 MILLIGRAM(S): 2 INJECTION INTRAMUSCULAR; INTRAVENOUS; SUBCUTANEOUS at 12:10

## 2022-12-23 RX ADMIN — Medication 1000 MILLIGRAM(S): at 06:15

## 2022-12-23 RX ADMIN — Medication 50 MILLIEQUIVALENT(S): at 13:49

## 2022-12-23 RX ADMIN — AMIODARONE HYDROCHLORIDE 600 MILLIGRAM(S): 400 TABLET ORAL at 14:05

## 2022-12-23 RX ADMIN — Medication 650 MILLIGRAM(S): at 17:20

## 2022-12-23 RX ADMIN — GABAPENTIN 100 MILLIGRAM(S): 400 CAPSULE ORAL at 21:59

## 2022-12-23 RX ADMIN — HYDROMORPHONE HYDROCHLORIDE 1 MILLIGRAM(S): 2 INJECTION INTRAMUSCULAR; INTRAVENOUS; SUBCUTANEOUS at 23:00

## 2022-12-23 RX ADMIN — HYDROMORPHONE HYDROCHLORIDE 0.5 MILLIGRAM(S): 2 INJECTION INTRAMUSCULAR; INTRAVENOUS; SUBCUTANEOUS at 02:28

## 2022-12-23 RX ADMIN — Medication 50 MILLIEQUIVALENT(S): at 23:18

## 2022-12-23 RX ADMIN — Medication 650 MILLIGRAM(S): at 12:11

## 2022-12-23 RX ADMIN — GABAPENTIN 100 MILLIGRAM(S): 400 CAPSULE ORAL at 13:49

## 2022-12-23 RX ADMIN — ENOXAPARIN SODIUM 40 MILLIGRAM(S): 100 INJECTION SUBCUTANEOUS at 12:09

## 2022-12-23 RX ADMIN — PANTOPRAZOLE SODIUM 40 MILLIGRAM(S): 20 TABLET, DELAYED RELEASE ORAL at 12:09

## 2022-12-23 RX ADMIN — Medication 40 MILLIGRAM(S): at 18:08

## 2022-12-23 RX ADMIN — Medication 50 MILLIEQUIVALENT(S): at 18:45

## 2022-12-23 RX ADMIN — Medication 2.5 MILLIGRAM(S): at 16:09

## 2022-12-23 RX ADMIN — HYDROMORPHONE HYDROCHLORIDE 0.5 MILLIGRAM(S): 2 INJECTION INTRAMUSCULAR; INTRAVENOUS; SUBCUTANEOUS at 06:15

## 2022-12-23 RX ADMIN — HYDROMORPHONE HYDROCHLORIDE 0.5 MILLIGRAM(S): 2 INJECTION INTRAMUSCULAR; INTRAVENOUS; SUBCUTANEOUS at 12:25

## 2022-12-23 RX ADMIN — AMIODARONE HYDROCHLORIDE 33.3 MG/MIN: 400 TABLET ORAL at 14:56

## 2022-12-23 RX ADMIN — HYDROMORPHONE HYDROCHLORIDE 0.5 MILLIGRAM(S): 2 INJECTION INTRAMUSCULAR; INTRAVENOUS; SUBCUTANEOUS at 02:45

## 2022-12-23 RX ADMIN — Medication 50 MILLIEQUIVALENT(S): at 17:02

## 2022-12-23 RX ADMIN — INSULIN GLARGINE 18 UNIT(S): 100 INJECTION, SOLUTION SUBCUTANEOUS at 21:59

## 2022-12-23 RX ADMIN — OXYCODONE HYDROCHLORIDE 5 MILLIGRAM(S): 5 TABLET ORAL at 09:15

## 2022-12-23 RX ADMIN — HYDROMORPHONE HYDROCHLORIDE 0.5 MILLIGRAM(S): 2 INJECTION INTRAMUSCULAR; INTRAVENOUS; SUBCUTANEOUS at 21:00

## 2022-12-23 RX ADMIN — Medication 500 MILLIGRAM(S): at 06:05

## 2022-12-23 RX ADMIN — Medication 650 MILLIGRAM(S): at 12:40

## 2022-12-23 RX ADMIN — Medication 125 MILLILITER(S): at 09:44

## 2022-12-23 RX ADMIN — HYDROMORPHONE HYDROCHLORIDE 0.5 MILLIGRAM(S): 2 INJECTION INTRAMUSCULAR; INTRAVENOUS; SUBCUTANEOUS at 15:50

## 2022-12-23 RX ADMIN — Medication 500 MILLIGRAM(S): at 17:02

## 2022-12-23 RX ADMIN — GABAPENTIN 100 MILLIGRAM(S): 400 CAPSULE ORAL at 06:05

## 2022-12-23 RX ADMIN — Medication 12.5 MILLIGRAM(S): at 18:08

## 2022-12-23 RX ADMIN — INSULIN HUMAN 3 UNIT(S)/HR: 100 INJECTION, SOLUTION SUBCUTANEOUS at 09:44

## 2022-12-23 RX ADMIN — Medication 100 MILLIGRAM(S): at 04:30

## 2022-12-23 RX ADMIN — Medication 50 MILLIEQUIVALENT(S): at 22:35

## 2022-12-23 RX ADMIN — Medication 50 MILLIEQUIVALENT(S): at 18:07

## 2022-12-23 RX ADMIN — OXYCODONE HYDROCHLORIDE 5 MILLIGRAM(S): 5 TABLET ORAL at 08:15

## 2022-12-23 RX ADMIN — INSULIN HUMAN 3 UNIT(S)/HR: 100 INJECTION, SOLUTION SUBCUTANEOUS at 22:01

## 2022-12-23 RX ADMIN — Medication 81 MILLIGRAM(S): at 12:11

## 2022-12-23 RX ADMIN — AMIODARONE HYDROCHLORIDE 33.3 MG/MIN: 400 TABLET ORAL at 22:00

## 2022-12-23 RX ADMIN — Medication 40 MILLIGRAM(S): at 12:09

## 2022-12-23 RX ADMIN — Medication 12.5 MILLIGRAM(S): at 14:05

## 2022-12-23 RX ADMIN — HYDROMORPHONE HYDROCHLORIDE 0.5 MILLIGRAM(S): 2 INJECTION INTRAMUSCULAR; INTRAVENOUS; SUBCUTANEOUS at 16:05

## 2022-12-23 NOTE — PHYSICAL THERAPY INITIAL EVALUATION ADULT - PERTINENT HX OF CURRENT PROBLEM, REHAB EVAL
77M with a PMH T2DM and recently diagnosed CHF? who had recent admission for cardiac work up. Pt found to have CAD and aortic stenosis. At that time, pt tested positive for COVID, however grossly asymptomatic. Pt was d/c home for outpatient follow up with Dr Bowles. Now presents to ED with chest pain. Pt now s/p C 12/4 with prox LAD 70% stenosis and CT Surgery consulted for CABG/SAVR evaluation at that time. Now s/p CABG x1 on 12/22

## 2022-12-23 NOTE — PHYSICAL THERAPY INITIAL EVALUATION ADULT - PLANNED THERAPY INTERVENTIONS, PT EVAL
GOAL: Pt will ascend/descend (3) steps (I) without HR and step to pattern in 4 weeks./balance training/bed mobility training/gait training/transfer training

## 2022-12-23 NOTE — PROGRESS NOTE ADULT - SUBJECTIVE AND OBJECTIVE BOX
HPI:      Patient is a 77 M with recently diagnosed T2D, CHF, CAD s/p [facility] 12/4 with prox LAD 70% stenosis, aortic stenosis here for CT surgery evaluation for CABG/SAVR. Endocrine consulted for hyperglycemia management.     Home DM medications:  - Metformin 1000 mg BID    Current inpatient DM Meds:   - Insulin drip     Most recent A1C 9.4 12/2/222      INTERVAL HPI/OVERNIGHT EVENTS:  Seen at the bedside with patient's wife. Patient is eating okay, tolerating diet, denies nausea or vomiting or abdominal pain. Wife is bringing home food. Currently on insulin continuous infusion at 3 units per hour.       Review of systems:   CONSTITUTIONAL:  Feels well, good appetite  CARDIOVASCULAR:  Negative for chest pain or palpitations  RESPIRATORY:  Negative for cough, or SOB   GASTROINTESTINAL:  Negative for nausea, vomiting, or abdominal pain  GENITOURINARY:  Negative frequency, urgency or dysuria     CAPILLARY BLOOD GLUCOSE  122 (23 Dec 2022 13:00)  118 (23 Dec 2022 12:00)  134 (23 Dec 2022 11:00)  135 (23 Dec 2022 10:00)  147 (23 Dec 2022 09:00)  104 (23 Dec 2022 08:00)  114 (23 Dec 2022 07:00)  123 (23 Dec 2022 06:00)  115 (23 Dec 2022 05:00)  105 (23 Dec 2022 04:00)  101 (23 Dec 2022 03:00)  105 (23 Dec 2022 02:00)  112 (23 Dec 2022 01:00)  124 (23 Dec 2022 00:00)  108 (22 Dec 2022 23:00)  120 (22 Dec 2022 22:00)  120 (22 Dec 2022 21:00)  118 (22 Dec 2022 20:00)  127 (22 Dec 2022 19:00)  129 (22 Dec 2022 18:00)  140 (22 Dec 2022 17:00)  138 (22 Dec 2022 16:00)  152 (22 Dec 2022 15:00)      POCT Blood Glucose.: 122 mg/dL (23 Dec 2022 13:01)  POCT Blood Glucose.: 118 mg/dL (23 Dec 2022 12:08)  POCT Blood Glucose.: 134 mg/dL (23 Dec 2022 10:53)  POCT Blood Glucose.: 135 mg/dL (23 Dec 2022 09:59)  POCT Blood Glucose.: 147 mg/dL (23 Dec 2022 09:03)  POCT Blood Glucose.: 104 mg/dL (23 Dec 2022 08:08)  POCT Blood Glucose.: 114 mg/dL (23 Dec 2022 06:46)  POCT Blood Glucose.: 123 mg/dL (23 Dec 2022 06:09)  POCT Blood Glucose.: 115 mg/dL (23 Dec 2022 05:03)  POCT Blood Glucose.: 105 mg/dL (23 Dec 2022 04:01)  POCT Blood Glucose.: 101 mg/dL (23 Dec 2022 03:01)  POCT Blood Glucose.: 105 mg/dL (23 Dec 2022 02:12)  POCT Blood Glucose.: 112 mg/dL (23 Dec 2022 00:56)  POCT Blood Glucose.: 124 mg/dL (23 Dec 2022 00:03)  POCT Blood Glucose.: 108 mg/dL (22 Dec 2022 23:01)  POCT Blood Glucose.: 120 mg/dL (22 Dec 2022 21:56)  POCT Blood Glucose.: 120 mg/dL (22 Dec 2022 20:55)  POCT Blood Glucose.: 118 mg/dL (22 Dec 2022 19:58)  POCT Blood Glucose.: 127 mg/dL (22 Dec 2022 18:52)  POCT Blood Glucose.: 129 mg/dL (22 Dec 2022 18:03)  POCT Blood Glucose.: 140 mg/dL (22 Dec 2022 16:54)  POCT Blood Glucose.: 138 mg/dL (22 Dec 2022 15:57)  POCT Blood Glucose.: 152 mg/dL (22 Dec 2022 14:57)  POCT Blood Glucose.: 178 mg/dL (22 Dec 2022 14:15)       MEDICATIONS  (STANDING):  acetaminophen     Tablet .. 650 milliGRAM(s) Oral every 6 hours  ascorbic acid 500 milliGRAM(s) Oral two times a day  aspirin enteric coated 81 milliGRAM(s) Oral daily  atorvastatin 40 milliGRAM(s) Oral at bedtime  cefuroxime  IVPB 1500 milliGRAM(s) IV Intermittent every 8 hours  chlorhexidine 2% Cloths 1 Application(s) Topical daily  dextrose 50% Injectable 50 milliLiter(s) IV Push every 15 minutes  dextrose 50% Injectable 25 milliLiter(s) IV Push every 15 minutes  enoxaparin Injectable 40 milliGRAM(s) SubCutaneous every 24 hours  gabapentin 100 milliGRAM(s) Oral every 8 hours  insulin regular Infusion 3 Unit(s)/Hr (3 mL/Hr) IV Continuous <Continuous>  pantoprazole  Injectable 40 milliGRAM(s) IV Push daily  polyethylene glycol 3350 17 Gram(s) Oral daily  polyethylene glycol 3350 17 Gram(s) Oral daily  potassium chloride  10 mEq/50 mL IVPB 10 milliEquivalent(s) IV Intermittent once  senna 2 Tablet(s) Oral at bedtime  sodium chloride 0.9%. 1000 milliLiter(s) (10 mL/Hr) IV Continuous <Continuous>    MEDICATIONS  (PRN):  HYDROmorphone  Injectable 0.5 milliGRAM(s) IV Push every 6 hours PRN Breakthrough Pain  oxyCODONE    IR 5 milliGRAM(s) Oral every 4 hours PRN Moderate Pain (4 - 6)  oxyCODONE    IR 10 milliGRAM(s) Oral every 4 hours PRN Severe Pain (7 - 10)      PHYSICAL EXAM  Vital Signs Last 24 Hrs  T(C): 36 (23 Dec 2022 08:00), Max: 37.7 (22 Dec 2022 20:00)  T(F): 96.8 (23 Dec 2022 08:00), Max: 99.9 (22 Dec 2022 20:00)  HR: 136 (23 Dec 2022 13:00) (67 - 136)  BP: 118/66 (23 Dec 2022 13:00) (69/38 - 129/59)  BP(mean): 81 (23 Dec 2022 13:00) (50 - 88)  RR: 17 (23 Dec 2022 12:00) (7 - 31)  SpO2: 96% (23 Dec 2022 13:00) (92% - 100%)    Parameters below as of 23 Dec 2022 12:00  Patient On (Oxygen Delivery Method): nasal cannula  O2 Flow (L/min): 3      GENERAL: Male laying in bed in NAD  RESPIRATORY: nonlabored breathing, no accessory muscle use  Extremities: Warm, no edema in all 4 exts   NEURO: A&O X3    LABS:                        8.9    8.86  )-----------( 130      ( 23 Dec 2022 00:45 )             26.0     12-23    140  |  104  |  12  ----------------------------<  114<H>  4.4   |  24  |  0.79    Ca    8.4      23 Dec 2022 00:45  Phos  3.7     12-23  Mg     2.0     12-23    TPro  6.0  /  Alb  4.2  /  TBili  2.0<H>  /  DBili  x   /  AST  85<H>  /  ALT  29  /  AlkPhos  68  12-23    PT/INR - ( 23 Dec 2022 00:45 )   PT: 14.0 sec;   INR: 1.21 ratio         PTT - ( 23 Dec 2022 00:45 )  PTT:26.5 sec    Thyroid Stimulating Hormone, Serum: 3.63 uIU/mL (12-02 @ 05:54)

## 2022-12-23 NOTE — PROGRESS NOTE ADULT - ASSESSMENT
Patient is a 77 M with recently diagnosed T2D, CHF, CAD s/p [facility] 12/4 with prox LAD 70% stenosis, aortic stenosis here for CT surgery evaluation for CABG/SAVR. Endocrine consulted for hyperglycemia management. Complex case, high risk patient, high-level decision-making, requiring ICU level of care.    1.  T2DM   - Most recent Hemoglobin A1C 9.2  - Current FS ranges from 160-250  - Current diet: CHO  - Please monitor blood glucose values TID AC & QHS while eating regular meals and Q6H while NPO  - Blood glucose goals pre-meal less than 140 mg/dL and random blood glucose less than 180 mg/dL  - Recommendations:  - When ready to transition off the insulin drip, please start the following: (please make sure to overlap the first dose of admelog with the drip by 2 hours)  - Start Admelog 6 units TID with meals,  hold if NPO or if eating less than 50% of meals  - Start Lantus 18 units QHS   -Start low dose Correctional scale TID with meals  - Start low dose Correctional scale QHS    2. HTN  - BP goal 130/80  - Manage per primary team     3. HLD  - Continue with high intensity statin atorvastatin 80 mg daily   - Manage as outpatient      4. CAD  - Manage per CT surgery team   - Optimize BP and glucose prior to CABG    Discharge planning:  - Current home DM meds: Metformin 1000 mg BID  - Discharge DM meds: Metformin 1000 mg BID+ GLP1RA + SGLT2I (may need insulin at discharge depending on overall requirements)  - Patient will follow up at   [facility]  [address] [place] [id number] [phone]  - Patient will need teaching on insulin injection and glucose checking before discharge  - Patient will need opthalmology and podiatry follow up as outpatient     Thank you for the consult. Will be followed by Dr. Jey Pitt's team tomorrow.      Agnes Campbell MD  Department of Endocrinology, Diabetes and metabolism   Pager [phone]

## 2022-12-23 NOTE — PHYSICAL THERAPY INITIAL EVALUATION ADULT - ADDITIONAL COMMENTS
Pt lives with his son in a PH +3 steps to enter without HR. PTA Pt was ambulating (I) without an AD and was (I) with all ADLs

## 2022-12-23 NOTE — PHYSICAL THERAPY INITIAL EVALUATION ADULT - GAIT DEVIATIONS NOTED, PT EVAL
decreased antonio/increased time in double stance/decreased step length/decreased stride length/decreased weight-shifting ability

## 2022-12-23 NOTE — PHYSICAL THERAPY INITIAL EVALUATION ADULT - GENERAL OBSERVATIONS, REHAB EVAL
Pt rec'd sitting in bedside chair in NAD, VSS, IVL, +CTU monitoring, +CT x1, +external pacemaker, +RIJ, +corbin, agreeable to PT. Son at b/s

## 2022-12-23 NOTE — PHYSICAL THERAPY INITIAL EVALUATION ADULT - NSPTDMEREC_GEN_A_CORE
Pt will require R/W due to unsteady gait while ambulating, dec'd balance and impaired dynamic postural control. A/rolling walker

## 2022-12-24 LAB
ALBUMIN SERPL ELPH-MCNC: 4.2 G/DL — SIGNIFICANT CHANGE UP (ref 3.3–5)
ALP SERPL-CCNC: 72 U/L — SIGNIFICANT CHANGE UP (ref 40–120)
ALT FLD-CCNC: 24 U/L — SIGNIFICANT CHANGE UP (ref 10–45)
ANION GAP SERPL CALC-SCNC: 13 MMOL/L — SIGNIFICANT CHANGE UP (ref 5–17)
AST SERPL-CCNC: 51 U/L — HIGH (ref 10–40)
BILIRUB SERPL-MCNC: 2.4 MG/DL — HIGH (ref 0.2–1.2)
BLD GP AB SCN SERPL QL: NEGATIVE — SIGNIFICANT CHANGE UP
BUN SERPL-MCNC: 14 MG/DL — SIGNIFICANT CHANGE UP (ref 7–23)
CALCIUM SERPL-MCNC: 8.9 MG/DL — SIGNIFICANT CHANGE UP (ref 8.4–10.5)
CHLORIDE SERPL-SCNC: 98 MMOL/L — SIGNIFICANT CHANGE UP (ref 96–108)
CO2 SERPL-SCNC: 24 MMOL/L — SIGNIFICANT CHANGE UP (ref 22–31)
CREAT SERPL-MCNC: 0.9 MG/DL — SIGNIFICANT CHANGE UP (ref 0.5–1.3)
EGFR: 88 ML/MIN/1.73M2 — SIGNIFICANT CHANGE UP
GAS PNL BLDA: SIGNIFICANT CHANGE UP
GLUCOSE BLDC GLUCOMTR-MCNC: 116 MG/DL — HIGH (ref 70–99)
GLUCOSE BLDC GLUCOMTR-MCNC: 120 MG/DL — HIGH (ref 70–99)
GLUCOSE BLDC GLUCOMTR-MCNC: 135 MG/DL — HIGH (ref 70–99)
GLUCOSE BLDC GLUCOMTR-MCNC: 139 MG/DL — HIGH (ref 70–99)
GLUCOSE BLDC GLUCOMTR-MCNC: 149 MG/DL — HIGH (ref 70–99)
GLUCOSE BLDC GLUCOMTR-MCNC: 151 MG/DL — HIGH (ref 70–99)
GLUCOSE BLDC GLUCOMTR-MCNC: 159 MG/DL — HIGH (ref 70–99)
GLUCOSE BLDC GLUCOMTR-MCNC: 182 MG/DL — HIGH (ref 70–99)
GLUCOSE BLDC GLUCOMTR-MCNC: 194 MG/DL — HIGH (ref 70–99)
GLUCOSE BLDC GLUCOMTR-MCNC: 196 MG/DL — HIGH (ref 70–99)
GLUCOSE BLDC GLUCOMTR-MCNC: 223 MG/DL — HIGH (ref 70–99)
GLUCOSE BLDC GLUCOMTR-MCNC: 236 MG/DL — HIGH (ref 70–99)
GLUCOSE BLDC GLUCOMTR-MCNC: 255 MG/DL — HIGH (ref 70–99)
GLUCOSE BLDC GLUCOMTR-MCNC: 256 MG/DL — HIGH (ref 70–99)
GLUCOSE BLDC GLUCOMTR-MCNC: 259 MG/DL — HIGH (ref 70–99)
GLUCOSE BLDC GLUCOMTR-MCNC: 260 MG/DL — HIGH (ref 70–99)
GLUCOSE BLDC GLUCOMTR-MCNC: 263 MG/DL — HIGH (ref 70–99)
GLUCOSE BLDC GLUCOMTR-MCNC: 273 MG/DL — HIGH (ref 70–99)
GLUCOSE BLDC GLUCOMTR-MCNC: 274 MG/DL — HIGH (ref 70–99)
GLUCOSE SERPL-MCNC: 125 MG/DL — HIGH (ref 70–99)
HCT VFR BLD CALC: 25.4 % — LOW (ref 39–50)
HGB BLD-MCNC: 8.5 G/DL — LOW (ref 13–17)
MAGNESIUM SERPL-MCNC: 2 MG/DL — SIGNIFICANT CHANGE UP (ref 1.6–2.6)
MAGNESIUM SERPL-MCNC: 2.2 MG/DL — SIGNIFICANT CHANGE UP (ref 1.6–2.6)
MCHC RBC-ENTMCNC: 31.6 PG — SIGNIFICANT CHANGE UP (ref 27–34)
MCHC RBC-ENTMCNC: 33.5 GM/DL — SIGNIFICANT CHANGE UP (ref 32–36)
MCV RBC AUTO: 94.4 FL — SIGNIFICANT CHANGE UP (ref 80–100)
NRBC # BLD: 0 /100 WBCS — SIGNIFICANT CHANGE UP (ref 0–0)
PHOSPHATE SERPL-MCNC: 3.7 MG/DL — SIGNIFICANT CHANGE UP (ref 2.5–4.5)
PLATELET # BLD AUTO: 135 K/UL — LOW (ref 150–400)
POTASSIUM BLDA-SCNC: 4.2 MMOL/L — SIGNIFICANT CHANGE UP (ref 3.5–5.1)
POTASSIUM SERPL-MCNC: 4.9 MMOL/L — SIGNIFICANT CHANGE UP (ref 3.5–5.3)
POTASSIUM SERPL-SCNC: 4.9 MMOL/L — SIGNIFICANT CHANGE UP (ref 3.5–5.3)
PROT SERPL-MCNC: 6.8 G/DL — SIGNIFICANT CHANGE UP (ref 6–8.3)
RBC # BLD: 2.69 M/UL — LOW (ref 4.2–5.8)
RBC # FLD: 13.8 % — SIGNIFICANT CHANGE UP (ref 10.3–14.5)
RH IG SCN BLD-IMP: POSITIVE — SIGNIFICANT CHANGE UP
SODIUM SERPL-SCNC: 135 MMOL/L — SIGNIFICANT CHANGE UP (ref 135–145)
WBC # BLD: 12.67 K/UL — HIGH (ref 3.8–10.5)
WBC # FLD AUTO: 12.67 K/UL — HIGH (ref 3.8–10.5)

## 2022-12-24 PROCEDURE — 71045 X-RAY EXAM CHEST 1 VIEW: CPT | Mod: 26

## 2022-12-24 PROCEDURE — 99291 CRITICAL CARE FIRST HOUR: CPT

## 2022-12-24 PROCEDURE — 93010 ELECTROCARDIOGRAM REPORT: CPT

## 2022-12-24 RX ORDER — FUROSEMIDE 40 MG
40 TABLET ORAL ONCE
Refills: 0 | Status: COMPLETED | OUTPATIENT
Start: 2022-12-24 | End: 2022-12-24

## 2022-12-24 RX ORDER — METOCLOPRAMIDE HCL 10 MG
10 TABLET ORAL EVERY 8 HOURS
Refills: 0 | Status: COMPLETED | OUTPATIENT
Start: 2022-12-24 | End: 2022-12-25

## 2022-12-24 RX ORDER — METOPROLOL TARTRATE 50 MG
25 TABLET ORAL EVERY 12 HOURS
Refills: 0 | Status: DISCONTINUED | OUTPATIENT
Start: 2022-12-24 | End: 2022-12-24

## 2022-12-24 RX ORDER — MAGNESIUM SULFATE 500 MG/ML
2 VIAL (ML) INJECTION ONCE
Refills: 0 | Status: COMPLETED | OUTPATIENT
Start: 2022-12-24 | End: 2022-12-24

## 2022-12-24 RX ORDER — AMIODARONE HYDROCHLORIDE 400 MG/1
0.25 TABLET ORAL
Qty: 900 | Refills: 0 | Status: DISCONTINUED | OUTPATIENT
Start: 2022-12-24 | End: 2022-12-26

## 2022-12-24 RX ORDER — DEXTROSE 50 % IN WATER 50 %
25 SYRINGE (ML) INTRAVENOUS ONCE
Refills: 0 | Status: COMPLETED | OUTPATIENT
Start: 2022-12-24 | End: 2022-12-24

## 2022-12-24 RX ORDER — NICARDIPINE HYDROCHLORIDE 30 MG/1
5 CAPSULE, EXTENDED RELEASE ORAL
Qty: 40 | Refills: 0 | Status: DISCONTINUED | OUTPATIENT
Start: 2022-12-24 | End: 2022-12-24

## 2022-12-24 RX ORDER — HYDROMORPHONE HYDROCHLORIDE 2 MG/ML
0.5 INJECTION INTRAMUSCULAR; INTRAVENOUS; SUBCUTANEOUS ONCE
Refills: 0 | Status: DISCONTINUED | OUTPATIENT
Start: 2022-12-24 | End: 2022-12-24

## 2022-12-24 RX ORDER — CALCIUM GLUCONATE 100 MG/ML
2 VIAL (ML) INTRAVENOUS ONCE
Refills: 0 | Status: COMPLETED | OUTPATIENT
Start: 2022-12-24 | End: 2022-12-24

## 2022-12-24 RX ORDER — INSULIN HUMAN 100 [IU]/ML
10 INJECTION, SOLUTION SUBCUTANEOUS ONCE
Refills: 0 | Status: COMPLETED | OUTPATIENT
Start: 2022-12-24 | End: 2022-12-24

## 2022-12-24 RX ORDER — POTASSIUM CHLORIDE 20 MEQ
40 PACKET (EA) ORAL ONCE
Refills: 0 | Status: COMPLETED | OUTPATIENT
Start: 2022-12-24 | End: 2022-12-24

## 2022-12-24 RX ORDER — AMIODARONE HYDROCHLORIDE 400 MG/1
0.5 TABLET ORAL
Qty: 900 | Refills: 0 | Status: DISCONTINUED | OUTPATIENT
Start: 2022-12-24 | End: 2022-12-24

## 2022-12-24 RX ORDER — METOPROLOL TARTRATE 50 MG
5 TABLET ORAL ONCE
Refills: 0 | Status: COMPLETED | OUTPATIENT
Start: 2022-12-24 | End: 2022-12-24

## 2022-12-24 RX ORDER — SODIUM ZIRCONIUM CYCLOSILICATE 10 G/10G
10 POWDER, FOR SUSPENSION ORAL ONCE
Refills: 0 | Status: COMPLETED | OUTPATIENT
Start: 2022-12-24 | End: 2022-12-24

## 2022-12-24 RX ADMIN — Medication 81 MILLIGRAM(S): at 11:42

## 2022-12-24 RX ADMIN — Medication 40 MILLIEQUIVALENT(S): at 17:43

## 2022-12-24 RX ADMIN — Medication 500 MILLIGRAM(S): at 05:30

## 2022-12-24 RX ADMIN — OXYCODONE HYDROCHLORIDE 10 MILLIGRAM(S): 5 TABLET ORAL at 19:39

## 2022-12-24 RX ADMIN — Medication 650 MILLIGRAM(S): at 00:54

## 2022-12-24 RX ADMIN — Medication 650 MILLIGRAM(S): at 17:43

## 2022-12-24 RX ADMIN — PANTOPRAZOLE SODIUM 40 MILLIGRAM(S): 20 TABLET, DELAYED RELEASE ORAL at 11:43

## 2022-12-24 RX ADMIN — HYDROMORPHONE HYDROCHLORIDE 0.5 MILLIGRAM(S): 2 INJECTION INTRAMUSCULAR; INTRAVENOUS; SUBCUTANEOUS at 10:54

## 2022-12-24 RX ADMIN — ATORVASTATIN CALCIUM 40 MILLIGRAM(S): 80 TABLET, FILM COATED ORAL at 22:10

## 2022-12-24 RX ADMIN — Medication 25 GRAM(S): at 22:14

## 2022-12-24 RX ADMIN — Medication 5 MILLIGRAM(S): at 00:53

## 2022-12-24 RX ADMIN — Medication 6 UNIT(S): at 11:43

## 2022-12-24 RX ADMIN — INSULIN HUMAN 10 UNIT(S): 100 INJECTION, SOLUTION SUBCUTANEOUS at 04:16

## 2022-12-24 RX ADMIN — GABAPENTIN 100 MILLIGRAM(S): 400 CAPSULE ORAL at 05:30

## 2022-12-24 RX ADMIN — Medication 10 MILLIGRAM(S): at 04:16

## 2022-12-24 RX ADMIN — Medication 100 MILLIGRAM(S): at 04:00

## 2022-12-24 RX ADMIN — Medication 10 MILLIGRAM(S): at 13:16

## 2022-12-24 RX ADMIN — Medication 40 MILLIGRAM(S): at 02:23

## 2022-12-24 RX ADMIN — GABAPENTIN 100 MILLIGRAM(S): 400 CAPSULE ORAL at 22:10

## 2022-12-24 RX ADMIN — HYDROMORPHONE HYDROCHLORIDE 0.5 MILLIGRAM(S): 2 INJECTION INTRAMUSCULAR; INTRAVENOUS; SUBCUTANEOUS at 16:38

## 2022-12-24 RX ADMIN — Medication 500 MILLIGRAM(S): at 17:43

## 2022-12-24 RX ADMIN — INSULIN HUMAN 3 UNIT(S)/HR: 100 INJECTION, SOLUTION SUBCUTANEOUS at 19:39

## 2022-12-24 RX ADMIN — HYDROMORPHONE HYDROCHLORIDE 0.5 MILLIGRAM(S): 2 INJECTION INTRAMUSCULAR; INTRAVENOUS; SUBCUTANEOUS at 06:30

## 2022-12-24 RX ADMIN — Medication 25 MILLILITER(S): at 04:00

## 2022-12-24 RX ADMIN — HYDROMORPHONE HYDROCHLORIDE 0.5 MILLIGRAM(S): 2 INJECTION INTRAMUSCULAR; INTRAVENOUS; SUBCUTANEOUS at 17:08

## 2022-12-24 RX ADMIN — Medication 200 GRAM(S): at 04:16

## 2022-12-24 RX ADMIN — SODIUM ZIRCONIUM CYCLOSILICATE 10 GRAM(S): 10 POWDER, FOR SUSPENSION ORAL at 04:10

## 2022-12-24 RX ADMIN — Medication 650 MILLIGRAM(S): at 11:42

## 2022-12-24 RX ADMIN — SENNA PLUS 2 TABLET(S): 8.6 TABLET ORAL at 22:10

## 2022-12-24 RX ADMIN — Medication 650 MILLIGRAM(S): at 00:53

## 2022-12-24 RX ADMIN — CHLORHEXIDINE GLUCONATE 1 APPLICATION(S): 213 SOLUTION TOPICAL at 11:44

## 2022-12-24 RX ADMIN — AMIODARONE HYDROCHLORIDE 16.7 MG/MIN: 400 TABLET ORAL at 19:39

## 2022-12-24 RX ADMIN — Medication 50 MILLIEQUIVALENT(S): at 22:30

## 2022-12-24 RX ADMIN — OXYCODONE HYDROCHLORIDE 10 MILLIGRAM(S): 5 TABLET ORAL at 20:09

## 2022-12-24 RX ADMIN — POLYETHYLENE GLYCOL 3350 17 GRAM(S): 17 POWDER, FOR SOLUTION ORAL at 11:42

## 2022-12-24 RX ADMIN — ENOXAPARIN SODIUM 40 MILLIGRAM(S): 100 INJECTION SUBCUTANEOUS at 11:42

## 2022-12-24 RX ADMIN — Medication 40 MILLIGRAM(S): at 15:07

## 2022-12-24 RX ADMIN — INSULIN GLARGINE 18 UNIT(S): 100 INJECTION, SOLUTION SUBCUTANEOUS at 22:09

## 2022-12-24 RX ADMIN — HYDROMORPHONE HYDROCHLORIDE 0.5 MILLIGRAM(S): 2 INJECTION INTRAMUSCULAR; INTRAVENOUS; SUBCUTANEOUS at 03:25

## 2022-12-24 RX ADMIN — Medication 50 MILLIEQUIVALENT(S): at 23:00

## 2022-12-24 RX ADMIN — HYDROMORPHONE HYDROCHLORIDE 0.5 MILLIGRAM(S): 2 INJECTION INTRAMUSCULAR; INTRAVENOUS; SUBCUTANEOUS at 11:24

## 2022-12-24 RX ADMIN — GABAPENTIN 100 MILLIGRAM(S): 400 CAPSULE ORAL at 13:16

## 2022-12-24 RX ADMIN — Medication 25 GRAM(S): at 16:39

## 2022-12-24 RX ADMIN — Medication 10 MILLIGRAM(S): at 22:11

## 2022-12-24 RX ADMIN — Medication 650 MILLIGRAM(S): at 05:30

## 2022-12-24 RX ADMIN — Medication 650 MILLIGRAM(S): at 07:00

## 2022-12-24 RX ADMIN — HYDROMORPHONE HYDROCHLORIDE 0.5 MILLIGRAM(S): 2 INJECTION INTRAMUSCULAR; INTRAVENOUS; SUBCUTANEOUS at 06:15

## 2022-12-24 RX ADMIN — HYDROMORPHONE HYDROCHLORIDE 0.5 MILLIGRAM(S): 2 INJECTION INTRAMUSCULAR; INTRAVENOUS; SUBCUTANEOUS at 03:10

## 2022-12-24 NOTE — PROGRESS NOTE ADULT - SUBJECTIVE AND OBJECTIVE BOX
Patient seen and examined at the bedside.    Remained critically ill on continuous ICU monitoring.      Brief Summary:  76 yo M with CAD s/p AVR (T) / CABG X 1 12/22/2022        24 Hour events:  - tachy overnight   - Amiodarone bolus given   - Now on HFNC from [facility] completed     OBJECTIVE:  Vital Signs Last 24 Hrs  T(C): 37.6 (24 Dec 2022 08:00), Max: 37.6 (24 Dec 2022 08:00)  T(F): 99.7 (24 Dec 2022 08:00), Max: 99.7 (24 Dec 2022 08:00)  HR: 69 (24 Dec 2022 10:00) (44 - 136)  BP: 117/56 (24 Dec 2022 10:00) (90/65 - 151/70)  BP(mean): 81 (24 Dec 2022 10:00) (70 - 100)  RR: 19 (24 Dec 2022 10:00) (11 - 27)  SpO2: 100% (24 Dec 2022 10:00) (88% - 100%)    Parameters below as of 24 Dec 2022 08:44  Patient On (Oxygen Delivery Method): nasal cannula, high flow  O2 Flow (L/min): 50  O2 Concentration (%): 70                Physical Exam:   General: OOB to chair, no acute distress  Neurology: No focal deficits  Eyes: Bilateral pupils reactive   ENT/Neck: Neck supple  Respiratory: Clear bilaterally, on nasal cannula  CV: Irregular, A fib  Abdominal: Soft, NT, ND +BS   Extremities: Warm       -------------------------------------------------------------------------------------------------------------------------------                        8.5    12.67 )-----------( 135      ( 24 Dec 2022 00:28 )             25.4     12-24    135  |  98  |  14  ----------------------------<  125<H>  4.9   |  24  |  0.90    Ca    8.9      24 Dec 2022 00:28  Phos  3.7     12-24  Mg     2.2     12-24    TPro  6.8  /  Alb  4.2  /  TBili  2.4<H>  /  DBili  x   /  AST  51<H>  /  ALT  24  /  AlkPhos  72  12-24    LIVER FUNCTIONS - ( 24 Dec 2022 00:28 )  Alb: 4.2 g/dL / Pro: 6.8 g/dL / ALK PHOS: 72 U/L / ALT: 24 U/L / AST: 51 U/L / GGT: x           PT/INR - ( 23 Dec 2022 00:45 )   PT: 14.0 sec;   INR: 1.21 ratio         PTT - ( 23 Dec 2022 00:45 )  PTT:26.5 sec  ABG - ( 24 Dec 2022 04:58 )  pH, Arterial: 7.41  pH, Blood: x     /  pCO2: 40    /  pO2: 83    / HCO3: 25    / Base Excess: 0.7   /  SaO2: 96.9              ------------------------------------------------------------------------------------------------------------------------------  Assessment:  76 yo M with CAD s/p AVR (T) / CABG X 1 12/22/2022    At high risk for hemodynamic instability  Cardiac arrhythmias - Atrial fibrillation  Post op respiratory insufficiency  Acute blood loss anemia  Hyperglycemia  Thrombocytopenia       Plan:   ***Neuro***  Postoperative acute pain control with Tylenol, and Gabapentin, for pain management.       ***Cardiovascular***  Invasive hemodynamic monitoring, assess perfusion indices   At high risk for hemodynamic instability and cardiac arrhythmias.  In atrial fibrillation - Amiodarone   Aggressive correction of electrolytes  ASA/Statin daily   Monitor chest tube output.        ***Pulmonary***  Postoperative respiratory insufficiency - HFNC   Deep breathing and coughing exercises.  Wean oxygen as able.        ***GI***  Tolerating PO DASH/TLC diet.   Protonix for stress ulcer prophylaxis   Bowel regimen with Miralax and senna   Reglan for gut motility        ***Renal***  Smiley catheter for strict I/O measurements.   Replete electrolytes as indicated.        ***ID***  Zinacef completed yesterday   WBC 8.86 ->12.67, contine to trend leukocytosis    ***Endocrine***  Stress Hyperglycemia  Insulin as needed to maintain euglycemia.       ***Hematology***  Acute blood loss anemia and Thrombocytopenia - no transfusion indication currently.  Lovenox for DVT ppx      Patient requires continuous monitoring with bedside rhythm monitoring, pulse oximetry monitoring, and continuous central venous and arterial pressure monitoring; and intermittent blood gas analysis. Care plan discussed with the ICU care team.   Patient remained critical, at risk for life threatening decompensation.    I have spent 30 minutes providing critical care management to this patient.    By signing my name below, I, Radha Malone, attest that this documentation has been prepared under the direction and in the presence of Aminah Pina MD  Electronically signed: Radha Malone, 12-24-22 @ 11:03    I, Aminah Pina MD, personally performed the services described in this documentation. all medical record entries made by the scribe were at my direction and in my presence. I have reviewed the chart and agree that the record reflects my personal performance and is accurate and complete  Electronically signed: Aminah Pina MD Patient seen and examined at the bedside.    Remains critically ill on continuous ICU monitoring.      Brief Summary:  76 yo M with CAD s/p AVR (T) / CABG X 1 12/22/2022      24 Hour events:  - A fib with RVR overnight - given Amiodarone and beta blocker  - Bradycardic this morning requiring pacing.  - Amiodarone and beta blocker held - now in normal sinus rhythm  - Placed on high flow nasal cannula for hypoxia.    OBJECTIVE:  Vital Signs Last 24 Hrs  T(C): 37.6 (24 Dec 2022 08:00), Max: 37.6 (24 Dec 2022 08:00)  T(F): 99.7 (24 Dec 2022 08:00), Max: 99.7 (24 Dec 2022 08:00)  HR: 69 (24 Dec 2022 10:00) (44 - 136)  BP: 117/56 (24 Dec 2022 10:00) (90/65 - 151/70)  BP(mean): 81 (24 Dec 2022 10:00) (70 - 100)  RR: 19 (24 Dec 2022 10:00) (11 - 27)  SpO2: 100% (24 Dec 2022 10:00) (88% - 100%)    Parameters below as of 24 Dec 2022 08:44  Patient On (Oxygen Delivery Method): nasal cannula, high flow  O2 Flow (L/min): 50  O2 Concentration (%): 70              Physical Exam:   General: OOB to chair, no acute distress  Neurology: No focal deficits  Eyes: Bilateral pupils reactive   ENT/Neck: Neck supple  Respiratory: On high flow nasal cannula  CV: Sinus  Abdominal: Soft, NT, ND +BS   Extremities: Warm       -------------------------------------------------------------------------------------------------------------------------------                        8.5    12.67 )-----------( 135      ( 24 Dec 2022 00:28 )             25.4     12-24    135  |  98  |  14  ----------------------------<  125<H>  4.9   |  24  |  0.90    Ca    8.9      24 Dec 2022 00:28  Phos  3.7     12-24  Mg     2.2     12-24    TPro  6.8  /  Alb  4.2  /  TBili  2.4<H>  /  DBili  x   /  AST  51<H>  /  ALT  24  /  AlkPhos  72  12-24    LIVER FUNCTIONS - ( 24 Dec 2022 00:28 )  Alb: 4.2 g/dL / Pro: 6.8 g/dL / ALK PHOS: 72 U/L / ALT: 24 U/L / AST: 51 U/L / GGT: x           PT/INR - ( 23 Dec 2022 00:45 )   PT: 14.0 sec;   INR: 1.21 ratio         PTT - ( 23 Dec 2022 00:45 )  PTT:26.5 sec  ABG - ( 24 Dec 2022 04:58 )  pH, Arterial: 7.41  pH, Blood: x     /  pCO2: 40    /  pO2: 83    / HCO3: 25    / Base Excess: 0.7   /  SaO2: 96.9          ------------------------------------------------------------------------------------------------------------------------------  Assessment:  76 yo M with CAD s/p AVR (T) / CABG X 1 12/22/2022    At high risk for hemodynamic instability  Cardiac arrhythmias - Atrial fibrillation  Post op respiratory insufficiency  Acute blood loss anemia  Hyperglycemia      Plan:   ***Neuro***  Postoperative acute pain control with Tylenol, Gabapentin, and prns for pain management.     ***Cardiovascular***  Invasive hemodynamic monitoring, assess perfusion indices   At high risk for hemodynamic instability and cardiac arrhythmias.  Normal sinus rhythm - consider low dose beta blocker if remains in sinus.  If persistent arrhythmias, consider EP consult.  Aggressive correction of electrolytes  ASA/Statin daily   Monitor chest tube output.    ***Pulmonary***  Postoperative respiratory insufficiency - HFNC   Deep breathing and coughing exercises.  Wean oxygen as able.    ***GI***  Tolerating PO DASH/TLC diet.   Protonix for GI prophylaxis   Bowel regimen with Miralax and senna   Reglan for gut motility    ***Renal***  Trend creatinine.  Lasix for diuresis.  Smiley catheter for strict I/O measurements.   Replete electrolytes as indicated.    ***ID***  Off antibiotics.  Trend WBC count    ***Endocrine***  Hyperglycemia  Insulin infusion - will follow up Endocrine recs    ***Hematology***  Acute blood loss anemia and Thrombocytopenia - no transfusion indication currently.  Lovenox for VTE prophylaxis.      Patient requires continuous monitoring with bedside rhythm monitoring, pulse oximetry monitoring, and continuous central venous and arterial pressure monitoring; and intermittent blood gas analysis. Care plan discussed with the ICU care team.   Patient remained critical, at risk for life threatening decompensation.    I have spent 35 minutes providing critical care management to this patient.    By signing my name below, I, Radha Malone, attest that this documentation has been prepared under the direction and in the presence of Aminah Pina MD  Electronically signed: Radha Malone, 12-24-22 @ 11:03    I, Aminah Pina MD, personally performed the services described in this documentation. all medical record entries made by the scribe were at my direction and in my presence. I have reviewed the chart and agree that the record reflects my personal performance and is accurate and complete  Electronically signed: Aminah Pina MD

## 2022-12-25 LAB
ALBUMIN SERPL ELPH-MCNC: 3.5 G/DL — SIGNIFICANT CHANGE UP (ref 3.3–5)
ALP SERPL-CCNC: 80 U/L — SIGNIFICANT CHANGE UP (ref 40–120)
ALT FLD-CCNC: 47 U/L — HIGH (ref 10–45)
ANION GAP SERPL CALC-SCNC: 8 MMOL/L — SIGNIFICANT CHANGE UP (ref 5–17)
AST SERPL-CCNC: 47 U/L — HIGH (ref 10–40)
BILIRUB SERPL-MCNC: 1.9 MG/DL — HIGH (ref 0.2–1.2)
BUN SERPL-MCNC: 16 MG/DL — SIGNIFICANT CHANGE UP (ref 7–23)
CALCIUM SERPL-MCNC: 8.5 MG/DL — SIGNIFICANT CHANGE UP (ref 8.4–10.5)
CHLORIDE SERPL-SCNC: 95 MMOL/L — LOW (ref 96–108)
CO2 SERPL-SCNC: 27 MMOL/L — SIGNIFICANT CHANGE UP (ref 22–31)
CREAT SERPL-MCNC: 0.86 MG/DL — SIGNIFICANT CHANGE UP (ref 0.5–1.3)
EGFR: 89 ML/MIN/1.73M2 — SIGNIFICANT CHANGE UP
GAS PNL BLDA: SIGNIFICANT CHANGE UP
GLUCOSE BLDC GLUCOMTR-MCNC: 122 MG/DL — HIGH (ref 70–99)
GLUCOSE BLDC GLUCOMTR-MCNC: 141 MG/DL — HIGH (ref 70–99)
GLUCOSE BLDC GLUCOMTR-MCNC: 152 MG/DL — HIGH (ref 70–99)
GLUCOSE BLDC GLUCOMTR-MCNC: 153 MG/DL — HIGH (ref 70–99)
GLUCOSE BLDC GLUCOMTR-MCNC: 161 MG/DL — HIGH (ref 70–99)
GLUCOSE BLDC GLUCOMTR-MCNC: 163 MG/DL — HIGH (ref 70–99)
GLUCOSE BLDC GLUCOMTR-MCNC: 163 MG/DL — HIGH (ref 70–99)
GLUCOSE BLDC GLUCOMTR-MCNC: 167 MG/DL — HIGH (ref 70–99)
GLUCOSE BLDC GLUCOMTR-MCNC: 180 MG/DL — HIGH (ref 70–99)
GLUCOSE BLDC GLUCOMTR-MCNC: 182 MG/DL — HIGH (ref 70–99)
GLUCOSE BLDC GLUCOMTR-MCNC: 185 MG/DL — HIGH (ref 70–99)
GLUCOSE BLDC GLUCOMTR-MCNC: 186 MG/DL — HIGH (ref 70–99)
GLUCOSE BLDC GLUCOMTR-MCNC: 189 MG/DL — HIGH (ref 70–99)
GLUCOSE BLDC GLUCOMTR-MCNC: 193 MG/DL — HIGH (ref 70–99)
GLUCOSE BLDC GLUCOMTR-MCNC: 193 MG/DL — HIGH (ref 70–99)
GLUCOSE BLDC GLUCOMTR-MCNC: 195 MG/DL — HIGH (ref 70–99)
GLUCOSE BLDC GLUCOMTR-MCNC: 218 MG/DL — HIGH (ref 70–99)
GLUCOSE BLDC GLUCOMTR-MCNC: 228 MG/DL — HIGH (ref 70–99)
GLUCOSE BLDC GLUCOMTR-MCNC: 235 MG/DL — HIGH (ref 70–99)
GLUCOSE BLDC GLUCOMTR-MCNC: 245 MG/DL — HIGH (ref 70–99)
GLUCOSE BLDC GLUCOMTR-MCNC: 290 MG/DL — HIGH (ref 70–99)
GLUCOSE BLDC GLUCOMTR-MCNC: 95 MG/DL — SIGNIFICANT CHANGE UP (ref 70–99)
GLUCOSE SERPL-MCNC: 153 MG/DL — HIGH (ref 70–99)
HCT VFR BLD CALC: 22.2 % — LOW (ref 39–50)
HGB BLD-MCNC: 7.7 G/DL — LOW (ref 13–17)
MAGNESIUM SERPL-MCNC: 2.6 MG/DL — SIGNIFICANT CHANGE UP (ref 1.6–2.6)
MCHC RBC-ENTMCNC: 32.1 PG — SIGNIFICANT CHANGE UP (ref 27–34)
MCHC RBC-ENTMCNC: 34.7 GM/DL — SIGNIFICANT CHANGE UP (ref 32–36)
MCV RBC AUTO: 92.5 FL — SIGNIFICANT CHANGE UP (ref 80–100)
NRBC # BLD: 0 /100 WBCS — SIGNIFICANT CHANGE UP (ref 0–0)
PHOSPHATE SERPL-MCNC: 2.8 MG/DL — SIGNIFICANT CHANGE UP (ref 2.5–4.5)
PLATELET # BLD AUTO: 119 K/UL — LOW (ref 150–400)
POTASSIUM SERPL-MCNC: 4.4 MMOL/L — SIGNIFICANT CHANGE UP (ref 3.5–5.3)
POTASSIUM SERPL-SCNC: 4.4 MMOL/L — SIGNIFICANT CHANGE UP (ref 3.5–5.3)
PROT SERPL-MCNC: 6 G/DL — SIGNIFICANT CHANGE UP (ref 6–8.3)
RBC # BLD: 2.4 M/UL — LOW (ref 4.2–5.8)
RBC # FLD: 13.2 % — SIGNIFICANT CHANGE UP (ref 10.3–14.5)
SODIUM SERPL-SCNC: 130 MMOL/L — LOW (ref 135–145)
WBC # BLD: 8.1 K/UL — SIGNIFICANT CHANGE UP (ref 3.8–10.5)
WBC # FLD AUTO: 8.1 K/UL — SIGNIFICANT CHANGE UP (ref 3.8–10.5)

## 2022-12-25 PROCEDURE — 99232 SBSQ HOSP IP/OBS MODERATE 35: CPT

## 2022-12-25 PROCEDURE — 99291 CRITICAL CARE FIRST HOUR: CPT

## 2022-12-25 PROCEDURE — 93010 ELECTROCARDIOGRAM REPORT: CPT | Mod: 76

## 2022-12-25 PROCEDURE — 99223 1ST HOSP IP/OBS HIGH 75: CPT

## 2022-12-25 PROCEDURE — 71045 X-RAY EXAM CHEST 1 VIEW: CPT | Mod: 26

## 2022-12-25 RX ORDER — MAGNESIUM SULFATE 500 MG/ML
1 VIAL (ML) INJECTION ONCE
Refills: 0 | Status: COMPLETED | OUTPATIENT
Start: 2022-12-25 | End: 2022-12-25

## 2022-12-25 RX ORDER — POTASSIUM CHLORIDE 20 MEQ
40 PACKET (EA) ORAL ONCE
Refills: 0 | Status: COMPLETED | OUTPATIENT
Start: 2022-12-25 | End: 2022-12-25

## 2022-12-25 RX ORDER — POTASSIUM CHLORIDE 20 MEQ
10 PACKET (EA) ORAL
Refills: 0 | Status: COMPLETED | OUTPATIENT
Start: 2022-12-25 | End: 2022-12-25

## 2022-12-25 RX ORDER — PANTOPRAZOLE SODIUM 20 MG/1
40 TABLET, DELAYED RELEASE ORAL
Refills: 0 | Status: DISCONTINUED | OUTPATIENT
Start: 2022-12-26 | End: 2023-01-03

## 2022-12-25 RX ORDER — FUROSEMIDE 40 MG
20 TABLET ORAL ONCE
Refills: 0 | Status: COMPLETED | OUTPATIENT
Start: 2022-12-25 | End: 2022-12-25

## 2022-12-25 RX ORDER — INSULIN GLARGINE 100 [IU]/ML
22 INJECTION, SOLUTION SUBCUTANEOUS AT BEDTIME
Refills: 0 | Status: DISCONTINUED | OUTPATIENT
Start: 2022-12-25 | End: 2022-12-26

## 2022-12-25 RX ADMIN — Medication 20 MILLIGRAM(S): at 02:39

## 2022-12-25 RX ADMIN — Medication 10 MILLIGRAM(S): at 12:54

## 2022-12-25 RX ADMIN — INSULIN GLARGINE 22 UNIT(S): 100 INJECTION, SOLUTION SUBCUTANEOUS at 22:24

## 2022-12-25 RX ADMIN — PANTOPRAZOLE SODIUM 40 MILLIGRAM(S): 20 TABLET, DELAYED RELEASE ORAL at 11:35

## 2022-12-25 RX ADMIN — ATORVASTATIN CALCIUM 40 MILLIGRAM(S): 80 TABLET, FILM COATED ORAL at 22:25

## 2022-12-25 RX ADMIN — Medication 500 MILLIGRAM(S): at 17:16

## 2022-12-25 RX ADMIN — Medication 500 MILLIGRAM(S): at 05:45

## 2022-12-25 RX ADMIN — CHLORHEXIDINE GLUCONATE 1 APPLICATION(S): 213 SOLUTION TOPICAL at 11:34

## 2022-12-25 RX ADMIN — GABAPENTIN 100 MILLIGRAM(S): 400 CAPSULE ORAL at 05:45

## 2022-12-25 RX ADMIN — GABAPENTIN 100 MILLIGRAM(S): 400 CAPSULE ORAL at 12:47

## 2022-12-25 RX ADMIN — Medication 81 MILLIGRAM(S): at 12:14

## 2022-12-25 RX ADMIN — OXYCODONE HYDROCHLORIDE 10 MILLIGRAM(S): 5 TABLET ORAL at 05:48

## 2022-12-25 RX ADMIN — Medication 10 MILLIGRAM(S): at 05:45

## 2022-12-25 RX ADMIN — OXYCODONE HYDROCHLORIDE 10 MILLIGRAM(S): 5 TABLET ORAL at 13:50

## 2022-12-25 RX ADMIN — POLYETHYLENE GLYCOL 3350 17 GRAM(S): 17 POWDER, FOR SOLUTION ORAL at 11:36

## 2022-12-25 RX ADMIN — ENOXAPARIN SODIUM 40 MILLIGRAM(S): 100 INJECTION SUBCUTANEOUS at 11:35

## 2022-12-25 RX ADMIN — Medication 50 MILLIEQUIVALENT(S): at 05:38

## 2022-12-25 RX ADMIN — Medication 100 GRAM(S): at 14:57

## 2022-12-25 RX ADMIN — SENNA PLUS 2 TABLET(S): 8.6 TABLET ORAL at 22:25

## 2022-12-25 RX ADMIN — GABAPENTIN 100 MILLIGRAM(S): 400 CAPSULE ORAL at 22:25

## 2022-12-25 RX ADMIN — Medication 40 MILLIEQUIVALENT(S): at 15:08

## 2022-12-25 RX ADMIN — OXYCODONE HYDROCHLORIDE 10 MILLIGRAM(S): 5 TABLET ORAL at 06:18

## 2022-12-25 RX ADMIN — Medication 650 MILLIGRAM(S): at 05:45

## 2022-12-25 RX ADMIN — Medication 650 MILLIGRAM(S): at 11:44

## 2022-12-25 RX ADMIN — Medication 20 MILLIGRAM(S): at 14:57

## 2022-12-25 RX ADMIN — OXYCODONE HYDROCHLORIDE 10 MILLIGRAM(S): 5 TABLET ORAL at 12:46

## 2022-12-25 NOTE — PROGRESS NOTE ADULT - SUBJECTIVE AND OBJECTIVE BOX
history via  phone  History: Patient is a 77 M with recently diagnosed T2D, CHF, CAD s/p [facility] 12/4 with prox LAD 70% stenosis, aortic stenosis. s/p AVR (T) / CABG X 1 12/22/2022  Remains in CTU.  On Lantus 18,  premeal lispro 6 + corrections.   Moderate appetite, po intake.   No hypos.  BS mildly elevated 200s.     Home DM medications:  - Metformin 1000 mg BID  Most recent A1C 9.4 12/2/222    MEDICATIONS  (STANDING):  aMIOdarone Infusion 0.25 mG/Min (8.33 mL/Hr) IV Continuous <Continuous>  ascorbic acid 500 milliGRAM(s) Oral two times a day  aspirin enteric coated 81 milliGRAM(s) Oral daily  atorvastatin 40 milliGRAM(s) Oral at bedtime  bisacodyl Suppository 10 milliGRAM(s) Rectal once  chlorhexidine 2% Cloths 1 Application(s) Topical daily  dextrose 50% Injectable 50 milliLiter(s) IV Push every 15 minutes  enoxaparin Injectable 40 milliGRAM(s) SubCutaneous every 24 hours  gabapentin 100 milliGRAM(s) Oral every 8 hours  insulin glargine Injectable (LANTUS) 18 Unit(s) SubCutaneous at bedtime  insulin lispro (ADMELOG) corrective regimen sliding scale   SubCutaneous three times a day before meals  insulin lispro Injectable (ADMELOG) 6 Unit(s) SubCutaneous three times a day before meals  insulin regular Infusion 3 Unit(s)/Hr (3 mL/Hr) IV Continuous <Continuous>  metoclopramide Injectable 10 milliGRAM(s) IV Push every 8 hours  pantoprazole  Injectable 40 milliGRAM(s) IV Push daily  polyethylene glycol 3350 17 Gram(s) Oral daily  polyethylene glycol 3350 17 Gram(s) Oral daily  senna 2 Tablet(s) Oral at bedtime  sodium chloride 0.9%. 1000 milliLiter(s) (10 mL/Hr) IV Continuous <Continuous>    MEDICATIONS  (PRN):  acetaminophen     Tablet .. 650 milliGRAM(s) Oral every 6 hours PRN Mild Pain (1 - 3)  oxyCODONE    IR 5 milliGRAM(s) Oral every 4 hours PRN Moderate Pain (4 - 6)  oxyCODONE    IR 10 milliGRAM(s) Oral every 4 hours PRN Severe Pain (7 - 10)      Allergies    No Known Allergies    Intolerances  PHYSICAL EXAM:  VITALS: T(C): 37.2 (12-25-22 @ 12:00)  T(F): 99 (12-25-22 @ 12:00), Max: 100 (12-25-22 @ 04:00)  HR: 80 (12-25-22 @ 12:00) (75 - 137)  BP: 115/56 (12-25-22 @ 12:00) (97/53 - 131/78)  RR:  (12 - 38)  SpO2:  (94% - 100%)    GENERAL: NAD, well-groomed, well-developed  RESPIRATORY: decreased breath sounds base  CARDIOVASCULAR: Regular rate and rhythm; No murmurs; no peripheral edema  sternal wound dressed  GI: Soft, nontender, non distended, normal bowel sounds    POCT Blood Glucose.: 245 mg/dL (12-25-22 @ 11:54)  POCT Blood Glucose.: 193 mg/dL (12-25-22 @ 11:06)  POCT Blood Glucose.: 228 mg/dL (12-25-22 @ 10:06)  POCT Blood Glucose.: 235 mg/dL (12-25-22 @ 08:47)  POCT Blood Glucose.: 167 mg/dL (12-25-22 @ 07:58)  POCT Blood Glucose.: 163 mg/dL (12-25-22 @ 06:45)  POCT Blood Glucose.: 163 mg/dL (12-25-22 @ 05:44)  POCT Blood Glucose.: 186 mg/dL (12-25-22 @ 04:49)  POCT Blood Glucose.: 185 mg/dL (12-25-22 @ 03:55)  POCT Blood Glucose.: 180 mg/dL (12-25-22 @ 02:46)  POCT Blood Glucose.: 189 mg/dL (12-25-22 @ 01:48)  POCT Blood Glucose.: 152 mg/dL (12-25-22 @ 00:28)  POCT Blood Glucose.: 182 mg/dL (12-24-22 @ 22:44)  POCT Blood Glucose.: 196 mg/dL (12-24-22 @ 22:08)  POCT Blood Glucose.: 263 mg/dL (12-24-22 @ 20:46)  POCT Blood Glucose.: 273 mg/dL (12-24-22 @ 19:47)  POCT Blood Glucose.: 236 mg/dL (12-24-22 @ 18:41)  POCT Blood Glucose.: 256 mg/dL (12-24-22 @ 17:46)  POCT Blood Glucose.: 255 mg/dL (12-24-22 @ 16:56)  POCT Blood Glucose.: 274 mg/dL (12-24-22 @ 16:25)  POCT Blood Glucose.: 149 mg/dL (12-24-22 @ 11:40)  POCT Blood Glucose.: 116 mg/dL (12-24-22 @ 10:40)  POCT Blood Glucose.: 151 mg/dL (12-24-22 @ 08:54)  POCT Blood Glucose.: 159 mg/dL (12-24-22 @ 07:51)  POCT Blood Glucose.: 194 mg/dL (12-24-22 @ 06:43)  POCT Blood Glucose.: 223 mg/dL (12-24-22 @ 06:15)  POCT Blood Glucose.: 260 mg/dL (12-24-22 @ 05:00)  POCT Blood Glucose.: 259 mg/dL (12-24-22 @ 03:56)  POCT Blood Glucose.: 120 mg/dL (12-24-22 @ 00:04)  POCT Blood Glucose.: 139 mg/dL (12-23-22 @ 22:48)  POCT Blood Glucose.: 135 mg/dL (12-23-22 @ 21:56)  POCT Blood Glucose.: 157 mg/dL (12-23-22 @ 20:59)  POCT Blood Glucose.: 145 mg/dL (12-23-22 @ 19:51)  POCT Blood Glucose.: 144 mg/dL (12-23-22 @ 18:59)  POCT Blood Glucose.: 145 mg/dL (12-23-22 @ 18:13)  POCT Blood Glucose.: 138 mg/dL (12-23-22 @ 17:06)  POCT Blood Glucose.: 181 mg/dL (12-23-22 @ 15:52)  POCT Blood Glucose.: 200 mg/dL (12-23-22 @ 15:22)  POCT Blood Glucose.: 172 mg/dL (12-23-22 @ 13:49)  POCT Blood Glucose.: 122 mg/dL (12-23-22 @ 13:01)  POCT Blood Glucose.: 118 mg/dL (12-23-22 @ 12:08)  POCT Blood Glucose.: 134 mg/dL (12-23-22 @ 10:53)  POCT Blood Glucose.: 135 mg/dL (12-23-22 @ 09:59)  POCT Blood Glucose.: 147 mg/dL (12-23-22 @ 09:03)  POCT Blood Glucose.: 104 mg/dL (12-23-22 @ 08:08)  POCT Blood Glucose.: 114 mg/dL (12-23-22 @ 06:46)  POCT Blood Glucose.: 123 mg/dL (12-23-22 @ 06:09)  POCT Blood Glucose.: 115 mg/dL (12-23-22 @ 05:03)  POCT Blood Glucose.: 105 mg/dL (12-23-22 @ 04:01)  POCT Blood Glucose.: 101 mg/dL (12-23-22 @ 03:01)  POCT Blood Glucose.: 105 mg/dL (12-23-22 @ 02:12)  POCT Blood Glucose.: 112 mg/dL (12-23-22 @ 00:56)  POCT Blood Glucose.: 124 mg/dL (12-23-22 @ 00:03)  POCT Blood Glucose.: 108 mg/dL (12-22-22 @ 23:01)  POCT Blood Glucose.: 120 mg/dL (12-22-22 @ 21:56)  POCT Blood Glucose.: 120 mg/dL (12-22-22 @ 20:55)  POCT Blood Glucose.: 118 mg/dL (12-22-22 @ 19:58)  POCT Blood Glucose.: 127 mg/dL (12-22-22 @ 18:52)  POCT Blood Glucose.: 129 mg/dL (12-22-22 @ 18:03)  POCT Blood Glucose.: 140 mg/dL (12-22-22 @ 16:54)  POCT Blood Glucose.: 138 mg/dL (12-22-22 @ 15:57)  POCT Blood Glucose.: 152 mg/dL (12-22-22 @ 14:57)  POCT Blood Glucose.: 178 mg/dL (12-22-22 @ 14:15)  POCT Blood Glucose.: 173 mg/dL (12-22-22 @ 13:29)      12-25    130<L>  |  95<L>  |  16  ----------------------------<  153<H>  4.4   |  27  |  0.86    eGFR: 89    Ca    8.5      12-25  Mg     2.6     12-25  Phos  2.8     12-25    TPro  6.0  /  Alb  3.5  /  TBili  1.9<H>  /  DBili  x   /  AST  47<H>  /  ALT  47<H>  /  AlkPhos  80  12-25          Thyroid Function Tests:  12-07 @ 13:19 TSH -- FreeT4 -- T3 100 Anti TPO -- Anti Thyroglobulin Ab -- TSI --  12-02 @ 05:54 TSH 3.63 FreeT4 -- T3 -- Anti TPO -- Anti Thyroglobulin Ab -- TSI --

## 2022-12-25 NOTE — CONSULT NOTE ADULT - ASSESSMENT
77M with T2DM and recently diagnosed CHF who had recent admission for cardiac work up. Patient found to have CAD (pLAD disease that was iFR positive) and moderate to severe aortic stenosis. Now POD#3 s/p 1V CABG (LIMA to LAD) and SAVR. Post op course c/b tachy-marisa (Afib RVR s/p amio and metoprolol, and undetermined bradycardia). Yesterday noted to have pauses and bradycardia requiring intermittent pacing. EP now consulted for recommendations.     #Post-op Afib RVR  #Post-op junctional rhythm  #Tachy-marisa    Recommendations:  1. Continue monitoring on telemetry  2. Agree with holding BB at this time  3. Can continue amiodarone infusion 0.5  4. Maintain epicardial pacing wires  5. Replete electrolytes K to 4 and Mg to 2  6. It sometimes takes a while for intrinsic sinus rhythm to recover post-open heart surgery, a little early to determine if patient will need PPM  7. EP to follow    Recommendations not final until attending attestation    Al Carty MD  Cardiology Fellow, PGY5    This is an overnight/weekend consult. During regular weekday hospital hours please contact appropriate day consult fellow. Contact information can be obtained on [employer] (Login: [email or web address]).      77M with T2DM and recently diagnosed CHF who had recent admission for cardiac work up. Patient found to have CAD (pLAD disease that was iFR positive) and moderate to severe aortic stenosis. Now POD#3 s/p 1V CABG (LIMA to LAD) and SAVR. Post op course c/b tachy-marisa (Afib RVR s/p amio and metoprolol, and undetermined bradycardia). Yesterday noted to have pauses and bradycardia requiring intermittent pacing. EP now consulted for recommendations.     #Post-op Afib RVR  #Post-op junctional rhythm  #Tachy-marisa    Recommendations:  1. Continue monitoring on telemetry  2. Agree with holding BB at this time  3. Can continue amiodarone infusion 0.5  4. Maintain epicardial pacing wires (Threshold: ; Settings: mAMP 10, backup rate 80)  5. Replete electrolytes K to 4 and Mg to 2  6. It sometimes takes a while for intrinsic sinus rhythm to recover post-open heart surgery, a little early to determine if patient will need PPM  7. EP to follow    Recommendations not final until attending attestation    Al Carty MD  Cardiology Fellow, PGY5    This is an overnight/weekend consult. During regular weekday hospital hours please contact appropriate day consult fellow. Contact information can be obtained on [employer] (Login: [email or web address]).      77M with T2DM and recently diagnosed CHF who had recent admission for cardiac work up. Patient found to have CAD (pLAD disease that was iFR positive) and moderate to severe aortic stenosis. Now POD#3 s/p 1V CABG (LIMA to LAD) and SAVR. Post op course c/b tachy-marisa (Afib RVR s/p amio and metoprolol, and undetermined bradycardia). Yesterday noted to have pauses and bradycardia requiring intermittent pacing. EP now consulted for recommendations.     #Post-op Afib RVR  #Post-op junctional rhythm  #Tachy-marisa    Recommendations:  1. Continue monitoring on telemetry  2. Agree with holding BB at this time  3. Can continue amiodarone infusion 0.5  4. Maintain epicardial pacing wires (Threshold: 2 mAMPs; Settings: mAMP 10, backup rate 80)  5. Replete electrolytes K to 4 and Mg to 2  6. It sometimes takes a while for intrinsic sinus rhythm to recover post-open heart surgery, a little early to determine if patient will need PPM  7. EP to follow    Recommendations not final until attending attestation    Al Carty MD  Cardiology Fellow, PGY5    This is an overnight/weekend consult. During regular weekday hospital hours please contact appropriate day consult fellow. Contact information can be obtained on [employer] (Login: [email or web address]).

## 2022-12-25 NOTE — PROGRESS NOTE ADULT - ASSESSMENT
Patient is a 77 M with recently diagnosed T2D, CHF, CAD s/p C 12/4 with prox LAD 70% stenosis, aortic stenosis  s/p  CABG/SAVR. 12/22   BS mildly above target. Moderate appetite po intake.   Recommend increase Lantus to 22 units.

## 2022-12-25 NOTE — CONSULT NOTE ADULT - SUBJECTIVE AND OBJECTIVE BOX
Cardiac Electrophysiology Consult Note    Patient seen and evaluated at bedside    Chief Complaint: Fatigue    HPI:  77M with T2DM and recently diagnosed CHF who had recent admission for cardiac work up. Pateint found to have CAD (pLAD disease that was iFR positive) and moderate to severe aortic stenosis. At that time, pt tested positive for COVID, however grossly asymptomatic. Patient was d/c home for outpatient follow up with cardiac surgeon Dr Bowles. Represented to ED with chest pain and is now POD#3 s/p 1V CABG (LIMA to LAD) and SAVR. Post op course c/b tachy-marisa (Afib RVR s/p amio and metoprolol, and undetermined bradycardia). Yesterday noted to have pauses and bradycardia requiring intermittent pacing. EP now consulted for recommendations.     At bedside patient's main complaint is fatigue and SOB. Appears to be in accelerated junctional rhythm with pauses. Review of tele consistent with current rhythm, also had Afib RVR post-op. Post-op ECG with accelerated junctional rhythm and widened QRS at 130 ms (nonspecific IV block). Pre-op ECG with sinus bradycardia.     PMHx/PSHx:   Chronic CHF  HTN (hypertension)  Diabetes  Aortic stenosis    Allergies:  No Known Allergies    Home Meds:  alcohol swabs : Apply topically to affected area 4 times a day   aspirin 81 mg oral delayed release tablet: 1 tab(s) orally once a day  atorvastatin 10 mg oral tablet: 2 tab(s) orally once a day (at bedtime)   glucometer (per patient's insurance): Test blood sugars four times a day. Dispense #1 glucometer.  lancets: 1 application subcutaneously 4 times a day   metFORMIN 1000 mg oral tablet: 1 tab(s) orally 2 times a day   metoprolol succinate 25 mg oral tablet, extended release: 1 tab(s) orally once a day  olmesartan 5 mg oral tablet: 2 tab(s) orally once a day   pantoprazole 40 mg oral delayed release tablet: 1 tab(s) orally once a day (before a meal)  test strips (per patient's insurance): 1 application subcutaneously 4 times a day. ** Compatible with patient's glucometer **    Current Medications:   acetaminophen     Tablet .. 650 milliGRAM(s) Oral every 6 hours PRN  aMIOdarone Infusion 0.25 mG/Min IV Continuous <Continuous>  ascorbic acid 500 milliGRAM(s) Oral two times a day  aspirin enteric coated 81 milliGRAM(s) Oral daily  atorvastatin 40 milliGRAM(s) Oral at bedtime  bisacodyl Suppository 10 milliGRAM(s) Rectal once  chlorhexidine 2% Cloths 1 Application(s) Topical daily  dextrose 50% Injectable 50 milliLiter(s) IV Push every 15 minutes  enoxaparin Injectable 40 milliGRAM(s) SubCutaneous every 24 hours  gabapentin 100 milliGRAM(s) Oral every 8 hours  insulin glargine Injectable (LANTUS) 18 Unit(s) SubCutaneous at bedtime  insulin lispro (ADMELOG) corrective regimen sliding scale   SubCutaneous three times a day before meals  insulin lispro Injectable (ADMELOG) 6 Unit(s) SubCutaneous three times a day before meals  insulin regular Infusion 3 Unit(s)/Hr IV Continuous <Continuous>  metoclopramide Injectable 10 milliGRAM(s) IV Push every 8 hours  oxyCODONE    IR 5 milliGRAM(s) Oral every 4 hours PRN  oxyCODONE    IR 10 milliGRAM(s) Oral every 4 hours PRN  pantoprazole  Injectable 40 milliGRAM(s) IV Push daily  polyethylene glycol 3350 17 Gram(s) Oral daily  polyethylene glycol 3350 17 Gram(s) Oral daily  senna 2 Tablet(s) Oral at bedtime  sodium chloride 0.9%. 1000 milliLiter(s) IV Continuous <Continuous>      FAMILY HISTORY:  Noncontributory    Social History:  lives in [facility]   travelling here (19 Dec 2022 13:52)    REVIEW OF SYSTEMS:  Constitutional:     [x ] negative [ ] fevers [ ] chills [ ] weight loss [ ] weight gain  HEENT:                  [x ] negative [ ] dry eyes [ ] eye irritation [ ] postnasal drip [ ] nasal congestion  CV:                         [ x] negative  [ ] chest pain [ ] orthopnea [ ] palpitations [ ] murmur  Resp:                     [ ] negative [ ] cough [x] shortness of breath [ ] dyspnea [ ] wheezing [ ] sputum [ ]hemoptysis  GI:                          [ x] negative [ ] nausea [ ] vomiting [ ] diarrhea [ ] constipation [ ] abd pain [ ] dysphagia   :                        [ x] negative [ ] dysuria [ ] nocturia [ ] hematuria [ ] increased urinary frequency  Musculoskeletal: [x ] negative [ ] back pain [ ] myalgias [ ] arthralgias [ ] fracture  Skin:                       [ x] negative [ ] rash [ ] itch  Neurological:        [ x] negative [ ] headache [ ] dizziness [ ] syncope [ ] weakness [ ] numbness  Psychiatric:           [ x] negative [ ] anxiety [ ] depression  Endocrine:            [ x] negative [ ] diabetes [ ] thyroid problem  Heme/Lymph:      [ x] negative [ ] anemia [ ] bleeding problem  Allergic/Immune: [ x] negative [ ] itchy eyes [ ] nasal discharge [ ] hives [ ] angioedema      Physical Exam:  T(F): 99 (12-25), Max: 100 (12-25)  HR: 80 (12-25) (75 - 137)  BP: 115/56 (12-25) (97/53 - 131/78)  RR: 16 (12-25)  SpO2: 96% (12-25)  GENERAL: No acute distress  HEAD:  Atraumatic, Normocephalic  ENT: EOMI, PERRLA, conjunctiva and sclera clear, Neck supple, No JVD, moist mucosa  CHEST/LUNG: Diminished breath sounds in bilateral lung bases; No wheeze, equal breath sounds bilaterally   BACK: No spinal tenderness  HEART: Sternotomy dressing C/D/I; Pacing wires and sternal drain in place; Irregular, No murmurs, rubs, or gallops  ABDOMEN: Soft, Nontender, Nondistended; Bowel sounds present  EXTREMITIES:  No clubbing, cyanosis, or edema  PSYCH: Nl behavior, nl affect  NEUROLOGY: AAOx3, non-focal, cranial nerves intact  SKIN: Normal color, No rashes or lesions    Cardiovascular Diagnostic Testing:    ECG: Personally reviewed:  As per HPI    Echo: Personally reviewed:  12/20/22 TTE  Dimensions:    Normal Values:  LA:     4.0    2.0 - 4.0 cm  Ao:     3.4    2.0 - 3.8 cm  SEPTUM: 0.8    0.6 - 1.2 cm  PWT:    0.8    0.6- 1.1 cm  LVIDd:  4.5    3.0 - 5.6 cm  LVIDs:  3.1    1.8 - 4.0 cm  Derived variables:  LVMI: 62 g/m2  RWT: 0.35  Fractional short: 31 %  EF (Visual Estimate): 55-60 %  Doppler Peak Velocity (m/sec): AoV=3.3  ------------------------------------------------------------------------  Observations:  Mitral Valve: Mitral annular calcification, otherwise  normal mitral valve. Minimal mitral regurgitation.  Aortic Valve/Aorta: Calcified trileaflet aortic valve with  decreased opening. Peak transaortic valve gradient equals  43 mm Hg, mean transaortic valve gradient equals 27 mm Hg,  estimated aortic valve area equals 1.1 sqcm (by continuity  equation), aortic valve velocity time integral equals 82  cm, consistent with moderate aortic stenosis. Dimensionless  Index=0.29. Minimal aortic regurgitation. Peak left  ventricular outflow tract gradient equals 3 mm Hg, mean  gradient is equal to 2 mm Hg, LVOT velocity time integral  equals 24 cm.  Aortic Root: 3.4 cm.  Ascending Aorta: 3.8 cm.  LVOT diameter: 2.2 cm.  Left Atrium: Mildly dilated left atrium.  LA volume index =  37 cc/m2. Mobile interatrial septum.  Left Ventricle: Normal left ventricular systolic function.  No segmental wall motion abnormalities. Normal left  ventricular internal dimensions and wall thicknesses.  Indeterminate diastolic function.  Right Heart: Normal right atrium. Normal right ventricular  size and systolic function. Normal tricuspid valve. Mild  tricuspid regurgitation. Normal pulmonic valve. Minimal  pulmonic regurgitation.  Pericardium/Pleura: Normal pericardium with no pericardial  effusion.  Hemodynamic: Estimated right atrial pressure equals 3 mmHg.  Inadequate tricuspid regurgitation Doppler envelope  precludes estimation of RVSP.  ------------------------------------------------------------------------  Conclusions:  1. Mitral annular calcification, otherwise normal mitral  valve. Minimal mitral regurgitation.  2. Calcified trileaflet aortic valve with decreased  opening. Peak transaortic valve gradient equals 43 mm Hg,  mean transaortic valve gradient equals 27 mm Hg, estimated  aortic valve area equals 1.1 sqcm (by continuity equation),  aortic valve velocity time integral equals 82 cm,  consistent with moderate aortic stenosis. Dimensionless  Index=0.29. Minimal aortic regurgitation.  3. Normal left ventricular systolic function. No segmental  wall motion abnormalities.  4. Normal right ventricular size and systolic function.  *** Compared with echocardiogram of 12/2/2022, aortic valve  gradients are higher on this study but calculated SOURAV is  increased. Calculated SOURAV was 0.9 cmsq on the prior study.  Consider additional imaging of the aortic valve with MORIAH or  cardiac CT scan if clinically indicated.    Imaging:    CXR: Personally reviewed  12/25   FINDINGS:  Low lung volumes.  Right-sided intrajugular central venous catheter w/ tip in the SVC,   left-sided chest tube and mediastinal drains, all unchanged in   positioning.  Redemonstrated left basilar atelectasis.  There is no pneumothorax.  The heart is unchanged.  Redemonstrated sternotomy wires.    IMPRESSION:  External tubes and lines described as above..  Left basilar atelectatic changes noted.      Labs: Personally reviewed                        7.7    8.10  )-----------( 119      ( 25 Dec 2022 00:44 )             22.2     12-25    130<L>  |  95<L>  |  16  ----------------------------<  153<H>  4.4   |  27  |  0.86    Ca    8.5      25 Dec 2022 00:44  Phos  2.8     12-25  Mg     2.6     12-25    TPro  6.0  /  Alb  3.5  /  TBili  1.9<H>  /  DBili  x   /  AST  47<H>  /  ALT  47<H>  /  AlkPhos  80  12-25      Serum Pro-Brain Natriuretic Peptide: 1214 pg/mL (12-19 @ 11:56)

## 2022-12-25 NOTE — PROGRESS NOTE ADULT - SUBJECTIVE AND OBJECTIVE BOX
Patient seen and examined at the bedside.    Remains critically ill on continuous ICU monitoring.      Brief Summary:  78 yo M with CAD s/p AVR-t / CABG X 1 12/22/2022      24 Hour events:  - Continues to have tachycardic and bradycardic episodes  - EP consulted, will continue Amiodarone  - Remains on high flow nasal cannula, weaning.  - Transfused 1 unit prbcs overnight.    OBJECTIVE:  ICU Vital Signs Last 24 Hrs  T(C): 37.2 (25 Dec 2022 12:00), Max: 37.8 (25 Dec 2022 04:00)  T(F): 99 (25 Dec 2022 12:00), Max: 100 (25 Dec 2022 04:00)  HR: 80 (25 Dec 2022 13:00) (80 - 137)  BP: 124/62 (25 Dec 2022 13:00) (97/53 - 131/78)  BP(mean): 88 (25 Dec 2022 13:00) (72 - 99)  ABP: 154/44 (25 Dec 2022 13:00) (122/50 - 169/68)  ABP(mean): 70 (25 Dec 2022 13:00) (66 - 95)  RR: 22 (25 Dec 2022 13:00) (12 - 38)  SpO2: 99% (25 Dec 2022 13:00) (94% - 100%)    O2 Parameters below as of 25 Dec 2022 12:00  Patient On (Oxygen Delivery Method): nasal cannula, high flow  O2 Flow (L/min): 50  O2 Concentration (%): 40              Physical Exam:   General: OOB to chair, no acute distress  Neurology: No focal deficits  Eyes: Bilateral pupils reactive   ENT/Neck: Neck supple  Respiratory: On high flow nasal cannula  CV: Irregular, a fib.  (V wire threshold 2)  Abdominal: Soft, NT, ND +BS   Extremities: Warm       -------------------------------------------------------------------------------------------------------------------------------                        7.7    8.10  )-----------( 119      ( 25 Dec 2022 00:44 )             22.2         130    |  95     |  16     ----------------------------<  153        ( 25 Dec 2022 00:44 )  4.4     |  27     |  0.86     Ca    8.5        ( 25 Dec 2022 00:44 )  Phos  2.8       ( 25 Dec 2022 00:44 )  Mg     2.6       ( 25 Dec 2022 00:44 )    TPro  6.0    /  Alb  3.5    /  TBili  1.9    /  DBili  x      /  AST  47     /  ALT  47     /  AlkPhos  80     ( 25 Dec 2022 00:44 )    LIVER FUNCTIONS - ( 25 Dec 2022 00:44 )  Alb: 3.5 g/dL / Pro: 6.0 g/dL / ALK PHOS: 80 U/L / ALT: 47 U/L / AST: 47 U/L / GGT: x           ABG - ( 25 Dec 2022 03:58 )  pH, Arterial: 7.46  pH, Blood: x     /  pCO2: 39    /  pO2: 100   / HCO3: 28    / Base Excess: 3.6   /  SaO2: 96.5          ------------------------------------------------------------------------------------------------------------------------------  Assessment:  78 yo M with CAD s/p AVR-t / CABG X 1 12/22/2022    At high risk for hemodynamic instability  Cardiac arrhythmias - Atrial fibrillation  Post op respiratory insufficiency  Acute blood loss anemia  Hyperglycemia      Plan:   ***Neuro***  Postoperative acute pain control with Tylenol, Gabapentin, and prns for pain management.     ***Cardiovascular***  Invasive hemodynamic monitoring, assess perfusion indices   At high risk for hemodynamic instability and cardiac arrhythmias.  Atrial fibrillation - continue Amiodarone per EP  V wires working if needed.  Aggressive correction of electrolytes  ASA/Statin daily     ***Pulmonary***  Postoperative respiratory insufficiency - HFNC   Deep breathing and coughing exercises.  Wean oxygen as able.    ***GI***  Tolerating PO DASH/TLC diet.   Protonix for GI prophylaxis   Bowel regimen with Miralax and senna   Reglan for gut motility    ***Renal***  Trend creatinine.  Lasix for diuresis.  Replete electrolytes as indicated.    ***ID***  Off antibiotics.  Trend WBC count    ***Endocrine***  Hyperglycemia  Insulin infusion - Increase Lantus per Endocrine recs.    ***Hematology***  Acute blood loss anemia and Thrombocytopenia - no transfusion indication currently.  Lovenox for VTE prophylaxis.      Patient requires continuous monitoring with bedside rhythm monitoring, pulse oximetry monitoring, and continuous central venous and arterial pressure monitoring; and intermittent blood gas analysis. Care plan discussed with the ICU care team.   Patient remained critical, at risk for life threatening decompensation.    I have spent 35 minutes providing critical care management to this patient.      Electronically signed: Aminah Pina MD

## 2022-12-25 NOTE — CONSULT NOTE ADULT - ATTENDING COMMENTS
pt with 75% right ICA stenosis  left ICA patent  left vertebral patent  [place] of melendez intact  given that the carotid disease is unilateral, there is no need for carotid endarterectomy prior to CABG  continue medical management   will plan to perform CEA after pt recovers from CABG
seen and agree

## 2022-12-25 NOTE — PROGRESS NOTE ADULT - PROBLEM SELECTOR PLAN 1
BS mildly above target. Moderate appetite po intake.   Recommend increase Lantus to 22 units.    disc with nurse at bedside. orders not written.

## 2022-12-26 LAB
ALBUMIN SERPL ELPH-MCNC: 3.4 G/DL — SIGNIFICANT CHANGE UP (ref 3.3–5)
ALP SERPL-CCNC: 85 U/L — SIGNIFICANT CHANGE UP (ref 40–120)
ALT FLD-CCNC: 36 U/L — SIGNIFICANT CHANGE UP (ref 10–45)
ANION GAP SERPL CALC-SCNC: 10 MMOL/L — SIGNIFICANT CHANGE UP (ref 5–17)
APTT BLD: 27.2 SEC — LOW (ref 27.5–35.5)
APTT BLD: 43.4 SEC — HIGH (ref 27.5–35.5)
AST SERPL-CCNC: 29 U/L — SIGNIFICANT CHANGE UP (ref 10–40)
BASE EXCESS BLDV CALC-SCNC: 6.1 MMOL/L — HIGH (ref -2–3)
BILIRUB SERPL-MCNC: 2.1 MG/DL — HIGH (ref 0.2–1.2)
BUN SERPL-MCNC: 15 MG/DL — SIGNIFICANT CHANGE UP (ref 7–23)
CALCIUM SERPL-MCNC: 8.2 MG/DL — LOW (ref 8.4–10.5)
CHLORIDE SERPL-SCNC: 93 MMOL/L — LOW (ref 96–108)
CO2 BLDV-SCNC: 33 MMOL/L — HIGH (ref 22–26)
CO2 SERPL-SCNC: 27 MMOL/L — SIGNIFICANT CHANGE UP (ref 22–31)
CREAT SERPL-MCNC: 0.75 MG/DL — SIGNIFICANT CHANGE UP (ref 0.5–1.3)
EGFR: 93 ML/MIN/1.73M2 — SIGNIFICANT CHANGE UP
GAS PNL BLDA: SIGNIFICANT CHANGE UP
GAS PNL BLDV: SIGNIFICANT CHANGE UP
GLUCOSE BLDC GLUCOMTR-MCNC: 133 MG/DL — HIGH (ref 70–99)
GLUCOSE BLDC GLUCOMTR-MCNC: 141 MG/DL — HIGH (ref 70–99)
GLUCOSE BLDC GLUCOMTR-MCNC: 147 MG/DL — HIGH (ref 70–99)
GLUCOSE BLDC GLUCOMTR-MCNC: 148 MG/DL — HIGH (ref 70–99)
GLUCOSE BLDC GLUCOMTR-MCNC: 150 MG/DL — HIGH (ref 70–99)
GLUCOSE BLDC GLUCOMTR-MCNC: 152 MG/DL — HIGH (ref 70–99)
GLUCOSE BLDC GLUCOMTR-MCNC: 156 MG/DL — HIGH (ref 70–99)
GLUCOSE BLDC GLUCOMTR-MCNC: 156 MG/DL — HIGH (ref 70–99)
GLUCOSE BLDC GLUCOMTR-MCNC: 158 MG/DL — HIGH (ref 70–99)
GLUCOSE BLDC GLUCOMTR-MCNC: 160 MG/DL — HIGH (ref 70–99)
GLUCOSE BLDC GLUCOMTR-MCNC: 160 MG/DL — HIGH (ref 70–99)
GLUCOSE BLDC GLUCOMTR-MCNC: 182 MG/DL — HIGH (ref 70–99)
GLUCOSE BLDC GLUCOMTR-MCNC: 183 MG/DL — HIGH (ref 70–99)
GLUCOSE BLDC GLUCOMTR-MCNC: 183 MG/DL — HIGH (ref 70–99)
GLUCOSE BLDC GLUCOMTR-MCNC: 190 MG/DL — HIGH (ref 70–99)
GLUCOSE BLDC GLUCOMTR-MCNC: 194 MG/DL — HIGH (ref 70–99)
GLUCOSE BLDC GLUCOMTR-MCNC: 201 MG/DL — HIGH (ref 70–99)
GLUCOSE BLDC GLUCOMTR-MCNC: 209 MG/DL — HIGH (ref 70–99)
GLUCOSE SERPL-MCNC: 179 MG/DL — HIGH (ref 70–99)
HCO3 BLDV-SCNC: 32 MMOL/L — HIGH (ref 22–29)
HCT VFR BLD CALC: 25.6 % — LOW (ref 39–50)
HGB BLD-MCNC: 8.9 G/DL — LOW (ref 13–17)
HOROWITZ INDEX BLDV+IHG-RTO: 40 — SIGNIFICANT CHANGE UP
MAGNESIUM SERPL-MCNC: 2 MG/DL — SIGNIFICANT CHANGE UP (ref 1.6–2.6)
MCHC RBC-ENTMCNC: 31 PG — SIGNIFICANT CHANGE UP (ref 27–34)
MCHC RBC-ENTMCNC: 34.8 GM/DL — SIGNIFICANT CHANGE UP (ref 32–36)
MCV RBC AUTO: 89.2 FL — SIGNIFICANT CHANGE UP (ref 80–100)
NRBC # BLD: 0 /100 WBCS — SIGNIFICANT CHANGE UP (ref 0–0)
PCO2 BLDV: 49 MMHG — SIGNIFICANT CHANGE UP (ref 42–55)
PH BLDV: 7.42 — SIGNIFICANT CHANGE UP (ref 7.32–7.43)
PHOSPHATE SERPL-MCNC: 2.4 MG/DL — LOW (ref 2.5–4.5)
PLATELET # BLD AUTO: 130 K/UL — LOW (ref 150–400)
PO2 BLDV: 35 MMHG — SIGNIFICANT CHANGE UP (ref 25–45)
POTASSIUM SERPL-MCNC: 4.1 MMOL/L — SIGNIFICANT CHANGE UP (ref 3.5–5.3)
POTASSIUM SERPL-SCNC: 4.1 MMOL/L — SIGNIFICANT CHANGE UP (ref 3.5–5.3)
PROT SERPL-MCNC: 6 G/DL — SIGNIFICANT CHANGE UP (ref 6–8.3)
RBC # BLD: 2.87 M/UL — LOW (ref 4.2–5.8)
RBC # FLD: 13.9 % — SIGNIFICANT CHANGE UP (ref 10.3–14.5)
SAO2 % BLDV: 62.1 % — LOW (ref 67–88)
SODIUM SERPL-SCNC: 130 MMOL/L — LOW (ref 135–145)
WBC # BLD: 8.18 K/UL — SIGNIFICANT CHANGE UP (ref 3.8–10.5)
WBC # FLD AUTO: 8.18 K/UL — SIGNIFICANT CHANGE UP (ref 3.8–10.5)

## 2022-12-26 PROCEDURE — 99233 SBSQ HOSP IP/OBS HIGH 50: CPT

## 2022-12-26 PROCEDURE — 71045 X-RAY EXAM CHEST 1 VIEW: CPT | Mod: 26,76

## 2022-12-26 PROCEDURE — 99291 CRITICAL CARE FIRST HOUR: CPT

## 2022-12-26 RX ORDER — METOPROLOL TARTRATE 50 MG
25 TABLET ORAL
Refills: 0 | Status: DISCONTINUED | OUTPATIENT
Start: 2022-12-26 | End: 2022-12-27

## 2022-12-26 RX ORDER — HEPARIN SODIUM 5000 [USP'U]/ML
800 INJECTION INTRAVENOUS; SUBCUTANEOUS
Qty: 25000 | Refills: 0 | Status: DISCONTINUED | OUTPATIENT
Start: 2022-12-26 | End: 2023-01-03

## 2022-12-26 RX ORDER — AMIODARONE HYDROCHLORIDE 400 MG/1
400 TABLET ORAL EVERY 8 HOURS
Refills: 0 | Status: COMPLETED | OUTPATIENT
Start: 2022-12-26 | End: 2022-12-28

## 2022-12-26 RX ORDER — AMIODARONE HYDROCHLORIDE 400 MG/1
TABLET ORAL
Refills: 0 | Status: DISCONTINUED | OUTPATIENT
Start: 2022-12-26 | End: 2023-01-03

## 2022-12-26 RX ORDER — INSULIN GLARGINE 100 [IU]/ML
26 INJECTION, SOLUTION SUBCUTANEOUS AT BEDTIME
Refills: 0 | Status: DISCONTINUED | OUTPATIENT
Start: 2022-12-26 | End: 2022-12-30

## 2022-12-26 RX ORDER — FUROSEMIDE 40 MG
20 TABLET ORAL
Refills: 0 | Status: DISCONTINUED | OUTPATIENT
Start: 2022-12-26 | End: 2022-12-27

## 2022-12-26 RX ORDER — MAGNESIUM SULFATE 500 MG/ML
1 VIAL (ML) INJECTION ONCE
Refills: 0 | Status: COMPLETED | OUTPATIENT
Start: 2022-12-26 | End: 2022-12-26

## 2022-12-26 RX ORDER — INSULIN LISPRO 100/ML
8 VIAL (ML) SUBCUTANEOUS
Refills: 0 | Status: DISCONTINUED | OUTPATIENT
Start: 2022-12-26 | End: 2022-12-27

## 2022-12-26 RX ORDER — HEPARIN SODIUM 5000 [USP'U]/ML
800 INJECTION INTRAVENOUS; SUBCUTANEOUS
Qty: 25000 | Refills: 0 | Status: DISCONTINUED | OUTPATIENT
Start: 2022-12-26 | End: 2022-12-26

## 2022-12-26 RX ORDER — MAGNESIUM SULFATE 500 MG/ML
2 VIAL (ML) INJECTION ONCE
Refills: 0 | Status: COMPLETED | OUTPATIENT
Start: 2022-12-26 | End: 2022-12-26

## 2022-12-26 RX ORDER — AMIODARONE HYDROCHLORIDE 400 MG/1
200 TABLET ORAL DAILY
Refills: 0 | Status: DISCONTINUED | OUTPATIENT
Start: 2022-12-29 | End: 2023-01-03

## 2022-12-26 RX ORDER — POTASSIUM CHLORIDE 20 MEQ
40 PACKET (EA) ORAL ONCE
Refills: 0 | Status: COMPLETED | OUTPATIENT
Start: 2022-12-26 | End: 2022-12-26

## 2022-12-26 RX ADMIN — Medication 8 UNIT(S): at 17:17

## 2022-12-26 RX ADMIN — ATORVASTATIN CALCIUM 40 MILLIGRAM(S): 80 TABLET, FILM COATED ORAL at 21:41

## 2022-12-26 RX ADMIN — HEPARIN SODIUM 8 UNIT(S)/HR: 5000 INJECTION INTRAVENOUS; SUBCUTANEOUS at 13:40

## 2022-12-26 RX ADMIN — OXYCODONE HYDROCHLORIDE 5 MILLIGRAM(S): 5 TABLET ORAL at 18:09

## 2022-12-26 RX ADMIN — Medication 100 GRAM(S): at 13:38

## 2022-12-26 RX ADMIN — Medication 25 MILLIGRAM(S): at 17:09

## 2022-12-26 RX ADMIN — Medication 2: at 17:17

## 2022-12-26 RX ADMIN — OXYCODONE HYDROCHLORIDE 5 MILLIGRAM(S): 5 TABLET ORAL at 07:45

## 2022-12-26 RX ADMIN — AMIODARONE HYDROCHLORIDE 400 MILLIGRAM(S): 400 TABLET ORAL at 13:38

## 2022-12-26 RX ADMIN — Medication 500 MILLIGRAM(S): at 06:15

## 2022-12-26 RX ADMIN — Medication 40 MILLIEQUIVALENT(S): at 13:36

## 2022-12-26 RX ADMIN — POLYETHYLENE GLYCOL 3350 17 GRAM(S): 17 POWDER, FOR SOLUTION ORAL at 12:11

## 2022-12-26 RX ADMIN — AMIODARONE HYDROCHLORIDE 400 MILLIGRAM(S): 400 TABLET ORAL at 21:41

## 2022-12-26 RX ADMIN — Medication 81 MILLIGRAM(S): at 12:11

## 2022-12-26 RX ADMIN — Medication 25 GRAM(S): at 03:43

## 2022-12-26 RX ADMIN — Medication 500 MILLIGRAM(S): at 17:08

## 2022-12-26 RX ADMIN — CHLORHEXIDINE GLUCONATE 1 APPLICATION(S): 213 SOLUTION TOPICAL at 23:00

## 2022-12-26 RX ADMIN — SENNA PLUS 2 TABLET(S): 8.6 TABLET ORAL at 21:41

## 2022-12-26 RX ADMIN — Medication 20 MILLIGRAM(S): at 13:38

## 2022-12-26 RX ADMIN — OXYCODONE HYDROCHLORIDE 5 MILLIGRAM(S): 5 TABLET ORAL at 17:09

## 2022-12-26 RX ADMIN — Medication 20 MILLIGRAM(S): at 09:45

## 2022-12-26 RX ADMIN — PANTOPRAZOLE SODIUM 40 MILLIGRAM(S): 20 TABLET, DELAYED RELEASE ORAL at 06:15

## 2022-12-26 RX ADMIN — GABAPENTIN 100 MILLIGRAM(S): 400 CAPSULE ORAL at 21:41

## 2022-12-26 RX ADMIN — GABAPENTIN 100 MILLIGRAM(S): 400 CAPSULE ORAL at 06:15

## 2022-12-26 RX ADMIN — ENOXAPARIN SODIUM 40 MILLIGRAM(S): 100 INJECTION SUBCUTANEOUS at 12:12

## 2022-12-26 RX ADMIN — INSULIN GLARGINE 26 UNIT(S): 100 INJECTION, SOLUTION SUBCUTANEOUS at 21:40

## 2022-12-26 RX ADMIN — OXYCODONE HYDROCHLORIDE 5 MILLIGRAM(S): 5 TABLET ORAL at 08:45

## 2022-12-26 RX ADMIN — GABAPENTIN 100 MILLIGRAM(S): 400 CAPSULE ORAL at 13:39

## 2022-12-26 NOTE — PROGRESS NOTE ADULT - SUBJECTIVE AND OBJECTIVE BOX
24H hour events: S/P AVR/CABG with AF    MEDICATIONS:  aMIOdarone    Tablet   Oral   aMIOdarone    Tablet 400 milliGRAM(s) Oral every 8 hours  aspirin enteric coated 81 milliGRAM(s) Oral daily  furosemide   Injectable 20 milliGRAM(s) IV Push <User Schedule>  heparin  Infusion 800 Unit(s)/Hr IV Continuous <Continuous>  metoprolol tartrate 25 milliGRAM(s) Oral two times a day  acetaminophen     Tablet .. 650 milliGRAM(s) Oral every 6 hours PRN  gabapentin 100 milliGRAM(s) Oral every 8 hours  oxyCODONE    IR 10 milliGRAM(s) Oral every 4 hours PRN  oxyCODONE    IR 5 milliGRAM(s) Oral every 4 hours PRN  bisacodyl Suppository 10 milliGRAM(s) Rectal once  pantoprazole    Tablet 40 milliGRAM(s) Oral before breakfast  polyethylene glycol 3350 17 Gram(s) Oral daily  polyethylene glycol 3350 17 Gram(s) Oral daily  senna 2 Tablet(s) Oral at bedtime  atorvastatin 40 milliGRAM(s) Oral at bedtime  dextrose 50% Injectable 50 milliLiter(s) IV Push every 15 minutes  insulin glargine Injectable (LANTUS) 22 Unit(s) SubCutaneous at bedtime  insulin lispro (ADMELOG) corrective regimen sliding scale   SubCutaneous three times a day before meals  insulin lispro Injectable (ADMELOG) 6 Unit(s) SubCutaneous three times a day before meals  insulin regular Infusion 3 Unit(s)/Hr IV Continuous <Continuous>  ascorbic acid 500 milliGRAM(s) Oral two times a day  chlorhexidine 2% Cloths 1 Application(s) Topical daily  magnesium sulfate  IVPB 1 Gram(s) IV Intermittent once  potassium chloride   Solution 40 milliEquivalent(s) Oral once  sodium chloride 0.9%. 1000 milliLiter(s) IV Continuous <Continuous>      PHYSICAL EXAM:  T(C): 37.3 (12-26-22 @ 12:00), Max: 37.4 (12-25-22 @ 16:00)  HR: 110 (12-26-22 @ 12:00) (80 - 122)  BP: 111/75 (12-26-22 @ 06:00) (98/58 - 141/65)  RR: 32 (12-26-22 @ 12:00) (10 - 33)  SpO2: 96% (12-26-22 @ 12:00) (94% - 100%)  Wt(kg): --  I&O's Summary    25 Dec 2022 07:01  -  26 Dec 2022 07:00  --------------------------------------------------------  IN: 1527.8 mL / OUT: 3275 mL / NET: -1747.2 mL    26 Dec 2022 07:01  -  26 Dec 2022 13:25  --------------------------------------------------------  IN: 140 mL / OUT: 50 mL / NET: 90 mL    LABS:	 	    CBC Full  -  ( 26 Dec 2022 00:40 )  WBC Count : 8.18 K/uL  Hemoglobin : 8.9 g/dL  Hematocrit : 25.6 %  Platelet Count - Automated : 130 K/uL  Mean Cell Volume : 89.2 fl  Mean Cell Hemoglobin : 31.0 pg  Mean Cell Hemoglobin Concentration : 34.8 gm/dL    12-26    130<L>  |  93<L>  |  15  ----------------------------<  179<H>  4.1   |  27  |  0.75  12-25    130<L>  |  95<L>  |  16  ----------------------------<  153<H>  4.4   |  27  |  0.86    Ca    8.2<L>      26 Dec 2022 00:40  Ca    8.5      25 Dec 2022 00:44  Phos  2.4     12-26  Phos  2.8     12-25  Mg     2.0     12-26  Mg     2.6     12-25    TPro  6.0  /  Alb  3.4  /  TBili  2.1<H>  /  DBili  x   /  AST  29  /  ALT  36  /  AlkPhos  85  12-26  TPro  6.0  /  Alb  3.5  /  TBili  1.9<H>  /  DBili  x   /  AST  47<H>  /  ALT  47<H>  /  AlkPhos  80  12-25    TELEMETRY: AFib with RVR at times and V pacing at others	      ASSESSMENT/PLAN: 	  Patient with tachybrady due to AF post op  plan to continue to load with amiodarone (can switch to oral 400 TID)  would add metoprolol if can tolerate  would start AC   potential could perform CV if remains difficult to manage

## 2022-12-26 NOTE — PROGRESS NOTE ADULT - SUBJECTIVE AND OBJECTIVE BOX
CRITICAL CARE ATTENDING - CTICU    MEDICATIONS  (STANDING):  aMIOdarone Infusion 0.25 mG/Min (8.33 mL/Hr) IV Continuous <Continuous>  ascorbic acid 500 milliGRAM(s) Oral two times a day  aspirin enteric coated 81 milliGRAM(s) Oral daily  atorvastatin 40 milliGRAM(s) Oral at bedtime  bisacodyl Suppository 10 milliGRAM(s) Rectal once  chlorhexidine 2% Cloths 1 Application(s) Topical daily  dextrose 50% Injectable 50 milliLiter(s) IV Push every 15 minutes  enoxaparin Injectable 40 milliGRAM(s) SubCutaneous every 24 hours  gabapentin 100 milliGRAM(s) Oral every 8 hours  insulin glargine Injectable (LANTUS) 22 Unit(s) SubCutaneous at bedtime  insulin lispro (ADMELOG) corrective regimen sliding scale   SubCutaneous three times a day before meals  insulin lispro Injectable (ADMELOG) 6 Unit(s) SubCutaneous three times a day before meals  insulin regular Infusion 3 Unit(s)/Hr (3 mL/Hr) IV Continuous <Continuous>  pantoprazole    Tablet 40 milliGRAM(s) Oral before breakfast  polyethylene glycol 3350 17 Gram(s) Oral daily  polyethylene glycol 3350 17 Gram(s) Oral daily  senna 2 Tablet(s) Oral at bedtime  sodium chloride 0.9%. 1000 milliLiter(s) (10 mL/Hr) IV Continuous <Continuous>                          8.9    8.18  )-----------( 130      ( 26 Dec 2022 00:40 )             25.6       12-    130<L>  |  93<L>  |  15  ----------------------------<  179<H>  4.1   |  27  |  0.75    Ca    8.2<L>      26 Dec 2022 00:40  Phos  2.4     12  Mg     2.0         TPro  6.0  /  Alb  3.4  /  TBili  2.1<H>  /  DBili  x   /  AST  29  /  ALT  36  /  AlkPhos  85  12-      Daily     Daily Weight in k.8 (26 Dec 2022 00:00)      - @ 07:01  -   @ 07:00  --------------------------------------------------------  IN: 1116.4 mL / OUT: 4470 mL / NET: -3353.6 mL     @ 07:01   @ 06:46  --------------------------------------------------------  IN: 1452.1 mL / OUT: 3210 mL / NET: -1757.9 mL        Critically Ill patient  : [ ] preoperative ,   [x] post operative    Requires :  [x] Arterial Line   [x] Central Line  [ ] PA catheter  [ ] IABP  [ ] ECMO  [ ] LVAD  [ ] Ventilator  [x] pacemaker- TPM                      [x ] ABG's     [ x] Pulse Oxymetry Monitoring  Bedside evaluation , monitoring , treatment of hemodynamics , fluids , IVP/ IVCD meds.        Diagnosis:     POD 4 - AVR, C1L     CHF     Requires chest PT, pulmonary toilet, suctioning to maintain SaO2,  patent airway and treat atelectasis.     Temporary pacemaker (TPM) interrogation and setting.     DM2 - IVCD insuln, ISS, Lantus     Thrombocytopenia     Requires bedside physical therapy, mobilization and total prison care.               By signing my name below, I, Tashia Kwong, attest that this documentation has been prepared under the direction and in the presence of Silverio Maxwell MD.   Electronically Signed: Ruby Su 22 @ 06:46      Discussed with CT surgeon, Physician Assistant - Nurse Practitioner- Critical care medicine team.   Discussed at  AM / PM rounds.   Chart, labs , films reviewed.    Cumulative Critical Care Time Given Today:  CRITICAL CARE ATTENDING - CTICU    MEDICATIONS  (STANDING):  aMIOdarone Infusion 0.25 mG/Min (8.33 mL/Hr) IV Continuous <Continuous>  ascorbic acid 500 milliGRAM(s) Oral two times a day  aspirin enteric coated 81 milliGRAM(s) Oral daily  atorvastatin 40 milliGRAM(s) Oral at bedtime  bisacodyl Suppository 10 milliGRAM(s) Rectal once  chlorhexidine 2% Cloths 1 Application(s) Topical daily  dextrose 50% Injectable 50 milliLiter(s) IV Push every 15 minutes  enoxaparin Injectable 40 milliGRAM(s) SubCutaneous every 24 hours  gabapentin 100 milliGRAM(s) Oral every 8 hours  insulin glargine Injectable (LANTUS) 22 Unit(s) SubCutaneous at bedtime  insulin lispro (ADMELOG) corrective regimen sliding scale   SubCutaneous three times a day before meals  insulin lispro Injectable (ADMELOG) 6 Unit(s) SubCutaneous three times a day before meals  insulin regular Infusion 3 Unit(s)/Hr (3 mL/Hr) IV Continuous <Continuous>  pantoprazole    Tablet 40 milliGRAM(s) Oral before breakfast  polyethylene glycol 3350 17 Gram(s) Oral daily  polyethylene glycol 3350 17 Gram(s) Oral daily  senna 2 Tablet(s) Oral at bedtime  sodium chloride 0.9%. 1000 milliLiter(s) (10 mL/Hr) IV Continuous <Continuous>                          8.9    8.18  )-----------( 130      ( 26 Dec 2022 00:40 )             25.6       12-    130<L>  |  93<L>  |  15  ----------------------------<  179<H>  4.1   |  27  |  0.75    Ca    8.2<L>      26 Dec 2022 00:40  Phos  2.4     12  Mg     2.0         TPro  6.0  /  Alb  3.4  /  TBili  2.1<H>  /  DBili  x   /  AST  29  /  ALT  36  /  AlkPhos  85  12-      Daily     Daily Weight in k.8 (26 Dec 2022 00:00)      - @ 07:01  -   @ 07:00  --------------------------------------------------------  IN: 1116.4 mL / OUT: 4470 mL / NET: -3353.6 mL     @ 07:01  -   @ 06:46  --------------------------------------------------------  IN: 1452.1 mL / OUT: 3210 mL / NET: -1757.9 mL        Critically Ill patient  : [ ] preoperative ,   [x] post operative    Requires :  [x] Arterial Line   [x] Central Line  [ ] PA catheter  [ ] IABP  [ ] ECMO  [ ] LVAD  [ ] Ventilator  [x] pacemaker- TPM                      [x ] ABG's     [ x] Pulse Oxymetry Monitoring  Bedside evaluation , monitoring , treatment of hemodynamics , fluids , IVP/ IVCD meds.        Diagnosis:     POD 4 - AVR, C1L     CHF- acute [ x]   chronic [x ]    systolic [ ]   diastolic [ x]  Valvular [ ]          - Echo- EF -             [ ] RV dysfunction          - Cxr-cardiomegally, edema          - Clinical-  [ ]inotropes   [ ]pressors   [x ]diuresis   [ ]IABP   [ ]ECMO   [ ]LVAD   [ ]Respiratory Failure    tachy / Josh     Temporary pacemaker (TPM) interrogation and setting.     Requires  [ x]DDD  [ ]VVI    [ ]AII  temporary pacing at  85 mi     to maintain HR, MAP, CI, and perfusion.     Requires chest PT, pulmonary toilet, suctioning to maintain SaO2,  patent airway and treat atelectasis.     Requires bedside physical therapy, mobilization and total [facility] care.     Hyponatremia     Thrombocytopenia     IVCD  Insulin         -      Requires chest PT, pulmonary toilet, suctioning to maintain SaO2,  patent airway and treat atelectasis.     Temporary pacemaker (TPM) interrogation and setting.     DM2 - IVCD insuln, ISS, Lantus     Thrombocytopenia     Requires bedside physical therapy, mobilization and total [facility] care.               By signing my name below, I, Tashia Kwong, attest that this documentation has been prepared under the direction and in the presence of Silverio Maxwell MD.   Electronically Signed: Ruby Su 12-26- @ 06:46    I, Silverio Maxwell, personally performed the services described in this documentation. All medical record entries made by the scribe were at my direction and in my presence. I have reviewed the chart and agree that the record reflects my personal performance and is accurate and complete.   Silverio Maxwell MD.       Discussed with CT surgeon, Physician Assistant - Nurse Practitioner- Critical care medicine team.   Discussed at  AM / PM rounds.   Chart, labs , films reviewed.    Cumulative Critical Care Time Given Today:  30 min

## 2022-12-26 NOTE — PROGRESS NOTE ADULT - ASSESSMENT
Patient is a 77 M with recently diagnosed T2D, CHF, CAD s/p [facility] 12/4 with prox LAD 70% stenosis, aortic stenosis  s/p  CABG/SAVR. 12/22     T2DM   A1C 9.4%  Home medications: Metformin 1g BID started December 2022   Endocrinologist: Dr Jey Pitt  While inpatient:  - on insulin gtt requiring 3-5 units /hr   - BG Goal 100-180mg/dl   - FS TID AC qHS or q6h if NPO , q1h while on insulin gtt  - Admelog 8 units TID AC hold if NPO or if eating less than 50% of meals, stop the insulin gtt 1 hour after first dose of admelog administered  - Increase  Lantus 26 units QHS   - low dose Correctional scale TID with meals  - low dose Correctional scale QHS  - RD Consult     2. HTN  - BP goal 130/80  - Manage per primary team     3. HLD  - Continue with high intensity statin atorvastatin 80 mg daily   - Manage as outpatient      4. CAD  - Manage per CT surgery team   - Optimize BP and glucose prior to CABG    Discharge planning:  - Current home DM meds: Metformin 1000 mg BID  - Discharge DM meds: Metformin 1000 mg BID+ GLP1RA + SGLT2I (may need insulin at discharge depending on overall requirements)  - Patient will follow up at   [facility]  [address] [place] [id number] [phone]  - Patient will need teaching on insulin injection and glucose checking before discharge  - Patient will need opthalmology and podiatry follow up as outpatient     discussed with patient and team Shanna BALDWIN  Contact via [employer] Teams during business hours  On evenings and weekends (or if no response on [employer] Teams) please call [phone] or page endocrine fellow on call.   For nonurgent matters please email [email or web address]    Please note that this patient may be followed by different provider tomorrow.  Notify endocrine 24 hours prior to discharge for final recommendations    greater than 50% of the encounter was spent counseling and/or coordination of care.  35 minutes spent on total encounter; The necessity of the time spent during the encounter on this date of service was due to development of plan of care/coordination of care/glycemic control through review of labs, blood glucose values and vital signs.

## 2022-12-26 NOTE — PROGRESS NOTE ADULT - SUBJECTIVE AND OBJECTIVE BOX
seen earlier today     Chief Complaint: Type 2 Diabetes Mellitus     INTERVAL HX: received Lantus 22 units insulin gtt was continued per team with BG >150s -200s tolerating PO from home +hospital trays   lani [id number]- son Subhash) at bedside. observed multiple containers food from home with additional carb (potatoe/cooked carrots/fruits) pt eating these in addition to hospital trays with BG above goal and remains on insulin gtt. discussed with pt and son importance of glycemic control <180mg/dl to prevent infection and promote wound healing, son reports no further outside food will be brought from home for now and pt verbalized will avoid outside food & is willing to meet with RD for meal planning. endorses discomfort at incision site & RN at bedside aware       Review of Systems:  General: As above  Cardiovascular: No chest pain  Respiratory: reports uncomfortable breathing from position when seated but needs help to stand up , RN at bedside aware   GI: No nausea, vomiting  Endocrine: no  S&Sx of hypoglycemia    Allergies    No Known Allergies    Intolerances      MEDICATIONS  (STANDING):  aMIOdarone    Tablet   Oral   aMIOdarone    Tablet 400 milliGRAM(s) Oral every 8 hours  ascorbic acid 500 milliGRAM(s) Oral two times a day  aspirin enteric coated 81 milliGRAM(s) Oral daily  atorvastatin 40 milliGRAM(s) Oral at bedtime  bisacodyl Suppository 10 milliGRAM(s) Rectal once  chlorhexidine 2% Cloths 1 Application(s) Topical daily  dextrose 50% Injectable 50 milliLiter(s) IV Push every 15 minutes  furosemide   Injectable 20 milliGRAM(s) IV Push <User Schedule>  gabapentin 100 milliGRAM(s) Oral every 8 hours  heparin  Infusion 800 Unit(s)/Hr (8 mL/Hr) IV Continuous <Continuous>  insulin glargine Injectable (LANTUS) 22 Unit(s) SubCutaneous at bedtime  insulin lispro (ADMELOG) corrective regimen sliding scale   SubCutaneous three times a day before meals  insulin lispro Injectable (ADMELOG) 6 Unit(s) SubCutaneous three times a day before meals  insulin regular Infusion 3 Unit(s)/Hr (3 mL/Hr) IV Continuous <Continuous>  metoprolol tartrate 25 milliGRAM(s) Oral two times a day  pantoprazole    Tablet 40 milliGRAM(s) Oral before breakfast  polyethylene glycol 3350 17 Gram(s) Oral daily  polyethylene glycol 3350 17 Gram(s) Oral daily  senna 2 Tablet(s) Oral at bedtime  sodium chloride 0.9%. 1000 milliLiter(s) (10 mL/Hr) IV Continuous <Continuous>        atorvastatin   40 milliGRAM(s) Oral (12-25-22 @ 22:25)    insulin glargine Injectable (LANTUS)   22 Unit(s) SubCutaneous (12-25-22 @ 22:24)        PHYSICAL EXAM:  VITALS: T(C): 37.3 (12-26-22 @ 12:00)  T(F): 99.1 (12-26-22 @ 12:00), Max: 99.3 (12-25-22 @ 16:00)  HR: 100 (12-26-22 @ 14:00) (80 - 122)  BP: 111/75 (12-26-22 @ 06:00) (98/58 - 141/65)  RR:  (10 - 33)  SpO2:  (94% - 100%)  Wt(kg): --  GENERAL: male sitting in chair in NAD MSI CDI   Respiratory: Respirations unlabored    Extremities: Warm, no edema  NEURO: Alert , appropriate , appears anxious    LABS:  POCT Blood Glucose.: 133 mg/dL (12-26-22 @ 14:03)  POCT Blood Glucose.: 158 mg/dL (12-26-22 @ 12:40)  POCT Blood Glucose.: 194 mg/dL (12-26-22 @ 12:08)  POCT Blood Glucose.: 160 mg/dL (12-26-22 @ 11:09)  POCT Blood Glucose.: 183 mg/dL (12-26-22 @ 09:49)  POCT Blood Glucose.: 183 mg/dL (12-26-22 @ 08:54)  POCT Blood Glucose.: 209 mg/dL (12-26-22 @ 07:49)  POCT Blood Glucose.: 152 mg/dL (12-26-22 @ 06:43)  POCT Blood Glucose.: 150 mg/dL (12-26-22 @ 06:17)  POCT Blood Glucose.: 147 mg/dL (12-26-22 @ 04:59)  POCT Blood Glucose.: 141 mg/dL (12-26-22 @ 03:45)  POCT Blood Glucose.: 156 mg/dL (12-26-22 @ 02:45)  POCT Blood Glucose.: 160 mg/dL (12-26-22 @ 01:44)  POCT Blood Glucose.: 182 mg/dL (12-26-22 @ 00:58)  POCT Blood Glucose.: 182 mg/dL (12-25-22 @ 22:06)  POCT Blood Glucose.: 161 mg/dL (12-25-22 @ 21:29)  POCT Blood Glucose.: 95 mg/dL (12-25-22 @ 19:59)  POCT Blood Glucose.: 122 mg/dL (12-25-22 @ 18:40)  POCT Blood Glucose.: 141 mg/dL (12-25-22 @ 18:10)  POCT Blood Glucose.: 153 mg/dL (12-25-22 @ 17:00)  POCT Blood Glucose.: 195 mg/dL (12-25-22 @ 15:52)  POCT Blood Glucose.: 193 mg/dL (12-25-22 @ 15:00)  POCT Blood Glucose.: 218 mg/dL (12-25-22 @ 13:45)  POCT Blood Glucose.: 290 mg/dL (12-25-22 @ 12:53)  POCT Blood Glucose.: 245 mg/dL (12-25-22 @ 11:54)  POCT Blood Glucose.: 193 mg/dL (12-25-22 @ 11:06)  POCT Blood Glucose.: 228 mg/dL (12-25-22 @ 10:06)  POCT Blood Glucose.: 235 mg/dL (12-25-22 @ 08:47)  POCT Blood Glucose.: 167 mg/dL (12-25-22 @ 07:58)  POCT Blood Glucose.: 163 mg/dL (12-25-22 @ 06:45)  POCT Blood Glucose.: 163 mg/dL (12-25-22 @ 05:44)  POCT Blood Glucose.: 186 mg/dL (12-25-22 @ 04:49)  POCT Blood Glucose.: 185 mg/dL (12-25-22 @ 03:55)  POCT Blood Glucose.: 180 mg/dL (12-25-22 @ 02:46)  POCT Blood Glucose.: 189 mg/dL (12-25-22 @ 01:48)  POCT Blood Glucose.: 152 mg/dL (12-25-22 @ 00:28)  POCT Blood Glucose.: 182 mg/dL (12-24-22 @ 22:44)  POCT Blood Glucose.: 196 mg/dL (12-24-22 @ 22:08)  POCT Blood Glucose.: 263 mg/dL (12-24-22 @ 20:46)  POCT Blood Glucose.: 273 mg/dL (12-24-22 @ 19:47)  POCT Blood Glucose.: 236 mg/dL (12-24-22 @ 18:41)  POCT Blood Glucose.: 256 mg/dL (12-24-22 @ 17:46)  POCT Blood Glucose.: 255 mg/dL (12-24-22 @ 16:56)  POCT Blood Glucose.: 274 mg/dL (12-24-22 @ 16:25)  POCT Blood Glucose.: 149 mg/dL (12-24-22 @ 11:40)  POCT Blood Glucose.: 116 mg/dL (12-24-22 @ 10:40)  POCT Blood Glucose.: 151 mg/dL (12-24-22 @ 08:54)  POCT Blood Glucose.: 159 mg/dL (12-24-22 @ 07:51)  POCT Blood Glucose.: 194 mg/dL (12-24-22 @ 06:43)  POCT Blood Glucose.: 223 mg/dL (12-24-22 @ 06:15)  POCT Blood Glucose.: 260 mg/dL (12-24-22 @ 05:00)  POCT Blood Glucose.: 259 mg/dL (12-24-22 @ 03:56)  POCT Blood Glucose.: 120 mg/dL (12-24-22 @ 00:04)  POCT Blood Glucose.: 139 mg/dL (12-23-22 @ 22:48)  POCT Blood Glucose.: 135 mg/dL (12-23-22 @ 21:56)  POCT Blood Glucose.: 157 mg/dL (12-23-22 @ 20:59)  POCT Blood Glucose.: 145 mg/dL (12-23-22 @ 19:51)  POCT Blood Glucose.: 144 mg/dL (12-23-22 @ 18:59)  POCT Blood Glucose.: 145 mg/dL (12-23-22 @ 18:13)  POCT Blood Glucose.: 138 mg/dL (12-23-22 @ 17:06)  POCT Blood Glucose.: 181 mg/dL (12-23-22 @ 15:52)  POCT Blood Glucose.: 200 mg/dL (12-23-22 @ 15:22)                          8.9    8.18  )-----------( 130      ( 26 Dec 2022 00:40 )             25.6     12-26    130<L>  |  93<L>  |  15  ----------------------------<  179<H>  4.1   |  27  |  0.75    Ca    8.2<L>      26 Dec 2022 00:40  Phos  2.4     12-26  Mg     2.0     12-26    TPro  6.0  /  Alb  3.4  /  TBili  2.1<H>  /  DBili  x   /  AST  29  /  ALT  36  /  AlkPhos  85  12-26    eGFR: 93 mL/min/1.73m2 (26 Dec 2022 00:40)    12-02 Chol 185 Direct LDL -- LDL calculated 110<H> HDL 34<L> Trig 208<H>  Thyroid Function Tests:  12-07 @ 13:19 TSH -- FreeT4 -- T3 100 Anti TPO -- Anti Thyroglobulin Ab -- TSI --  12-02 @ 05:54 TSH 3.63 FreeT4 -- T3 -- Anti TPO -- Anti Thyroglobulin Ab -- TSI --      A1C with Estimated Average Glucose Result: 9.4 % (12-02-22 @ 05:54)    Estimated Average Glucose: 223 mg/dL (12-02-22 @ 05:54)        Diet, Consistent Carbohydrate/No Snacks:   DASH/TLC Sodium & Cholesterol Restricted (DASH) (12-23-22 @ 05:28) [Active]

## 2022-12-27 LAB
ALBUMIN SERPL ELPH-MCNC: 3.4 G/DL — SIGNIFICANT CHANGE UP (ref 3.3–5)
ALP SERPL-CCNC: 89 U/L — SIGNIFICANT CHANGE UP (ref 40–120)
ALT FLD-CCNC: 32 U/L — SIGNIFICANT CHANGE UP (ref 10–45)
ANION GAP SERPL CALC-SCNC: 8 MMOL/L — SIGNIFICANT CHANGE UP (ref 5–17)
APTT BLD: 43.2 SEC — HIGH (ref 27.5–35.5)
APTT BLD: 50 SEC — HIGH (ref 27.5–35.5)
APTT BLD: 52 SEC — HIGH (ref 27.5–35.5)
AST SERPL-CCNC: 25 U/L — SIGNIFICANT CHANGE UP (ref 10–40)
BASE EXCESS BLDV CALC-SCNC: 5.8 MMOL/L — HIGH (ref -2–3)
BILIRUB SERPL-MCNC: 1.6 MG/DL — HIGH (ref 0.2–1.2)
BUN SERPL-MCNC: 14 MG/DL — SIGNIFICANT CHANGE UP (ref 7–23)
CALCIUM SERPL-MCNC: 8.2 MG/DL — LOW (ref 8.4–10.5)
CHLORIDE SERPL-SCNC: 95 MMOL/L — LOW (ref 96–108)
CO2 BLDV-SCNC: 33 MMOL/L — HIGH (ref 22–26)
CO2 SERPL-SCNC: 28 MMOL/L — SIGNIFICANT CHANGE UP (ref 22–31)
CREAT SERPL-MCNC: 0.75 MG/DL — SIGNIFICANT CHANGE UP (ref 0.5–1.3)
EGFR: 93 ML/MIN/1.73M2 — SIGNIFICANT CHANGE UP
GAS PNL BLDA: SIGNIFICANT CHANGE UP
GAS PNL BLDA: SIGNIFICANT CHANGE UP
GAS PNL BLDV: SIGNIFICANT CHANGE UP
GLUCOSE BLDC GLUCOMTR-MCNC: 169 MG/DL — HIGH (ref 70–99)
GLUCOSE BLDC GLUCOMTR-MCNC: 174 MG/DL — HIGH (ref 70–99)
GLUCOSE BLDC GLUCOMTR-MCNC: 180 MG/DL — HIGH (ref 70–99)
GLUCOSE BLDC GLUCOMTR-MCNC: 210 MG/DL — HIGH (ref 70–99)
GLUCOSE SERPL-MCNC: 174 MG/DL — HIGH (ref 70–99)
HCO3 BLDV-SCNC: 32 MMOL/L — HIGH (ref 22–29)
HCT VFR BLD CALC: 24.9 % — LOW (ref 39–50)
HGB BLD-MCNC: 8.4 G/DL — LOW (ref 13–17)
HOROWITZ INDEX BLDV+IHG-RTO: 44 — SIGNIFICANT CHANGE UP
MAGNESIUM SERPL-MCNC: 2 MG/DL — SIGNIFICANT CHANGE UP (ref 1.6–2.6)
MCHC RBC-ENTMCNC: 30.7 PG — SIGNIFICANT CHANGE UP (ref 27–34)
MCHC RBC-ENTMCNC: 33.7 GM/DL — SIGNIFICANT CHANGE UP (ref 32–36)
MCV RBC AUTO: 90.9 FL — SIGNIFICANT CHANGE UP (ref 80–100)
NRBC # BLD: 0 /100 WBCS — SIGNIFICANT CHANGE UP (ref 0–0)
PCO2 BLDV: 50 MMHG — SIGNIFICANT CHANGE UP (ref 42–55)
PH BLDV: 7.41 — SIGNIFICANT CHANGE UP (ref 7.32–7.43)
PHOSPHATE SERPL-MCNC: 2.7 MG/DL — SIGNIFICANT CHANGE UP (ref 2.5–4.5)
PLATELET # BLD AUTO: 150 K/UL — SIGNIFICANT CHANGE UP (ref 150–400)
PO2 BLDV: 35 MMHG — SIGNIFICANT CHANGE UP (ref 25–45)
POTASSIUM SERPL-MCNC: 4.1 MMOL/L — SIGNIFICANT CHANGE UP (ref 3.5–5.3)
POTASSIUM SERPL-SCNC: 4.1 MMOL/L — SIGNIFICANT CHANGE UP (ref 3.5–5.3)
PROT SERPL-MCNC: 5.7 G/DL — LOW (ref 6–8.3)
RBC # BLD: 2.74 M/UL — LOW (ref 4.2–5.8)
RBC # FLD: 13.7 % — SIGNIFICANT CHANGE UP (ref 10.3–14.5)
SAO2 % BLDV: 56.1 % — LOW (ref 67–88)
SODIUM SERPL-SCNC: 131 MMOL/L — LOW (ref 135–145)
WBC # BLD: 6.03 K/UL — SIGNIFICANT CHANGE UP (ref 3.8–10.5)
WBC # FLD AUTO: 6.03 K/UL — SIGNIFICANT CHANGE UP (ref 3.8–10.5)

## 2022-12-27 PROCEDURE — 71045 X-RAY EXAM CHEST 1 VIEW: CPT | Mod: 26

## 2022-12-27 PROCEDURE — 93010 ELECTROCARDIOGRAM REPORT: CPT

## 2022-12-27 PROCEDURE — 99233 SBSQ HOSP IP/OBS HIGH 50: CPT

## 2022-12-27 PROCEDURE — 99292 CRITICAL CARE ADDL 30 MIN: CPT | Mod: 24

## 2022-12-27 PROCEDURE — 99291 CRITICAL CARE FIRST HOUR: CPT

## 2022-12-27 RX ORDER — POTASSIUM CHLORIDE 20 MEQ
10 PACKET (EA) ORAL
Refills: 0 | Status: COMPLETED | OUTPATIENT
Start: 2022-12-27 | End: 2022-12-27

## 2022-12-27 RX ORDER — METOPROLOL TARTRATE 50 MG
25 TABLET ORAL THREE TIMES A DAY
Refills: 0 | Status: DISCONTINUED | OUTPATIENT
Start: 2022-12-27 | End: 2022-12-28

## 2022-12-27 RX ORDER — INSULIN LISPRO 100/ML
10 VIAL (ML) SUBCUTANEOUS
Refills: 0 | Status: DISCONTINUED | OUTPATIENT
Start: 2022-12-27 | End: 2023-01-03

## 2022-12-27 RX ORDER — MAGNESIUM SULFATE 500 MG/ML
2 VIAL (ML) INJECTION ONCE
Refills: 0 | Status: COMPLETED | OUTPATIENT
Start: 2022-12-27 | End: 2022-12-27

## 2022-12-27 RX ORDER — BUMETANIDE 0.25 MG/ML
2 INJECTION INTRAMUSCULAR; INTRAVENOUS ONCE
Refills: 0 | Status: COMPLETED | OUTPATIENT
Start: 2022-12-27 | End: 2022-12-27

## 2022-12-27 RX ORDER — BUMETANIDE 0.25 MG/ML
1 INJECTION INTRAMUSCULAR; INTRAVENOUS EVERY 8 HOURS
Refills: 0 | Status: DISCONTINUED | OUTPATIENT
Start: 2022-12-27 | End: 2022-12-28

## 2022-12-27 RX ADMIN — Medication 10 UNIT(S): at 16:28

## 2022-12-27 RX ADMIN — OXYCODONE HYDROCHLORIDE 5 MILLIGRAM(S): 5 TABLET ORAL at 22:00

## 2022-12-27 RX ADMIN — PANTOPRAZOLE SODIUM 40 MILLIGRAM(S): 20 TABLET, DELAYED RELEASE ORAL at 07:03

## 2022-12-27 RX ADMIN — AMIODARONE HYDROCHLORIDE 400 MILLIGRAM(S): 400 TABLET ORAL at 21:25

## 2022-12-27 RX ADMIN — OXYCODONE HYDROCHLORIDE 5 MILLIGRAM(S): 5 TABLET ORAL at 21:30

## 2022-12-27 RX ADMIN — OXYCODONE HYDROCHLORIDE 5 MILLIGRAM(S): 5 TABLET ORAL at 18:00

## 2022-12-27 RX ADMIN — OXYCODONE HYDROCHLORIDE 5 MILLIGRAM(S): 5 TABLET ORAL at 01:40

## 2022-12-27 RX ADMIN — HEPARIN SODIUM 10.5 UNIT(S)/HR: 5000 INJECTION INTRAVENOUS; SUBCUTANEOUS at 19:40

## 2022-12-27 RX ADMIN — OXYCODONE HYDROCHLORIDE 5 MILLIGRAM(S): 5 TABLET ORAL at 10:07

## 2022-12-27 RX ADMIN — OXYCODONE HYDROCHLORIDE 5 MILLIGRAM(S): 5 TABLET ORAL at 09:07

## 2022-12-27 RX ADMIN — BUMETANIDE 1 MILLIGRAM(S): 0.25 INJECTION INTRAMUSCULAR; INTRAVENOUS at 12:26

## 2022-12-27 RX ADMIN — ATORVASTATIN CALCIUM 40 MILLIGRAM(S): 80 TABLET, FILM COATED ORAL at 21:25

## 2022-12-27 RX ADMIN — Medication 8 UNIT(S): at 07:40

## 2022-12-27 RX ADMIN — SODIUM CHLORIDE 10 MILLILITER(S): 9 INJECTION INTRAMUSCULAR; INTRAVENOUS; SUBCUTANEOUS at 19:40

## 2022-12-27 RX ADMIN — Medication 50 MILLIEQUIVALENT(S): at 19:02

## 2022-12-27 RX ADMIN — BUMETANIDE 1 MILLIGRAM(S): 0.25 INJECTION INTRAMUSCULAR; INTRAVENOUS at 18:30

## 2022-12-27 RX ADMIN — INSULIN GLARGINE 26 UNIT(S): 100 INJECTION, SOLUTION SUBCUTANEOUS at 21:35

## 2022-12-27 RX ADMIN — Medication 25 MILLIGRAM(S): at 05:04

## 2022-12-27 RX ADMIN — BUMETANIDE 2 MILLIGRAM(S): 0.25 INJECTION INTRAMUSCULAR; INTRAVENOUS at 03:26

## 2022-12-27 RX ADMIN — Medication 2: at 11:27

## 2022-12-27 RX ADMIN — OXYCODONE HYDROCHLORIDE 5 MILLIGRAM(S): 5 TABLET ORAL at 17:10

## 2022-12-27 RX ADMIN — Medication 50 MILLIEQUIVALENT(S): at 19:41

## 2022-12-27 RX ADMIN — Medication 25 MILLIGRAM(S): at 17:12

## 2022-12-27 RX ADMIN — OXYCODONE HYDROCHLORIDE 5 MILLIGRAM(S): 5 TABLET ORAL at 02:10

## 2022-12-27 RX ADMIN — AMIODARONE HYDROCHLORIDE 400 MILLIGRAM(S): 400 TABLET ORAL at 14:23

## 2022-12-27 RX ADMIN — Medication 1: at 07:41

## 2022-12-27 RX ADMIN — AMIODARONE HYDROCHLORIDE 400 MILLIGRAM(S): 400 TABLET ORAL at 05:05

## 2022-12-27 RX ADMIN — Medication 81 MILLIGRAM(S): at 12:26

## 2022-12-27 RX ADMIN — Medication 25 GRAM(S): at 12:26

## 2022-12-27 RX ADMIN — Medication 8 UNIT(S): at 11:27

## 2022-12-27 RX ADMIN — Medication 50 MILLIEQUIVALENT(S): at 18:30

## 2022-12-27 RX ADMIN — Medication 500 MILLIGRAM(S): at 05:04

## 2022-12-27 RX ADMIN — CHLORHEXIDINE GLUCONATE 1 APPLICATION(S): 213 SOLUTION TOPICAL at 20:00

## 2022-12-27 RX ADMIN — Medication 1: at 16:28

## 2022-12-27 RX ADMIN — Medication 25 MILLIGRAM(S): at 21:34

## 2022-12-27 RX ADMIN — GABAPENTIN 100 MILLIGRAM(S): 400 CAPSULE ORAL at 05:05

## 2022-12-27 RX ADMIN — SENNA PLUS 2 TABLET(S): 8.6 TABLET ORAL at 21:26

## 2022-12-27 NOTE — PROGRESS NOTE ADULT - SUBJECTIVE AND OBJECTIVE BOX
DIABETES FOLLOW UP NOTE: Saw pt earlier today    Chief Complaint: Endocrine consult requested for management of T2DM    INTERVAL HX: Pt stable, reports tolerating POs with BG levels improving but still above goal with some prandial hyperglycemia even though pt and son reports he is now only eating hospital food. No hypoglycemia. Feeling better with on/off surgical pain.  Pt has very limited knowledge od DM and its care. Pt was diagnosed with T2DM recently (~3month ago) and was on Metformin PTA. Doesn't have glucose meter.   States diabetes is sugar in blood. Brother with DM      Review of Systems:  General: As above  Cardiovascular: No chest pain, palpitations  Respiratory: No SOB, no cough  GI: No nausea, vomiting, abdominal pain  Endocrine: No polyuria, polydipsia or S&Sx of hypoglycemia    Allergies    No Known Allergies    Intolerances      MEDICATIONS:  atorvastatin 40 milliGRAM(s) Oral at bedtime  insulin glargine Injectable (LANTUS) 26 Unit(s) SubCutaneous at bedtime  insulin lispro (ADMELOG) corrective regimen sliding scale   SubCutaneous three times a day before meals  insulin lispro Injectable (ADMELOG) 8 Unit(s) SubCutaneous three times a day before meals      PHYSICAL EXAM:  VITALS: T(C): 36.7 (12-27-22 @ 12:00)  T(F): 98 (12-27-22 @ 12:00), Max: 99.1 (12-26-22 @ 20:00)  HR: 111 (12-27-22 @ 14:00) (85 - 126)  BP: 142/72 (12-27-22 @ 12:00) (84/53 - 142/72)  RR:  (13 - 39)  SpO2:  (83% - 100%)  Wt(kg): --  GENERAL: Male sitting in chair in NAD. Son at bedside  Chest: Sternal incision covered with dressing D&I  Abdomen: Soft, nontender, non distended, + central adiposity  Extremities: Warm, no edema in all 4 exts  NEURO: Alert and able to answer simple questions. Encounter done in [place]ic  LABS:  POCT Blood Glucose.: 210 mg/dL (12-27-22 @ 11:24)  POCT Blood Glucose.: 174 mg/dL (12-27-22 @ 07:02)  POCT Blood Glucose.: 190 mg/dL (12-26-22 @ 21:39)  POCT Blood Glucose.: 201 mg/dL (12-26-22 @ 17:16)  POCT Blood Glucose.: 156 mg/dL (12-26-22 @ 16:00)  POCT Blood Glucose.: 148 mg/dL (12-26-22 @ 15:29)  POCT Blood Glucose.: 133 mg/dL (12-26-22 @ 14:03)  POCT Blood Glucose.: 158 mg/dL (12-26-22 @ 12:40)  POCT Blood Glucose.: 194 mg/dL (12-26-22 @ 12:08)  POCT Blood Glucose.: 160 mg/dL (12-26-22 @ 11:09)  POCT Blood Glucose.: 183 mg/dL (12-26-22 @ 09:49)  POCT Blood Glucose.: 183 mg/dL (12-26-22 @ 08:54)  POCT Blood Glucose.: 209 mg/dL (12-26-22 @ 07:49)  POCT Blood Glucose.: 152 mg/dL (12-26-22 @ 06:43)  POCT Blood Glucose.: 150 mg/dL (12-26-22 @ 06:17)  POCT Blood Glucose.: 147 mg/dL (12-26-22 @ 04:59)  POCT Blood Glucose.: 141 mg/dL (12-26-22 @ 03:45)  POCT Blood Glucose.: 156 mg/dL (12-26-22 @ 02:45)  POCT Blood Glucose.: 160 mg/dL (12-26-22 @ 01:44)  POCT Blood Glucose.: 182 mg/dL (12-26-22 @ 00:58)  POCT Blood Glucose.: 182 mg/dL (12-25-22 @ 22:06)  POCT Blood Glucose.: 161 mg/dL (12-25-22 @ 21:29)  POCT Blood Glucose.: 95 mg/dL (12-25-22 @ 19:59)  POCT Blood Glucose.: 122 mg/dL (12-25-22 @ 18:40)  POCT Blood Glucose.: 141 mg/dL (12-25-22 @ 18:10)  POCT Blood Glucose.: 153 mg/dL (12-25-22 @ 17:00)  POCT Blood Glucose.: 195 mg/dL (12-25-22 @ 15:52)  POCT Blood Glucose.: 193 mg/dL (12-25-22 @ 15:00)  POCT Blood Glucose.: 218 mg/dL (12-25-22 @ 13:45)  POCT Blood Glucose.: 290 mg/dL (12-25-22 @ 12:53)  POCT Blood Glucose.: 245 mg/dL (12-25-22 @ 11:54)  POCT Blood Glucose.: 193 mg/dL (12-25-22 @ 11:06)  POCT Blood Glucose.: 228 mg/dL (12-25-22 @ 10:06)  POCT Blood Glucose.: 235 mg/dL (12-25-22 @ 08:47)  POCT Blood Glucose.: 167 mg/dL (12-25-22 @ 07:58)  POCT Blood Glucose.: 163 mg/dL (12-25-22 @ 06:45)  POCT Blood Glucose.: 163 mg/dL (12-25-22 @ 05:44)  POCT Blood Glucose.: 186 mg/dL (12-25-22 @ 04:49)  POCT Blood Glucose.: 185 mg/dL (12-25-22 @ 03:55)  POCT Blood Glucose.: 180 mg/dL (12-25-22 @ 02:46)  POCT Blood Glucose.: 189 mg/dL (12-25-22 @ 01:48)  POCT Blood Glucose.: 152 mg/dL (12-25-22 @ 00:28)                            8.4    6.03  )-----------( 150      ( 27 Dec 2022 00:34 )             24.9       12-27    131<L>  |  95<L>  |  14  ----------------------------<  174<H>  4.1   |  28  |  0.75    eGFR: 93    Ca    8.2<L>      12-27  Mg     2.0     12-27  Phos  2.7     12-27    TPro  5.7<L>  /  Alb  3.4  /  TBili  1.6<H>  /  DBili  x   /  AST  25  /  ALT  32  /  AlkPhos  89  12-27      Thyroid Function Tests:  12-07 @ 13:19 TSH -- FreeT4 -- T3 100 Anti TPO -- Anti Thyroglobulin Ab -- TSI --  12-02 @ 05:54 TSH 3.63 FreeT4 -- T3 -- Anti TPO -- Anti Thyroglobulin Ab -- TSI --      A1C with Estimated Average Glucose Result: 9.4 % (12-02-22 @ 05:54)      Estimated Average Glucose: 223 mg/dL (12-02-22 @ 05:54)        12-02 Chol 185 Direct LDL -- LDL calculated 110<H> HDL 34<L> Trig 208<H>

## 2022-12-27 NOTE — PROGRESS NOTE ADULT - PROBLEM SELECTOR PLAN 1
- Test blood glucose values TID AC & QHS while eating regular meals and Q6H while NPO  - Continue with insulin Glargine 26 units QHS  - Increase insulin Lispro to 10units TID with meals, hold if NPO or if eating less than 50% of meals  - Continue with low dose Correctional scale TID with meals and add scale at hs as recommended yesterday  - Will adjust insulin doses as needed  - Needs RD consult  - Please teach and allow pt/son to do own finger sticks and insulin injections under RN supervision  Please teach use of insulin syringe  Please document teach back  Discharge:  -Due to recent open heart surgery and elevated A1C levels needs to continue insulin therapy. However, pt is uninsured and lives out side [facility] so can discharge on mix insulin Novolin 70/30 bid> doses TBD  -C/w Metformin 1g bid  Please write Rxs for: Humulin 70/30 insulin plus 1/2cc insulin syringes plus glucose meter/strips/lancets> [facility].  -Will need home care upon discharge due to new DM diagnosis and new insulin therapy  -Needs f/u at medicine clinic and endo clinic as well.

## 2022-12-27 NOTE — PROGRESS NOTE ADULT - SUBJECTIVE AND OBJECTIVE BOX
ID# [id number] ([place])     24H hour events: Pt without complaint, no acute events overnight, Tele: Afib with VHR 's with intermittent V pacing       MEDICATIONS:  aMIOdarone    Tablet 400 milliGRAM(s) Oral every 8 hours  aspirin enteric coated 81 milliGRAM(s) Oral daily  buMETAnide Injectable 1 milliGRAM(s) IV Push every 8 hours  heparin  Infusion 800 Unit(s)/Hr IV Continuous <Continuous>  metoprolol tartrate 25 milliGRAM(s) Oral two times a day  acetaminophen     Tablet .. 650 milliGRAM(s) Oral every 6 hours PRN  oxyCODONE    IR 5 milliGRAM(s) Oral every 4 hours PRN  oxyCODONE    IR 10 milliGRAM(s) Oral every 4 hours PRN  bisacodyl Suppository 10 milliGRAM(s) Rectal once  pantoprazole    Tablet 40 milliGRAM(s) Oral before breakfast  polyethylene glycol 3350 17 Gram(s) Oral daily  polyethylene glycol 3350 17 Gram(s) Oral daily  senna 2 Tablet(s) Oral at bedtime  atorvastatin 40 milliGRAM(s) Oral at bedtime  dextrose 50% Injectable 50 milliLiter(s) IV Push every 15 minutes  insulin glargine Injectable (LANTUS) 26 Unit(s) SubCutaneous at bedtime  insulin lispro (ADMELOG) corrective regimen sliding scale   SubCutaneous three times a day before meals  insulin lispro Injectable (ADMELOG) 8 Unit(s) SubCutaneous three times a day before meals  chlorhexidine 2% Cloths 1 Application(s) Topical daily  magnesium sulfate  IVPB 2 Gram(s) IV Intermittent once  sodium chloride 0.9%. 1000 milliLiter(s) IV Continuous <Continuous>      REVIEW OF SYSTEMS:  Complete 12 point ROS negative.    PHYSICAL EXAM:  T(C): 36.9 (12-27-22 @ 08:00), Max: 37.3 (12-26-22 @ 12:00)  HR: 85 (12-27-22 @ 09:00) (85 - 126)  BP: 84/53 (12-27-22 @ 09:00) (84/53 - 128/60)  RR: 21 (12-27-22 @ 09:00) (13 - 37)  SpO2: 94% (12-27-22 @ 09:00) (83% - 100%)  Wt(kg): --  I&O's Summary    26 Dec 2022 07:01  -  27 Dec 2022 07:00  --------------------------------------------------------  IN: 1344.5 mL / OUT: 3150 mL / NET: -1805.5 mL    27 Dec 2022 07:01  -  27 Dec 2022 11:41  --------------------------------------------------------  IN: 26 mL / OUT: 200 mL / NET: -174 mL        Appearance: NAD, OOB sitting up in chair 	  HEENT: PERRL, EOMI	  Cardiovascular: Normal S1 S2, Irregular, No JVD, No murmurs  Respiratory: Lungs clear to auscultation	  Psychiatry: A & O x 3, Mood & affect appropriate  Gastrointestinal: Soft, Non-tender, + BS	  Skin: No rashes, Mediastinal surgical incision site C/D/I. Right neck central line dressing site C/D/I  Neurologic: Grossly intact  Extremities: No clubbing, cyanosis or edema  Vascular: Peripheral pulses palpable 2+ bilaterally        LABS:	 	    CBC Full  -  ( 27 Dec 2022 00:34 )  WBC Count : 6.03 K/uL  Hemoglobin : 8.4 g/dL  Hematocrit : 24.9 %  Platelet Count - Automated : 150 K/uL  Mean Cell Volume : 90.9 fl  Mean Cell Hemoglobin : 30.7 pg  Mean Cell Hemoglobin Concentration : 33.7 gm/dL      12-27    131<L>  |  95<L>  |  14  ----------------------------<  174<H>  4.1   |  28  |  0.75  12-26    130<L>  |  93<L>  |  15  ----------------------------<  179<H>  4.1   |  27  |  0.75    Ca    8.2<L>      27 Dec 2022 00:34  Ca    8.2<L>      26 Dec 2022 00:40  Phos  2.7     12-27  Phos  2.4     12-26  Mg     2.0     12-27  Mg     2.0     12-26    TPro  5.7<L>  /  Alb  3.4  /  TBili  1.6<H>  /  DBili  x   /  AST  25  /  ALT  32  /  AlkPhos  89  12-27  TPro  6.0  /  Alb  3.4  /  TBili  2.1<H>  /  DBili  x   /  AST  29  /  ALT  36  /  AlkPhos  85  12-26    Thyroid Stimulating Hormone, Serum in AM (12.02.22 @ 05:54)    Thyroid Stimulating Hormone, Serum: 3.63 uIU/mL        TELEMETRY: Afib with VHR 's with intermittent V pacing    	      < from: Intra-Operative Transesophageal Echo (12.22.22 @ 11:30) >  Dimensions:    Normal Values:  LA:            2.0 - 4.0 cm  Ao:            2.0 - 3.8 cm  SEPTUM:        0.6 - 1.2 cm  PWT:           0.6 - 1.1 cm  LVIDd:         3.0 - 5.6 cm  LVIDs:         1.8 - 4.0 cm  EF (Visual Estimate): 55-60 %  ------------------------------------------------------------------------  Pre-Bypass Observations:  Mitral Valve: Normal mitral valve. Minimal mitral  regurgitation.  Aortic Valve/Aorta: Calcified trileaflet aortic valve with  restricted motion.  Vmax is 2.5m/s, mean gradient is  13mmHg, SOURAV by continuity equation is 1.1cm2, DI is 0.35,  consistent with moderate aortic stenosis. Mild aortic  regurgitation.  Simple atheroma noted in aortic arch/descending aorta.  Left Atrium: Normal left atrium.  Left Ventricle: Normal left ventricular systolic function.  No segmental wall motion abnormalities. No regional wall  motion abnormalities. Normal left ventricular internal  dimensions and wall thicknesses.  Right Heart: Normal right atrium. Normal right ventricular  size and function. Normal tricuspid valve. Minimal  tricuspid regurgitation. Normal pulmonic valve. Minimal  pulmonic regurgitation.  Pericardium/Pleura: Normal pericardium with no pericardial  effusion.  Hemodynamic: Color Doppler demonstrates no evidence of a  patent foramen ovale.  ------------------------------------------------------------------------  Post-Bypass Observations:    Status post CABGx1, AVR with Inspiris #25  Normal biventricular function  Bioprosthetic aortic valve is well-seated, there is no  paravalvular leak. The peak gradient [id number] and the mean  gradient is 3mmHg.The other valves are unchanged.  There is no aortic dissection seen in the ascending aorta.  ------------------------------------------------------------------------  Conclusions:  1. Normal mitral valve. Minimal mitral regurgitation.  2. Calcified trileaflet aortic valve with restricted  motion.  Vmax is 2.5m/s, mean gradient is 13mmHg, SOURAV by  continuity equation is 1.1cm2, DI is 0.35, consistent with  moderate aortic stenosis. Mild aortic regurgitation.  3. Simple atheroma noted in aortic arch/descending aorta.  4. Normal left atrium.  No left atrium or left atrial  appendage thrombus.  5. Normal left ventricular internal dimensions and wall  thicknesses.  6. Normal left ventricular systolic function. No segmental  wall motion abnormalities. No regional wall motion  abnormalities.  7. Normal right atrium.  8. Normal right ventricular size and function.  9. Normal tricuspid valve. Minimal tricuspid regurgitation.  10. Normal pulmonic valve. Minimal pulmonic regurgitation.  11. Color Doppler demonstrates no evidence of a patent  foramen ovale.  12. Normal pericardium with no pericardial effusion.    < end of copied text >

## 2022-12-27 NOTE — PROGRESS NOTE ADULT - PROBLEM SELECTOR PLAN 2
-LDL goal <50 due to uncontrolled T2DM plus recent heart surgery placing pt at risk of other CV events  -Pt   -Consider atorvastatin 80 mg daily   - Manage per cardiac team  -Follow up levels as out pt

## 2022-12-27 NOTE — PROGRESS NOTE ADULT - SUBJECTIVE AND OBJECTIVE BOX
CRITICAL CARE ATTENDING - CTICU    MEDICATIONS  (STANDING):  aMIOdarone    Tablet 400 milliGRAM(s) Oral every 8 hours  aMIOdarone    Tablet   Oral   aspirin enteric coated 81 milliGRAM(s) Oral daily  atorvastatin 40 milliGRAM(s) Oral at bedtime  bisacodyl Suppository 10 milliGRAM(s) Rectal once  buMETAnide Injectable 1 milliGRAM(s) IV Push every 8 hours  chlorhexidine 2% Cloths 1 Application(s) Topical daily  dextrose 50% Injectable 50 milliLiter(s) IV Push every 15 minutes  heparin  Infusion 800 Unit(s)/Hr (10.5 mL/Hr) IV Continuous <Continuous>  insulin glargine Injectable (LANTUS) 26 Unit(s) SubCutaneous at bedtime  insulin lispro (ADMELOG) corrective regimen sliding scale   SubCutaneous three times a day before meals  insulin lispro Injectable (ADMELOG) 8 Unit(s) SubCutaneous three times a day before meals  metoprolol tartrate 25 milliGRAM(s) Oral two times a day  pantoprazole    Tablet 40 milliGRAM(s) Oral before breakfast  polyethylene glycol 3350 17 Gram(s) Oral daily  polyethylene glycol 3350 17 Gram(s) Oral daily  senna 2 Tablet(s) Oral at bedtime  sodium chloride 0.9%. 1000 milliLiter(s) (10 mL/Hr) IV Continuous <Continuous>                              8.4    6.03  )-----------( 150      ( 27 Dec 2022 00:34 )             24.9       12    131<L>  |  95<L>  |  14  ----------------------------<  174<H>  4.1   |  28  |  0.75    Ca    8.2<L>      27 Dec 2022 00:34  Phos  2.7       Mg     2.0         TPro  5.7<L>  /  Alb  3.4  /  TBili  1.6<H>  /  DBili  x   /  AST  25  /  ALT  32  /  AlkPhos  89        PTT - ( 27 Dec 2022 00:34 )  PTT:43.2 sec        Daily     Daily Weight in k (27 Dec 2022 00:00)      - @ 07:01  -   @ 07:00  --------------------------------------------------------  IN: 1527.8 mL / OUT: 3275 mL / NET: -1747.2 mL     @ 07:01   @ 06:27  --------------------------------------------------------  IN: 1313.5 mL / OUT: 2550 mL / NET: -1236.5 mL      Critically Ill patient  : [ ] preoperative ,   [x] post operative    Requires :  [x] Arterial Line   [x] Central Line  [ ] PA catheter  [ ] IABP  [ ] ECMO  [ ] LVAD  [ ] Ventilator  [x] pacemaker- TPM                      [x ] ABG's     [ x] Pulse Oxymetry Monitoring  Bedside evaluation , monitoring , treatment of hemodynamics , fluids , IVP/ IVCD meds.        Diagnosis:     POD 5 - AVR, C1L     CHF- acute [ x]   chronic [x ]    systolic [ ]   diastolic [ x]  Valvular [ ]          - Echo- EF -             [ ] RV dysfunction          - Cxr-cardiomegally, edema          - Clinical-  [ ]inotropes   [ ]pressors   [x ]diuresis   [ ]IABP   [ ]ECMO   [ ]LVAD   [ ]Respiratory Failure    tachy / Josh     Requires  [ x]DDD  [ ]VVI    [ ]AII  temporary pacing at  85 mi     to maintain HR, MAP, CI, and perfusion.     Requires bedside physical therapy, mobilization and total snf care.     IVCD anticoagulation with [x] Heparin  [ ] Argatroban for     Hyponatremia     Fluid  overload - IVCD bumex     Requires chest PT, pulmonary toilet, suctioning to maintain SaO2,  patent airway and treat atelectasis.     Temporary pacemaker (TPM) interrogation and setting.     DM2 - Ladonna LUZ             By signing my name below, I, Tashia Kwong, attest that this documentation has been prepared under the direction and in the presence of Silverio Maxwell MD.   Electronically Signed: Ruby Su 22 @ 06:27      Discussed with CT surgeon, Physician Assistant - Nurse Practitioner- Critical care medicine team.   Discussed at  AM / PM rounds.   Chart, labs , films reviewed.    Cumulative Critical Care Time Given Today:  CRITICAL CARE ATTENDING - CTICU    MEDICATIONS  (STANDING):  aMIOdarone    Tablet 400 milliGRAM(s) Oral every 8 hours  aMIOdarone    Tablet   Oral   aspirin enteric coated 81 milliGRAM(s) Oral daily  atorvastatin 40 milliGRAM(s) Oral at bedtime  bisacodyl Suppository 10 milliGRAM(s) Rectal once  buMETAnide Injectable 1 milliGRAM(s) IV Push every 8 hours  chlorhexidine 2% Cloths 1 Application(s) Topical daily  dextrose 50% Injectable 50 milliLiter(s) IV Push every 15 minutes  heparin  Infusion 800 Unit(s)/Hr (10.5 mL/Hr) IV Continuous <Continuous>  insulin glargine Injectable (LANTUS) 26 Unit(s) SubCutaneous at bedtime  insulin lispro (ADMELOG) corrective regimen sliding scale   SubCutaneous three times a day before meals  insulin lispro Injectable (ADMELOG) 8 Unit(s) SubCutaneous three times a day before meals  metoprolol tartrate 25 milliGRAM(s) Oral two times a day  pantoprazole    Tablet 40 milliGRAM(s) Oral before breakfast  polyethylene glycol 3350 17 Gram(s) Oral daily  polyethylene glycol 3350 17 Gram(s) Oral daily  senna 2 Tablet(s) Oral at bedtime  sodium chloride 0.9%. 1000 milliLiter(s) (10 mL/Hr) IV Continuous <Continuous>                              8.4    6.03  )-----------( 150      ( 27 Dec 2022 00:34 )             24.9       12    131<L>  |  95<L>  |  14  ----------------------------<  174<H>  4.1   |  28  |  0.75    Ca    8.2<L>      27 Dec 2022 00:34  Phos  2.7       Mg     2.0         TPro  5.7<L>  /  Alb  3.4  /  TBili  1.6<H>  /  DBili  x   /  AST  25  /  ALT  32  /  AlkPhos  89        PTT - ( 27 Dec 2022 00:34 )  PTT:43.2 sec        Daily     Daily Weight in k (27 Dec 2022 00:00)      - @ 07:01  -   @ 07:00  --------------------------------------------------------  IN: 1527.8 mL / OUT: 3275 mL / NET: -1747.2 mL     @ 07:01   @ 06:27  --------------------------------------------------------  IN: 1313.5 mL / OUT: 2550 mL / NET: -1236.5 mL      Critically Ill patient  : [ ] preoperative ,   [x] post operative    Requires :  [x] Arterial Line   [x] Central Line  [ ] PA catheter  [ ] IABP  [ ] ECMO  [ ] LVAD  [ ] Ventilator  [x] pacemaker- TPM                      [x ] ABG's     [ x] Pulse Oxymetry Monitoring  Bedside evaluation , monitoring , treatment of hemodynamics , fluids , IVP/ IVCD meds.        Diagnosis:     POD 5 - AVR, C1L     CHF- acute [ x]   chronic [x ]    systolic [ ]   diastolic [ x]  Valvular [ ]          - Echo- EF -             [ ] RV dysfunction          - Cxr-cardiomegally, edema          - Clinical-  [ ]inotropes   [ ]pressors   [x ]diuresis   [ ]IABP   [ ]ECMO   [ ]LVAD   [ ]Respiratory Failure    tachy / Josh     Requires  [  ]DDD  [x ]VVI    [ ]AII  temporary pacing at  80  mi     to maintain HR, MAP, CI, and perfusion.     Requires bedside physical therapy, mobilization and total intermediate care.     IVCD anticoagulation with [x] Heparin  [ ] Argatroban for Tachy / Josh     Hyponatremia     Fluid  overload - IVCD bumex     Requires chest PT, pulmonary toilet, suctioning to maintain SaO2,  patent airway and treat atelectasis.     Temporary pacemaker (TPM) interrogation and setting.     DM2 - ISS, Cristitus             By signing my name below, I, Tashia Kwong, attest that this documentation has been prepared under the direction and in the presence of Silverio Maxwell MD.   Electronically Signed: Ruby Su 12-27-22 @ 06:27    I, Silverio Maxwell, personally performed the services described in this documentation. All medical record entries made by the scribe were at my direction and in my presence. I have reviewed the chart and agree that the record reflects my personal performance and is accurate and complete.   Silverio Maxwell MD.       Discussed with CT surgeon, Physician Assistant - Nurse Practitioner- Critical care medicine team.   Discussed at  AM / PM rounds.   Chart, labs , films reviewed.    Cumulative Critical Care Time Given Today:  30 min

## 2022-12-27 NOTE — PROGRESS NOTE ADULT - ASSESSMENT
76y/o M w/h/o recently diagnosed with uncontrolled T2D(A1C 9.4%) on Metformin. No known DM complications but now found to have CAD > s/p C 12/4 with prox LAD 70% stenosis, aortic stenosis. Also h/o of HLD, HF. Here for CT surgery evaluation for CABG/SAVR. Now s/p replacement, aortic valve, with MORIAH, CABG x 1 LIMA to LAD 12/22/22.  Endocrine consulted for hyperglycemia management. Tolerating POs with some prandial hyperglycemia> will continue to adjust insulin doses to BG goal 100 to 180s. No hypoglycemia.  Off note pt lives in [facility] and is planning to go back once he recuperates from surgery.       Met with patient/son  and reviewed the following:  -T2DM diagnosis  -A1c LEVEL: Present and goal  -Blood glucose goals: 100s to 150s as out pt  -Glucose monitoring frequency: at least fasting and ac dinner  -Healthy eating and portion control. Reviewed My Plate in Depth  -Insulin(s) action, time of administration and side effects. Mix Novolin insulin 70/30. Needs to learn insulin administration  -Importance of follow up care. Endo clinic and medicine clinif  -Pt able to verbalize understanding but has some trouble recalling new info. Pt's son able to give teach back and states he and his family will be able to assist pt as needed upon discharge.   -Also discussed lipids and importance of life style modifications for tx of DM hypercholesterolemia.    - Patient will follow up with Dr. Pitt as outpatient   - Patient will need opthalmology and podiatry follow up as outpatient

## 2022-12-27 NOTE — PROGRESS NOTE ADULT - SUBJECTIVE AND OBJECTIVE BOX
Patient seen and examined at the bedside.    Remained critically ill on continuous ICU monitoring.    OBJECTIVE:  Vital Signs Last 24 Hrs  T(C): 36.5 (27 Dec 2022 20:00), Max: 37.2 (27 Dec 2022 00:00)  T(F): 97.7 (27 Dec 2022 20:00), Max: 98.9 (27 Dec 2022 00:00)  HR: 93 (27 Dec 2022 20:00) (82 - 124)  BP: 102/57 (27 Dec 2022 20:00) (84/53 - 142/72)  BP(mean): 72 (27 Dec 2022 20:00) (58 - 99)  RR: 20 (27 Dec 2022 20:00) (7 - 39)  SpO2: 98% (27 Dec 2022 20:00) (83% - 100%)    Parameters below as of 27 Dec 2022 20:00  Patient On (Oxygen Delivery Method): nasal cannula  O2 Flow (L/min): 2        Physical Exam:   General: OOB to chair, no acute distress  Neurology: No focal deficits  Eyes: Bilateral pupils reactive   ENT/Neck: Neck supple  Respiratory: On high flow nasal cannula  CV: Sinus  [x] Sternal dressing, [x] Mediastinal CT, [x] Pleural CT,  [x] Paced Rhythm, [x] Temporary Pacing- VVI 60  Abdominal: Soft, NT, ND +BS   Extremities: Warm                             Assessment:  78 yo M with CAD s/p AVR (T) / CABG X 1 12/22/2022    At high risk for hemodynamic instability  Cardiac arrhythmias - Atrial fibrillation  Post op respiratory insufficiency  Acute blood loss anemia  Hyperglycemia          Plan:   ***Neuro***   [x] Nonfocal   Post operative neuro assessment     ***Cardiovascular***  Invasive hemodynamic monitoring, assess perfusion indices   IA / CVP 5/ MAP 71/ Hct 24.9/ Lactate 1.0  Reassessment of hemodynamics post resuscitation   Lopressor for rate control   Amiodarone for afib prophylaxis   Monitor chest tube outputs *remove if none present, check DAILY*  [x] AC therapy with Heparin gtt  [x]  ASA [x] Statin   Serial EKG and cardiac enzymes     ***Pulmonary***  [x] 2L NC  Encourage incentive spirometry, continue pulse ox monitoring, follow ABGs                 ***GI***  [x]  Diet: Tolerating PO consistent carb diet.    [x] Protonix   Bowel regimen with Miralax and senna     ***Renal***  Continue to monitor I/Os, BUN/Creatinine.   Replete lytes PRN  Sherice present     ***ID***  No active antibiotic coverage      ***Endocrine***  [x] Stress Hyperglycemia: HbA1c 9.4%                - [x] ISS [x]  Lantus             - Need tight glycemic control to prevent wound infection.        Patient requires continuous monitoring with bedside rhythm monitoring, pulse oximetry monitoring, and continuous central venous and arterial pressure monitoring; and intermittent blood gas analysis. Care plan discussed with the ICU care team.   Patient remained critical, at risk for life threatening decompensation.    I have spent 45 minutes providing critical care management to this patient.    By signing my name below, I, Armando Jackman, attest that this documentation has been prepared under the direction and in the presence of Bharath Loaiza NP   Electronically signed: Ruby Palmer, 12-27-22 @ 20:35    I, Bharath Loaiza NP, personally performed the services described in this documentation. all medical record entries made by the scribe were at my direction and in my presence. I have reviewed the chart and agree that the record reflects my personal performance and is accurate and complete  Electronically signed: Bharath Loaiza NP

## 2022-12-27 NOTE — PROGRESS NOTE ADULT - ASSESSMENT
77M with T2DM and recently diagnosed CHF who had recent admission for cardiac work up. Patient found to have CAD (pLAD disease that was iFR positive) and moderate to severe aortic stenosis. Now s/p 1V CABG (LIMA to LAD) and SAVR on 12/22/22. Post op course c/b tachy-marisa (Afib RVR s/p IV amio and metoprolol, and undetermined bradycardia with pauses requiring intermittent pacing). EP now consulted for recommendations.     1) Post-op TBS Afib RVR/ junctional rhythm    -Patient with tachybrady due to AF post op  -Temp epicardial pacing wire set to VVI 86 bpm (thresholds at 2.5mA, output 10mA)  -Continue to load with amiodarone 400mg TID (PO loaded started on 12/26, received 1.2G thus far)  -Continue metoprolol  -Currently on Heparin drip for AC  -Pt is in and out of AF and SR, no plan for DCCV  -Will continue to assess the need for permanent pacemaker     SRoya Arteaga NP-C  [id number] 77M with T2DM and recently diagnosed CHF who had recent admission for cardiac work up. Patient found to have CAD (pLAD disease that was iFR positive) and moderate to severe aortic stenosis. Now s/p 1V CABG (LIMA to LAD) and SAVR on 12/22/22. Post op course c/b tachy-marisa (Afib RVR s/p IV amio and metoprolol, and undetermined bradycardia with pauses requiring intermittent pacing). EP now consulted for recommendations.     1) Post-op TBS Afib RVR/ junctional rhythm    -Patient with tachybrady due to AF post op  -Temp epicardial pacing wire set to VVI 86 bpm (thresholds at 2.5mA, output 10mA)  -Recommend to decrease pacing rate to 60 bpm.  -Continue to load with amiodarone 400mg TID (PO loaded started on 12/26, received 1.2G thus far)  -Continue metoprolol  -Currently on Heparin drip for AC  -Pt is in and out of AF and SR, no plan for DCCV  -Will continue to assess the need for permanent pacemaker     OTTO Arteaga, NP-C  [id number]

## 2022-12-28 LAB
ALBUMIN SERPL ELPH-MCNC: 3.2 G/DL — LOW (ref 3.3–5)
ALP SERPL-CCNC: 87 U/L — SIGNIFICANT CHANGE UP (ref 40–120)
ALT FLD-CCNC: 29 U/L — SIGNIFICANT CHANGE UP (ref 10–45)
ANION GAP SERPL CALC-SCNC: 10 MMOL/L — SIGNIFICANT CHANGE UP (ref 5–17)
APTT BLD: 135.1 SEC — CRITICAL HIGH (ref 27.5–35.5)
APTT BLD: 39.3 SEC — HIGH (ref 27.5–35.5)
APTT BLD: 44.9 SEC — HIGH (ref 27.5–35.5)
APTT BLD: 50.4 SEC — HIGH (ref 27.5–35.5)
APTT BLD: 57.4 SEC — HIGH (ref 27.5–35.5)
AST SERPL-CCNC: 20 U/L — SIGNIFICANT CHANGE UP (ref 10–40)
BILIRUB SERPL-MCNC: 1.4 MG/DL — HIGH (ref 0.2–1.2)
BUN SERPL-MCNC: 15 MG/DL — SIGNIFICANT CHANGE UP (ref 7–23)
CALCIUM SERPL-MCNC: 8.6 MG/DL — SIGNIFICANT CHANGE UP (ref 8.4–10.5)
CHLORIDE SERPL-SCNC: 93 MMOL/L — LOW (ref 96–108)
CO2 SERPL-SCNC: 29 MMOL/L — SIGNIFICANT CHANGE UP (ref 22–31)
CREAT SERPL-MCNC: 0.82 MG/DL — SIGNIFICANT CHANGE UP (ref 0.5–1.3)
EGFR: 90 ML/MIN/1.73M2 — SIGNIFICANT CHANGE UP
GAS PNL BLDA: SIGNIFICANT CHANGE UP
GLUCOSE BLDC GLUCOMTR-MCNC: 119 MG/DL — HIGH (ref 70–99)
GLUCOSE BLDC GLUCOMTR-MCNC: 148 MG/DL — HIGH (ref 70–99)
GLUCOSE BLDC GLUCOMTR-MCNC: 168 MG/DL — HIGH (ref 70–99)
GLUCOSE BLDC GLUCOMTR-MCNC: 199 MG/DL — HIGH (ref 70–99)
GLUCOSE SERPL-MCNC: 168 MG/DL — HIGH (ref 70–99)
HCT VFR BLD CALC: 25.8 % — LOW (ref 39–50)
HGB BLD-MCNC: 8.8 G/DL — LOW (ref 13–17)
MAGNESIUM SERPL-MCNC: 1.8 MG/DL — SIGNIFICANT CHANGE UP (ref 1.6–2.6)
MCHC RBC-ENTMCNC: 30.2 PG — SIGNIFICANT CHANGE UP (ref 27–34)
MCHC RBC-ENTMCNC: 34.1 GM/DL — SIGNIFICANT CHANGE UP (ref 32–36)
MCV RBC AUTO: 88.7 FL — SIGNIFICANT CHANGE UP (ref 80–100)
NRBC # BLD: 0 /100 WBCS — SIGNIFICANT CHANGE UP (ref 0–0)
PHOSPHATE SERPL-MCNC: 3.9 MG/DL — SIGNIFICANT CHANGE UP (ref 2.5–4.5)
PLATELET # BLD AUTO: 155 K/UL — SIGNIFICANT CHANGE UP (ref 150–400)
POTASSIUM SERPL-MCNC: 3.7 MMOL/L — SIGNIFICANT CHANGE UP (ref 3.5–5.3)
POTASSIUM SERPL-SCNC: 3.7 MMOL/L — SIGNIFICANT CHANGE UP (ref 3.5–5.3)
PROT SERPL-MCNC: 6 G/DL — SIGNIFICANT CHANGE UP (ref 6–8.3)
RBC # BLD: 2.91 M/UL — LOW (ref 4.2–5.8)
RBC # FLD: 13.5 % — SIGNIFICANT CHANGE UP (ref 10.3–14.5)
SARS-COV-2 RNA SPEC QL NAA+PROBE: SIGNIFICANT CHANGE UP
SODIUM SERPL-SCNC: 132 MMOL/L — LOW (ref 135–145)
WBC # BLD: 5.34 K/UL — SIGNIFICANT CHANGE UP (ref 3.8–10.5)
WBC # FLD AUTO: 5.34 K/UL — SIGNIFICANT CHANGE UP (ref 3.8–10.5)

## 2022-12-28 PROCEDURE — 71045 X-RAY EXAM CHEST 1 VIEW: CPT | Mod: 26

## 2022-12-28 PROCEDURE — 99291 CRITICAL CARE FIRST HOUR: CPT

## 2022-12-28 PROCEDURE — 99232 SBSQ HOSP IP/OBS MODERATE 35: CPT

## 2022-12-28 PROCEDURE — 93010 ELECTROCARDIOGRAM REPORT: CPT

## 2022-12-28 PROCEDURE — 99233 SBSQ HOSP IP/OBS HIGH 50: CPT

## 2022-12-28 RX ORDER — MAGNESIUM SULFATE 500 MG/ML
2 VIAL (ML) INJECTION ONCE
Refills: 0 | Status: COMPLETED | OUTPATIENT
Start: 2022-12-28 | End: 2022-12-28

## 2022-12-28 RX ORDER — METOPROLOL TARTRATE 50 MG
12.5 TABLET ORAL EVERY 12 HOURS
Refills: 0 | Status: DISCONTINUED | OUTPATIENT
Start: 2022-12-28 | End: 2022-12-30

## 2022-12-28 RX ORDER — POTASSIUM CHLORIDE 20 MEQ
40 PACKET (EA) ORAL ONCE
Refills: 0 | Status: COMPLETED | OUTPATIENT
Start: 2022-12-28 | End: 2022-12-28

## 2022-12-28 RX ORDER — ALBUMIN HUMAN 25 %
200 VIAL (ML) INTRAVENOUS EVERY 8 HOURS
Refills: 0 | Status: DISCONTINUED | OUTPATIENT
Start: 2022-12-28 | End: 2022-12-28

## 2022-12-28 RX ADMIN — OXYCODONE HYDROCHLORIDE 10 MILLIGRAM(S): 5 TABLET ORAL at 11:06

## 2022-12-28 RX ADMIN — Medication 12.5 MILLIGRAM(S): at 17:11

## 2022-12-28 RX ADMIN — OXYCODONE HYDROCHLORIDE 10 MILLIGRAM(S): 5 TABLET ORAL at 21:00

## 2022-12-28 RX ADMIN — Medication 81 MILLIGRAM(S): at 12:16

## 2022-12-28 RX ADMIN — AMIODARONE HYDROCHLORIDE 400 MILLIGRAM(S): 400 TABLET ORAL at 14:24

## 2022-12-28 RX ADMIN — BUMETANIDE 1 MILLIGRAM(S): 0.25 INJECTION INTRAMUSCULAR; INTRAVENOUS at 04:10

## 2022-12-28 RX ADMIN — Medication 10 UNIT(S): at 16:53

## 2022-12-28 RX ADMIN — HEPARIN SODIUM 11.5 UNIT(S)/HR: 5000 INJECTION INTRAVENOUS; SUBCUTANEOUS at 02:58

## 2022-12-28 RX ADMIN — Medication 10 UNIT(S): at 12:14

## 2022-12-28 RX ADMIN — OXYCODONE HYDROCHLORIDE 10 MILLIGRAM(S): 5 TABLET ORAL at 21:30

## 2022-12-28 RX ADMIN — HEPARIN SODIUM 12.5 UNIT(S)/HR: 5000 INJECTION INTRAVENOUS; SUBCUTANEOUS at 19:54

## 2022-12-28 RX ADMIN — Medication 10 UNIT(S): at 07:28

## 2022-12-28 RX ADMIN — SENNA PLUS 2 TABLET(S): 8.6 TABLET ORAL at 21:01

## 2022-12-28 RX ADMIN — CHLORHEXIDINE GLUCONATE 1 APPLICATION(S): 213 SOLUTION TOPICAL at 22:18

## 2022-12-28 RX ADMIN — AMIODARONE HYDROCHLORIDE 400 MILLIGRAM(S): 400 TABLET ORAL at 21:01

## 2022-12-28 RX ADMIN — Medication 25 MILLIGRAM(S): at 06:16

## 2022-12-28 RX ADMIN — ATORVASTATIN CALCIUM 40 MILLIGRAM(S): 80 TABLET, FILM COATED ORAL at 21:02

## 2022-12-28 RX ADMIN — Medication 1: at 07:28

## 2022-12-28 RX ADMIN — AMIODARONE HYDROCHLORIDE 400 MILLIGRAM(S): 400 TABLET ORAL at 06:15

## 2022-12-28 RX ADMIN — Medication 25 GRAM(S): at 02:55

## 2022-12-28 RX ADMIN — INSULIN GLARGINE 26 UNIT(S): 100 INJECTION, SOLUTION SUBCUTANEOUS at 21:06

## 2022-12-28 RX ADMIN — Medication 1: at 12:13

## 2022-12-28 RX ADMIN — Medication 40 MILLIEQUIVALENT(S): at 02:55

## 2022-12-28 RX ADMIN — PANTOPRAZOLE SODIUM 40 MILLIGRAM(S): 20 TABLET, DELAYED RELEASE ORAL at 06:19

## 2022-12-28 RX ADMIN — OXYCODONE HYDROCHLORIDE 10 MILLIGRAM(S): 5 TABLET ORAL at 10:36

## 2022-12-28 NOTE — DIETITIAN INITIAL EVALUATION ADULT - OTHER INFO
-- Pt reports UBW 172lbs without any recent changes PTA. States he feels he has lost weight since admission. Dosing wt 172lbs (12/22). Current weight 178.3lbs (12/28). Pt with weight fluctuations in house, likely 2/2 perioperative fluid shifts. Will continue to monitor.  -- HbA1c 9.4%, per endo note: "Pt was diagnosed with T2DM recently (~3month ago) and was on Metformin PTA. Doesn't have glucose meter." Pt with limited nutrition knowledge, unable to state sources of carbohydrates and protein at RD visit.

## 2022-12-28 NOTE — DIETITIAN INITIAL EVALUATION ADULT - REASON INDICATOR FOR ASSESSMENT
Consult received for education for HbA1c 9.4%  Information obtained from pt, RN, medical record. Pt and wife Burmese speaking,  services utilized ID#[id number] & [id number] (first  disconnected). Pt's son not at bedside at time of visit.

## 2022-12-28 NOTE — PROGRESS NOTE ADULT - SUBJECTIVE AND OBJECTIVE BOX
DIABETES FOLLOW UP NOTE: Saw pt earlier today    Chief Complaint: Endocrine consult requested for management of T2DM    INTERVAL HX: Pt stable, tolerating POs with BG levels at goal while on present insulin doses. No hypoglycemia. Feeling well> still with some surgical pain not asking for pain meds so spoke to RN.        Review of Systems:  General: As above  Cardiovascular: No chest pain, palpitations  Respiratory: No SOB, no cough  GI: No nausea, vomiting, abdominal pain  Endocrine: No polyuria, polydipsia or S&Sx of hypoglycemia    Allergies    No Known Allergies    Intolerances      MEDICATIONS:  atorvastatin 40 milliGRAM(s) Oral at bedtime  insulin glargine Injectable (LANTUS) 26 Unit(s) SubCutaneous at bedtime  insulin lispro (ADMELOG) corrective regimen sliding scale   SubCutaneous three times a day before meals  insulin lispro Injectable (ADMELOG) 10 Unit(s) SubCutaneous three times a day before meals      PHYSICAL EXAM:  VITALS: T(C): 36 (12-28-22 @ 16:00)  T(F): 96.8 (12-28-22 @ 16:00), Max: 98 (12-28-22 @ 12:00)  HR: 72 (12-28-22 @ 16:00) (72 - 117)  BP: 116/56 (12-28-22 @ 16:00) (102/57 - 141/63)  RR:  (13 - 27)  SpO2:  (95% - 100%)  Wt(kg): --  GENERAL: Male sitting in chair in NAD. Son at bedside  Chest: Sternal incision D&I  Abdomen: Soft, nontender, non distended, + central adiposity  Extremities: Warm, no edema in all 4 exts  NEURO: Alert and able to answer simple questions. Encounter done in Chinese    LABS:  POCT Blood Glucose.: 119 mg/dL (12-28-22 @ 16:45)  POCT Blood Glucose.: 199 mg/dL (12-28-22 @ 12:08)  POCT Blood Glucose.: 168 mg/dL (12-28-22 @ 07:25)  POCT Blood Glucose.: 169 mg/dL (12-27-22 @ 21:31)  POCT Blood Glucose.: 180 mg/dL (12-27-22 @ 16:16)  POCT Blood Glucose.: 210 mg/dL (12-27-22 @ 11:24)  POCT Blood Glucose.: 174 mg/dL (12-27-22 @ 07:02)  POCT Blood Glucose.: 190 mg/dL (12-26-22 @ 21:39)  POCT Blood Glucose.: 201 mg/dL (12-26-22 @ 17:16)  POCT Blood Glucose.: 156 mg/dL (12-26-22 @ 16:00)  POCT Blood Glucose.: 148 mg/dL (12-26-22 @ 15:29)  POCT Blood Glucose.: 133 mg/dL (12-26-22 @ 14:03)  POCT Blood Glucose.: 158 mg/dL (12-26-22 @ 12:40)  POCT Blood Glucose.: 194 mg/dL (12-26-22 @ 12:08)  POCT Blood Glucose.: 160 mg/dL (12-26-22 @ 11:09)  POCT Blood Glucose.: 183 mg/dL (12-26-22 @ 09:49)  POCT Blood Glucose.: 183 mg/dL (12-26-22 @ 08:54)  POCT Blood Glucose.: 209 mg/dL (12-26-22 @ 07:49)  POCT Blood Glucose.: 152 mg/dL (12-26-22 @ 06:43)  POCT Blood Glucose.: 150 mg/dL (12-26-22 @ 06:17)  POCT Blood Glucose.: 147 mg/dL (12-26-22 @ 04:59)  POCT Blood Glucose.: 141 mg/dL (12-26-22 @ 03:45)  POCT Blood Glucose.: 156 mg/dL (12-26-22 @ 02:45)  POCT Blood Glucose.: 160 mg/dL (12-26-22 @ 01:44)  POCT Blood Glucose.: 182 mg/dL (12-26-22 @ 00:58)  POCT Blood Glucose.: 182 mg/dL (12-25-22 @ 22:06)  POCT Blood Glucose.: 161 mg/dL (12-25-22 @ 21:29)  POCT Blood Glucose.: 95 mg/dL (12-25-22 @ 19:59)  POCT Blood Glucose.: 122 mg/dL (12-25-22 @ 18:40)  POCT Blood Glucose.: 141 mg/dL (12-25-22 @ 18:10)  POCT Blood Glucose.: 153 mg/dL (12-25-22 @ 17:00)                            8.8    5.34  )-----------( 155      ( 28 Dec 2022 00:50 )             25.8       12-28    132<L>  |  93<L>  |  15  ----------------------------<  168<H>  3.7   |  29  |  0.82    eGFR: 90    Ca    8.6      12-28  Mg     1.8     12-28  Phos  3.9     12-28    TPro  6.0  /  Alb  3.2<L>  /  TBili  1.4<H>  /  DBili  x   /  AST  20  /  ALT  29  /  AlkPhos  87  12-28    Thyroid Function Tests:  12-07 @ 13:19 TSH -- FreeT4 -- T3 100 Anti TPO -- Anti Thyroglobulin Ab -- TSI --  12-02 @ 05:54 TSH 3.63 FreeT4 -- T3 -- Anti TPO -- Anti Thyroglobulin Ab -- TSI --      A1C with Estimated Average Glucose Result: 9.4 % (12-02-22 @ 05:54)      Estimated Average Glucose: 223 mg/dL (12-02-22 @ 05:54)        12-02 Chol 185 Direct LDL -- LDL calculated 110<H> HDL 34<L> Trig 208<H>

## 2022-12-28 NOTE — PROGRESS NOTE ADULT - ASSESSMENT
77M with T2DM and recently diagnosed CHF who had recent admission for cardiac work up. Patient found to have CAD (pLAD disease that was iFR positive) and moderate to severe aortic stenosis. Now s/p 1V CABG (LIMA to LAD) and SAVR on 12/22/22. Post op course c/b tachy-marisa (Afib RVR s/p IV amio and metoprolol, and undetermined bradycardia with pauses requiring intermittent pacing). EP now consulted for recommendations.     1) Post-op TBS Afib RVR/ junctional rhythm    -Patient with tachybrady due to AF post op  -Temp epicardial pacing wire set to VVI 86 bpm (thresholds at 2.5mA, output 10mA)  -Recommend to decrease pacing rate to 60 bpm.  -Continue to load with amiodarone 400mg TID (PO loaded started on 12/26, ~3g so far total including IV 12/25-12/26)  -Continue metoprolol  -Currently on Heparin drip for AC  -Pt is in and out of AF and SR, no plan for DCCV  -Will continue to assess the need for permanent pacemaker    77M with T2DM and recently diagnosed CHF who had recent admission for cardiac work up. Patient found to have CAD (pLAD disease that was iFR positive) and moderate to severe aortic stenosis. Now s/p 1V CABG (LIMA to LAD) and SAVR on 12/22/22. Post op course c/b tachy-marisa (Afib RVR s/p IV amio and metoprolol, and undetermined bradycardia with pauses requiring intermittent pacing). EP now consulted for recommendations.     1) Post-op TBS Afib RVR/ junctional rhythm    -Patient with tachybrady due to AF post op  -Temp epicardial pacing wire set to VVI 35 bpm (thresholds at 2.5mA, output 10mA)  -Continue to load with amiodarone 400mg TID (PO loaded started on 12/26, ~3g so far total including IV 12/25-12/26)  -Continue metoprolol  -Currently on Heparin drip for AC  -Pt is in and out of AF and SR, no plan for DCCV  -Will continue to assess the need for permanent pacemaker

## 2022-12-28 NOTE — PROGRESS NOTE ADULT - ASSESSMENT
76y/o M w/h/o recently diagnosed with uncontrolled T2D(A1C 9.4%) on Metformin. No known DM complications but now found to have CAD > s/p C 12/4 with prox LAD 70% stenosis, aortic stenosis. Also h/o of HLD, HF. Here for CT surgery evaluation for CABG/SAVR. Now s/p replacement, aortic valve, with MORIAH, CABG x 1 LIMA to LAD 12/22/22.  Endocrine consulted for hyperglycemia management. Tolerating POs with BG levels mostly at goal on present insulin doses> will continue present insulin regimen for now. BG goal 100 to 180s. No hypoglycemia.    Off note pt lives in [facility] and is planning to go back once he recuperates from surgery.

## 2022-12-28 NOTE — DIETITIAN INITIAL EVALUATION ADULT - PERSON TAUGHT/METHOD
Provided education on T2DM nutrition therapy. Provided education on foods containing carbohydrates, foods containing proteins, and portion sizes. Stressed the importance of a balanced meal to maintain blood glucose./verbal instruction/written material/teach back - (Patient repeats in own words)/patient instructed/spouse instructed

## 2022-12-28 NOTE — DIETITIAN INITIAL EVALUATION ADULT - REASON FOR ADMISSION
Pt is a 78y/o M w/h/o recently diagnosed with uncontrolled T2D(A1C 9.4%) on Metformin. No known DM complications but now found to have CAD > s/p C 12/4 with prox LAD 70% stenosis, aortic stenosis. Also h/o of HLD, HF. Here for CT surgery evaluation for CABG/SAVR. Now s/p replacement, aortic valve, with MORIAH, CABG x 1 LIMA to LAD 12/22/22.

## 2022-12-28 NOTE — DIETITIAN INITIAL EVALUATION ADULT - NS FNS DIET ORDER
Diet, Consistent Carbohydrate/No Snacks:   DASH/TLC {Sodium & Cholesterol Restricted} (DASH) (12-23-22 @ 05:28)

## 2022-12-28 NOTE — DIETITIAN INITIAL EVALUATION ADULT - ETIOLOGY
limited exposure to nutrition related topics  increased physiological demand for nutrients 2/2 surgical healing

## 2022-12-28 NOTE — PROGRESS NOTE ADULT - PROBLEM SELECTOR PLAN 1
- Test blood glucose values TID AC & QHS while eating regular meals and Q6H while NPO  - Continue with insulin Glargine 26 units QHS  - C/w insulin Lispro [id number] TID with meals, hold if NPO or if eating less than 50% of meals  - Continue with low dose Correctional scale TID with meals and add scale at hs   - Will adjust insulin doses as needed  - Please teach and allow pt/son to do own finger sticks and insulin injections under RN supervision  Please teach use of insulin syringe  Please document teach back  Discharge:  -Due to recent open heart surgery and elevated A1C levels needs to continue insulin therapy. However, pt is uninsured and lives out side [facility] so can discharge on mix insulin Novolin 70/30 bid> doses TBD  -C/w Metformin 1g bid  Please write Rxs for: Humulin 70/30 insulin plus 1/2cc insulin syringes plus glucose meter/strips/lancets> [facility] to get [facility] discount.  -Will need home care upon discharge due to new DM diagnosis and new insulin therapy  -Needs f/u at medicine clinic and endo clinic as well.

## 2022-12-28 NOTE — PROGRESS NOTE ADULT - SUBJECTIVE AND OBJECTIVE BOX
Patient seen and examined at the bedside.    Remains critically ill on continuous ICU monitoring.      Brief Summary:  78 yo M with CAD s/p AVR-t / CABG X 1 12/22/2022      24 Hour events:  - Continues to have some bradycardia, but currently in sinus rhythm  - EP consulted, will continue Amiodarone and beta blocker  - Bumex decreased to BID.      OBJECTIVE:  ICU Vital Signs Last 24 Hrs  T(C): 36.7 (28 Dec 2022 12:00), Max: 36.7 (28 Dec 2022 12:00)  T(F): 98 (28 Dec 2022 12:00), Max: 98 (28 Dec 2022 12:00)  HR: 88 (28 Dec 2022 12:00) (77 - 124)  BP: 141/63 (28 Dec 2022 12:00) (102/57 - 141/63)  BP(mean): 91 (28 Dec 2022 12:00) (72 - 91)  ABP: 184/60 (28 Dec 2022 10:00) (107/43 - 184/60)  ABP(mean): 92 (28 Dec 2022 10:00) (59 - 95)  RR: 23 (28 Dec 2022 12:00) (7 - 27)  SpO2: 96% (28 Dec 2022 12:00) (89% - 100%)    O2 Parameters below as of 28 Dec 2022 12:00  Patient On (Oxygen Delivery Method): nasal cannula  O2 Flow (L/min): 4             Physical Exam:   General: OOB to chair, no acute distress  Neurology: No focal deficits  Eyes: Bilateral pupils reactive   ENT/Neck: Neck supple  Respiratory: On nasal cannula  CV: regular, sinus  Abdominal: Soft, NT, ND +BS   Extremities: Warm       -------------------------------------------------------------------------------------------------------------------------------  Labs:                        8.8    5.34  )-----------( 155      ( 28 Dec 2022 00:50 )             25.8       PTT - ( 28 Dec 2022 11:38 )  PTT:39.3 sec, PTT - ( 28 Dec 2022 06:46 )  PTT:50.4 sec, PTT - ( 28 Dec 2022 01:25 )  PTT:44.9 sec, PTT - ( 27 Dec 2022 10:45 )  PTT:50.0 sec, PTT - ( 27 Dec 2022 05:59 )  PTT:52.0 sec, PTT - ( 27 Dec 2022 00:34 )  PTT:43.2 sec, PTT - ( 26 Dec 2022 17:59 )  PTT:43.4 sec, PTT - ( 26 Dec 2022 12:43 )  PTT:27.2 sec  132    |  93     |  15     ----------------------------<  168        ( 28 Dec 2022 00:49 )  3.7     |  29     |  0.82     Ca    8.6        ( 28 Dec 2022 00:49 )  Phos  3.9       ( 28 Dec 2022 00:49 )  Mg     1.8       ( 28 Dec 2022 00:49 )    TPro  6.0    /  Alb  3.2    /  TBili  1.4    /  DBili  x      /  AST  20     /  ALT  29     /  AlkPhos  87     ( 28 Dec 2022 00:49 )    LIVER FUNCTIONS - ( 28 Dec 2022 00:49 )  Alb: 3.2 g/dL / Pro: 6.0 g/dL / ALK PHOS: 87 U/L / ALT: 29 U/L / AST: 20 U/L / GGT: x           ABG - ( 28 Dec 2022 00:40 )  pH, Arterial: 7.49  pH, Blood: x     /  pCO2: 43    /  pO2: 89    / HCO3: 33    / Base Excess: 8.6   /  SaO2: 97.8        ------------------------------------------------------------------------------------------------------------------------------  Assessment:  78 yo M with CAD s/p AVR-t / CABG X 1 12/22/2022    At high risk for hemodynamic instability  Cardiac arrhythmias - Atrial fibrillation  Post op respiratory insufficiency  Acute blood loss anemia  Hyperglycemia      Plan:   ***Neuro***  Postoperative acute pain control with Tylenol, Gabapentin, and prns for pain management.     ***Cardiovascular***  At high risk for hemodynamic instability and cardiac arrhythmias.  Intermittent atrial fibrillation, currently in sinus  Continue Amiodarone and Beta blocker - appreciate EP input.  V wires working if needed.  Aggressive correction of electrolytes  ASA/Statin daily     ***Pulmonary***  Postoperative respiratory insufficiency  Deep breathing and coughing exercises.  Wean oxygen as able.    ***GI***  Tolerating  diet.   Protonix for GI prophylaxis   Bowel regimen with Miralax and senna     ***Renal***  Trend creatinine.  Bumex for diuresis. Dose decreased.  Replete electrolytes as indicated.    ***ID***  Off antibiotics.  Trend WBC count    ***Endocrine***  Hyperglycemia  Insulin infusion - Lantus and SS Insulin    ***Hematology***  Acute blood loss anemia - no transfusion indication currently.  Lovenox for VTE prophylaxis.      Patient requires continuous monitoring with bedside rhythm monitoring, pulse oximetry monitoring, and continuous central venous and arterial pressure monitoring; and intermittent blood gas analysis. Care plan discussed with the ICU care team.   Patient remained critical, at risk for life threatening decompensation.    I have spent 38 minutes providing critical care management to this patient.      Electronically signed: Aminah Pina MD

## 2022-12-28 NOTE — PROGRESS NOTE ADULT - PROBLEM SELECTOR PLAN 2
-LDL goal <50 due to uncontrolled T2DM plus recent heart surgery placing pt at risk of other CV events  -Pt   -Consider atorvastatin 80 mg daily. Pt on 40mg now per CTU team   - Manage per cardiac team  -Follow up levels as out pt

## 2022-12-28 NOTE — DIETITIAN INITIAL EVALUATION ADULT - ENERGY INTAKE
Pt consumed 100% of breakfast tray. Per previous endocrinology notes, pt receiving meals from home in addition to institutional meal trays causing hyperglycemia -pt states he will no longer eat outside foods while in hospital. Good PO otherwise per chart but pt states he has not been eating as much as usual.  Fair (50-75%)

## 2022-12-28 NOTE — DIETITIAN INITIAL EVALUATION ADULT - ORAL INTAKE PTA/DIET HISTORY
PTA pt reports fair PO intake, reports consuming 2-3 meals per day. States he was not following proper diet PTA but does not fully elaborate. Reports diet high in carbohydrates (rice, beans, fruit) but also consumes chicken. No noted/reported micronutrient supplementation PTA. NKFA or intolerances. Denies difficulty chewing/swallowing.

## 2022-12-28 NOTE — DIETITIAN INITIAL EVALUATION ADULT - PERTINENT MEDS FT
MEDICATIONS  (STANDING):  aMIOdarone    Tablet   Oral   aMIOdarone    Tablet 400 milliGRAM(s) Oral every 8 hours  aspirin enteric coated 81 milliGRAM(s) Oral daily  atorvastatin 40 milliGRAM(s) Oral at bedtime  bisacodyl Suppository 10 milliGRAM(s) Rectal once  chlorhexidine 2% Cloths 1 Application(s) Topical daily  heparin  Infusion 800 Unit(s)/Hr (11.5 mL/Hr) IV Continuous <Continuous>  insulin glargine Injectable (LANTUS) 26 Unit(s) SubCutaneous at bedtime  insulin lispro (ADMELOG) corrective regimen sliding scale   SubCutaneous three times a day before meals  insulin lispro Injectable (ADMELOG) 10 Unit(s) SubCutaneous three times a day before meals  metoprolol tartrate 25 milliGRAM(s) Oral three times a day  pantoprazole    Tablet 40 milliGRAM(s) Oral before breakfast  polyethylene glycol 3350 17 Gram(s) Oral daily  polyethylene glycol 3350 17 Gram(s) Oral daily  senna 2 Tablet(s) Oral at bedtime  sodium chloride 0.9%. 1000 milliLiter(s) (10 mL/Hr) IV Continuous <Continuous>    MEDICATIONS  (PRN):  acetaminophen     Tablet .. 650 milliGRAM(s) Oral every 6 hours PRN Mild Pain (1 - 3)  oxyCODONE    IR 5 milliGRAM(s) Oral every 4 hours PRN Moderate Pain (4 - 6)  oxyCODONE    IR 10 milliGRAM(s) Oral every 4 hours PRN Severe Pain (7 - 10)

## 2022-12-28 NOTE — DIETITIAN INITIAL EVALUATION ADULT - ADD RECOMMEND
-- Reinforce nutrition education as needed - RD remains available. Written materials provided, RD remains available to discuss with son when present.  -- Provide encouragement with PO intake, menu selections, and assistance with meals as needed.   -- Continue to monitor nutritional intake, labs, weights, BM, skin, clinical course.

## 2022-12-28 NOTE — DIETITIAN INITIAL EVALUATION ADULT - PERTINENT LABORATORY DATA
12-28    132<L>  |  93<L>  |  15  ----------------------------<  168<H>  3.7   |  29  |  0.82    Ca    8.6      28 Dec 2022 00:49  Phos  3.9     12-28  Mg     1.8     12-28    TPro  6.0  /  Alb  3.2<L>  /  TBili  1.4<H>  /  DBili  x   /  AST  20  /  ALT  29  /  AlkPhos  87  12-28  POCT Blood Glucose.: 168 mg/dL (12-28-22 @ 07:25)  A1C with Estimated Average Glucose Result: 9.4 % (12-02-22 @ 05:54)

## 2022-12-29 DIAGNOSIS — Z95.2 PRESENCE OF PROSTHETIC HEART VALVE: ICD-10-CM

## 2022-12-29 DIAGNOSIS — I48.91 UNSPECIFIED ATRIAL FIBRILLATION: ICD-10-CM

## 2022-12-29 LAB
ALBUMIN SERPL ELPH-MCNC: 3.3 G/DL — SIGNIFICANT CHANGE UP (ref 3.3–5)
ALP SERPL-CCNC: 103 U/L — SIGNIFICANT CHANGE UP (ref 40–120)
ALT FLD-CCNC: 24 U/L — SIGNIFICANT CHANGE UP (ref 10–45)
ANION GAP SERPL CALC-SCNC: 11 MMOL/L — SIGNIFICANT CHANGE UP (ref 5–17)
APTT BLD: 50.7 SEC — HIGH (ref 27.5–35.5)
APTT BLD: 52.2 SEC — HIGH (ref 27.5–35.5)
AST SERPL-CCNC: 20 U/L — SIGNIFICANT CHANGE UP (ref 10–40)
BILIRUB SERPL-MCNC: 1.2 MG/DL — SIGNIFICANT CHANGE UP (ref 0.2–1.2)
BUN SERPL-MCNC: 16 MG/DL — SIGNIFICANT CHANGE UP (ref 7–23)
CALCIUM SERPL-MCNC: 8.9 MG/DL — SIGNIFICANT CHANGE UP (ref 8.4–10.5)
CHLORIDE SERPL-SCNC: 91 MMOL/L — LOW (ref 96–108)
CO2 SERPL-SCNC: 30 MMOL/L — SIGNIFICANT CHANGE UP (ref 22–31)
CREAT SERPL-MCNC: 0.87 MG/DL — SIGNIFICANT CHANGE UP (ref 0.5–1.3)
EGFR: 89 ML/MIN/1.73M2 — SIGNIFICANT CHANGE UP
GLUCOSE BLDC GLUCOMTR-MCNC: 148 MG/DL — HIGH (ref 70–99)
GLUCOSE BLDC GLUCOMTR-MCNC: 149 MG/DL — HIGH (ref 70–99)
GLUCOSE BLDC GLUCOMTR-MCNC: 161 MG/DL — HIGH (ref 70–99)
GLUCOSE BLDC GLUCOMTR-MCNC: 246 MG/DL — HIGH (ref 70–99)
GLUCOSE SERPL-MCNC: 166 MG/DL — HIGH (ref 70–99)
HCT VFR BLD CALC: 25.4 % — LOW (ref 39–50)
HGB BLD-MCNC: 8.5 G/DL — LOW (ref 13–17)
MAGNESIUM SERPL-MCNC: 1.8 MG/DL — SIGNIFICANT CHANGE UP (ref 1.6–2.6)
MCHC RBC-ENTMCNC: 30.6 PG — SIGNIFICANT CHANGE UP (ref 27–34)
MCHC RBC-ENTMCNC: 33.5 GM/DL — SIGNIFICANT CHANGE UP (ref 32–36)
MCV RBC AUTO: 91.4 FL — SIGNIFICANT CHANGE UP (ref 80–100)
NRBC # BLD: 0 /100 WBCS — SIGNIFICANT CHANGE UP (ref 0–0)
PHOSPHATE SERPL-MCNC: 4.3 MG/DL — SIGNIFICANT CHANGE UP (ref 2.5–4.5)
PLATELET # BLD AUTO: 171 K/UL — SIGNIFICANT CHANGE UP (ref 150–400)
POTASSIUM SERPL-MCNC: 3.8 MMOL/L — SIGNIFICANT CHANGE UP (ref 3.5–5.3)
POTASSIUM SERPL-SCNC: 3.8 MMOL/L — SIGNIFICANT CHANGE UP (ref 3.5–5.3)
PROT SERPL-MCNC: 6.4 G/DL — SIGNIFICANT CHANGE UP (ref 6–8.3)
RBC # BLD: 2.78 M/UL — LOW (ref 4.2–5.8)
RBC # FLD: 13.5 % — SIGNIFICANT CHANGE UP (ref 10.3–14.5)
SODIUM SERPL-SCNC: 132 MMOL/L — LOW (ref 135–145)
WBC # BLD: 5.72 K/UL — SIGNIFICANT CHANGE UP (ref 3.8–10.5)
WBC # FLD AUTO: 5.72 K/UL — SIGNIFICANT CHANGE UP (ref 3.8–10.5)

## 2022-12-29 PROCEDURE — 99232 SBSQ HOSP IP/OBS MODERATE 35: CPT

## 2022-12-29 PROCEDURE — 99233 SBSQ HOSP IP/OBS HIGH 50: CPT

## 2022-12-29 PROCEDURE — 71045 X-RAY EXAM CHEST 1 VIEW: CPT | Mod: 26

## 2022-12-29 PROCEDURE — 93010 ELECTROCARDIOGRAM REPORT: CPT

## 2022-12-29 RX ORDER — COLCHICINE 0.6 MG
0.6 TABLET ORAL DAILY
Refills: 0 | Status: DISCONTINUED | OUTPATIENT
Start: 2022-12-29 | End: 2022-12-29

## 2022-12-29 RX ORDER — SODIUM CHLORIDE 9 MG/ML
3 INJECTION INTRAMUSCULAR; INTRAVENOUS; SUBCUTANEOUS EVERY 8 HOURS
Refills: 0 | Status: DISCONTINUED | OUTPATIENT
Start: 2022-12-29 | End: 2023-01-03

## 2022-12-29 RX ADMIN — Medication 81 MILLIGRAM(S): at 12:21

## 2022-12-29 RX ADMIN — Medication 12.5 MILLIGRAM(S): at 05:01

## 2022-12-29 RX ADMIN — HEPARIN SODIUM 12.5 UNIT(S)/HR: 5000 INJECTION INTRAVENOUS; SUBCUTANEOUS at 17:11

## 2022-12-29 RX ADMIN — Medication 12.5 MILLIGRAM(S): at 17:15

## 2022-12-29 RX ADMIN — ATORVASTATIN CALCIUM 40 MILLIGRAM(S): 80 TABLET, FILM COATED ORAL at 21:47

## 2022-12-29 RX ADMIN — PANTOPRAZOLE SODIUM 40 MILLIGRAM(S): 20 TABLET, DELAYED RELEASE ORAL at 05:01

## 2022-12-29 RX ADMIN — Medication 10 UNIT(S): at 17:15

## 2022-12-29 RX ADMIN — Medication 2: at 12:11

## 2022-12-29 RX ADMIN — AMIODARONE HYDROCHLORIDE 200 MILLIGRAM(S): 400 TABLET ORAL at 05:01

## 2022-12-29 RX ADMIN — POLYETHYLENE GLYCOL 3350 17 GRAM(S): 17 POWDER, FOR SOLUTION ORAL at 12:12

## 2022-12-29 RX ADMIN — SODIUM CHLORIDE 3 MILLILITER(S): 9 INJECTION INTRAMUSCULAR; INTRAVENOUS; SUBCUTANEOUS at 21:43

## 2022-12-29 RX ADMIN — SENNA PLUS 2 TABLET(S): 8.6 TABLET ORAL at 21:47

## 2022-12-29 RX ADMIN — CHLORHEXIDINE GLUCONATE 1 APPLICATION(S): 213 SOLUTION TOPICAL at 22:15

## 2022-12-29 RX ADMIN — INSULIN GLARGINE 26 UNIT(S): 100 INJECTION, SOLUTION SUBCUTANEOUS at 21:48

## 2022-12-29 RX ADMIN — Medication 10 UNIT(S): at 12:12

## 2022-12-29 RX ADMIN — SODIUM CHLORIDE 3 MILLILITER(S): 9 INJECTION INTRAMUSCULAR; INTRAVENOUS; SUBCUTANEOUS at 13:23

## 2022-12-29 NOTE — PROGRESS NOTE ADULT - SUBJECTIVE AND OBJECTIVE BOX
seen earlier today     Chief Complaint: Type 2 Diabetes Mellitus     INTERVAL HX: granddaughter carmen at bedside interpreting per pt request, pt was NPO for potential PPM this am & breakfast admelog was held , then procedure was canceled pt ate breakfast now with rebound hyperglycemia in setting of eating without prandial insulin. BG values otherwise at goal, pt tolerating po , granddaughter reports no further outside food       Review of Systems:  General: As above  Cardiovascular: No chest pain  Respiratory: No SOB  GI: No nausea, vomiting  Endocrine: no  S&Sx of hypoglycemia    Allergies    No Known Allergies    Intolerances      MEDICATIONS  (STANDING):  aMIOdarone    Tablet   Oral   aMIOdarone    Tablet 200 milliGRAM(s) Oral daily  aspirin enteric coated 81 milliGRAM(s) Oral daily  atorvastatin 40 milliGRAM(s) Oral at bedtime  bisacodyl Suppository 10 milliGRAM(s) Rectal once  chlorhexidine 2% Cloths 1 Application(s) Topical daily  heparin  Infusion 800 Unit(s)/Hr (12.5 mL/Hr) IV Continuous <Continuous>  insulin glargine Injectable (LANTUS) 26 Unit(s) SubCutaneous at bedtime  insulin lispro (ADMELOG) corrective regimen sliding scale   SubCutaneous three times a day before meals  insulin lispro Injectable (ADMELOG) 10 Unit(s) SubCutaneous three times a day before meals  metoprolol tartrate 12.5 milliGRAM(s) Oral every 12 hours  pantoprazole    Tablet 40 milliGRAM(s) Oral before breakfast  polyethylene glycol 3350 17 Gram(s) Oral daily  polyethylene glycol 3350 17 Gram(s) Oral daily  senna 2 Tablet(s) Oral at bedtime  sodium chloride 0.9% lock flush 3 milliLiter(s) IV Push every 8 hours  sodium chloride 0.9%. 1000 milliLiter(s) (10 mL/Hr) IV Continuous <Continuous>        atorvastatin   40 milliGRAM(s) Oral (12-28-22 @ 21:02)    insulin glargine Injectable (LANTUS)   26 Unit(s) SubCutaneous (12-28-22 @ 21:06)    insulin lispro (ADMELOG) corrective regimen sliding scale   2 Unit(s) SubCutaneous (12-29-22 @ 12:11)    insulin lispro Injectable (ADMELOG)   10 Unit(s) SubCutaneous (12-29-22 @ 12:12)   10 Unit(s) SubCutaneous (12-28-22 @ 16:53)        PHYSICAL EXAM:  VITALS: T(C): 36.7 (12-29-22 @ 10:35)  T(F): 98.1 (12-29-22 @ 10:35), Max: 98.1 (12-29-22 @ 10:35)  HR: 107 (12-29-22 @ 10:35) (72 - 107)  BP: 100/64 (12-29-22 @ 10:35) (95/52 - 146/60)  RR:  (15 - 30)  SpO2:  (93% - 100%)  Wt(kg): --  GENERAL: male sitting in chair  in NAD MSI AVINASH   Respiratory: Respirations unlabored   Extremities: Warm, no edema  NEURO: Alert , appropriate     LABS:  POCT Blood Glucose.: 246 mg/dL (12-29-22 @ 11:56)  POCT Blood Glucose.: 148 mg/dL (12-29-22 @ 06:50)  POCT Blood Glucose.: 148 mg/dL (12-28-22 @ 21:05)  POCT Blood Glucose.: 119 mg/dL (12-28-22 @ 16:45)  POCT Blood Glucose.: 199 mg/dL (12-28-22 @ 12:08)  POCT Blood Glucose.: 168 mg/dL (12-28-22 @ 07:25)  POCT Blood Glucose.: 169 mg/dL (12-27-22 @ 21:31)  POCT Blood Glucose.: 180 mg/dL (12-27-22 @ 16:16)  POCT Blood Glucose.: 210 mg/dL (12-27-22 @ 11:24)  POCT Blood Glucose.: 174 mg/dL (12-27-22 @ 07:02)  POCT Blood Glucose.: 190 mg/dL (12-26-22 @ 21:39)  POCT Blood Glucose.: 201 mg/dL (12-26-22 @ 17:16)  POCT Blood Glucose.: 156 mg/dL (12-26-22 @ 16:00)  POCT Blood Glucose.: 148 mg/dL (12-26-22 @ 15:29)  POCT Blood Glucose.: 133 mg/dL (12-26-22 @ 14:03)  POCT Blood Glucose.: 158 mg/dL (12-26-22 @ 12:40)                          8.5    5.72  )-----------( 171      ( 29 Dec 2022 00:56 )             25.4     12-29    132<L>  |  91<L>  |  16  ----------------------------<  166<H>  3.8   |  30  |  0.87    Ca    8.9      29 Dec 2022 00:56  Phos  4.3     12-29  Mg     1.8     12-29    TPro  6.4  /  Alb  3.3  /  TBili  1.2  /  DBili  x   /  AST  20  /  ALT  24  /  AlkPhos  103  12-29    eGFR: 89 mL/min/1.73m2 (29 Dec 2022 00:56)    12-02 Chol 185 Direct LDL -- LDL calculated 110<H> HDL 34<L> Trig 208<H>  Thyroid Function Tests:  12-07 @ 13:19 TSH -- FreeT4 -- T3 100 Anti TPO -- Anti Thyroglobulin Ab -- TSI --  12-02 @ 05:54 TSH 3.63 FreeT4 -- T3 -- Anti TPO -- Anti Thyroglobulin Ab -- TSI --      A1C with Estimated Average Glucose Result: 9.4 % (12-02-22 @ 05:54)    Estimated Average Glucose: 223 mg/dL (12-02-22 @ 05:54)        Diet, Consistent Carbohydrate/No Snacks:   DASH/TLC Sodium & Cholesterol Restricted (DASH) (12-23-22 @ 05:28) [Active]

## 2022-12-29 NOTE — PROGRESS NOTE ADULT - PROBLEM SELECTOR PLAN 3
12/23 CALE Atkinson following deferring PPM/DCCV   tacky- marisa    heparin GTT   Amiodarone load   metoprolol 12.5 BIDS  EPM VVI 35 VMA 10

## 2022-12-29 NOTE — PROGRESS NOTE ADULT - SUBJECTIVE AND OBJECTIVE BOX
Sarbjit Santana MD  Cardiology Fellow  [phone]  All Cardiology service information can be found 24/7 on [email or web address], password: [email or web address]    Patient seen and examined at bedside.  Patient with same reproducible pain from surgery  Telemetry reviewed w/ Dr. Atkinson, yesterday at 11am with PVcs, pauses with backup pacing  Since then with conducting apcs. Will continue to monitor for need for PPM     Review Of Systems: No chest pain, shortness of breath, or palpitations            Current Meds:  acetaminophen     Tablet .. 650 milliGRAM(s) Oral every 6 hours PRN  aMIOdarone    Tablet   Oral   aMIOdarone    Tablet 200 milliGRAM(s) Oral daily  aspirin enteric coated 81 milliGRAM(s) Oral daily  atorvastatin 40 milliGRAM(s) Oral at bedtime  bisacodyl Suppository 10 milliGRAM(s) Rectal once  chlorhexidine 2% Cloths 1 Application(s) Topical daily  heparin  Infusion 800 Unit(s)/Hr IV Continuous <Continuous>  insulin glargine Injectable (LANTUS) 26 Unit(s) SubCutaneous at bedtime  insulin lispro (ADMELOG) corrective regimen sliding scale   SubCutaneous three times a day before meals  insulin lispro Injectable (ADMELOG) 10 Unit(s) SubCutaneous three times a day before meals  metoprolol tartrate 12.5 milliGRAM(s) Oral every 12 hours  oxyCODONE    IR 5 milliGRAM(s) Oral every 4 hours PRN  oxyCODONE    IR 10 milliGRAM(s) Oral every 4 hours PRN  pantoprazole    Tablet 40 milliGRAM(s) Oral before breakfast  polyethylene glycol 3350 17 Gram(s) Oral daily  polyethylene glycol 3350 17 Gram(s) Oral daily  senna 2 Tablet(s) Oral at bedtime  sodium chloride 0.9%. 1000 milliLiter(s) IV Continuous <Continuous>    Vitals:  T(F): 96.8 (12-29), Max: 98 (12-28)  HR: 76 (12-29) (72 - 103)  BP: 127/76 (12-29) (95/52 - 146/60)  RR: 22 (12-29)  SpO2: 100% (12-29)  I&O's Summary    28 Dec 2022 07:01  -  29 Dec 2022 07:00  --------------------------------------------------------  IN: 936 mL / OUT: 2790 mL / NET: -1854 mL    Physical Exam:  Appearance: NAD, OOB sitting up in chair 	  HEENT: PERRL, EOMI	  Cardiovascular: Normal S1 S2, Irregular, No JVD, No murmurs  Respiratory: Lungs clear to auscultation	  Psychiatry: A & O x 3, Mood & affect appropriate  Gastrointestinal: Soft, Non-tender, + BS	  Skin: No rashes, Mediastinal surgical incision site C/D/I. Right neck central line dressing site C/D/I  Neurologic: Grossly intact  Extremities: No clubbing, cyanosis or edema  Vascular: Peripheral pulses palpable 2+ bilaterally               8.5    5.72  )-----------( 171      ( 29 Dec 2022 00:56 )             25.4     12-29    132<L>  |  91<L>  |  16  ----------------------------<  166<H>  3.8   |  30  |  0.87    Ca    8.9      29 Dec 2022 00:56  Phos  4.3     12-29  Mg     1.8     12-29    TPro  6.4  /  Alb  3.3  /  TBili  1.2  /  DBili  x   /  AST  20  /  ALT  24  /  AlkPhos  103  12-29    PTT - ( 28 Dec 2022 23:25 )  PTT:50.7 sec  CARDIAC MARKERS ( 22 Dec 2022 13:58 )  898 ng/L / x     / x     / 588 U/L / x     / 87.9 ng/mL    TELEMETRY:sinus rhythm, first degree av block. PACs followed by nonconducting p waves with ventricular pacing kicking in afterwards  11am yesterday with pauses after PVC requiring backup pacing    < from: Intra-Operative Transesophageal Echo (12.22.22 @ 11:30) >  Dimensions:    Normal Values:  LA:            2.0 - 4.0 cm  Ao:            2.0 - 3.8 cm  SEPTUM:        0.6 - 1.2 cm  PWT:           0.6 - 1.1 cm  LVIDd:         3.0 - 5.6 cm  LVIDs:         1.8 - 4.0 cm  EF (Visual Estimate): 55-60 %  ------------------------------------------------------------------------  Pre-Bypass Observations:  Mitral Valve: Normal mitral valve. Minimal mitral  regurgitation.  Aortic Valve/Aorta: Calcified trileaflet aortic valve with  restricted motion.  Vmax is 2.5m/s, mean gradient is  13mmHg, SOURAV by continuity equation is 1.1cm2, DI is 0.35,  consistent with moderate aortic stenosis. Mild aortic  regurgitation.  Simple atheroma noted in aortic arch/descending aorta.  Left Atrium: Normal left atrium.  Left Ventricle: Normal left ventricular systolic function.  No segmental wall motion abnormalities. No regional wall  motion abnormalities. Normal left ventricular internal  dimensions and wall thicknesses.  Right Heart: Normal right atrium. Normal right ventricular  size and function. Normal tricuspid valve. Minimal  tricuspid regurgitation. Normal pulmonic valve. Minimal  pulmonic regurgitation.  Pericardium/Pleura: Normal pericardium with no pericardial  effusion.  Hemodynamic: Color Doppler demonstrates no evidence of a  patent foramen ovale.  ------------------------------------------------------------------------  Post-Bypass Observations:    Status post CABGx1, AVR with Inspiris #25  Normal biventricular function  Bioprosthetic aortic valve is well-seated, there is no  paravalvular leak. The peak gradient [id number] and the mean  gradient is 3mmHg.The other valves are unchanged.  There is no aortic dissection seen in the ascending aorta.  ------------------------------------------------------------------------  Conclusions:  1. Normal mitral valve. Minimal mitral regurgitation.  2. Calcified trileaflet aortic valve with restricted  motion.  Vmax is 2.5m/s, mean gradient is 13mmHg, SOURAV by  continuity equation is 1.1cm2, DI is 0.35, consistent with  moderate aortic stenosis. Mild aortic regurgitation.  3. Simple atheroma noted in aortic arch/descending aorta.  4. Normal left atrium.  No left atrium or left atrial  appendage thrombus.  5. Normal left ventricular internal dimensions and wall  thicknesses.  6. Normal left ventricular systolic function. No segmental  wall motion abnormalities. No regional wall motion  abnormalities.  7. Normal right atrium.  8. Normal right ventricular size and function.  9. Normal tricuspid valve. Minimal tricuspid regurgitation.  10. Normal pulmonic valve. Minimal pulmonic regurgitation.  11. Color Doppler demonstrates no evidence of a patent  foramen ovale.  12. Normal pericardium with no pericardial effusion.

## 2022-12-29 NOTE — PROGRESS NOTE ADULT - ASSESSMENT
This is a 78y/o male on holiday  PMH T2DM and recently diagnosed CHF? who had recent admission for cardiac work up. Pt found to have CAD and aortic stenosis. At that time, pt tested positive for COVID, however grossly asymptomatic. Pt was d/c home for outpatient follow up with Dr Bowles. Now presents to ED with chest pain starting yesterday afternoon. During previous admission, s/p LHC on 12/4. Of note, pt had covid + pcr, asymptomatic and completed 10 day isolation.     12/22 underwent  AVR #25 Inspiris, CABG x 1 LIMA recovered in CTU   fluid resuscitation extubated  12/23Rapid afib  amiodarone ->SR  Seen by PT disposition to home   Hyperkalemic  - diuresied  12/25 Bradycardia EPM VVI 35 Ep consulted c/b tachy-marisa (Afib RVR s/p amio and metoprolol, and undetermined bradycardia). Yesterday noted to have pauses and bradycardia requiring intermittent pacing. PRBC x1 Endocrine consulted  12/26 heparin gtt  12/29 VSS transferred to floor NO PPm at this time on heparin gtt amiodarone load + PW EPM VVI 35 SR1 degree-  needs insulin teaching disposition to home

## 2022-12-29 NOTE — PROGRESS NOTE ADULT - SUBJECTIVE AND OBJECTIVE BOX
Subjective " I am ok today"    VITAL SIGNS    Telemetry:  SR1 degree    Vital Signs Last 24 Hrs  T(C): 36.7 (22 @ 10:35), Max: 36.7 (22 @ 12:00)  T(F): 98.1 (22 @ 10:35), Max: 98.1 (22 @ 10:35)  HR: 107 (22 @ 10:35) (72 - 107)  BP: 100/64 (22 @ 10:35) (95/52 - 146/60)  RR: 20 (22 @ 10:35) (15 - 30)  SpO2: 93% (22 @ 10:35) (93% - 100%)            @ 07:01  -   @ 07:00  --------------------------------------------------------  IN: 936 mL / OUT: 2790 mL / NET: -1854 mL     @ 07:01  -   @ 11:03  --------------------------------------------------------  IN: 37.5 mL / OUT: 200 mL / NET: -162.5 mL      Daily Weight in k.1 (29 Dec 2022 00:00)                         8.5    5.72  )-----------( 171      ( 29 Dec 2022 00:56 )             25.4         132<L>  |  91<L>  |  16  ----------------------------<  166<H>  3.8   |  30  |  0.87    Ca    8.9      29 Dec 2022 00:56  Phos  4.3       Mg     1.8         TPro  6.4  /  Alb  3.3  /  TBili  1.2  /  DBili  x   /  AST  20  /  ALT  24  /  AlkPhos  103  12-29        MEDICATIONS  (STANDING):  aMIOdarone    Tablet   Oral   aMIOdarone    Tablet 200 milliGRAM(s) Oral daily  aspirin enteric coated 81 milliGRAM(s) Oral daily  atorvastatin 40 milliGRAM(s) Oral at bedtime  bisacodyl Suppository 10 milliGRAM(s) Rectal once  chlorhexidine 2% Cloths 1 Application(s) Topical daily  heparin  Infusion 800 Unit(s)/Hr (12.5 mL/Hr) IV Continuous <Continuous>  insulin glargine Injectable (LANTUS) 26 Unit(s) SubCutaneous at bedtime  insulin lispro (ADMELOG) corrective regimen sliding scale   SubCutaneous three times a day before meals  insulin lispro Injectable (ADMELOG) 10 Unit(s) SubCutaneous three times a day before meals  metoprolol tartrate 12.5 milliGRAM(s) Oral every 12 hours  pantoprazole    Tablet 40 milliGRAM(s) Oral before breakfast  polyethylene glycol 3350 17 Gram(s) Oral daily  polyethylene glycol 3350 17 Gram(s) Oral daily  senna 2 Tablet(s) Oral at bedtime  sodium chloride 0.9%. 1000 milliLiter(s) (10 mL/Hr) IV Continuous <Continuous>    MEDICATIONS  (PRN):  acetaminophen     Tablet .. 650 milliGRAM(s) Oral every 6 hours PRN Mild Pain (1 - 3)  oxyCODONE    IR 5 milliGRAM(s) Oral every 4 hours PRN Moderate Pain (4 - 6)  oxyCODONE    IR 10 milliGRAM(s) Oral every 4 hours PRN Severe Pain (7 - 10)      CAPILLARY BLOOD GLUCOSE      POCT Blood Glucose.: 148 mg/dL (29 Dec 2022 06:50)  POCT Blood Glucose.: 148 mg/dL (28 Dec 2022 21:05)  POCT Blood Glucose.: 119 mg/dL (28 Dec 2022 16:45)  POCT Blood Glucose.: 199 mg/dL (28 Dec 2022 12:08)            Pacing Wires        [x  ]   Settings:  x4 VVI 35 VMA 10                              Coumadin    [ ] YES          [  ]      NO         Reason:         MEDICATIONS  (STANDING):  aMIOdarone    Tablet   Oral   aMIOdarone    Tablet 200 milliGRAM(s) Oral daily  aspirin enteric coated 81 milliGRAM(s) Oral daily  atorvastatin 40 milliGRAM(s) Oral at bedtime  bisacodyl Suppository 10 milliGRAM(s) Rectal once  chlorhexidine 2% Cloths 1 Application(s) Topical daily  heparin  Infusion 800 Unit(s)/Hr (12.5 mL/Hr) IV Continuous <Continuous>  insulin glargine Injectable (LANTUS) 26 Unit(s) SubCutaneous at bedtime  insulin lispro (ADMELOG) corrective regimen sliding scale   SubCutaneous three times a day before meals  insulin lispro Injectable (ADMELOG) 10 Unit(s) SubCutaneous three times a day before meals  metoprolol tartrate 12.5 milliGRAM(s) Oral every 12 hours  pantoprazole    Tablet 40 milliGRAM(s) Oral before breakfast  polyethylene glycol 3350 17 Gram(s) Oral daily  polyethylene glycol 3350 17 Gram(s) Oral daily  senna 2 Tablet(s) Oral at bedtime  sodium chloride 0.9%. 1000 milliLiter(s) (10 mL/Hr) IV Continuous <Continuous>    MEDICATIONS  (PRN):  acetaminophen     Tablet .. 650 milliGRAM(s) Oral every 6 hours PRN Mild Pain (1 - 3)  oxyCODONE    IR 5 milliGRAM(s) Oral every 4 hours PRN Moderate Pain (4 - 6)  oxyCODONE    IR 10 milliGRAM(s) Oral every 4 hours PRN Severe Pain (7 - 10)                            PHYSICAL EXAM        Neurology: alert and oriented x 3, nonfocal, no gross deficits    CV :[id number]    Sternal Wound :  AVINASH stenum stable + PW x4    Stable    Lungs: B/l cta on room air    Abdomen: soft, nontender, nondistended, positive bowel sounds, last bowel movement     :  Voids               Extremities:    warm well perfused  equal strength throughout   B/llw warm well perfused + DP                                      Physical Therapy Rec:   Home  [ x ]   Home w/ PT  [  ]  Rehab  [  ]    Discussed with Cardiothoracic Team at AM rounds.

## 2022-12-29 NOTE — PROGRESS NOTE ADULT - SUBJECTIVE AND OBJECTIVE BOX
CRITICAL CARE ATTENDING - CTICU    MEDICATIONS  (STANDING):  aMIOdarone    Tablet   Oral   aMIOdarone    Tablet 200 milliGRAM(s) Oral daily  aspirin enteric coated 81 milliGRAM(s) Oral daily  atorvastatin 40 milliGRAM(s) Oral at bedtime  bisacodyl Suppository 10 milliGRAM(s) Rectal once  chlorhexidine 2% Cloths 1 Application(s) Topical daily  heparin  Infusion 800 Unit(s)/Hr (12.5 mL/Hr) IV Continuous <Continuous>  insulin glargine Injectable (LANTUS) 26 Unit(s) SubCutaneous at bedtime  insulin lispro (ADMELOG) corrective regimen sliding scale   SubCutaneous three times a day before meals  insulin lispro Injectable (ADMELOG) 10 Unit(s) SubCutaneous three times a day before meals  metoprolol tartrate 12.5 milliGRAM(s) Oral every 12 hours  pantoprazole    Tablet 40 milliGRAM(s) Oral before breakfast  polyethylene glycol 3350 17 Gram(s) Oral daily  polyethylene glycol 3350 17 Gram(s) Oral daily  senna 2 Tablet(s) Oral at bedtime  sodium chloride 0.9%. 1000 milliLiter(s) (10 mL/Hr) IV Continuous <Continuous>                                    8.5    5.72  )-----------( 171      ( 29 Dec 2022 00:56 )             25.4           132<L>  |  91<L>  |  16  ----------------------------<  166<H>  3.8   |  30  |  0.87    Ca    8.9      29 Dec 2022 00:56  Phos  4.3       Mg     1.8         TPro  6.4  /  Alb  3.3  /  TBili  1.2  /  DBili  x   /  AST  20  /  ALT  24  /  AlkPhos  103        PTT - ( 28 Dec 2022 23:25 )  PTT:50.7 sec        Daily     Daily Weight in k.1 (29 Dec 2022 00:00)       @ 07:01  -   @ 07:00  --------------------------------------------------------  IN: 762 mL / OUT: 4025 mL / NET: -3263 mL     @ 07:01   @ 06:14  --------------------------------------------------------  IN: 911 mL / OUT: 2490 mL / NET: -1579 mL        Critically Ill patient  : [ ] preoperative ,   [x] post operative    Requires :  [] Arterial Line   [] Central Line  [ ] PA catheter  [ ] IABP  [ ] ECMO  [ ] LVAD  [ ] Ventilator  [x] pacemaker- TPM                      [x ] ABG's     [ x] Pulse Oxymetry Monitoring  Bedside evaluation , monitoring , treatment of hemodynamics , fluids , IVP/ IVCD meds.        Diagnosis:     POD 7 - AVR, C1L     Plan for PPM today    Hemodynamic lability,  instability. Requires IVCD [ ] vasopressors [ ] inotropes  [ ] vasodilator  [x] IVSS fluid  to maintain MAP, perfusion, C.I.     CHF- acute [ x]   chronic [x ]    systolic [ ]   diastolic [ x]  Valvular [ ]          - Echo- EF -             [ ] RV dysfunction          - Cxr-cardiomegally, edema          - Clinical-  [ ]inotropes   [ ]pressors   [ ]diuresis   [ ]IABP   [ ]ECMO   [ ]LVAD   [ ]Respiratory Failure    tachy / Josh     Temporary pacemaker (TPM) interrogation and setting.     Requires  [  ]DDD  [x ]VVI    [ ]AII  temporary pacing at  80  mi     to maintain HR, MAP, CI, and perfusion.     IVCD anticoagulation with [x] Heparin  [ ] Argatroban for Tachy / Josh - hold this AM for PPM    Hyponatremia     Requires chest PT, pulmonary toilet, suctioning to maintain SaO2,  patent airway and treat atelectasis.     DM2 - ISS, Lantus     Requires bedside physical therapy, mobilization and total longterm care.         By signing my name below, I, Maria D'Amico, attest that this documentation has been prepared under the direction and in the presence of Silverio Maxwell MD.   Electronically Signed: Maria D'Amico, Scribe 22 @ 06:14      Discussed with CT surgeon, Physician Assistant - Nurse Practitioner- Critical care medicine team.   Discussed at  AM / PM rounds.   Chart, labs , films reviewed.    Cumulative Critical Care Time Given Today:  CRITICAL CARE ATTENDING - CTICU    MEDICATIONS  (STANDING):  aMIOdarone    Tablet   Oral   aMIOdarone    Tablet 200 milliGRAM(s) Oral daily  aspirin enteric coated 81 milliGRAM(s) Oral daily  atorvastatin 40 milliGRAM(s) Oral at bedtime  bisacodyl Suppository 10 milliGRAM(s) Rectal once  chlorhexidine 2% Cloths 1 Application(s) Topical daily  heparin  Infusion 800 Unit(s)/Hr (12.5 mL/Hr) IV Continuous <Continuous>  insulin glargine Injectable (LANTUS) 26 Unit(s) SubCutaneous at bedtime  insulin lispro (ADMELOG) corrective regimen sliding scale   SubCutaneous three times a day before meals  insulin lispro Injectable (ADMELOG) 10 Unit(s) SubCutaneous three times a day before meals  metoprolol tartrate 12.5 milliGRAM(s) Oral every 12 hours  pantoprazole    Tablet 40 milliGRAM(s) Oral before breakfast  polyethylene glycol 3350 17 Gram(s) Oral daily  polyethylene glycol 3350 17 Gram(s) Oral daily  senna 2 Tablet(s) Oral at bedtime  sodium chloride 0.9%. 1000 milliLiter(s) (10 mL/Hr) IV Continuous <Continuous>                                    8.5    5.72  )-----------( 171      ( 29 Dec 2022 00:56 )             25.4           132<L>  |  91<L>  |  16  ----------------------------<  166<H>  3.8   |  30  |  0.87    Ca    8.9      29 Dec 2022 00:56  Phos  4.3       Mg     1.8         TPro  6.4  /  Alb  3.3  /  TBili  1.2  /  DBili  x   /  AST  20  /  ALT  24  /  AlkPhos  103        PTT - ( 28 Dec 2022 23:25 )  PTT:50.7 sec        Daily     Daily Weight in k.1 (29 Dec 2022 00:00)       @ 07:01  -   @ 07:00  --------------------------------------------------------  IN: 762 mL / OUT: 4025 mL / NET: -3263 mL     @ 07:01   @ 06:14  --------------------------------------------------------  IN: 911 mL / OUT: 2490 mL / NET: -1579 mL        Critically Ill patient  : [ ] preoperative ,   [x] post operative    Requires :  [] Arterial Line   [] Central Line  [ ] PA catheter  [ ] IABP  [ ] ECMO  [ ] LVAD  [ ] Ventilator  [x] pacemaker- TPM                      [x ] ABG's     [ x] Pulse Oxymetry Monitoring  Bedside evaluation , monitoring , treatment of hemodynamics , fluids , IVP/ IVCD meds.        Diagnosis:     POD 7 - AVR, C1L     Plan for PPM today    Hemodynamic lability,  instability. Requires IVCD [ ] vasopressors [ ] inotropes  [ ] vasodilator  [x] IVSS fluid  to maintain MAP, perfusion, C.I.     CHF- acute [ x]   chronic [x ]    systolic [ ]   diastolic [ x]  Valvular [ ]          - Echo- EF -             [ ] RV dysfunction          - Cxr-cardiomegally, edema          - Clinical-  [ ]inotropes   [ ]pressors   [ ]diuresis   [ ]IABP   [ ]ECMO   [ ]LVAD   [ ]Respiratory Failure    tachy / Josh     Temporary pacemaker (TPM) interrogation and setting.     Bradycardia / pauses    Requires  [  ]DDD  [x ]VVI    [ ]AII  temporary pacing at  80  mi     to maintain HR, MAP, CI, and perfusion.     IVCD anticoagulation with [x] Heparin  [ ] Argatroban for Tachy / Josh - hold this AM for PPM    Hyponatremia     Requires chest PT, pulmonary toilet, suctioning to maintain SaO2,  patent airway and treat atelectasis.     DM2 - ISS, Lantus     Requires bedside physical therapy, mobilization and total care home care.         By signing my name below, I, Maria D'Amico, attest that this documentation has been prepared under the direction and in the presence of Silverio Maxwell MD.   Electronically Signed: Maria D'Amico, Scribe 22 @ 06:14    Silverio SMALLWOOD, personally performed the services described in this documentation. All medical record entries made by the scribe were at my direction and in my presence. I have reviewed the chart and agree that the record reflects my personal performance and is accurate and complete.   Silverio Maxwell MD.       Discussed with CT surgeon, Physician Assistant - Nurse Practitioner- Critical care medicine team.   Discussed at  AM / PM rounds.   Chart, labs , films reviewed.    Cumulative Critical Care Time Given Today:  30 min

## 2022-12-29 NOTE — PROGRESS NOTE ADULT - PROBLEM SELECTOR PLAN 2
s/p AVR CABG 1 on 12/22  Heparin GTT  aspirin enteric coated 81 milliGRAM(s) Oral daily  atorvastatin 40 milliGRAM(s) Oral at bedtime  metoprolol tartrate 12.5 milliGRAM(s) Oral every 12 hours  pantoprazole    Tablet 40 milliGRAM(s) Oral before breakfast   + PW EPm VVI 35   ambulate tid  incentive spirometry   dispotion to home

## 2022-12-29 NOTE — PROGRESS NOTE ADULT - ASSESSMENT
Patient is a 77 M with recently diagnosed T2D, CHF, CAD s/p [facility] 12/4 with prox LAD 70% stenosis, aortic stenosis  s/p  CABG/SAVR. 12/22     T2DM   A1C 9.4%  Home medications: Metformin 1g BID started December 2022   While inpatient:  - BG Goal 100-180mg/dl   - FS TID AC qHS or q6h if NPO   - C/w Lantus 26 units sq qhs, if npo p mn can reduce to 22 units sq x1  - C/w Admelog 10 units TID AC  hold if NPO or if eating less than 50% of meals  - low dose Correctional scale TID with meals  - low dose Correctional scale QHS  - RD Consult   Discharge planning:  Send Rxs to [facility] for uninsured discount   Send Rx for Novolin 70/30 BID ( Final doses TBD)  vial and syringe + Metformin 1000mg BID   Send Rx for glucometer, test strips, lancets, alcohol pads, and Syringes  At home, Patient should check FSBG premeals and bedtime. Pt should call their doctor when FSBG <70 or above >400 and or consistently above 200s as changes in the regimen will have to be made.  Recommend outpatient routine dilated eye exam/ophthalmology f/u; podiatry referral and endocrinology   -Primary team should arrange medicine clinic follow up prior to discharge as pt is uninsured and visiting from [facility] & planning to go back once he recuperates from surgery. & the medicine clinic can address multiple medical concerns at once. phone: [phone]  - If [place] remains in [place] and wishes to establish endocrine care can  PATIENT ACCESS SERVICES  [phone]  For all [facility] Endocrine Practices phone numbers and locations throughout [facility], and [place].        2. HTN  - BP goal 130/80  - Manage per primary team     3. HLD  - Continue with high intensity statin atorvastatin 80 mg daily   - Manage as outpatient      4. CAD  - Manage per CT surgery team   - Optimize BP and glucose prior to CABG    Contact via Microsoft Teams during business hours  On evenings and weekends (or if no response on Microsoft Teams) please call [phone] or page endocrine fellow on call.   For nonurgent matters please email [email or web address]    Please note that this patient may be followed by different provider tomorrow.  Notify endocrine 24 hours prior to discharge for final recommendations    greater than 50% of the encounter was spent counseling and/or coordination of care.  30 minutes spent on total encounter; The necessity of the time spent during the encounter on this date of service was due to development of plan of care/coordination of care/glycemic control through review of labs, blood glucose values and vital signs.  Patient is a 77 M with recently diagnosed T2D, CHF, CAD s/p [facility] 12/4 with prox LAD 70% stenosis, aortic stenosis  s/p  CABG/SAVR. 12/22     T2DM   A1C 9.4%  Home medications: Metformin 1g BID started December 2022   While inpatient:  - BG Goal 100-180mg/dl   - FS TID AC qHS or q6h if NPO   - C/w Lantus 26 units sq qhs, if npo p mn can reduce to 22 units sq x1  - C/w Admelog 10 units TID AC  hold if NPO or if eating less than 50% of meals  - low dose Correctional scale TID with meals  - low dose Correctional scale QHS  - RD Consult   Discharge planning:  Send Rxs to [facility] for uninsured discount   Send Rx for Novolin 70/30 BID ( Final doses TBD)  vial and syringe + Metformin 1000mg BID   Send Rx for glucometer, test strips, lancets, alcohol pads, and Syringes  At home, Patient should check FSBG premeals and bedtime. Pt should call their doctor when FSBG <70 or above >400 and or consistently above 200s as changes in the regimen will have to be made.  Recommend outpatient routine dilated eye exam/ophthalmology f/u; podiatry referral and endocrinology   -Primary team should arrange medicine clinic follow up prior to discharge as pt is uninsured and visiting from [facility] & planning to go back once he recuperates from surgery. & the medicine clinic can address multiple medical concerns at once. phone: [phone]  - If [place] remains in [place] and wishes to establish endocrine care can  PATIENT ACCESS SERVICES  [phone]  For all [facility] Endocrine Practices phone numbers and locations throughout [facility], and [place].        2. HTN  - BP goal 130/80  - Manage per primary team     3. HLD  - Continue with high intensity statin atorvastatin 80 mg daily   - Manage as outpatient      4. CAD  - Manage per CT surgery team   - Optimize BP and glucose prior to CABG    discussed with pt/family & Kia NUNEZ   Contact via Microsoft Teams during business hours  On evenings and weekends (or if no response on Microsoft Teams) please call [phone] or page endocrine fellow on call.   For nonurgent matters please email [email or web address]    Please note that this patient may be followed by different provider tomorrow.  Notify endocrine 24 hours prior to discharge for final recommendations    greater than 50% of the encounter was spent counseling and/or coordination of care.  30 minutes spent on total encounter; The necessity of the time spent during the encounter on this date of service was due to development of plan of care/coordination of care/glycemic control through review of labs, blood glucose values and vital signs.

## 2022-12-29 NOTE — PROGRESS NOTE ADULT - ASSESSMENT
77M with T2DM and recently diagnosed CHF who had recent admission for cardiac work up. Patient found to have CAD (pLAD disease that was iFR positive) and moderate to severe aortic stenosis. Now s/p 1V CABG (LIMA to LAD) and SAVR on 12/22/22. Post op course c/b tachy-marsia (Afib RVR s/p IV amio and metoprolol, and undetermined bradycardia with pauses requiring intermittent pacing). EP now consulted for recommendations.     Interval events:Yesterday at 11am backup pacing after PVCs, pauses. More recently tele with conducting apcs     1) Post-op TBS Afib RVR/ junctional rhythm    -Patient with tachybrady due to AF post op  -Temp epicardial pacing wire set to VVI 35 bpm (thresholds at 2.5mA, output 10mA)  -Continue amiodarone, s/p load (now on 200mg daily)  -Continue metoprolol  -Currently on Heparin drip for AC  -Pt is in and out of AF and SR, no plan for DCCV  -Will continue to assess the need for permanent pacemaker w/ telemetry review

## 2022-12-30 LAB
ALBUMIN SERPL ELPH-MCNC: 3.5 G/DL — SIGNIFICANT CHANGE UP (ref 3.3–5)
ALP SERPL-CCNC: 97 U/L — SIGNIFICANT CHANGE UP (ref 40–120)
ALT FLD-CCNC: 21 U/L — SIGNIFICANT CHANGE UP (ref 10–45)
ANION GAP SERPL CALC-SCNC: 9 MMOL/L — SIGNIFICANT CHANGE UP (ref 5–17)
APTT BLD: 43.7 SEC — HIGH (ref 27.5–35.5)
APTT BLD: 56.5 SEC — HIGH (ref 27.5–35.5)
APTT BLD: 64.3 SEC — HIGH (ref 27.5–35.5)
AST SERPL-CCNC: 16 U/L — SIGNIFICANT CHANGE UP (ref 10–40)
BILIRUB SERPL-MCNC: 1.4 MG/DL — HIGH (ref 0.2–1.2)
BUN SERPL-MCNC: 16 MG/DL — SIGNIFICANT CHANGE UP (ref 7–23)
CALCIUM SERPL-MCNC: 9.3 MG/DL — SIGNIFICANT CHANGE UP (ref 8.4–10.5)
CHLORIDE SERPL-SCNC: 98 MMOL/L — SIGNIFICANT CHANGE UP (ref 96–108)
CO2 SERPL-SCNC: 29 MMOL/L — SIGNIFICANT CHANGE UP (ref 22–31)
CREAT SERPL-MCNC: 0.94 MG/DL — SIGNIFICANT CHANGE UP (ref 0.5–1.3)
EGFR: 83 ML/MIN/1.73M2 — SIGNIFICANT CHANGE UP
GLUCOSE BLDC GLUCOMTR-MCNC: 170 MG/DL — HIGH (ref 70–99)
GLUCOSE BLDC GLUCOMTR-MCNC: 174 MG/DL — HIGH (ref 70–99)
GLUCOSE BLDC GLUCOMTR-MCNC: 194 MG/DL — HIGH (ref 70–99)
GLUCOSE BLDC GLUCOMTR-MCNC: 242 MG/DL — HIGH (ref 70–99)
GLUCOSE SERPL-MCNC: 155 MG/DL — HIGH (ref 70–99)
HCT VFR BLD CALC: 27.6 % — LOW (ref 39–50)
HGB BLD-MCNC: 9.2 G/DL — LOW (ref 13–17)
INR BLD: 1.24 RATIO — HIGH (ref 0.88–1.16)
MCHC RBC-ENTMCNC: 30.5 PG — SIGNIFICANT CHANGE UP (ref 27–34)
MCHC RBC-ENTMCNC: 33.3 GM/DL — SIGNIFICANT CHANGE UP (ref 32–36)
MCV RBC AUTO: 91.4 FL — SIGNIFICANT CHANGE UP (ref 80–100)
NRBC # BLD: 0 /100 WBCS — SIGNIFICANT CHANGE UP (ref 0–0)
PLATELET # BLD AUTO: 200 K/UL — SIGNIFICANT CHANGE UP (ref 150–400)
POTASSIUM SERPL-MCNC: 4.6 MMOL/L — SIGNIFICANT CHANGE UP (ref 3.5–5.3)
POTASSIUM SERPL-SCNC: 4.6 MMOL/L — SIGNIFICANT CHANGE UP (ref 3.5–5.3)
PROT SERPL-MCNC: 6.8 G/DL — SIGNIFICANT CHANGE UP (ref 6–8.3)
PROTHROM AB SERPL-ACNC: 14.3 SEC — HIGH (ref 10.5–13.4)
RBC # BLD: 3.02 M/UL — LOW (ref 4.2–5.8)
RBC # FLD: 13.6 % — SIGNIFICANT CHANGE UP (ref 10.3–14.5)
SODIUM SERPL-SCNC: 136 MMOL/L — SIGNIFICANT CHANGE UP (ref 135–145)
WBC # BLD: 5.54 K/UL — SIGNIFICANT CHANGE UP (ref 3.8–10.5)
WBC # FLD AUTO: 5.54 K/UL — SIGNIFICANT CHANGE UP (ref 3.8–10.5)

## 2022-12-30 PROCEDURE — 71045 X-RAY EXAM CHEST 1 VIEW: CPT | Mod: 26

## 2022-12-30 PROCEDURE — 99232 SBSQ HOSP IP/OBS MODERATE 35: CPT

## 2022-12-30 RX ORDER — INSULIN GLARGINE 100 [IU]/ML
28 INJECTION, SOLUTION SUBCUTANEOUS AT BEDTIME
Refills: 0 | Status: DISCONTINUED | OUTPATIENT
Start: 2022-12-30 | End: 2023-01-02

## 2022-12-30 RX ORDER — METOPROLOL TARTRATE 50 MG
12.5 TABLET ORAL ONCE
Refills: 0 | Status: COMPLETED | OUTPATIENT
Start: 2022-12-30 | End: 2022-12-30

## 2022-12-30 RX ORDER — METOPROLOL TARTRATE 50 MG
50 TABLET ORAL DAILY
Refills: 0 | Status: DISCONTINUED | OUTPATIENT
Start: 2022-12-31 | End: 2022-12-31

## 2022-12-30 RX ORDER — METOCLOPRAMIDE HCL 10 MG
5 TABLET ORAL ONCE
Refills: 0 | Status: COMPLETED | OUTPATIENT
Start: 2022-12-30 | End: 2022-12-30

## 2022-12-30 RX ADMIN — Medication 10 UNIT(S): at 17:27

## 2022-12-30 RX ADMIN — SODIUM CHLORIDE 3 MILLILITER(S): 9 INJECTION INTRAMUSCULAR; INTRAVENOUS; SUBCUTANEOUS at 11:36

## 2022-12-30 RX ADMIN — POLYETHYLENE GLYCOL 3350 17 GRAM(S): 17 POWDER, FOR SOLUTION ORAL at 11:32

## 2022-12-30 RX ADMIN — AMIODARONE HYDROCHLORIDE 200 MILLIGRAM(S): 400 TABLET ORAL at 06:34

## 2022-12-30 RX ADMIN — Medication 81 MILLIGRAM(S): at 11:32

## 2022-12-30 RX ADMIN — SENNA PLUS 2 TABLET(S): 8.6 TABLET ORAL at 21:48

## 2022-12-30 RX ADMIN — Medication 2: at 07:45

## 2022-12-30 RX ADMIN — ATORVASTATIN CALCIUM 40 MILLIGRAM(S): 80 TABLET, FILM COATED ORAL at 21:48

## 2022-12-30 RX ADMIN — Medication 10 UNIT(S): at 11:31

## 2022-12-30 RX ADMIN — Medication 1: at 17:26

## 2022-12-30 RX ADMIN — PANTOPRAZOLE SODIUM 40 MILLIGRAM(S): 20 TABLET, DELAYED RELEASE ORAL at 06:34

## 2022-12-30 RX ADMIN — Medication 12.5 MILLIGRAM(S): at 17:27

## 2022-12-30 RX ADMIN — Medication 10 UNIT(S): at 08:17

## 2022-12-30 RX ADMIN — Medication 10 MILLIGRAM(S): at 13:33

## 2022-12-30 RX ADMIN — SODIUM CHLORIDE 3 MILLILITER(S): 9 INJECTION INTRAMUSCULAR; INTRAVENOUS; SUBCUTANEOUS at 22:03

## 2022-12-30 RX ADMIN — INSULIN GLARGINE 28 UNIT(S): 100 INJECTION, SOLUTION SUBCUTANEOUS at 21:47

## 2022-12-30 RX ADMIN — Medication 12.5 MILLIGRAM(S): at 06:34

## 2022-12-30 RX ADMIN — Medication 1: at 11:31

## 2022-12-30 RX ADMIN — Medication 5 MILLIGRAM(S): at 13:10

## 2022-12-30 RX ADMIN — SODIUM CHLORIDE 3 MILLILITER(S): 9 INJECTION INTRAMUSCULAR; INTRAVENOUS; SUBCUTANEOUS at 06:34

## 2022-12-30 NOTE — PROGRESS NOTE ADULT - SUBJECTIVE AND OBJECTIVE BOX
seen earlier today     Chief Complaint: Type 2 Diabetes Mellitus     INTERVAL HX:  FBG above goal, prandial BG at/above goal   daugther at bedside called granddaughter Kristen to interpret- noted gatorade & jello & juice at bedside- per staff  pt has been eating food from outside, pt denies drinking gatorade, pt denies eating prior to FBG- noted serum BG at goal then FBG above goal . eating in between meals despite multiple educations from different providers & staff.  D/w Team & RN & Family RD To meet with pt & family today to provide further reinforcement of education as family continue to bring outside food despite counseling      Review of Systems:  General: As above  Cardiovascular: No chest pain  Respiratory: No SOB  GI: No nausea, vomiting  Endocrine: no  S&Sx of hypoglycemia    Allergies    No Known Allergies    Intolerances      MEDICATIONS  (STANDING):  aMIOdarone    Tablet   Oral   aMIOdarone    Tablet 200 milliGRAM(s) Oral daily  aspirin enteric coated 81 milliGRAM(s) Oral daily  atorvastatin 40 milliGRAM(s) Oral at bedtime  bisacodyl Suppository 10 milliGRAM(s) Rectal once  chlorhexidine 2% Cloths 1 Application(s) Topical daily  heparin  Infusion 800 Unit(s)/Hr (12.5 mL/Hr) IV Continuous <Continuous>  insulin glargine Injectable (LANTUS) 26 Unit(s) SubCutaneous at bedtime  insulin lispro (ADMELOG) corrective regimen sliding scale   SubCutaneous three times a day before meals  insulin lispro Injectable (ADMELOG) 10 Unit(s) SubCutaneous three times a day before meals  metoprolol tartrate 12.5 milliGRAM(s) Oral every 12 hours  pantoprazole    Tablet 40 milliGRAM(s) Oral before breakfast  polyethylene glycol 3350 17 Gram(s) Oral daily  polyethylene glycol 3350 17 Gram(s) Oral daily  senna 2 Tablet(s) Oral at bedtime  sodium chloride 0.9% lock flush 3 milliLiter(s) IV Push every 8 hours  sodium chloride 0.9%. 1000 milliLiter(s) (10 mL/Hr) IV Continuous <Continuous>        atorvastatin   40 milliGRAM(s) Oral (12-29-22 @ 21:47)    insulin glargine Injectable (LANTUS)   26 Unit(s) SubCutaneous (12-29-22 @ 21:48)    insulin lispro (ADMELOG) corrective regimen sliding scale   1 Unit(s) SubCutaneous (12-30-22 @ 11:31)   2 Unit(s) SubCutaneous (12-30-22 @ 07:45)    insulin lispro Injectable (ADMELOG)   10 Unit(s) SubCutaneous (12-30-22 @ 11:31)   10 Unit(s) SubCutaneous (12-30-22 @ 08:17)   10 Unit(s) SubCutaneous (12-29-22 @ 17:15)        PHYSICAL EXAM:  VITALS: T(C): 36.9 (12-30-22 @ 11:46)  T(F): 98.4 (12-30-22 @ 11:46), Max: 98.4 (12-30-22 @ 11:46)  HR: 100 (12-30-22 @ 11:46) (76 - 104)  BP: 123/60 (12-30-22 @ 11:46) (104/69 - 123/60)  RR:  (18 - 19)  SpO2:  (94% - 95%)  Wt(kg): --  GENERAL: male laying in chair in [facility] AVINASH   Respiratory: Respirations unlabored   Extremities: Warm, no edema  NEURO: Alert , appropriate     LABS:  POCT Blood Glucose.: 194 mg/dL (12-30-22 @ 11:27)  POCT Blood Glucose.: 242 mg/dL (12-30-22 @ 07:39)  POCT Blood Glucose.: 161 mg/dL (12-29-22 @ 21:07)  POCT Blood Glucose.: 149 mg/dL (12-29-22 @ 16:27)  POCT Blood Glucose.: 246 mg/dL (12-29-22 @ 11:56)  POCT Blood Glucose.: 148 mg/dL (12-29-22 @ 06:50)  POCT Blood Glucose.: 148 mg/dL (12-28-22 @ 21:05)  POCT Blood Glucose.: 119 mg/dL (12-28-22 @ 16:45)  POCT Blood Glucose.: 199 mg/dL (12-28-22 @ 12:08)  POCT Blood Glucose.: 168 mg/dL (12-28-22 @ 07:25)  POCT Blood Glucose.: 169 mg/dL (12-27-22 @ 21:31)  POCT Blood Glucose.: 180 mg/dL (12-27-22 @ 16:16)                          9.2    5.54  )-----------( 200      ( 30 Dec 2022 06:51 )             27.6     12-30    136  |  98  |  16  ----------------------------<  155<H>  4.6   |  29  |  0.94    Ca    9.3      30 Dec 2022 06:50  Phos  4.3     12-29  Mg     1.8     12-29    TPro  6.8  /  Alb  3.5  /  TBili  1.4<H>  /  DBili  x   /  AST  16  /  ALT  21  /  AlkPhos  97  12-30    eGFR: 83 mL/min/1.73m2 (30 Dec 2022 06:50)    12-02 Chol 185 Direct LDL -- LDL calculated 110<H> HDL 34<L> Trig 208<H>  Thyroid Function Tests:  12-07 @ 13:19 TSH -- FreeT4 -- T3 100 Anti TPO -- Anti Thyroglobulin Ab -- TSI --  12-02 @ 05:54 TSH 3.63 FreeT4 -- T3 -- Anti TPO -- Anti Thyroglobulin Ab -- TSI --      A1C with Estimated Average Glucose Result: 9.4 % (12-02-22 @ 05:54)    Estimated Average Glucose: 223 mg/dL (12-02-22 @ 05:54)        Diet, Consistent Carbohydrate/No Snacks:   DASH/TLC Sodium & Cholesterol Restricted (DASH) (12-23-22 @ 05:28) [Active]

## 2022-12-30 NOTE — PROGRESS NOTE ADULT - SUBJECTIVE AND OBJECTIVE BOX
VITAL SIGNS    Telemetry:  SR 1st degree 70-90  Vital Signs Last 24 Hrs  T(C): 36.8 (22 @ 05:38), Max: 36.8 (22 @ 05:38)  T(F): 98.2 (22 @ 05:38), Max: 98.2 (22 @ 05:38)  HR: 104 (22 @ 05:38) (76 - 104)  BP: 104/69 (22 @ 05:38) (104/69 - 115/58)  RR: 18 (22 @ 05:38) (18 - 19)  SpO2: 95% (22 @ 05:38) (95% - 95%)             @ 07:01  -   @ 07:00  --------------------------------------------------------  IN: 605 mL / OUT: 1000 mL / NET: -395 mL     @ 07:01  -   @ 11:02  --------------------------------------------------------  IN: 13 mL / OUT: 0 mL / NET: 13 mL    Daily Weight in k.8 (30 Dec 2022 09:00)  Admit Wt: Drug Dosing Weight  Height (cm): 162.6 (22 Dec 2022 07:00)  Weight (kg): 78 (22 Dec 2022 07:00)  BMI (kg/m2): 29.5 (22 Dec 2022 07:00)  BSA (m2): 1.83 (22 Dec 2022 07:00)    Bilirubin Total, Serum: 1.4 mg/dL ( @ 06:50)    CAPILLARY BLOOD GLUCOSE      POCT Blood Glucose.: 242 mg/dL (30 Dec 2022 07:39)  POCT Blood Glucose.: 161 mg/dL (29 Dec 2022 21:07)  POCT Blood Glucose.: 149 mg/dL (29 Dec 2022 16:27)  POCT Blood Glucose.: 246 mg/dL (29 Dec 2022 11:56)          MEDICATIONS  acetaminophen     Tablet .. 650 milliGRAM(s) Oral every 6 hours PRN  aMIOdarone    Tablet   Oral   aMIOdarone    Tablet 200 milliGRAM(s) Oral daily  aspirin enteric coated 81 milliGRAM(s) Oral daily  atorvastatin 40 milliGRAM(s) Oral at bedtime  bisacodyl Suppository 10 milliGRAM(s) Rectal once  chlorhexidine 2% Cloths 1 Application(s) Topical daily  heparin  Infusion 800 Unit(s)/Hr IV Continuous <Continuous>  insulin glargine Injectable (LANTUS) 26 Unit(s) SubCutaneous at bedtime  insulin lispro (ADMELOG) corrective regimen sliding scale   SubCutaneous three times a day before meals  insulin lispro Injectable (ADMELOG) 10 Unit(s) SubCutaneous three times a day before meals  metoprolol tartrate 12.5 milliGRAM(s) Oral every 12 hours  pantoprazole    Tablet 40 milliGRAM(s) Oral before breakfast  polyethylene glycol 3350 17 Gram(s) Oral daily  polyethylene glycol 3350 17 Gram(s) Oral daily  senna 2 Tablet(s) Oral at bedtime  sodium chloride 0.9% lock flush 3 milliLiter(s) IV Push every 8 hours  sodium chloride 0.9%. 1000 milliLiter(s) IV Continuous <Continuous>      >>> <<<  PHYSICAL EXAM  Subjective: NAD  Neurology: alert and oriented x 3, nonfocal, no gross deficits  CV :s1s2  Sternal Wound :  CDI , Stable  Lungs: cta  Abdomen: soft, NT,ND, (+ )BM  :  voiding  Extremities: -c/c/e      LABS      136  |  98  |  16  ----------------------------<  155<H>  4.6   |  29  |  0.94    Ca    9.3      30 Dec 2022 06:50  Phos  4.3       Mg     1.8         TPro  6.8  /  Alb  3.5  /  TBili  1.4<H>  /  DBili  x   /  AST  16  /  ALT  21  /  AlkPhos  97                                   9.2    5.54  )-----------( 200      ( 30 Dec 2022 06:51 )             27.6          PT/INR - ( 30 Dec 2022 06:49 )   PT: 14.3 sec;   INR: 1.24 ratio         PTT - ( 30 Dec 2022 06:49 )  PTT:43.7 sec       PAST MEDICAL & SURGICAL HISTORY:  Chronic CHF      HTN (hypertension)      Diabetes      Aortic stenosis      No significant past surgical history

## 2022-12-30 NOTE — PROGRESS NOTE ADULT - ASSESSMENT
Patient is a 77 M with recently diagnosed T2D, CHF, CAD s/p [facility] 12/4 with prox LAD 70% stenosis, aortic stenosis  s/p  CABG/SAVR. 12/22. pending R. CEA date TBD     T2DM   A1C 9.4%  Home medications: Metformin 1g BID started December 2022   While inpatient:  - BG Goal 100-180mg/dl   - FS TID AC qHS or q6h if NPO  - BG above goal 2/2 nutritional indiscretions food from home , continue reinforcement of diet education to pt and family   - Increase Lantus 28 units sq qhs, if npo p mn can reduce to 24 units sq x1  - Adjust Admelog 12-10-10 units TID AC  hold if NPO or if eating less than 50% of meals  - low dose Correctional scale TID with meals  - low dose Correctional scale QHS  - RD follow up reinforce Diet education with pt and family  Discharge planning:  Per  pt has [facility] benefit- can discharge on basal bolus insulin pens + metformin 1000mg BID   final recs pending hospital course  Send Rx for glucometer, test strips, lancets, alcohol pads, and pen needles  At home, Patient should check FSBG premeals and bedtime. Pt should call their doctor when FSBG <70 or above >400 and or consistently above 200s as changes in the regimen will have to be made.  Recommend outpatient routine dilated eye exam/ophthalmology f/u; podiatry referral and endocrinology   -Primary team should arrange medicine clinic follow up prior to discharge as pt is uninsured and visiting from [facility] & planning to go back once he recuperates from surgery. & the medicine clinic can address multiple medical concerns at once. phone: [phone]  - If [place] remains in [place] and wishes to establish endocrine care can  PATIENT ACCESS SERVICES  [phone]  For all [facility] Endocrine Practices phone numbers and locations throughout [place], [place], and [place].        2. HTN  - BP goal 130/80  - Manage per primary team     3. HLD  - Continue with high intensity statin atorvastatin 80 mg daily   - Manage as outpatient      4. CAD  - Manage per CT surgery team   - Optimize BP and glucose prior to CABG    discussed with pt/family & MJ  NP   Contact via [employer] Teams during business hours  On evenings and weekends (or if no response on [employer] Teams) please call [phone] or page endocrine fellow on call.   For nonurgent matters please email [email or web address]    Please note that this patient may be followed by different provider tomorrow.  Notify endocrine 24 hours prior to discharge for final recommendations    greater than 50% of the encounter was spent counseling and/or coordination of care.  30 minutes spent on total encounter; The necessity of the time spent during the encounter on this date of service was due to development of plan of care/coordination of care/glycemic control through review of labs, blood glucose values and vital signs.

## 2022-12-30 NOTE — PROGRESS NOTE ADULT - SUBJECTIVE AND OBJECTIVE BOX
ID# [id number] (Yeyo)  24H hour events: Pt c/o occasional anxiety and palpitations, none currently. No acute events overnight, Tele: SR at 80-90's with PAF 's, APCs     MEDICATIONS:  aMIOdarone    Tablet 200 milliGRAM(s) Oral daily  aspirin enteric coated 81 milliGRAM(s) Oral daily  heparin  Infusion 800 Unit(s)/Hr IV Continuous <Continuous>  metoprolol tartrate 12.5 milliGRAM(s) Oral every 12 hours  acetaminophen     Tablet .. 650 milliGRAM(s) Oral every 6 hours PRN  metoclopramide Injectable 5 milliGRAM(s) IV Push once  bisacodyl Suppository 10 milliGRAM(s) Rectal once  bisacodyl Suppository 10 milliGRAM(s) Rectal once  pantoprazole    Tablet 40 milliGRAM(s) Oral before breakfast  polyethylene glycol 3350 17 Gram(s) Oral daily  polyethylene glycol 3350 17 Gram(s) Oral daily  senna 2 Tablet(s) Oral at bedtime  atorvastatin 40 milliGRAM(s) Oral at bedtime  insulin glargine Injectable (LANTUS) 26 Unit(s) SubCutaneous at bedtime  insulin lispro (ADMELOG) corrective regimen sliding scale   SubCutaneous three times a day before meals  insulin lispro Injectable (ADMELOG) 10 Unit(s) SubCutaneous three times a day before meals  chlorhexidine 2% Cloths 1 Application(s) Topical daily  sodium chloride 0.9% lock flush 3 milliLiter(s) IV Push every 8 hours  sodium chloride 0.9%. 1000 milliLiter(s) IV Continuous <Continuous>      REVIEW OF SYSTEMS:  Complete 12 point ROS negative.    PHYSICAL EXAM:  T(C): 36.9 (12-30-22 @ 11:46), Max: 36.9 (12-30-22 @ 11:46)  HR: 100 (12-30-22 @ 11:46) (76 - 104)  BP: 123/60 (12-30-22 @ 11:46) (104/69 - 123/60)  RR: 18 (12-30-22 @ 11:46) (18 - 19)  SpO2: 94% (12-30-22 @ 11:46) (94% - 95%)  Wt(kg): --  I&O's Summary    29 Dec 2022 07:01  -  30 Dec 2022 07:00  --------------------------------------------------------  IN: 605 mL / OUT: 1000 mL / NET: -395 mL    30 Dec 2022 07:01  -  30 Dec 2022 12:33  --------------------------------------------------------  IN: 553 mL / OUT: 400 mL / NET: 153 mL        Appearance: NAD  HEENT: PERRL, EOMI	  Cardiovascular: Normal S1 S2, RRR, No JVD, No murmurs  Respiratory: Lungs clear to auscultation	  Psychiatry: A & O x 3, Mood & affect appropriate  Gastrointestinal: Soft, Non-tender, + BS	  Skin: Mediastinal surgical incision site C/D/I  Neurologic: Grossly intact  Extremities: No clubbing, cyanosis or edema  Vascular: Peripheral pulses palpable 2+ bilaterally        LABS:	 	    CBC Full  -  ( 30 Dec 2022 06:51 )  WBC Count : 5.54 K/uL  Hemoglobin : 9.2 g/dL  Hematocrit : 27.6 %  Platelet Count - Automated : 200 K/uL  Mean Cell Volume : 91.4 fl  Mean Cell Hemoglobin : 30.5 pg  Mean Cell Hemoglobin Concentration : 33.3 gm/dL      12-30    136  |  98  |  16  ----------------------------<  155<H>  4.6   |  29  |  0.94  12-29    132<L>  |  91<L>  |  16  ----------------------------<  166<H>  3.8   |  30  |  0.87    Ca    9.3      30 Dec 2022 06:50  Ca    8.9      29 Dec 2022 00:56  Phos  4.3     12-29  Mg     1.8     12-29    TPro  6.8  /  Alb  3.5  /  TBili  1.4<H>  /  DBili  x   /  AST  16  /  ALT  21  /  AlkPhos  97  12-30  TPro  6.4  /  Alb  3.3  /  TBili  1.2  /  DBili  x   /  AST  20  /  ALT  24  /  AlkPhos  103  12-29    Thyroid Stimulating Hormone, Serum in AM (12.02.22 @ 05:54)    Thyroid Stimulating Hormone, Serum: 3.63 uIU/mL      TELEMETRY: SR at 80-90's with PAF 's, APCs   	    < from: Intra-Operative Transesophageal Echo (12.22.22 @ 11:30) >  Dimensions:    Normal Values:  LA:            2.0 - 4.0 cm  Ao:            2.0 - 3.8 cm  SEPTUM:        0.6 - 1.2 cm  PWT:           0.6 - 1.1 cm  LVIDd:         3.0 - 5.6 cm  LVIDs:         1.8 - 4.0 cm  EF (Visual Estimate): 55-60 %  ------------------------------------------------------------------------  Pre-Bypass Observations:  Mitral Valve: Normal mitral valve. Minimal mitral  regurgitation.  Aortic Valve/Aorta: Calcified trileaflet aortic valve with  restricted motion.  Vmax is 2.5m/s, mean gradient is  13mmHg, SOURAV by continuity equation is 1.1cm2, DI is 0.35,  consistent with moderate aortic stenosis. Mild aortic  regurgitation.  Simple atheroma noted in aortic arch/descending aorta.  Left Atrium: Normal left atrium.  Left Ventricle: Normal left ventricular systolic function.  No segmental wall motion abnormalities. No regional wall  motion abnormalities. Normal left ventricular internal  dimensions and wall thicknesses.  Right Heart: Normal right atrium. Normal right ventricular  size and function. Normal tricuspid valve. Minimal  tricuspid regurgitation. Normal pulmonic valve. Minimal  pulmonic regurgitation.  Pericardium/Pleura: Normal pericardium with no pericardialeffusion.  Hemodynamic: Color Doppler demonstrates no evidence of a  patent foramen ovale.  ------------------------------------------------------------------------  Post-Bypass Observations:    Status post CABGx1, AVR with Inspiris #25  Normal biventricular function  Bioprosthetic aortic valve is well-seated, there is no  paravalvular leak. The peak gradient [id number] and the mean  gradient is 3mmHg.  The other valves are unchanged.  There is no aortic dissection seen in the ascending aorta.  ------------------------------------------------------------------------  Conclusions:  1. Normal mitral valve. Minimal mitral regurgitation.  2. Calcified trileaflet aortic valve with restricted  motion.  Vmax is 2.5m/s, mean gradient is 13mmHg, SOURAV by  continuity equation is 1.1cm2, DI is 0.35, consistent with  moderate aortic stenosis. Mild aortic regurgitation.  3. Simple atheroma noted in aortic arch/descending aorta.  4. Normal left atrium.  No left atrium or left atrial  appendage thrombus.  5. Normal left ventricular internal dimensions and wallthicknesses.  6. Normal left ventricular systolic function. No segmental  wall motion abnormalities. No regional wall motion  abnormalities.  7. Normal right atrium.  8. Normal right ventricular size and function.  9. Normal tricuspid valve. Minimal tricuspid regurgitation.  10. Normal pulmonic valve. Minimal pulmonic regurgitation.  11. Color Doppler demonstrates no evidence of a patent  foramen ovale.  12. Normal pericardium with no pericardial effusion.    < end of copied text >

## 2022-12-30 NOTE — PROGRESS NOTE ADULT - ASSESSMENT
This is a 78y/o male on holiday  PMH T2DM and recently diagnosed CHF? who had recent admission for cardiac work up. Pt found to have CAD and aortic stenosis. At that time, pt tested positive for COVID, however grossly asymptomatic. Pt was d/c home for outpatient follow up with Dr Bowles. Now presents to ED with chest pain starting yesterday afternoon. During previous admission, s/p LHC on 12/4. Of note, pt had covid + pcr, asymptomatic and completed 10 day isolation.     12/22 underwent  AVR #25 Inspiris, CABG x 1 LIMA recovered in CTU   fluid resuscitation extubated  12/23Rapid afib  amiodarone ->SR  Seen by PT disposition to home   Hyperkalemic  - diuresied  12/25 Bradycardia EPM VVI 35 Ep consulted c/b tachy-marisa (Afib RVR s/p amio and metoprolol, and undetermined bradycardia). Yesterday noted to have pauses and bradycardia requiring intermittent pacing. PRBC x1 Endocrine consulted  12/26 heparin gtt  12/29 VSS transferred to floor NO PPm at this time on heparin gtt amiodarone load + PW EPM VVI 35 SR1 degree-  needs insulin teaching disposition to home   12/30 EP to determine indication for PPM ongoing. Continue with antiarrhythmic agents, anticoagulation and insulin teaching.          This is a 76y/o male on holiday  PMH T2DM and recently diagnosed CHF? who had recent admission for cardiac work up. Pt found to have CAD and aortic stenosis. At that time, pt tested positive for COVID, however grossly asymptomatic. Pt was d/c home for outpatient follow up with Dr Bowles. Now presents to ED with chest pain starting yesterday afternoon. During previous admission, s/p LHC on 12/4. Of note, pt had covid + pcr, asymptomatic and completed 10 day isolation.     12/22 underwent  AVR #25 Inspiris, CABG x 1 LIMA recovered in CTU   fluid resuscitation extubated  12/23Rapid afib  amiodarone ->SR  Seen by PT disposition to home   Hyperkalemic  - diuresied  12/25 Bradycardia EPM VVI 35 Ep consulted c/b tachy-marisa (Afib RVR s/p amio and metoprolol, and undetermined bradycardia). Yesterday noted to have pauses and bradycardia requiring intermittent pacing. PRBC x1 Endocrine consulted  12/26 heparin gtt  12/29 VSS transferred to floor NO PPm at this time on heparin gtt amiodarone load + PW EPM VVI 35 SR1 degree-  needs insulin teaching disposition to home   12/30 EP to determine indication for PPM ongoing. Continue with antiarrhythmic agents and anticoagulation.  Insulin teaching ongoing. Nutrition consulted to reinforce dietary restrictions. Pt approved for joe diabetic medications from [facility] program. Laxatives for constipation.  Awaiting CEA in two weeks by Vascualr surgery.  Pt will resume primary care in country of residence when discharged.

## 2022-12-30 NOTE — CHART NOTE - NSCHARTNOTEFT_GEN_A_CORE
Vascular surgery will plan for R. CEA end of next week. Definitive OR date pending cardiac risk stratification and EP evaluation for permanent PM pending tele review.    Vascular Surgery  [phone]

## 2022-12-30 NOTE — PROGRESS NOTE ADULT - ASSESSMENT
77M with T2DM and recently diagnosed CHF who had recent admission for cardiac work up. Patient found to have CAD (pLAD disease that was iFR positive) and moderate to severe aortic stenosis. Now s/p 1V CABG (LIMA to LAD) and SAVR on 12/22/22. Post op course c/b tachy-marisa (Afib RVR s/p IV amio and metoprolol, and undetermined bradycardia with pauses requiring intermittent pacing).      1) Post-op TBS Afib RVR/ junctional rhythm    -Patient with tachybrady due to AF post op, now conducting in SR with PAT/PAF  -Temp epicardial pacing wire set to VVI 35 bpm (thresholds at 2.5mA, output 10mA),   -Continue amiodarone, s/p load (now on 200mg daily)  -I discussed with patient plans for short term amiodarone including risks/side effects. Discussed need for outpatient monitoring of PFT/TFT/LFT/opthalmology monitoring while on amiodarone.  -Continue metoprolol  -Currently on Heparin drip for AC with plan to transition to oral AC when able to  -Pt is in and out of AF and SR, no plan for DCCV  -No PPM at this time.    OTTO Arteaga NP-C  [phone]   77M with T2DM and recently diagnosed CHF who had recent admission for cardiac work up. Patient found to have CAD (pLAD disease that was iFR positive) and moderate to severe aortic stenosis. Now s/p 1V CABG (LIMA to LAD) and SAVR on 12/22/22. Post op course c/b tachy-marisa (Afib RVR s/p IV amio and metoprolol, and undetermined bradycardia with pauses requiring intermittent pacing).      1) Post-op TBS Afib RVR/ junctional rhythm    -Patient with tachybrady due to AF post op, now conducting in SR with PAT/PAF  -Temp epicardial pacing wire set to VVI 35 bpm (thresholds at 2.5mA, output 10mA),   -Continue amiodarone, s/p load (now on 200mg daily)  -I discussed with patient plans for short term amiodarone including risks/side effects. Discussed need for outpatient monitoring of PFT/TFT/LFT/opthalmology monitoring while on amiodarone.  -Continue metoprolol  -Currently on Heparin drip for AC with plan to transition to oral AC when able to  -Pt is in and out of AF and SR, no plan for DCCV  -No PPM at this time.    Addendum: Recommend change metoprolol to succinate 50mg QD. Transition AC from Heparin to DOAC when able. Continue amiodarone 200mg QD. No pacemaker at this time. Can remove temp. epicardial wire. EPS will sign off, reconsult as needed. Discussed with primary team and Dr. Prieto.     OTTO Arteaga, NP-C  [phone]

## 2022-12-31 LAB
ALBUMIN SERPL ELPH-MCNC: 3.2 G/DL — LOW (ref 3.3–5)
ALP SERPL-CCNC: 92 U/L — SIGNIFICANT CHANGE UP (ref 40–120)
ALT FLD-CCNC: 24 U/L — SIGNIFICANT CHANGE UP (ref 10–45)
ANION GAP SERPL CALC-SCNC: 13 MMOL/L — SIGNIFICANT CHANGE UP (ref 5–17)
APTT BLD: 52.9 SEC — HIGH (ref 27.5–35.5)
APTT BLD: 57.6 SEC — HIGH (ref 27.5–35.5)
AST SERPL-CCNC: 25 U/L — SIGNIFICANT CHANGE UP (ref 10–40)
BILIRUB SERPL-MCNC: 1.2 MG/DL — SIGNIFICANT CHANGE UP (ref 0.2–1.2)
BUN SERPL-MCNC: 14 MG/DL — SIGNIFICANT CHANGE UP (ref 7–23)
CALCIUM SERPL-MCNC: 9.3 MG/DL — SIGNIFICANT CHANGE UP (ref 8.4–10.5)
CHLORIDE SERPL-SCNC: 100 MMOL/L — SIGNIFICANT CHANGE UP (ref 96–108)
CO2 SERPL-SCNC: 24 MMOL/L — SIGNIFICANT CHANGE UP (ref 22–31)
CREAT SERPL-MCNC: 0.92 MG/DL — SIGNIFICANT CHANGE UP (ref 0.5–1.3)
EGFR: 86 ML/MIN/1.73M2 — SIGNIFICANT CHANGE UP
GLUCOSE BLDC GLUCOMTR-MCNC: 144 MG/DL — HIGH (ref 70–99)
GLUCOSE BLDC GLUCOMTR-MCNC: 166 MG/DL — HIGH (ref 70–99)
GLUCOSE BLDC GLUCOMTR-MCNC: 174 MG/DL — HIGH (ref 70–99)
GLUCOSE BLDC GLUCOMTR-MCNC: 257 MG/DL — HIGH (ref 70–99)
GLUCOSE SERPL-MCNC: 158 MG/DL — HIGH (ref 70–99)
HCT VFR BLD CALC: 26.9 % — LOW (ref 39–50)
HGB BLD-MCNC: 8.8 G/DL — LOW (ref 13–17)
MAGNESIUM SERPL-MCNC: 1.8 MG/DL — SIGNIFICANT CHANGE UP (ref 1.6–2.6)
MCHC RBC-ENTMCNC: 29.8 PG — SIGNIFICANT CHANGE UP (ref 27–34)
MCHC RBC-ENTMCNC: 32.7 GM/DL — SIGNIFICANT CHANGE UP (ref 32–36)
MCV RBC AUTO: 91.2 FL — SIGNIFICANT CHANGE UP (ref 80–100)
NRBC # BLD: 0 /100 WBCS — SIGNIFICANT CHANGE UP (ref 0–0)
PHOSPHATE SERPL-MCNC: 4 MG/DL — SIGNIFICANT CHANGE UP (ref 2.5–4.5)
PLATELET # BLD AUTO: 191 K/UL — SIGNIFICANT CHANGE UP (ref 150–400)
POTASSIUM SERPL-MCNC: 4.2 MMOL/L — SIGNIFICANT CHANGE UP (ref 3.5–5.3)
POTASSIUM SERPL-SCNC: 4.2 MMOL/L — SIGNIFICANT CHANGE UP (ref 3.5–5.3)
PROT SERPL-MCNC: 6.5 G/DL — SIGNIFICANT CHANGE UP (ref 6–8.3)
RBC # BLD: 2.95 M/UL — LOW (ref 4.2–5.8)
RBC # FLD: 13.7 % — SIGNIFICANT CHANGE UP (ref 10.3–14.5)
SODIUM SERPL-SCNC: 137 MMOL/L — SIGNIFICANT CHANGE UP (ref 135–145)
WBC # BLD: 5.14 K/UL — SIGNIFICANT CHANGE UP (ref 3.8–10.5)
WBC # FLD AUTO: 5.14 K/UL — SIGNIFICANT CHANGE UP (ref 3.8–10.5)

## 2022-12-31 RX ORDER — OXYCODONE HYDROCHLORIDE 5 MG/1
10 TABLET ORAL EVERY 4 HOURS
Refills: 0 | Status: DISCONTINUED | OUTPATIENT
Start: 2022-12-31 | End: 2023-01-03

## 2022-12-31 RX ORDER — METOPROLOL TARTRATE 50 MG
75 TABLET ORAL DAILY
Refills: 0 | Status: DISCONTINUED | OUTPATIENT
Start: 2023-01-01 | End: 2023-01-03

## 2022-12-31 RX ORDER — METOPROLOL TARTRATE 50 MG
25 TABLET ORAL DAILY
Refills: 0 | Status: COMPLETED | OUTPATIENT
Start: 2022-12-31 | End: 2022-12-31

## 2022-12-31 RX ORDER — METOPROLOL TARTRATE 50 MG
2.5 TABLET ORAL ONCE
Refills: 0 | Status: COMPLETED | OUTPATIENT
Start: 2022-12-31 | End: 2022-12-31

## 2022-12-31 RX ADMIN — Medication 1: at 07:40

## 2022-12-31 RX ADMIN — Medication 1: at 16:43

## 2022-12-31 RX ADMIN — ATORVASTATIN CALCIUM 40 MILLIGRAM(S): 80 TABLET, FILM COATED ORAL at 21:57

## 2022-12-31 RX ADMIN — OXYCODONE HYDROCHLORIDE 10 MILLIGRAM(S): 5 TABLET ORAL at 20:15

## 2022-12-31 RX ADMIN — Medication 81 MILLIGRAM(S): at 11:21

## 2022-12-31 RX ADMIN — SODIUM CHLORIDE 3 MILLILITER(S): 9 INJECTION INTRAMUSCULAR; INTRAVENOUS; SUBCUTANEOUS at 05:37

## 2022-12-31 RX ADMIN — Medication 10 UNIT(S): at 16:42

## 2022-12-31 RX ADMIN — PANTOPRAZOLE SODIUM 40 MILLIGRAM(S): 20 TABLET, DELAYED RELEASE ORAL at 05:36

## 2022-12-31 RX ADMIN — Medication 10 UNIT(S): at 11:22

## 2022-12-31 RX ADMIN — INSULIN GLARGINE 28 UNIT(S): 100 INJECTION, SOLUTION SUBCUTANEOUS at 21:57

## 2022-12-31 RX ADMIN — SODIUM CHLORIDE 3 MILLILITER(S): 9 INJECTION INTRAMUSCULAR; INTRAVENOUS; SUBCUTANEOUS at 13:02

## 2022-12-31 RX ADMIN — HEPARIN SODIUM 12.5 UNIT(S)/HR: 5000 INJECTION INTRAVENOUS; SUBCUTANEOUS at 07:00

## 2022-12-31 RX ADMIN — Medication 10 UNIT(S): at 07:40

## 2022-12-31 RX ADMIN — CHLORHEXIDINE GLUCONATE 1 APPLICATION(S): 213 SOLUTION TOPICAL at 09:40

## 2022-12-31 RX ADMIN — SENNA PLUS 2 TABLET(S): 8.6 TABLET ORAL at 21:57

## 2022-12-31 RX ADMIN — OXYCODONE HYDROCHLORIDE 10 MILLIGRAM(S): 5 TABLET ORAL at 13:00

## 2022-12-31 RX ADMIN — Medication 50 MILLIGRAM(S): at 05:33

## 2022-12-31 RX ADMIN — OXYCODONE HYDROCHLORIDE 10 MILLIGRAM(S): 5 TABLET ORAL at 19:45

## 2022-12-31 RX ADMIN — OXYCODONE HYDROCHLORIDE 10 MILLIGRAM(S): 5 TABLET ORAL at 13:50

## 2022-12-31 RX ADMIN — Medication 25 MILLIGRAM(S): at 13:00

## 2022-12-31 RX ADMIN — SODIUM CHLORIDE 3 MILLILITER(S): 9 INJECTION INTRAMUSCULAR; INTRAVENOUS; SUBCUTANEOUS at 22:01

## 2022-12-31 RX ADMIN — AMIODARONE HYDROCHLORIDE 200 MILLIGRAM(S): 400 TABLET ORAL at 05:33

## 2022-12-31 RX ADMIN — Medication 2.5 MILLIGRAM(S): at 09:37

## 2022-12-31 RX ADMIN — HEPARIN SODIUM 12.5 UNIT(S)/HR: 5000 INJECTION INTRAVENOUS; SUBCUTANEOUS at 11:57

## 2022-12-31 NOTE — PROGRESS NOTE ADULT - SUBJECTIVE AND OBJECTIVE BOX
VITAL SIGNS    Telemetry:  aflutter   Vital Signs Last 24 Hrs  T(C): 36.3 (22 @ 12:39), Max: 37 (22 @ 04:29)  T(F): 97.4 (22 @ 12:39), Max: 98.6 (22 @ 04:29)  HR: 105 (22 @ 12:39) (95 - 128)  BP: 95/60 (22 @ 12:39) (92/56 - 118/56)  RR: 18 (22 @ 12:39) (18 - 19)  SpO2: 98% (22 @ 12:39) (95% - 98%)             @ 07:01  -   @ 07:00  --------------------------------------------------------  IN: 1194.5 mL / OUT: 650 mL / NET: 544.5 mL     @ 07:01  -   @ 15:08  --------------------------------------------------------  IN: 807.5 mL / OUT: 850 mL / NET: -42.5 mL       Daily     Daily Weight in k.9 (31 Dec 2022 09:49)  Admit Wt: Drug Dosing Weight  Height (cm): 162.6 (22 Dec 2022 07:00)  Weight (kg): 78 (22 Dec 2022 07:00)  BMI (kg/m2): 29.5 (22 Dec 2022 07:00)  BSA (m2): 1.83 (22 Dec 2022 07:00)    Bilirubin Total, Serum: 1.2 mg/dL ( @ 04:21)    CAPILLARY BLOOD GLUCOSE      POCT Blood Glucose.: 144 mg/dL (31 Dec 2022 11:15)  POCT Blood Glucose.: 174 mg/dL (31 Dec 2022 07:29)  POCT Blood Glucose.: 170 mg/dL (30 Dec 2022 21:36)  POCT Blood Glucose.: 174 mg/dL (30 Dec 2022 16:42)          MEDICATIONS  acetaminophen     Tablet .. 650 milliGRAM(s) Oral every 6 hours PRN  aMIOdarone    Tablet 200 milliGRAM(s) Oral daily  aMIOdarone    Tablet   Oral   aspirin enteric coated 81 milliGRAM(s) Oral daily  atorvastatin 40 milliGRAM(s) Oral at bedtime  bisacodyl Suppository 10 milliGRAM(s) Rectal once  chlorhexidine 2% Cloths 1 Application(s) Topical daily  heparin  Infusion 800 Unit(s)/Hr IV Continuous <Continuous>  insulin glargine Injectable (LANTUS) 28 Unit(s) SubCutaneous at bedtime  insulin lispro (ADMELOG) corrective regimen sliding scale   SubCutaneous three times a day before meals  insulin lispro Injectable (ADMELOG) 10 Unit(s) SubCutaneous three times a day before meals  oxyCODONE    IR 10 milliGRAM(s) Oral every 4 hours PRN  pantoprazole    Tablet 40 milliGRAM(s) Oral before breakfast  polyethylene glycol 3350 17 Gram(s) Oral daily  polyethylene glycol 3350 17 Gram(s) Oral daily  senna 2 Tablet(s) Oral at bedtime  sodium chloride 0.9% lock flush 3 milliLiter(s) IV Push every 8 hours  sodium chloride 0.9%. 1000 milliLiter(s) IV Continuous <Continuous>      >>> <<<  PHYSICAL EXAM  Subjective: nad  Neurology: alert and oriented x 3, nonfocal, no gross deficits  CV : s1s2  Sternal Wound :  CDI , Stable  Lungs:cta b/l  Abdomen: soft, NT,ND, ( +)BM  :  voiding  Extremities:  -c/c/e     LABS      137  |  100  |  14  ----------------------------<  158<H>  4.2   |  24  |  0.92    Ca    9.3      31 Dec 2022 04:21  Phos  4.0       Mg     1.8         TPro  6.5  /  Alb  3.2<L>  /  TBili  1.2  /  DBili  x   /  AST  25  /  ALT  24  /  AlkPhos  92                                   8.8    5.14  )-----------( 191      ( 31 Dec 2022 04:21 )             26.9          PT/INR - ( 30 Dec 2022 06:49 )   PT: 14.3 sec;   INR: 1.24 ratio         PTT - ( 31 Dec 2022 11:25 )  PTT:52.9 sec       PAST MEDICAL & SURGICAL HISTORY:  Chronic CHF      HTN (hypertension)      Diabetes      Aortic stenosis      No significant past surgical history

## 2022-12-31 NOTE — PROGRESS NOTE ADULT - ASSESSMENT
This is a 78y/o male on holiday  PMH T2DM and recently diagnosed CHF? who had recent admission for cardiac work up. Pt found to have CAD and aortic stenosis. At that time, pt tested positive for COVID, however grossly asymptomatic. Pt was d/c home for outpatient follow up with Dr Bowles. Now presents to ED with chest pain starting yesterday afternoon. During previous admission, s/p LHC on 12/4. Of note, pt had covid + pcr, asymptomatic and completed 10 day isolation.     12/22 underwent  AVR #25 Inspiris, CABG x 1 LIMA recovered in CTU   fluid resuscitation extubated  12/23Rapid afib  amiodarone ->SR  Seen by PT disposition to home   Hyperkalemic  - diuresied  12/25 Bradycardia EPM VVI 35 Ep consulted c/b tachy-marisa (Afib RVR s/p amio and metoprolol, and undetermined bradycardia). Yesterday noted to have pauses and bradycardia requiring intermittent pacing. PRBC x1 Endocrine consulted  12/26 heparin gtt  12/29 VSS transferred to floor NO PPm at this time on heparin gtt amiodarone load + PW EPM VVI 35 SR1 degree-  needs insulin teaching disposition to home   12/30 EP to determine indication for PPM ongoing. Continue with antiarrhythmic agents and anticoagulation.  Insulin teaching ongoing. Nutrition consulted to reinforce dietary restrictions. Pt approved for joe diabetic medications from [facility] program. Laxatives for constipation.  Awaiting CEA in two weeks by Vascualr surgery.  Pt will resume primary care in country of residence when discharged. HR control. No indication for PPM as per EP. Will continue with hep gtt prior to CEA. Pt is medically and cardiac cleared from CTS perspective to proceed with CEA.

## 2023-01-01 LAB
ALBUMIN SERPL ELPH-MCNC: 3.8 G/DL — SIGNIFICANT CHANGE UP (ref 3.3–5)
ALP SERPL-CCNC: 97 U/L — SIGNIFICANT CHANGE UP (ref 40–120)
ALT FLD-CCNC: 30 U/L — SIGNIFICANT CHANGE UP (ref 10–45)
ANION GAP SERPL CALC-SCNC: 9 MMOL/L — SIGNIFICANT CHANGE UP (ref 5–17)
APTT BLD: 51.9 SEC — HIGH (ref 27.5–35.5)
AST SERPL-CCNC: 27 U/L — SIGNIFICANT CHANGE UP (ref 10–40)
BILIRUB SERPL-MCNC: 0.9 MG/DL — SIGNIFICANT CHANGE UP (ref 0.2–1.2)
BUN SERPL-MCNC: 14 MG/DL — SIGNIFICANT CHANGE UP (ref 7–23)
CALCIUM SERPL-MCNC: 10.1 MG/DL — SIGNIFICANT CHANGE UP (ref 8.4–10.5)
CHLORIDE SERPL-SCNC: 99 MMOL/L — SIGNIFICANT CHANGE UP (ref 96–108)
CO2 SERPL-SCNC: 27 MMOL/L — SIGNIFICANT CHANGE UP (ref 22–31)
CREAT SERPL-MCNC: 0.97 MG/DL — SIGNIFICANT CHANGE UP (ref 0.5–1.3)
EGFR: 80 ML/MIN/1.73M2 — SIGNIFICANT CHANGE UP
GLUCOSE BLDC GLUCOMTR-MCNC: 146 MG/DL — HIGH (ref 70–99)
GLUCOSE BLDC GLUCOMTR-MCNC: 149 MG/DL — HIGH (ref 70–99)
GLUCOSE BLDC GLUCOMTR-MCNC: 150 MG/DL — HIGH (ref 70–99)
GLUCOSE BLDC GLUCOMTR-MCNC: 153 MG/DL — HIGH (ref 70–99)
GLUCOSE SERPL-MCNC: 158 MG/DL — HIGH (ref 70–99)
HCT VFR BLD CALC: 26.9 % — LOW (ref 39–50)
HGB BLD-MCNC: 8.8 G/DL — LOW (ref 13–17)
MCHC RBC-ENTMCNC: 30.4 PG — SIGNIFICANT CHANGE UP (ref 27–34)
MCHC RBC-ENTMCNC: 32.7 GM/DL — SIGNIFICANT CHANGE UP (ref 32–36)
MCV RBC AUTO: 93.1 FL — SIGNIFICANT CHANGE UP (ref 80–100)
NRBC # BLD: 0 /100 WBCS — SIGNIFICANT CHANGE UP (ref 0–0)
PLATELET # BLD AUTO: 200 K/UL — SIGNIFICANT CHANGE UP (ref 150–400)
POTASSIUM SERPL-MCNC: 4.6 MMOL/L — SIGNIFICANT CHANGE UP (ref 3.5–5.3)
POTASSIUM SERPL-SCNC: 4.6 MMOL/L — SIGNIFICANT CHANGE UP (ref 3.5–5.3)
PROT SERPL-MCNC: 6.8 G/DL — SIGNIFICANT CHANGE UP (ref 6–8.3)
RBC # BLD: 2.89 M/UL — LOW (ref 4.2–5.8)
RBC # FLD: 13.8 % — SIGNIFICANT CHANGE UP (ref 10.3–14.5)
SODIUM SERPL-SCNC: 135 MMOL/L — SIGNIFICANT CHANGE UP (ref 135–145)
WBC # BLD: 5.08 K/UL — SIGNIFICANT CHANGE UP (ref 3.8–10.5)
WBC # FLD AUTO: 5.08 K/UL — SIGNIFICANT CHANGE UP (ref 3.8–10.5)

## 2023-01-01 RX ADMIN — POLYETHYLENE GLYCOL 3350 17 GRAM(S): 17 POWDER, FOR SOLUTION ORAL at 11:25

## 2023-01-01 RX ADMIN — HEPARIN SODIUM 12.5 UNIT(S)/HR: 5000 INJECTION INTRAVENOUS; SUBCUTANEOUS at 07:00

## 2023-01-01 RX ADMIN — Medication 10 UNIT(S): at 16:35

## 2023-01-01 RX ADMIN — AMIODARONE HYDROCHLORIDE 200 MILLIGRAM(S): 400 TABLET ORAL at 05:30

## 2023-01-01 RX ADMIN — Medication 81 MILLIGRAM(S): at 11:24

## 2023-01-01 RX ADMIN — INSULIN GLARGINE 28 UNIT(S): 100 INJECTION, SOLUTION SUBCUTANEOUS at 21:48

## 2023-01-01 RX ADMIN — ATORVASTATIN CALCIUM 40 MILLIGRAM(S): 80 TABLET, FILM COATED ORAL at 21:47

## 2023-01-01 RX ADMIN — SODIUM CHLORIDE 3 MILLILITER(S): 9 INJECTION INTRAMUSCULAR; INTRAVENOUS; SUBCUTANEOUS at 05:34

## 2023-01-01 RX ADMIN — Medication 75 MILLIGRAM(S): at 05:30

## 2023-01-01 RX ADMIN — PANTOPRAZOLE SODIUM 40 MILLIGRAM(S): 20 TABLET, DELAYED RELEASE ORAL at 05:30

## 2023-01-01 RX ADMIN — Medication 10 UNIT(S): at 08:10

## 2023-01-01 RX ADMIN — Medication 10 UNIT(S): at 11:25

## 2023-01-01 RX ADMIN — HEPARIN SODIUM 12.5 UNIT(S)/HR: 5000 INJECTION INTRAVENOUS; SUBCUTANEOUS at 11:21

## 2023-01-01 RX ADMIN — HEPARIN SODIUM 12.5 UNIT(S)/HR: 5000 INJECTION INTRAVENOUS; SUBCUTANEOUS at 19:00

## 2023-01-01 RX ADMIN — SODIUM CHLORIDE 3 MILLILITER(S): 9 INJECTION INTRAMUSCULAR; INTRAVENOUS; SUBCUTANEOUS at 21:45

## 2023-01-01 RX ADMIN — SODIUM CHLORIDE 3 MILLILITER(S): 9 INJECTION INTRAMUSCULAR; INTRAVENOUS; SUBCUTANEOUS at 13:30

## 2023-01-01 NOTE — PHYSICAL THERAPY INITIAL EVALUATION ADULT - IMPAIRED TRANSFERS: SIT/STAND, REHAB EVAL
Unable to offer, due to 's clinical indication impaired balance/pain/impaired postural control/decreased strength

## 2023-01-01 NOTE — CHART NOTE - NSCHARTNOTEFT_GEN_A_CORE
Nutrition Follow Up Note  Patient seen for: Nutrition Follow Up/Nutrition Consult     Chart reviewed, events noted. Pt is a 78 yo M with PMH: T2DM, recently diagnosed with CHF. Recent admission for cardiac workup. Found to have CAD and aortic stenosis. Now presented with chest pain. On , pt underwent AVR #25 Inspiris, CABG x 1 LIMA. Course c/b tachy-marisa. Transferred to floor . Nutrition consulted for diet education in setting of T2DM.     Source: [x] Patient       [x] EMR        [x] RN        [] Family at bedside       [x] Other: interdisciplinary medical team    Diet Order:   Diet, Consistent Carbohydrate/No Snacks:   DASH/TLC {Sodium & Cholesterol Restricted} (DASH) (22)    Is current diet order appropriate/adequate? [x] Yes  []  No:     PO intake :   [x] >75%  Adequate    [] 50-75%  Fair       [] <50%  Poor    Nutrition-related concerns:  -Good PO intake noted, consuming >/=75% of meals.   -Last BM (). On bowel regimen (Miralax, Dulcolax).   -Endo following in setting of T2DM, A1c 9.4%. Continues on antihyperglycemic regimen: Lantus 10u 3x daily before meals and insulin lispro sliding scale to aid in management of BG.     Weights:   Daily Weight in k.1 (), Weight in k.9 (), Weight in k.8 (-30), Weight in k.1 (-), Weight in k.9 (), Weight in k (-27), Weight in k.8 (-)    MEDICATIONS  (STANDING):  aMIOdarone    Tablet  aMIOdarone    Tablet  atorvastatin  bisacodyl Suppository  insulin glargine Injectable (LANTUS)  insulin lispro (ADMELOG) corrective regimen sliding scale  insulin lispro Injectable (ADMELOG)  metoprolol succinate ER  pantoprazole    Tablet  polyethylene glycol 3350  polyethylene glycol 3350  senna  sodium chloride 0.9% lock flush  sodium chloride 0.9%.    Pertinent Labs:   A1C with Estimated Average Glucose Result: 9.4 % (22 @ 05:54)    Finger Sticks:  POCT Blood Glucose.: 146 mg/dL ( @ 07:35)  POCT Blood Glucose.: 257 mg/dL ( @ 21:52)  POCT Blood Glucose.: 166 mg/dL ( @ 16:37)  POCT Blood Glucose.: 144 mg/dL ( @ 11:15)    Skin per nursing documentation: surgical incision midsternum  Edema: 1+ right ankle; left ankle    (based on dosing wt 78kg):   Estimated Energy Needs: (25-30kcal/kg): 1950-2340kcal   Estimated Protein Needs: (1.2-1.4g protein/kg): 94-109g protein  Estimated Fluid Needs: defer to team    Previous Nutrition Diagnosis: Increased nutrient needs; Food and nutrition related knowledge deficit   Nutrition Diagnosis is: [x] ongoing  [] resolved [] not applicable     New Nutrition Diagnosis: [x] Not applicable    Nutrition Care Plan:  [x] In Progress  [] Achieved  [] Not applicable    Nutrition Interventions:     Education Provided:       [x] Yes: T2DM nutrition education       Recommendations:         [x] Continue current diet order: DASH/TLC, Consistent Carbohydrate.      [x] Diet education ongoing throughout hospital admission.      [x] Monitor and encourage PO intake. [place] dietary preferences as expressed, as able.     Monitoring and Evaluation:   Continue to monitor nutritional intake, tolerance to diet prescription, weights, labs, skin integrity    RD remains available upon request and will follow up per protocol    Alison Kleiner, RD, [facility] Pager # [phone] Nutrition Follow Up Note  Patient seen for: Nutrition Follow Up/Nutrition Consult     Chart reviewed, events noted. Pt is a 78 yo M with PMH: T2DM, recently diagnosed with CHF. Recent admission for cardiac workup. Found to have CAD and aortic stenosis. Now presented with chest pain. On , pt underwent AVR #25 Inspiris, CABG x 1 LIMA. Course c/b tachy-marisa. Transferred to floor . Nutrition consulted for diet education in setting of T2DM.     Source: [x] Patient - with use of  (ID#[id number], Ivette)       [x] EMR        [x] RN        [] Family at bedside       [x] Other: interdisciplinary medical team    Diet Order:   Diet, Consistent Carbohydrate/No Snacks:   DASH/TLC {Sodium & Cholesterol Restricted} (DASH) (22)    Is current diet order appropriate/adequate? [x] Yes  []  No:     PO intake :   [x] >75%  Adequate    [] 50-75%  Fair       [] <50%  Poor    Nutrition-related concerns:  -Good PO intake noted, consuming >/=75% of meals. Dietary preferences noted. Enjoys: chicken, hamburger, oatmeal, chicken sandwiches, fruits (apple, pears), bread. Dislikes: salmon.   -Last BM (). On bowel regimen (Miralax, Dulcolax).   -Endo following in setting of T2DM, A1c 9.4%. Continues on antihyperglycemic regimen: Lantus 10u 3x daily before meals and insulin lispro sliding scale to aid in management of BG.   -Provided diet education regarding T2DM. Education included dietary sources of carbohydrates (i.e. concentrated sweets, starches, dairy, fruit), monitoring portion sizes of carbohydrates, and including good sources of high biological protein with carbohydrates during meals/snacks, importance of consistent meal pattern, MyPlate healthy eating model, label reading for CHO content, limiting concentrated sweets in diet, and importance of monitoring fingersticks daily. Provided written materials of T2DM nutrition therapy and label reading for CHO content from [employer].  --> Pt to likely require ongoing education/review/reinforcement with provider/endocrinology/outpatient RD.     Weights:   Daily Weight in k.1 (), Weight in k.9 (31), Weight in k.8 (12-30), Weight in k.1 (12-29), Weight in k.9 (12-28), Weight in k (12-27), Weight in k.8 (12-26)    MEDICATIONS  (STANDING):  aMIOdarone    Tablet  aMIOdarone    Tablet  atorvastatin  bisacodyl Suppository  insulin glargine Injectable (LANTUS)  insulin lispro (ADMELOG) corrective regimen sliding scale  insulin lispro Injectable (ADMELOG)  metoprolol succinate ER  pantoprazole    Tablet  polyethylene glycol 3350  polyethylene glycol 3350  senna  sodium chloride 0.9% lock flush  sodium chloride 0.9%.    Pertinent Labs:   A1C with Estimated Average Glucose Result: 9.4 % (22 @ 05:54)    Finger Sticks:  POCT Blood Glucose.: 146 mg/dL ( @ 07:35)  POCT Blood Glucose.: 257 mg/dL ( @ 21:52)  POCT Blood Glucose.: 166 mg/dL ( @ 16:37)  POCT Blood Glucose.: 144 mg/dL ( @ 11:15)    Skin per nursing documentation: surgical incision midsternum  Edema: 1+ right ankle; left ankle    (based on dosing wt 78kg):   Estimated Energy Needs: (25-30kcal/kg): 1950-2340kcal   Estimated Protein Needs: (1.2-1.4g protein/kg): 94-109g protein  Estimated Fluid Needs: defer to team    Previous Nutrition Diagnosis: Increased nutrient needs; Food and nutrition related knowledge deficit   Nutrition Diagnosis is: [x] ongoing  [] resolved [] not applicable     New Nutrition Diagnosis: [x] Not applicable    Nutrition Care Plan:  [x] In Progress  [] Achieved  [] Not applicable    Nutrition Interventions:     Education Provided:       [x] Yes: T2DM nutrition education       Recommendations:         [x] Continue current diet order: DASH/TLC, Consistent Carbohydrate.      [x] Diet education ongoing throughout hospital admission, as per request of pt/provider, per nutrition follow up.      [x] Monitor and encourage PO intake. [place] dietary preferences as expressed, as able.      [x] Recommend multivitamin (if no medication contraindications) to aid in prevention of micronutrient deficiencies, aid in wound healing (midsternum).      [x] RD to add diet Mighty Shakes 2x daily.     Monitoring and Evaluation:   Continue to monitor nutritional intake, tolerance to diet prescription, weights, labs, skin integrity    RD remains available upon request and will follow up per protocol    Alison Kleiner, RD, [facility] Pager # [phone] Nutrition Follow Up Note  Patient seen for: Nutrition Follow Up/Nutrition Consult     Chart reviewed, events noted. Pt is a 78 yo M with PMH: T2DM, recently diagnosed with CHF. Recent admission for cardiac workup. Found to have CAD and aortic stenosis. Now presented with chest pain. On , pt underwent AVR #25 Inspiris, CABG x 1 LIMA. Course c/b tachy-marisa. Transferred to floor . Nutrition consulted for diet education in setting of T2DM.     Source: [x] Patient - with use of  (ID#[id number], [place])       [x] EMR        [x] RN        [] Family at bedside       [x] Other: interdisciplinary medical team    Diet Order:   Diet, Consistent Carbohydrate/No Snacks:   DASH/TLC {Sodium & Cholesterol Restricted} (DASH) (22)    Is current diet order appropriate/adequate? [x] Yes  []  No:     PO intake :   [x] >75%  Adequate    [] 50-75%  Fair       [] <50%  Poor    Nutrition-related concerns:  -Good PO intake noted, consuming >/=75% of meals. Dietary preferences noted. Enjoys: chicken, hamburger, oatmeal, chicken sandwiches, fruits (apple, pears), bread. Dislikes: salmon.   -Last BM (). On bowel regimen (Miralax, Dulcolax).   -Endo following in setting of T2DM, A1c 9.4%. Continues on antihyperglycemic regimen: Lantus 10u 3x daily before meals and insulin lispro sliding scale to aid in management of BG.   -Provided diet education regarding T2DM. Education included dietary sources of carbohydrates (i.e. concentrated sweets, starches, dairy, fruit), monitoring portion sizes of carbohydrates, and including good sources of high biological protein with carbohydrates during meals/snacks, importance of consistent meal pattern, MyPlate healthy eating model, label reading for CHO content, limiting concentrated sweets in diet, and importance of monitoring fingersticks daily. Provided written materials of T2DM nutrition therapy and label reading for CHO content from [employer].  --> Pt to likely require ongoing education/review/reinforcement with provider/endocrinology/outpatient RD.     Weights:   Daily Weight in k.1 (), Weight in k.9 (31), Weight in k.8 (12-30), Weight in k.1 (12-29), Weight in k.9 (12-28), Weight in k (12-27), Weight in k.8 (12-26)    MEDICATIONS  (STANDING):  aMIOdarone    Tablet  aMIOdarone    Tablet  atorvastatin  bisacodyl Suppository  insulin glargine Injectable (LANTUS)  insulin lispro (ADMELOG) corrective regimen sliding scale  insulin lispro Injectable (ADMELOG)  metoprolol succinate ER  pantoprazole    Tablet  polyethylene glycol 3350  polyethylene glycol 3350  senna  sodium chloride 0.9% lock flush  sodium chloride 0.9%.    Pertinent Labs:   A1C with Estimated Average Glucose Result: 9.4 % (22 @ 05:54)    Finger Sticks:  POCT Blood Glucose.: 146 mg/dL ( @ 07:35)  POCT Blood Glucose.: 257 mg/dL ( @ 21:52)  POCT Blood Glucose.: 166 mg/dL ( @ 16:37)  POCT Blood Glucose.: 144 mg/dL ( @ 11:15)    Skin per nursing documentation: surgical incision midsternum  Edema: 1+ right ankle; left ankle    (based on dosing wt 78kg):   Estimated Energy Needs: (25-30kcal/kg): 1950-2340kcal   Estimated Protein Needs: (1.2-1.4g protein/kg): 94-109g protein  Estimated Fluid Needs: defer to team    Previous Nutrition Diagnosis: Increased nutrient needs; Food and nutrition related knowledge deficit   Nutrition Diagnosis is: [x] ongoing  [] resolved [] not applicable     New Nutrition Diagnosis: [x] Not applicable    Nutrition Care Plan:  [x] In Progress  [] Achieved  [] Not applicable    Nutrition Interventions:     Education Provided:       [x] Yes: T2DM nutrition education       Recommendations:         [x] Continue current diet order: DASH/TLC, Consistent Carbohydrate. Defer consistency to medical team, SLP.      [x] Diet education ongoing throughout hospital admission, as per request of pt/provider, per nutrition follow up.      [x] Monitor and encourage PO intake. [place] dietary preferences as expressed, as able.      [x] Recommend multivitamin (if no medication contraindications) to aid in prevention of micronutrient deficiencies, aid in wound healing (midsternum).      [x] RD to add diet Mighty Shakes 2x daily.     Monitoring and Evaluation:   Continue to monitor nutritional intake, tolerance to diet prescription, weights, labs, skin integrity    RD remains available upon request and will follow up per protocol    Alison Kleiner, RD, [facility] Pager # [phone]

## 2023-01-01 NOTE — PROGRESS NOTE ADULT - PROBLEM SELECTOR PLAN 3
12/23 CALE Atkinson following deferring PPM/DCCV   tacky- marisa    heparin GTT   Amiodarone load   metoprolol 12.5 BIDS  EPM VVI 35 VMA 10 12/23 CALE  No indication for PPM/DCCV   tacky- marisa    heparin GTT   Amiodarone load   metoprolol 12.5 BIDS  EPM VVI 35 VMA 10

## 2023-01-01 NOTE — PROGRESS NOTE ADULT - ASSESSMENT
This is a 78y/o male on holiday  PMH T2DM and recently diagnosed CHF? who had recent admission for cardiac work up. Pt found to have CAD and aortic stenosis. At that time, pt tested positive for COVID, however grossly asymptomatic. Pt was d/c home for outpatient follow up with Dr Bowles. Now presents to ED with chest pain starting yesterday afternoon. During previous admission, s/p LHC on 12/4. Of note, pt had covid + pcr, asymptomatic and completed 10 day isolation.     12/22 underwent  AVR #25 Inspiris, CABG x 1 LIMA recovered in CTU   fluid resuscitation extubated  12/23Rapid afib  amiodarone ->SR  Seen by PT disposition to home   Hyperkalemic  - diuresied  12/25 Bradycardia EPM VVI 35 Ep consulted c/b tachy-marisa (Afib RVR s/p amio and metoprolol, and undetermined bradycardia). Yesterday noted to have pauses and bradycardia requiring intermittent pacing. PRBC x1 Endocrine consulted  12/26 heparin gtt  12/29 VSS transferred to floor NO PPm at this time on heparin gtt amiodarone load + PW EPM VVI 35 SR1 degree-  needs insulin teaching disposition to home   12/30 EP to determine indication for PPM ongoing. Continue with antiarrhythmic agents and anticoagulation.  Insulin teaching ongoing. Nutrition consulted to reinforce dietary restrictions. Pt approved for joe diabetic medications from [employer] program. Laxatives for constipation.  Awaiting CEA in two weeks by Vascualr surgery.  Pt will resume primary care in country of residence when discharged. HR control. No indication for PPM as per EP. Will continue with hep gtt prior to CEA. Pt is medically and cardiac cleared from CTS perspective to proceed with CEA.   1/1 T2DM nutrition education in progress. No indication for PPM as per EP. Will continue with hep gtt prior to CEA. Pt is medically and cardiac cleared from CTS perspective to proceed with CEA.

## 2023-01-01 NOTE — PROGRESS NOTE ADULT - SUBJECTIVE AND OBJECTIVE BOX
VITAL SIGNS    Telemetry:    Vital Signs Last 24 Hrs  T(C): 36.4 (23 @ 04:48), Max: 36.7 (22 @ 19:43)  T(F): 97.6 (23 @ 04:48), Max: 98 (22 @ 19:43)  HR: 68 (23 @ 04:48) (68 - 105)  BP: 107/66 (23 @ 04:48) (95/60 - 107/66)  RR: 18 (23 @ 04:48) (18 - 18)  SpO2: 98% (23 @ 04:48) (95% - 98%)             @ 07: @ 07:00  --------------------------------------------------------  IN: 1667.5 mL / OUT: 1900 mL / NET: -232.5 mL     @ 07: @ 10:38  --------------------------------------------------------  IN: 360 mL / OUT: 225 mL / NET: 135 mL       Daily     Daily Weight in k.1 (2023 04:48)  Admit Wt: Drug Dosing Weight  Height (cm): 162.6 (22 Dec 2022 07:00)  Weight (kg): 78 (22 Dec 2022 07:00)  BMI (kg/m2): 29.5 (22 Dec 2022 07:00)  BSA (m2): 1.83 (22 Dec 2022 07:00)      CAPILLARY BLOOD GLUCOSE      POCT Blood Glucose.: 146 mg/dL (2023 07:35)  POCT Blood Glucose.: 257 mg/dL (31 Dec 2022 21:52)  POCT Blood Glucose.: 166 mg/dL (31 Dec 2022 16:37)  POCT Blood Glucose.: 144 mg/dL (31 Dec 2022 11:15)          MEDICATIONS  acetaminophen     Tablet .. 650 milliGRAM(s) Oral every 6 hours PRN  aMIOdarone    Tablet   Oral   aMIOdarone    Tablet 200 milliGRAM(s) Oral daily  aspirin enteric coated 81 milliGRAM(s) Oral daily  atorvastatin 40 milliGRAM(s) Oral at bedtime  bisacodyl Suppository 10 milliGRAM(s) Rectal once  chlorhexidine 2% Cloths 1 Application(s) Topical daily  heparin  Infusion 800 Unit(s)/Hr IV Continuous <Continuous>  insulin glargine Injectable (LANTUS) 28 Unit(s) SubCutaneous at bedtime  insulin lispro (ADMELOG) corrective regimen sliding scale   SubCutaneous three times a day before meals  insulin lispro Injectable (ADMELOG) 10 Unit(s) SubCutaneous three times a day before meals  metoprolol succinate ER 75 milliGRAM(s) Oral daily  oxyCODONE    IR 10 milliGRAM(s) Oral every 4 hours PRN  pantoprazole    Tablet 40 milliGRAM(s) Oral before breakfast  polyethylene glycol 3350 17 Gram(s) Oral daily  polyethylene glycol 3350 17 Gram(s) Oral daily  senna 2 Tablet(s) Oral at bedtime  sodium chloride 0.9% lock flush 3 milliLiter(s) IV Push every 8 hours  sodium chloride 0.9%. 1000 milliLiter(s) IV Continuous <Continuous>      >>> <<<  PHYSICAL EXAM  Subjective:  Neurology: alert and oriented x 3, nonfocal, no gross deficits  CV :  Sternal Wound :  CDI , Stable  Lungs:  Abdomen: soft, NT,ND, ( )BM  :  voiding  Extremities:       LABS      137  |  100  |  14  ----------------------------<  158<H>  4.2   |  24  |  0.92    Ca    9.3      31 Dec 2022 04:21  Phos  4.0       Mg     1.8         TPro  6.5  /  Alb  3.2<L>  /  TBili  1.2  /  DBili  x   /  AST  25  /  ALT  24  /  AlkPhos  92                                   8.8    5.14  )-----------( 191      ( 31 Dec 2022 04:21 )             26.9          PTT - ( 31 Dec 2022 11:25 )  PTT:52.9 sec       PAST MEDICAL & SURGICAL HISTORY:  Chronic CHF      HTN (hypertension)      Diabetes      Aortic stenosis      No significant past surgical history            VITAL SIGNS    Telemetry:    Vital Signs Last 24 Hrs  T(C): 36.4 (23 @ 04:48), Max: 36.7 (22 @ 19:43)  T(F): 97.6 (23 @ 04:48), Max: 98 (22 @ 19:43)  HR: 68 (23 @ 04:48) (68 - 105)  BP: 107/66 (23 @ 04:48) (95/60 - 107/66)  RR: 18 (23 @ 04:48) (18 - 18)  SpO2: 98% (23 @ 04:48) (95% - 98%)             @ 07: @ 07:00  --------------------------------------------------------  IN: 1667.5 mL / OUT: 1900 mL / NET: -232.5 mL     @ 07: @ 10:38  --------------------------------------------------------  IN: 360 mL / OUT: 225 mL / NET: 135 mL       Daily     Daily Weight in k.1 (2023 04:48)  Admit Wt: Drug Dosing Weight  Height (cm): 162.6 (22 Dec 2022 07:00)  Weight (kg): 78 (22 Dec 2022 07:00)  BMI (kg/m2): 29.5 (22 Dec 2022 07:00)  BSA (m2): 1.83 (22 Dec 2022 07:00)      CAPILLARY BLOOD GLUCOSE      POCT Blood Glucose.: 146 mg/dL (2023 07:35)  POCT Blood Glucose.: 257 mg/dL (31 Dec 2022 21:52)  POCT Blood Glucose.: 166 mg/dL (31 Dec 2022 16:37)  POCT Blood Glucose.: 144 mg/dL (31 Dec 2022 11:15)          MEDICATIONS  acetaminophen     Tablet .. 650 milliGRAM(s) Oral every 6 hours PRN  aMIOdarone    Tablet   Oral   aMIOdarone    Tablet 200 milliGRAM(s) Oral daily  aspirin enteric coated 81 milliGRAM(s) Oral daily  atorvastatin 40 milliGRAM(s) Oral at bedtime  bisacodyl Suppository 10 milliGRAM(s) Rectal once  chlorhexidine 2% Cloths 1 Application(s) Topical daily  heparin  Infusion 800 Unit(s)/Hr IV Continuous <Continuous>  insulin glargine Injectable (LANTUS) 28 Unit(s) SubCutaneous at bedtime  insulin lispro (ADMELOG) corrective regimen sliding scale   SubCutaneous three times a day before meals  insulin lispro Injectable (ADMELOG) 10 Unit(s) SubCutaneous three times a day before meals  metoprolol succinate ER 75 milliGRAM(s) Oral daily  oxyCODONE    IR 10 milliGRAM(s) Oral every 4 hours PRN  pantoprazole    Tablet 40 milliGRAM(s) Oral before breakfast  polyethylene glycol 3350 17 Gram(s) Oral daily  polyethylene glycol 3350 17 Gram(s) Oral daily  senna 2 Tablet(s) Oral at bedtime  sodium chloride 0.9% lock flush 3 milliLiter(s) IV Push every 8 hours  sodium chloride 0.9%. 1000 milliLiter(s) IV Continuous <Continuous>      >>> <<<  PHYSICAL EXAM  Subjective: NAD  Neurology: alert and oriented x 3, nonfocal, no gross deficits  CV : s1s2  Sternal Wound :  CDI , Stable  Lungs: cta  Abdomen: soft, NT,ND, (+ )BM  :  voiding  Extremities:   -c/c/e    LABS      137  |  100  |  14  ----------------------------<  158<H>  4.2   |  24  |  0.92    Ca    9.3      31 Dec 2022 04:21  Phos  4.0       Mg     1.8         TPro  6.5  /  Alb  3.2<L>  /  TBili  1.2  /  DBili  x   /  AST  25  /  ALT  24  /  AlkPhos  92                                   8.8    5.14  )-----------( 191      ( 31 Dec 2022 04:21 )             26.9          PTT - ( 31 Dec 2022 11:25 )  PTT:52.9 sec       PAST MEDICAL & SURGICAL HISTORY:  Chronic CHF      HTN (hypertension)      Diabetes      Aortic stenosis      No significant past surgical history

## 2023-01-02 ENCOUNTER — TRANSCRIPTION ENCOUNTER (OUTPATIENT)
Age: 78
End: 2023-01-02

## 2023-01-02 LAB
ANION GAP SERPL CALC-SCNC: 10 MMOL/L — SIGNIFICANT CHANGE UP (ref 5–17)
APTT BLD: 61 SEC — HIGH (ref 27.5–35.5)
BLD GP AB SCN SERPL QL: NEGATIVE — SIGNIFICANT CHANGE UP
BUN SERPL-MCNC: 15 MG/DL — SIGNIFICANT CHANGE UP (ref 7–23)
CALCIUM SERPL-MCNC: 10.1 MG/DL — SIGNIFICANT CHANGE UP (ref 8.4–10.5)
CHLORIDE SERPL-SCNC: 101 MMOL/L — SIGNIFICANT CHANGE UP (ref 96–108)
CO2 SERPL-SCNC: 26 MMOL/L — SIGNIFICANT CHANGE UP (ref 22–31)
CREAT SERPL-MCNC: 1.08 MG/DL — SIGNIFICANT CHANGE UP (ref 0.5–1.3)
EGFR: 71 ML/MIN/1.73M2 — SIGNIFICANT CHANGE UP
GLUCOSE BLDC GLUCOMTR-MCNC: 117 MG/DL — HIGH (ref 70–99)
GLUCOSE BLDC GLUCOMTR-MCNC: 123 MG/DL — HIGH (ref 70–99)
GLUCOSE BLDC GLUCOMTR-MCNC: 137 MG/DL — HIGH (ref 70–99)
GLUCOSE BLDC GLUCOMTR-MCNC: 165 MG/DL — HIGH (ref 70–99)
GLUCOSE SERPL-MCNC: 129 MG/DL — HIGH (ref 70–99)
HCT VFR BLD CALC: 27.1 % — LOW (ref 39–50)
HGB BLD-MCNC: 8.8 G/DL — LOW (ref 13–17)
MCHC RBC-ENTMCNC: 30.2 PG — SIGNIFICANT CHANGE UP (ref 27–34)
MCHC RBC-ENTMCNC: 32.5 GM/DL — SIGNIFICANT CHANGE UP (ref 32–36)
MCV RBC AUTO: 93.1 FL — SIGNIFICANT CHANGE UP (ref 80–100)
NRBC # BLD: 0 /100 WBCS — SIGNIFICANT CHANGE UP (ref 0–0)
PLATELET # BLD AUTO: 197 K/UL — SIGNIFICANT CHANGE UP (ref 150–400)
POTASSIUM SERPL-MCNC: 4.7 MMOL/L — SIGNIFICANT CHANGE UP (ref 3.5–5.3)
POTASSIUM SERPL-SCNC: 4.7 MMOL/L — SIGNIFICANT CHANGE UP (ref 3.5–5.3)
RBC # BLD: 2.91 M/UL — LOW (ref 4.2–5.8)
RBC # FLD: 13.8 % — SIGNIFICANT CHANGE UP (ref 10.3–14.5)
RH IG SCN BLD-IMP: POSITIVE — SIGNIFICANT CHANGE UP
SARS-COV-2 RNA SPEC QL NAA+PROBE: SIGNIFICANT CHANGE UP
SODIUM SERPL-SCNC: 137 MMOL/L — SIGNIFICANT CHANGE UP (ref 135–145)
WBC # BLD: 5.3 K/UL — SIGNIFICANT CHANGE UP (ref 3.8–10.5)
WBC # FLD AUTO: 5.3 K/UL — SIGNIFICANT CHANGE UP (ref 3.8–10.5)

## 2023-01-02 PROCEDURE — 99232 SBSQ HOSP IP/OBS MODERATE 35: CPT

## 2023-01-02 RX ORDER — INSULIN GLARGINE 100 [IU]/ML
20 INJECTION, SOLUTION SUBCUTANEOUS AT BEDTIME
Refills: 0 | Status: DISCONTINUED | OUTPATIENT
Start: 2023-01-02 | End: 2023-01-03

## 2023-01-02 RX ADMIN — CHLORHEXIDINE GLUCONATE 1 APPLICATION(S): 213 SOLUTION TOPICAL at 21:42

## 2023-01-02 RX ADMIN — INSULIN GLARGINE 20 UNIT(S): 100 INJECTION, SOLUTION SUBCUTANEOUS at 21:56

## 2023-01-02 RX ADMIN — SODIUM CHLORIDE 3 MILLILITER(S): 9 INJECTION INTRAMUSCULAR; INTRAVENOUS; SUBCUTANEOUS at 13:54

## 2023-01-02 RX ADMIN — Medication 10 UNIT(S): at 17:10

## 2023-01-02 RX ADMIN — ATORVASTATIN CALCIUM 40 MILLIGRAM(S): 80 TABLET, FILM COATED ORAL at 21:56

## 2023-01-02 RX ADMIN — PANTOPRAZOLE SODIUM 40 MILLIGRAM(S): 20 TABLET, DELAYED RELEASE ORAL at 09:13

## 2023-01-02 RX ADMIN — POLYETHYLENE GLYCOL 3350 17 GRAM(S): 17 POWDER, FOR SOLUTION ORAL at 11:27

## 2023-01-02 RX ADMIN — SODIUM CHLORIDE 3 MILLILITER(S): 9 INJECTION INTRAMUSCULAR; INTRAVENOUS; SUBCUTANEOUS at 06:36

## 2023-01-02 RX ADMIN — Medication 1: at 11:28

## 2023-01-02 RX ADMIN — SENNA PLUS 2 TABLET(S): 8.6 TABLET ORAL at 22:09

## 2023-01-02 RX ADMIN — SODIUM CHLORIDE 3 MILLILITER(S): 9 INJECTION INTRAMUSCULAR; INTRAVENOUS; SUBCUTANEOUS at 21:42

## 2023-01-02 RX ADMIN — AMIODARONE HYDROCHLORIDE 200 MILLIGRAM(S): 400 TABLET ORAL at 05:43

## 2023-01-02 RX ADMIN — Medication 10 UNIT(S): at 07:38

## 2023-01-02 RX ADMIN — Medication 75 MILLIGRAM(S): at 05:42

## 2023-01-02 RX ADMIN — Medication 10 UNIT(S): at 11:27

## 2023-01-02 RX ADMIN — Medication 81 MILLIGRAM(S): at 11:28

## 2023-01-02 NOTE — PROGRESS NOTE ADULT - PROBLEM SELECTOR PLAN 1
Aic 6.7   Lantus 26    Premeal 10   insulin teaching    house endocrine following  Approved [facility] program @ [facility] for insulin coverage

## 2023-01-02 NOTE — PROGRESS NOTE ADULT - SUBJECTIVE AND OBJECTIVE BOX
Vascular Surgery Progress Note    SUBJECTIVE  No acute events overnight. Pt seen and examined on mornings rounds.    OBJECTIVE  ___________________________________________________  VITAL SIGNS / I&O's   Vital Signs Last 24 Hrs  T(C): 36.4 (02 Jan 2023 04:44), Max: 37.1 (01 Jan 2023 11:46)  T(F): 97.5 (02 Jan 2023 04:44), Max: 98.7 (01 Jan 2023 11:46)  HR: 68 (02 Jan 2023 04:44) (68 - 75)  BP: 110/60 (02 Jan 2023 04:44) (94/54 - 111/68)  BP(mean): 77 (02 Jan 2023 04:44) (77 - 77)  RR: 18 (02 Jan 2023 04:44) (18 - 18)  SpO2: 95% (02 Jan 2023 04:44) (94% - 98%)    Parameters below as of 02 Jan 2023 04:44  Patient On (Oxygen Delivery Method): room air          01 Jan 2023 07:01  -  02 Jan 2023 07:00  --------------------------------------------------------  IN:    Heparin: 162.5 mL    Oral Fluid: 920 mL  Total IN: 1082.5 mL    OUT:    Voided (mL): 750 mL  Total OUT: 750 mL    Total NET: 332.5 mL      02 Jan 2023 07:01  -  02 Jan 2023 10:00  --------------------------------------------------------  IN:    Oral Fluid: 240 mL  Total IN: 240 mL    OUT:  Total OUT: 0 mL    Total NET: 240 mL        ___________________________________________________  PHYSICAL EXAM    General: Well developed, well nourished, NAD  Neuro: Alert and oriented, no focal deficits, moves all extremities spontaneously  HEENT: NCAT, EOMI, anicteric, mucosa moist  Respiratory: Airway patent, respirations unlabored  CVS: Sternotomy scars c/d/i  Abdomen: Soft, nontender, nondistended  Extremities: No edema, sensation and movement grossly intact  Skin: Warm, dry, appropriate color    ___________________________________________________  LABS                        8.8    5.30  )-----------( 197      ( 02 Jan 2023 06:29 )             27.1     02 Jan 2023 06:29    137    |  101    |  15     ----------------------------<  129    4.7     |  26     |  1.08     Ca    10.1       02 Jan 2023 06:29    TPro  6.8    /  Alb  3.8    /  TBili  0.9    /  DBili  x      /  AST  27     /  ALT  30     /  AlkPhos  97     01 Jan 2023 10:33    PTT - ( 02 Jan 2023 06:29 )  PTT:61.0 sec  CAPILLARY BLOOD GLUCOSE      POCT Blood Glucose.: 137 mg/dL (02 Jan 2023 07:13)  POCT Blood Glucose.: 153 mg/dL (01 Jan 2023 21:28)  POCT Blood Glucose.: 150 mg/dL (01 Jan 2023 16:31)  POCT Blood Glucose.: 149 mg/dL (01 Jan 2023 11:18)  ___________________________________________________  MEDICATIONS  (STANDING):  aMIOdarone    Tablet   Oral   aMIOdarone    Tablet 200 milliGRAM(s) Oral daily  aspirin enteric coated 81 milliGRAM(s) Oral daily  atorvastatin 40 milliGRAM(s) Oral at bedtime  bisacodyl Suppository 10 milliGRAM(s) Rectal once  chlorhexidine 2% Cloths 1 Application(s) Topical daily  heparin  Infusion 800 Unit(s)/Hr (12.5 mL/Hr) IV Continuous <Continuous>  insulin glargine Injectable (LANTUS) 28 Unit(s) SubCutaneous at bedtime  insulin lispro (ADMELOG) corrective regimen sliding scale   SubCutaneous three times a day before meals  insulin lispro Injectable (ADMELOG) 10 Unit(s) SubCutaneous three times a day before meals  metoprolol succinate ER 75 milliGRAM(s) Oral daily  pantoprazole    Tablet 40 milliGRAM(s) Oral before breakfast  polyethylene glycol 3350 17 Gram(s) Oral daily  polyethylene glycol 3350 17 Gram(s) Oral daily  senna 2 Tablet(s) Oral at bedtime  sodium chloride 0.9% lock flush 3 milliLiter(s) IV Push every 8 hours  sodium chloride 0.9%. 1000 milliLiter(s) (10 mL/Hr) IV Continuous <Continuous>    MEDICATIONS  (PRN):  acetaminophen     Tablet .. 650 milliGRAM(s) Oral every 6 hours PRN Mild Pain (1 - 3)  oxyCODONE    IR 10 milliGRAM(s) Oral every 4 hours PRN Severe Pain (7 - 10)

## 2023-01-02 NOTE — PROGRESS NOTE ADULT - SUBJECTIVE AND OBJECTIVE BOX
Subjective: "Hello"  Sitting up in bed, questions answered       Tele:  SR 70s           V/S:                    T(F): 97.5 (23 @ 11:37), Max: 98.3 (23 @ 19:45)  HR: 69 (23 @ 11:37) (68 - 75)  BP: 112/67 (23 @ 11:37) (94/54 - 112/67)  RR: 18 (23 @ 11:37) (18 - 18)  SpO2: 97% (23 @ 11:37) (94% - 97%)  Wt(kg): --      LV EF:      Labs:      137  |  101  |  15  ----------------------------<  129<H>  4.7   |  26  |  1.08    Ca    10.1      2023 06:29    TPro  6.8  /  Alb  3.8  /  TBili  0.9  /  DBili  x   /  AST  27  /  ALT  30  /  AlkPhos  97                                 8.8    5.30  )-----------( 197      ( 2023 06:29 )             27.1        PTT - ( 2023 06:29 )  PTT:61.0 sec     CAPILLARY BLOOD GLUCOSE      POCT Blood Glucose.: 165 mg/dL (2023 11:25)  POCT Blood Glucose.: 137 mg/dL (2023 07:13)  POCT Blood Glucose.: 153 mg/dL (2023 21:28)  POCT Blood Glucose.: 150 mg/dL (2023 16:31)           CXR:    I&O's Detail    2023 07:01  -  2023 07:00  --------------------------------------------------------  IN:    Heparin: 162.5 mL    Oral Fluid: 920 mL  Total IN: 1082.5 mL    OUT:    Voided (mL): 750 mL  Total OUT: 750 mL    Total NET: 332.5 mL      2023 07:01  -  2023 12:56  --------------------------------------------------------  IN:    Oral Fluid: 240 mL  Total IN: 240 mL    OUT:  Total OUT: 0 mL    Total NET: 240 mL      EPICARDIAL WIRES:  [x ] YES [ ] NO      BOWEL MOVEMENT:  [x ] YES [ ] NO      Daily     Daily Weight in k.5 (2023 08:05)  Medications:  acetaminophen     Tablet .. 650 milliGRAM(s) Oral every 6 hours PRN  aMIOdarone    Tablet   Oral   aMIOdarone    Tablet 200 milliGRAM(s) Oral daily  aspirin enteric coated 81 milliGRAM(s) Oral daily  atorvastatin 40 milliGRAM(s) Oral at bedtime  bisacodyl Suppository 10 milliGRAM(s) Rectal once  chlorhexidine 2% Cloths 1 Application(s) Topical daily  heparin  Infusion 800 Unit(s)/Hr IV Continuous <Continuous>  insulin glargine Injectable (LANTUS) 28 Unit(s) SubCutaneous at bedtime  insulin lispro (ADMELOG) corrective regimen sliding scale   SubCutaneous three times a day before meals  insulin lispro Injectable (ADMELOG) 10 Unit(s) SubCutaneous three times a day before meals  metoprolol succinate ER 75 milliGRAM(s) Oral daily  oxyCODONE    IR 10 milliGRAM(s) Oral every 4 hours PRN  pantoprazole    Tablet 40 milliGRAM(s) Oral before breakfast  polyethylene glycol 3350 17 Gram(s) Oral daily  polyethylene glycol 3350 17 Gram(s) Oral daily  senna 2 Tablet(s) Oral at bedtime  sodium chloride 0.9% lock flush 3 milliLiter(s) IV Push every 8 hours  sodium chloride 0.9%. 1000 milliLiter(s) IV Continuous <Continuous>        Physical Exam:      Neurology: alert and oriented x 3    CV : s1s2 RRR    Sternal Wound :  CDI , Stable    Lungs: Clear     Abdomen: soft, NT,ND, (+ )BM    :  voiding    Extremities:  no edema,                  PAST MEDICAL & SURGICAL HISTORY:  Chronic CHF      HTN (hypertension)      Diabetes      Aortic stenosis      No significant past surgical history

## 2023-01-02 NOTE — PROGRESS NOTE ADULT - ASSESSMENT
77M with a PMH T2DM and recently diagnosed CHF? who had recent admission for cardiac work up. Pt found to have CAD and aortic stenosis. On CT neck found to have R ICA stenosis.    - Plan for R CEA tomorrow, added on to OR schedule  - NPO after midnight, 4am labs, COVID test  - Will consent  - Appreciate clearances  - Will follow    Vascular Surgery  [id number] 77M with a PMH T2DM and recently diagnosed CHF? who had recent admission for cardiac work up. Pt found to have CAD and aortic stenosis. On CT neck found to have R ICA stenosis.    - Plan for R CEA tomorrow, added on to OR schedule  - Please hold heparin gtt at midnight  - NPO after midnight, 4am labs, COVID test  - Will consent  - Appreciate clearances  - Will follow    Vascular Surgery  [id number]

## 2023-01-02 NOTE — PROGRESS NOTE ADULT - ASSESSMENT
76y/o male on holiday  PMH T2DM and recently diagnosed CHF? who had recent admission for cardiac work up. Pt found to have CAD and aortic stenosis. At that time, pt tested positive for COVID, however grossly asymptomatic. Pt was d/c home for outpatient follow up with Dr Bowles. Now presents to ED with chest pain starting yesterday afternoon. During previous admission, s/p LHC on 12/4. Of note, pt had covid + pcr, asymptomatic and completed 10 day isolation.     12/22 underwent  AVR #25 Inspiris, CABG x 1 LIMA recovered in CTU   fluid resuscitation extubated  12/23Rapid afib  amiodarone ->SR  Seen by PT disposition to home   Hyperkalemic  - diuresied  12/25 Bradycardia EPM VVI 35 Ep consulted c/b tachy-marisa (Afib RVR s/p amio and metoprolol, and undetermined bradycardia). Yesterday noted to have pauses and bradycardia requiring intermittent pacing. PRBC x1 Endocrine consulted  12/26 heparin gtt  12/29 VSS transferred to floor NO PPm at this time on heparin gtt amiodarone load + PW EPM VVI 35 SR1 degree-  needs insulin teaching disposition to home   12/30 EP to determine indication for PPM ongoing. Continue with antiarrhythmic agents and anticoagulation.  Insulin teaching ongoing. Nutrition consulted to reinforce dietary restrictions. Pt approved for joe diabetic medications from [employer]OPE program. Laxatives for constipation.  Awaiting CEA in two weeks by Vascualr surgery.  Pt will resume primary care in country of residence when discharged. HR control. No indication for PPM as per EP. Will continue with hep gtt prior to CEA. Pt is medically and cardiac cleared from CTS perspective to proceed with CEA.   1/1 T2DM nutrition education in progress. No indication for PPM as per EP. Will continue with hep gtt prior to CEA. Pt is medically and cardiac cleared from CTS perspective to proceed with CEA.  1/2 Preop CEA am>insulin teaching >approved for HOPE program, insulin will be covered at [facility]  Hep gtt till 12 midnight  Questions answered @ bedside (dtr)

## 2023-01-02 NOTE — PROGRESS NOTE ADULT - ASSESSMENT
Patient is a 77 M with recently diagnosed T2D, CHF, CAD s/p [facility] 12/4 with prox LAD 70% stenosis, aortic stenosis  s/p  CABG/SAVR. 12/22. pending R. CEA tomorrow. BG values at goal on current basal/bolus regimen. Discussed w/pt and family need to start practicing insulin administration. NPO after midnight tonight. BG goal (100-180mg/dl).

## 2023-01-02 NOTE — PROGRESS NOTE ADULT - PROBLEM SELECTOR PLAN 3
12/23 CALE  No indication for PPM/DCCV   tachy  marisa    heparin GTT   Amiodarone load   metoprolol 12.5 BIDS  Isolated PW

## 2023-01-02 NOTE — PROGRESS NOTE ADULT - ATTENDING COMMENTS
seen and agree  patient conducting in sinus rhythm with frequent APCs and runs of AT  cont amio loading
right carotid endarterectomy  all risks benefits and alternatives were discussed and he wished to proceed
seen and agree  may require pacing

## 2023-01-02 NOTE — PROGRESS NOTE ADULT - PROBLEM SELECTOR PLAN 1
-test BG AC/HS. Can change to q6h when NPO  -NPO tonight-reduce Lantus 20 units tonight since case in afternoon  -c/w Admelog 10 units AC meals  -c/w Admelog low correction scale AC and low HS scale. Can change to low q6h scale overnight.   -cons carb diet  -Insulin pen teaching for pt/family  Discharge planning:  Per  pt has [facility] benefit- can discharge on basal bolus insulin pens + metformin 1000mg BID   final recs pending hospital course  Send Rx for glucometer, test strips, lancets, alcohol pads, and pen needles  At home, Patient should check FSBG premeals and bedtime. Pt should call their doctor when FSBG <70 or above >400 and or consistently above 200s as changes in the regimen will have to be made.  Recommend outpatient routine dilated eye exam/ophthalmology f/u; podiatry referral and endocrinology   -Primary team should arrange medicine clinic follow up prior to discharge as pt is uninsured and visiting from [facility] & planning to go back once he recuperates from surgery. & the medicine clinic can address multiple medical concerns at once. phone: [phone]

## 2023-01-02 NOTE — PROGRESS NOTE ADULT - PROBLEM SELECTOR PLAN 2
s/p AVR CABG 1 on 12/22  Heparin GTT  aspirin enteric coated 81 milliGRAM(s) Oral daily  atorvastatin 40 milliGRAM(s) Oral at bedtime  metoprolol tartrate 12.5 milliGRAM(s) Oral every 12 hours  pantoprazole    Tablet 40 milliGRAM(s) Oral before breakfast   + PW isolated    ambulate tid  incentive spirometry   dispostion to home

## 2023-01-02 NOTE — PROGRESS NOTE ADULT - PROBLEM SELECTOR PLAN 3
- Continue with high intensity statin atorvastatin 80 mg daily   - Manage as outpatient       Can be reached via TEAMS/pager: [phone]   office:  [phone] (M-F 9a-5pm)               [phone] (nights/weekends)   [email or web address] password [email or web address]

## 2023-01-02 NOTE — PROGRESS NOTE ADULT - SUBJECTIVE AND OBJECTIVE BOX
Diabetes Follow up note:    Chief complaint: T2DM    Interval Hx: BG values at goal. Pt seen at bedside w/eileen present and assisting w/translation for pt. Pt reports feeling well, tolerating POs. NPO tonight for CEA planned for afternoon. Pt/family have not yet learned how to administer insulin.     Review of Systems:  General: no complaints.   GI: Tolerating POs. Denies N/V/D/Abd pain  CV: Denies CP/SOB  ENDO: No S&Sx of hypoglycemia    MEDS:  atorvastatin 40 milliGRAM(s) Oral at bedtime  insulin glargine Injectable (LANTUS) 28 Unit(s) SubCutaneous at bedtime  insulin lispro (ADMELOG) corrective regimen sliding scale   SubCutaneous three times a day before meals  insulin lispro Injectable (ADMELOG) 10 Unit(s) SubCutaneous three times a day before meals      Allergies    No Known Allergies      PE:  General: Male sitting in chair. NAD>   Vital Signs Last 24 Hrs  T(C): 36.4 (02 Jan 2023 11:37), Max: 36.8 (01 Jan 2023 19:45)  T(F): 97.5 (02 Jan 2023 11:37), Max: 98.3 (01 Jan 2023 19:45)  HR: 69 (02 Jan 2023 11:37) (68 - 75)  BP: 112/67 (02 Jan 2023 11:37) (94/54 - 112/67)  BP(mean): 77 (02 Jan 2023 04:44) (77 - 77)  RR: 18 (02 Jan 2023 11:37) (18 - 18)  SpO2: 97% (02 Jan 2023 11:37) (94% - 97%)    Parameters below as of 02 Jan 2023 11:37  Patient On (Oxygen Delivery Method): room air    Chest: midsternal AVINASH.  Abd: Soft, NT,ND,   Extremities: Warm  Neuro: A&O X3    LABS:  POCT Blood Glucose.: 165 mg/dL (01-02-23 @ 11:25)  POCT Blood Glucose.: 137 mg/dL (01-02-23 @ 07:13)  POCT Blood Glucose.: 153 mg/dL (01-01-23 @ 21:28)  POCT Blood Glucose.: 150 mg/dL (01-01-23 @ 16:31)  POCT Blood Glucose.: 149 mg/dL (01-01-23 @ 11:18)  POCT Blood Glucose.: 146 mg/dL (01-01-23 @ 07:35)  POCT Blood Glucose.: 257 mg/dL (12-31-22 @ 21:52)  POCT Blood Glucose.: 166 mg/dL (12-31-22 @ 16:37)  POCT Blood Glucose.: 144 mg/dL (12-31-22 @ 11:15)  POCT Blood Glucose.: 174 mg/dL (12-31-22 @ 07:29)  POCT Blood Glucose.: 170 mg/dL (12-30-22 @ 21:36)  POCT Blood Glucose.: 174 mg/dL (12-30-22 @ 16:42)                            8.8    5.30  )-----------( 197      ( 02 Jan 2023 06:29 )             27.1       01-02    137  |  101  |  15  ----------------------------<  129<H>  4.7   |  26  |  1.08    eGFR: 71    Ca    10.1      01-02  Mg     1.8     12-31  Phos  4.0     12-31    TPro  6.8  /  Alb  3.8  /  TBili  0.9  /  DBili  x   /  AST  27  /  ALT  30  /  AlkPhos  97  01-01      Thyroid Function Tests:  12-07 @ 13:19 TSH -- FreeT4 -- T3 100 Anti TPO -- Anti Thyroglobulin Ab -- TSI --      A1C with Estimated Average Glucose Result: 9.4 % (12-02-22 @ 05:54)          Contact number: fatuma [phone] or [phone]

## 2023-01-03 ENCOUNTER — RESULT REVIEW (OUTPATIENT)
Age: 78
End: 2023-01-03

## 2023-01-03 LAB
ANION GAP SERPL CALC-SCNC: 12 MMOL/L — SIGNIFICANT CHANGE UP (ref 5–17)
APTT BLD: 30.6 SEC — SIGNIFICANT CHANGE UP (ref 27.5–35.5)
BUN SERPL-MCNC: 17 MG/DL — SIGNIFICANT CHANGE UP (ref 7–23)
CALCIUM SERPL-MCNC: 10.4 MG/DL — SIGNIFICANT CHANGE UP (ref 8.4–10.5)
CHLORIDE SERPL-SCNC: 100 MMOL/L — SIGNIFICANT CHANGE UP (ref 96–108)
CO2 SERPL-SCNC: 25 MMOL/L — SIGNIFICANT CHANGE UP (ref 22–31)
CREAT SERPL-MCNC: 1.1 MG/DL — SIGNIFICANT CHANGE UP (ref 0.5–1.3)
EGFR: 69 ML/MIN/1.73M2 — SIGNIFICANT CHANGE UP
GLUCOSE BLDC GLUCOMTR-MCNC: 132 MG/DL — HIGH (ref 70–99)
GLUCOSE BLDC GLUCOMTR-MCNC: 133 MG/DL — HIGH (ref 70–99)
GLUCOSE BLDC GLUCOMTR-MCNC: 165 MG/DL — HIGH (ref 70–99)
GLUCOSE SERPL-MCNC: 146 MG/DL — HIGH (ref 70–99)
HCT VFR BLD CALC: 29.2 % — LOW (ref 39–50)
HGB BLD-MCNC: 9.4 G/DL — LOW (ref 13–17)
INR BLD: 1.26 RATIO — HIGH (ref 0.88–1.16)
MAGNESIUM SERPL-MCNC: 1.8 MG/DL — SIGNIFICANT CHANGE UP (ref 1.6–2.6)
MCHC RBC-ENTMCNC: 30 PG — SIGNIFICANT CHANGE UP (ref 27–34)
MCHC RBC-ENTMCNC: 32.2 GM/DL — SIGNIFICANT CHANGE UP (ref 32–36)
MCV RBC AUTO: 93.3 FL — SIGNIFICANT CHANGE UP (ref 80–100)
NRBC # BLD: 0 /100 WBCS — SIGNIFICANT CHANGE UP (ref 0–0)
PHOSPHATE SERPL-MCNC: 4.3 MG/DL — SIGNIFICANT CHANGE UP (ref 2.5–4.5)
PLATELET # BLD AUTO: 242 K/UL — SIGNIFICANT CHANGE UP (ref 150–400)
POTASSIUM SERPL-MCNC: 4.5 MMOL/L — SIGNIFICANT CHANGE UP (ref 3.5–5.3)
POTASSIUM SERPL-SCNC: 4.5 MMOL/L — SIGNIFICANT CHANGE UP (ref 3.5–5.3)
PROTHROM AB SERPL-ACNC: 14.6 SEC — HIGH (ref 10.5–13.4)
RBC # BLD: 3.13 M/UL — LOW (ref 4.2–5.8)
RBC # FLD: 14 % — SIGNIFICANT CHANGE UP (ref 10.3–14.5)
SODIUM SERPL-SCNC: 137 MMOL/L — SIGNIFICANT CHANGE UP (ref 135–145)
SURGICAL PATHOLOGY STUDY: SIGNIFICANT CHANGE UP
WBC # BLD: 5.88 K/UL — SIGNIFICANT CHANGE UP (ref 3.8–10.5)
WBC # FLD AUTO: 5.88 K/UL — SIGNIFICANT CHANGE UP (ref 3.8–10.5)

## 2023-01-03 PROCEDURE — 88304 TISSUE EXAM BY PATHOLOGIST: CPT | Mod: 26

## 2023-01-03 PROCEDURE — 35301 RECHANNELING OF ARTERY: CPT

## 2023-01-03 PROCEDURE — 88311 DECALCIFY TISSUE: CPT | Mod: 26

## 2023-01-03 DEVICE — SURGIFLO MATRIX WITH THROMBIN KIT: Type: IMPLANTABLE DEVICE | Site: RIGHT | Status: FUNCTIONAL

## 2023-01-03 DEVICE — CLIP APPLIER ETHICON LIGACLIP 9 3/8" SMALL: Type: IMPLANTABLE DEVICE | Site: RIGHT | Status: FUNCTIONAL

## 2023-01-03 DEVICE — CLIP APPLIER COVIDIEN SURGICLIP II 9.75" MEDIUM: Type: IMPLANTABLE DEVICE | Site: RIGHT | Status: FUNCTIONAL

## 2023-01-03 DEVICE — SURGIFOAM PAD 8CM X 12.5CM X 10MM (100): Type: IMPLANTABLE DEVICE | Site: RIGHT | Status: FUNCTIONAL

## 2023-01-03 DEVICE — PATCH VASC PERIPHERAL 1X8CM: Type: IMPLANTABLE DEVICE | Site: RIGHT | Status: FUNCTIONAL

## 2023-01-03 DEVICE — ARISTA 3GR: Type: IMPLANTABLE DEVICE | Site: RIGHT | Status: FUNCTIONAL

## 2023-01-03 DEVICE — KIT A-LINE 1LUM 20G X 12CM SAFE KIT: Type: IMPLANTABLE DEVICE | Site: RIGHT | Status: FUNCTIONAL

## 2023-01-03 RX ORDER — POLYETHYLENE GLYCOL 3350 17 G/17G
17 POWDER, FOR SOLUTION ORAL DAILY
Refills: 0 | Status: DISCONTINUED | OUTPATIENT
Start: 2023-01-03 | End: 2023-01-11

## 2023-01-03 RX ORDER — PANTOPRAZOLE SODIUM 20 MG/1
40 TABLET, DELAYED RELEASE ORAL
Refills: 0 | Status: DISCONTINUED | OUTPATIENT
Start: 2023-01-03 | End: 2023-01-04

## 2023-01-03 RX ORDER — ACETAMINOPHEN 500 MG
650 TABLET ORAL EVERY 6 HOURS
Refills: 0 | Status: COMPLETED | OUTPATIENT
Start: 2023-01-03 | End: 2023-12-02

## 2023-01-03 RX ORDER — INSULIN GLARGINE 100 [IU]/ML
20 INJECTION, SOLUTION SUBCUTANEOUS AT BEDTIME
Refills: 0 | Status: DISCONTINUED | OUTPATIENT
Start: 2023-01-03 | End: 2023-01-04

## 2023-01-03 RX ORDER — INSULIN LISPRO 100/ML
10 VIAL (ML) SUBCUTANEOUS
Refills: 0 | Status: DISCONTINUED | OUTPATIENT
Start: 2023-01-03 | End: 2023-01-04

## 2023-01-03 RX ORDER — ONDANSETRON 8 MG/1
4 TABLET, FILM COATED ORAL ONCE
Refills: 0 | Status: DISCONTINUED | OUTPATIENT
Start: 2023-01-03 | End: 2023-01-04

## 2023-01-03 RX ORDER — METOPROLOL TARTRATE 50 MG
75 TABLET ORAL DAILY
Refills: 0 | Status: DISCONTINUED | OUTPATIENT
Start: 2023-01-03 | End: 2023-01-04

## 2023-01-03 RX ORDER — HYDROMORPHONE HYDROCHLORIDE 2 MG/ML
0.25 INJECTION INTRAMUSCULAR; INTRAVENOUS; SUBCUTANEOUS
Refills: 0 | Status: DISCONTINUED | OUTPATIENT
Start: 2023-01-03 | End: 2023-01-04

## 2023-01-03 RX ORDER — SODIUM CHLORIDE 9 MG/ML
3 INJECTION INTRAMUSCULAR; INTRAVENOUS; SUBCUTANEOUS EVERY 8 HOURS
Refills: 0 | Status: DISCONTINUED | OUTPATIENT
Start: 2023-01-03 | End: 2023-01-11

## 2023-01-03 RX ORDER — SENNA PLUS 8.6 MG/1
2 TABLET ORAL AT BEDTIME
Refills: 0 | Status: DISCONTINUED | OUTPATIENT
Start: 2023-01-03 | End: 2023-01-11

## 2023-01-03 RX ORDER — ASPIRIN/CALCIUM CARB/MAGNESIUM 324 MG
81 TABLET ORAL DAILY
Refills: 0 | Status: DISCONTINUED | OUTPATIENT
Start: 2023-01-03 | End: 2023-01-04

## 2023-01-03 RX ORDER — AMIODARONE HYDROCHLORIDE 400 MG/1
TABLET ORAL
Refills: 0 | Status: DISCONTINUED | OUTPATIENT
Start: 2023-01-03 | End: 2023-01-11

## 2023-01-03 RX ORDER — INSULIN LISPRO 100/ML
VIAL (ML) SUBCUTANEOUS
Refills: 0 | Status: DISCONTINUED | OUTPATIENT
Start: 2023-01-03 | End: 2023-01-04

## 2023-01-03 RX ORDER — OXYCODONE HYDROCHLORIDE 5 MG/1
10 TABLET ORAL EVERY 4 HOURS
Refills: 0 | Status: DISCONTINUED | OUTPATIENT
Start: 2023-01-03 | End: 2023-01-04

## 2023-01-03 RX ORDER — ATORVASTATIN CALCIUM 80 MG/1
40 TABLET, FILM COATED ORAL AT BEDTIME
Refills: 0 | Status: DISCONTINUED | OUTPATIENT
Start: 2023-01-03 | End: 2023-01-04

## 2023-01-03 RX ORDER — ENOXAPARIN SODIUM 100 MG/ML
40 INJECTION SUBCUTANEOUS EVERY 24 HOURS
Refills: 0 | Status: DISCONTINUED | OUTPATIENT
Start: 2023-01-04 | End: 2023-01-04

## 2023-01-03 RX ADMIN — AMIODARONE HYDROCHLORIDE 200 MILLIGRAM(S): 400 TABLET ORAL at 05:18

## 2023-01-03 RX ADMIN — SODIUM CHLORIDE 75 MILLILITER(S): 9 INJECTION INTRAMUSCULAR; INTRAVENOUS; SUBCUTANEOUS at 08:02

## 2023-01-03 RX ADMIN — Medication 75 MILLIGRAM(S): at 05:18

## 2023-01-03 RX ADMIN — SODIUM CHLORIDE 3 MILLILITER(S): 9 INJECTION INTRAMUSCULAR; INTRAVENOUS; SUBCUTANEOUS at 05:09

## 2023-01-03 RX ADMIN — PANTOPRAZOLE SODIUM 40 MILLIGRAM(S): 20 TABLET, DELAYED RELEASE ORAL at 05:18

## 2023-01-03 NOTE — PRE-ANESTHESIA EVALUATION ADULT - NSANTHOSAYNRD_GEN_A_CORE
No. YULISSA screening performed.  STOP BANG Legend: 0-2 = LOW Risk; 3-4 = INTERMEDIATE Risk; 5-8 = HIGH Risk
No. YULISSA screening performed.  STOP BANG Legend: 0-2 = LOW Risk; 3-4 = INTERMEDIATE Risk; 5-8 = HIGH Risk

## 2023-01-03 NOTE — PRE-OP CHECKLIST - HOW ADMINISTERED
See MAR for last dose taken
12/29 0530/See MAR for last dose taken
See MAR for last dose taken
See MAR for last dose taken

## 2023-01-03 NOTE — BRIEF OPERATIVE NOTE - NSICDXBRIEFPROCEDURE_GEN_ALL_CORE_FT
PROCEDURES:  Carotid endarterectomy 03-Jan-2023 22:57:27  Ron aMrtino  
PROCEDURES:  Replacement, aortic valve, with MORIAH 22-Dec-2022 13:56:43 AVR #25 Inspiris, CABG x 1 CASANOVA to Lele Gillespie  CABG, with MORIAH 22-Dec-2022 13:58:06 CABG CASANOVA to Lele Gillespie

## 2023-01-03 NOTE — PROGRESS NOTE ADULT - ASSESSMENT
76y/o male on holiday  PMH T2DM and recently diagnosed CHF? who had recent admission for cardiac work up. Pt found to have CAD and aortic stenosis. At that time, pt tested positive for COVID, however grossly asymptomatic. Pt was d/c home for outpatient follow up with Dr Bowles. Now presents to ED with chest pain starting yesterday afternoon. During previous admission, s/p LHC on 12/4. Of note, pt had covid + pcr, asymptomatic and completed 10 day isolation.     12/22 underwent  AVR #25 Inspiris, CABG x 1 LIMA recovered in CTU   fluid resuscitation extubated  12/23Rapid afib  amiodarone ->SR  Seen by PT disposition to home   Hyperkalemic  - diuresied  12/25 Bradycardia EPM VVI 35 Ep consulted c/b tachy-marisa (Afib RVR s/p amio and metoprolol, and undetermined bradycardia). Yesterday noted to have pauses and bradycardia requiring intermittent pacing. PRBC x1 Endocrine consulted  12/26 heparin gtt  12/29 VSS transferred to floor NO PPm at this time on heparin gtt amiodarone load + PW EPM VVI 35 SR1 degree-  needs insulin teaching disposition to home   12/30 EP to determine indication for PPM ongoing. Continue with antiarrhythmic agents and anticoagulation.  Insulin teaching ongoing. Nutrition consulted to reinforce dietary restrictions. Pt approved for joe diabetic medications from [employer] program. Laxatives for constipation.  Awaiting CEA in two weeks by Vascualr surgery.  Pt will resume primary care in country of residence when discharged. HR control. No indication for PPM as per EP. Will continue with hep gtt prior to CEA. Pt is medically and cardiac cleared from CTS perspective to proceed with CEA.   1/1 T2DM nutrition education in progress. No indication for PPM as per EP. Will continue with hep gtt prior to CEA. Pt is medically and cardiac cleared from CTS perspective to proceed with CEA.  1/2 Preop CEA am>insulin teaching >approved for [employer] program, insulin will be covered at [facility]  Hep gtt till 12 midnight  Questions answered @ bedside (dtr)  1/3 VSS NPO for carotid endarterectomy - pws out

## 2023-01-03 NOTE — BRIEF OPERATIVE NOTE - NSICDXBRIEFPREOP_GEN_ALL_CORE_FT
PRE-OP DIAGNOSIS:  Stenosis of right carotid artery 03-Jan-2023 22:57:44  Ron Martino  
PRE-OP DIAGNOSIS:  Aortic stenosis 22-Dec-2022 14:03:56  Lele Flores  CAD in native artery 22-Dec-2022 14:06:28  Lele Flores

## 2023-01-03 NOTE — PROGRESS NOTE ADULT - SUBJECTIVE AND OBJECTIVE BOX
VITAL SIGNS-Telemetry:  SR 1st deg. 60-90  Vital Signs Last 24 Hrs  T(C): 36.5 (01-03-23 @ 04:04), Max: 36.7 (01-02-23 @ 19:32)  T(F): 97.7 (01-03-23 @ 04:04), Max: 98.1 (01-02-23 @ 19:32)  HR: 81 (01-03-23 @ 04:04) (69 - 81)  BP: 107/68 (01-03-23 @ 04:04) (104/62 - 112/67)  RR: 18 (01-03-23 @ 04:04) (18 - 18)  SpO2: 94% (01-03-23 @ 04:04) (94% - 97%)         01-02 @ 07:01  -  01-03 @ 07:00  --------------------------------------------------------  IN: 855 mL / OUT: 900 mL / NET: -45 mL    Daily     Daily     CAPILLARY BLOOD GLUCOSE  POCT Blood Glucose.: 132 mg/dL (03 Jan 2023 07:28)  POCT Blood Glucose.: 117 mg/dL (02 Jan 2023 21:33)  POCT Blood Glucose.: 123 mg/dL (02 Jan 2023 16:27)        PHYSICAL EXAM:  Neurology: alert and oriented x 3, nonfocal, no gross deficits  CV : S1S2  Sternal Wound :  CDI , Stable  Lungs: cta  Abdomen: soft, nontender, nondistended, positive bowel sounds, last bowel movement  1/2       Extremities:    no c/c/e     acetaminophen     Tablet .. 650 milliGRAM(s) Oral every 6 hours PRN  aMIOdarone    Tablet   Oral   aMIOdarone    Tablet 200 milliGRAM(s) Oral daily  aspirin enteric coated 81 milliGRAM(s) Oral daily  atorvastatin 40 milliGRAM(s) Oral at bedtime  bisacodyl Suppository 10 milliGRAM(s) Rectal once  chlorhexidine 2% Cloths 1 Application(s) Topical daily  insulin glargine Injectable (LANTUS) 20 Unit(s) SubCutaneous at bedtime  insulin lispro (ADMELOG) corrective regimen sliding scale   SubCutaneous three times a day before meals  insulin lispro Injectable (ADMELOG) 10 Unit(s) SubCutaneous three times a day before meals  metoprolol succinate ER 75 milliGRAM(s) Oral daily  oxyCODONE    IR 10 milliGRAM(s) Oral every 4 hours PRN  pantoprazole    Tablet 40 milliGRAM(s) Oral before breakfast  polyethylene glycol 3350 17 Gram(s) Oral daily  polyethylene glycol 3350 17 Gram(s) Oral daily  senna 2 Tablet(s) Oral at bedtime  sodium chloride 0.9% lock flush 3 milliLiter(s) IV Push every 8 hours  sodium chloride 0.9%. 1000 milliLiter(s) IV Continuous <Continuous>    Physical Therapy Rec:   Home  [ x ]   Home w/ PT  [  ]  Rehab  [  ]  Discussed with Cardiothoracic Team at AM rounds.

## 2023-01-03 NOTE — BRIEF OPERATIVE NOTE - NSICDXBRIEFPOSTOP_GEN_ALL_CORE_FT
POST-OP DIAGNOSIS:  Stenosis of right carotid artery 03-Jan-2023 22:57:59  Ron Martino  
POST-OP DIAGNOSIS:  Aortic stenosis 22-Dec-2022 14:07:29  Lele Flores  CAD in native artery 22-Dec-2022 14:07:40  Lele Flores

## 2023-01-03 NOTE — PRE-OP CHECKLIST - SELECT TESTS ORDERED
BMP/CBC/PT/PTT/INR/Type and Screen/COVID-19
Results in MD note
BMP/CBC/CMP/PT/PTT/INR/Urinalysis/COVID-19

## 2023-01-04 LAB
ANION GAP SERPL CALC-SCNC: 12 MMOL/L — SIGNIFICANT CHANGE UP (ref 5–17)
BUN SERPL-MCNC: 17 MG/DL — SIGNIFICANT CHANGE UP (ref 7–23)
CALCIUM SERPL-MCNC: 8.7 MG/DL — SIGNIFICANT CHANGE UP (ref 8.4–10.5)
CHLORIDE SERPL-SCNC: 101 MMOL/L — SIGNIFICANT CHANGE UP (ref 96–108)
CO2 SERPL-SCNC: 22 MMOL/L — SIGNIFICANT CHANGE UP (ref 22–31)
CREAT SERPL-MCNC: 0.93 MG/DL — SIGNIFICANT CHANGE UP (ref 0.5–1.3)
EGFR: 85 ML/MIN/1.73M2 — SIGNIFICANT CHANGE UP
GAS PNL BLDA: SIGNIFICANT CHANGE UP
GLUCOSE BLDC GLUCOMTR-MCNC: 120 MG/DL — HIGH (ref 70–99)
GLUCOSE BLDC GLUCOMTR-MCNC: 132 MG/DL — HIGH (ref 70–99)
GLUCOSE BLDC GLUCOMTR-MCNC: 156 MG/DL — HIGH (ref 70–99)
GLUCOSE BLDC GLUCOMTR-MCNC: 174 MG/DL — HIGH (ref 70–99)
GLUCOSE BLDC GLUCOMTR-MCNC: 176 MG/DL — HIGH (ref 70–99)
GLUCOSE BLDC GLUCOMTR-MCNC: 200 MG/DL — HIGH (ref 70–99)
GLUCOSE SERPL-MCNC: 182 MG/DL — HIGH (ref 70–99)
HCT VFR BLD CALC: 25.3 % — LOW (ref 39–50)
HGB BLD-MCNC: 8.3 G/DL — LOW (ref 13–17)
INR BLD: 1.44 RATIO — HIGH (ref 0.88–1.16)
MCHC RBC-ENTMCNC: 29.9 PG — SIGNIFICANT CHANGE UP (ref 27–34)
MCHC RBC-ENTMCNC: 32.8 GM/DL — SIGNIFICANT CHANGE UP (ref 32–36)
MCV RBC AUTO: 91 FL — SIGNIFICANT CHANGE UP (ref 80–100)
NRBC # BLD: 0 /100 WBCS — SIGNIFICANT CHANGE UP (ref 0–0)
PLATELET # BLD AUTO: 200 K/UL — SIGNIFICANT CHANGE UP (ref 150–400)
POTASSIUM SERPL-MCNC: 4.4 MMOL/L — SIGNIFICANT CHANGE UP (ref 3.5–5.3)
POTASSIUM SERPL-SCNC: 4.4 MMOL/L — SIGNIFICANT CHANGE UP (ref 3.5–5.3)
PROTHROM AB SERPL-ACNC: 16.8 SEC — HIGH (ref 10.5–13.4)
RBC # BLD: 2.78 M/UL — LOW (ref 4.2–5.8)
RBC # FLD: 13.8 % — SIGNIFICANT CHANGE UP (ref 10.3–14.5)
SODIUM SERPL-SCNC: 135 MMOL/L — SIGNIFICANT CHANGE UP (ref 135–145)
WBC # BLD: 7.33 K/UL — SIGNIFICANT CHANGE UP (ref 3.8–10.5)
WBC # FLD AUTO: 7.33 K/UL — SIGNIFICANT CHANGE UP (ref 3.8–10.5)

## 2023-01-04 PROCEDURE — 99232 SBSQ HOSP IP/OBS MODERATE 35: CPT

## 2023-01-04 PROCEDURE — 70496 CT ANGIOGRAPHY HEAD: CPT | Mod: 26

## 2023-01-04 PROCEDURE — 99292 CRITICAL CARE ADDL 30 MIN: CPT | Mod: 24

## 2023-01-04 PROCEDURE — 71045 X-RAY EXAM CHEST 1 VIEW: CPT | Mod: 26

## 2023-01-04 PROCEDURE — 70498 CT ANGIOGRAPHY NECK: CPT | Mod: 26

## 2023-01-04 PROCEDURE — 99291 CRITICAL CARE FIRST HOUR: CPT | Mod: 24

## 2023-01-04 PROCEDURE — 0042T: CPT

## 2023-01-04 RX ORDER — AMIODARONE HYDROCHLORIDE 400 MG/1
200 TABLET ORAL DAILY
Refills: 0 | Status: DISCONTINUED | OUTPATIENT
Start: 2023-01-05 | End: 2023-01-11

## 2023-01-04 RX ORDER — ACETAMINOPHEN 500 MG
1000 TABLET ORAL ONCE
Refills: 0 | Status: COMPLETED | OUTPATIENT
Start: 2023-01-04 | End: 2023-01-04

## 2023-01-04 RX ORDER — SODIUM CHLORIDE 9 MG/ML
1000 INJECTION INTRAMUSCULAR; INTRAVENOUS; SUBCUTANEOUS
Refills: 0 | Status: DISCONTINUED | OUTPATIENT
Start: 2023-01-04 | End: 2023-01-05

## 2023-01-04 RX ORDER — INSULIN GLARGINE 100 [IU]/ML
20 INJECTION, SOLUTION SUBCUTANEOUS AT BEDTIME
Refills: 0 | Status: DISCONTINUED | OUTPATIENT
Start: 2023-01-04 | End: 2023-01-04

## 2023-01-04 RX ORDER — PHENYLEPHRINE HYDROCHLORIDE 10 MG/ML
0.1 INJECTION INTRAVENOUS
Qty: 40 | Refills: 0 | Status: DISCONTINUED | OUTPATIENT
Start: 2023-01-04 | End: 2023-01-05

## 2023-01-04 RX ORDER — INSULIN GLARGINE 100 [IU]/ML
10 INJECTION, SOLUTION SUBCUTANEOUS AT BEDTIME
Refills: 0 | Status: DISCONTINUED | OUTPATIENT
Start: 2023-01-04 | End: 2023-01-06

## 2023-01-04 RX ORDER — ASPIRIN/CALCIUM CARB/MAGNESIUM 324 MG
81 TABLET ORAL DAILY
Refills: 0 | Status: DISCONTINUED | OUTPATIENT
Start: 2023-01-04 | End: 2023-01-11

## 2023-01-04 RX ORDER — ENOXAPARIN SODIUM 100 MG/ML
40 INJECTION SUBCUTANEOUS EVERY 24 HOURS
Refills: 0 | Status: DISCONTINUED | OUTPATIENT
Start: 2023-01-04 | End: 2023-01-06

## 2023-01-04 RX ORDER — ATORVASTATIN CALCIUM 80 MG/1
80 TABLET, FILM COATED ORAL AT BEDTIME
Refills: 0 | Status: DISCONTINUED | OUTPATIENT
Start: 2023-01-04 | End: 2023-01-11

## 2023-01-04 RX ORDER — NYSTATIN 500MM UNIT
500000 POWDER (EA) MISCELLANEOUS EVERY 6 HOURS
Refills: 0 | Status: DISCONTINUED | OUTPATIENT
Start: 2023-01-04 | End: 2023-01-11

## 2023-01-04 RX ORDER — AMIODARONE HYDROCHLORIDE 400 MG/1
400 TABLET ORAL EVERY 8 HOURS
Refills: 0 | Status: COMPLETED | OUTPATIENT
Start: 2023-01-03 | End: 2023-01-05

## 2023-01-04 RX ORDER — INSULIN LISPRO 100/ML
VIAL (ML) SUBCUTANEOUS EVERY 6 HOURS
Refills: 0 | Status: DISCONTINUED | OUTPATIENT
Start: 2023-01-04 | End: 2023-01-06

## 2023-01-04 RX ORDER — SODIUM CHLORIDE 9 MG/ML
1000 INJECTION, SOLUTION INTRAVENOUS
Refills: 0 | Status: DISCONTINUED | OUTPATIENT
Start: 2023-01-04 | End: 2023-01-05

## 2023-01-04 RX ORDER — ALBUMIN HUMAN 25 %
250 VIAL (ML) INTRAVENOUS ONCE
Refills: 0 | Status: COMPLETED | OUTPATIENT
Start: 2023-01-04 | End: 2023-01-04

## 2023-01-04 RX ADMIN — INSULIN GLARGINE 10 UNIT(S): 100 INJECTION, SOLUTION SUBCUTANEOUS at 21:27

## 2023-01-04 RX ADMIN — Medication 400 MILLIGRAM(S): at 17:33

## 2023-01-04 RX ADMIN — Medication 1: at 07:48

## 2023-01-04 RX ADMIN — Medication 1000 MILLIGRAM(S): at 11:00

## 2023-01-04 RX ADMIN — ATORVASTATIN CALCIUM 80 MILLIGRAM(S): 80 TABLET, FILM COATED ORAL at 21:27

## 2023-01-04 RX ADMIN — SODIUM CHLORIDE 50 MILLILITER(S): 9 INJECTION, SOLUTION INTRAVENOUS at 21:08

## 2023-01-04 RX ADMIN — SODIUM CHLORIDE 3 MILLILITER(S): 9 INJECTION INTRAMUSCULAR; INTRAVENOUS; SUBCUTANEOUS at 06:08

## 2023-01-04 RX ADMIN — Medication 400 MILLIGRAM(S): at 10:26

## 2023-01-04 RX ADMIN — AMIODARONE HYDROCHLORIDE 400 MILLIGRAM(S): 400 TABLET ORAL at 21:27

## 2023-01-04 RX ADMIN — Medication 1000 MILLIGRAM(S): at 18:00

## 2023-01-04 RX ADMIN — AMIODARONE HYDROCHLORIDE 400 MILLIGRAM(S): 400 TABLET ORAL at 06:22

## 2023-01-04 RX ADMIN — SODIUM CHLORIDE 3 MILLILITER(S): 9 INJECTION INTRAMUSCULAR; INTRAVENOUS; SUBCUTANEOUS at 21:35

## 2023-01-04 RX ADMIN — ENOXAPARIN SODIUM 40 MILLIGRAM(S): 100 INJECTION SUBCUTANEOUS at 12:06

## 2023-01-04 RX ADMIN — SENNA PLUS 2 TABLET(S): 8.6 TABLET ORAL at 21:27

## 2023-01-04 RX ADMIN — Medication 125 MILLILITER(S): at 10:34

## 2023-01-04 RX ADMIN — OXYCODONE HYDROCHLORIDE 10 MILLIGRAM(S): 5 TABLET ORAL at 03:15

## 2023-01-04 RX ADMIN — PHENYLEPHRINE HYDROCHLORIDE 2.83 MICROGRAM(S)/KG/MIN: 10 INJECTION INTRAVENOUS at 21:07

## 2023-01-04 RX ADMIN — Medication 81 MILLIGRAM(S): at 03:15

## 2023-01-04 RX ADMIN — OXYCODONE HYDROCHLORIDE 10 MILLIGRAM(S): 5 TABLET ORAL at 04:00

## 2023-01-04 RX ADMIN — PANTOPRAZOLE SODIUM 40 MILLIGRAM(S): 20 TABLET, DELAYED RELEASE ORAL at 06:22

## 2023-01-04 RX ADMIN — INSULIN GLARGINE 20 UNIT(S): 100 INJECTION, SOLUTION SUBCUTANEOUS at 01:27

## 2023-01-04 RX ADMIN — SODIUM CHLORIDE 3 MILLILITER(S): 9 INJECTION INTRAMUSCULAR; INTRAVENOUS; SUBCUTANEOUS at 00:00

## 2023-01-04 RX ADMIN — PHENYLEPHRINE HYDROCHLORIDE 2.83 MICROGRAM(S)/KG/MIN: 10 INJECTION INTRAVENOUS at 12:06

## 2023-01-04 RX ADMIN — SODIUM CHLORIDE 3 MILLILITER(S): 9 INJECTION INTRAMUSCULAR; INTRAVENOUS; SUBCUTANEOUS at 13:08

## 2023-01-04 RX ADMIN — Medication 81 MILLIGRAM(S): at 18:31

## 2023-01-04 NOTE — PROGRESS NOTE ADULT - PROBLEM SELECTOR PLAN 1
-test  q6h when NPO  -Restart Lantus low dose 10 units for now  -Restart Admelog low correction scale  q6h   -Will follow and adjust insulin as needed  -contact endo team when pt able to take POs or TFs  Discharge planning:  Per  pt has [facility] benefit- can discharge on basal bolus insulin pens + metformin 1000mg BID   final recs pending hospital course  Send Rx for glucometer, test strips, lancets, alcohol pads, and pen needles  At home, Patient should check FSBG premeals and bedtime. Pt should call their doctor when FSBG <70 or above >400 and or consistently above 200s as changes in the regimen will have to be made.  Recommend outpatient routine dilated eye exam/ophthalmology f/u; podiatry referral and endocrinology   -Primary team should arrange medicine clinic follow up prior to discharge as pt is uninsured and visiting from [facility] & planning to go back once he recuperates from surgery. & the medicine clinic can address multiple medical concerns at once. phone: [phone]

## 2023-01-04 NOTE — SWALLOW BEDSIDE ASSESSMENT ADULT - COMMENTS
CT Head/ Neck 1/4:  IMPRESSION: Small right frontal and temporal opercular acute infarct   local sulcal effacement and mass effect. No hemorrhage. CTA of the neck   demonstrates postoperative changes at the right carotid bifurcation with   a patent endarterectomy graft. No large vessel occlusion intracranially   although a small distal branch occlusion is not excluded.    Swallow history: Pt is unknown to this service CT Head/ Neck 1/4:  IMPRESSION: Small right frontal and temporal opercular acute infarct   local sulcal effacement and mass effect. No hemorrhage. CTA of the neck   demonstrates postoperative changes at the right carotid bifurcation with   a patent endarterectomy graft. No large vessel occlusion intracranially   although a small distal branch occlusion is not excluded.    Swallow history: Pt is unknown to this service. Per RN, dysphagia screen administered this AM and pt coughing with 5 cc's of water.

## 2023-01-04 NOTE — CONSULT NOTE ADULT - ASSESSMENT
77M RH Arabic speaking with a PMH T2DM, CHF, CAD, aortic stenosis, atrial fibrillation, COVID+ presented with chest pain on 12/1/22. Pt now s/p LHC 12/4 with prox LAD 70% stenosis. Pt s/p AVR and CABG on 12/22/22. Pt had R CEA for asymptomatic carotid stenosis on 1/3/23 at 19:00. Code stroke called on 1/4/23. LKN 1/3/23 at 19:00 prior to R CEA surgery. No deficits noted in PACU. Pt on aspirin and lovenox ppx. NIHSS: 11, preMRS: 0. Neuro exam w/ LHH, mild L hemiparesis, R gaze preference, moderate dysarthria.    Impression: LHH, mild L hemiparesis 2/2 R frontal infarct seen on CTH. Mechanism likely post operative complication from R CEA.    Recommendations:  [] Keep BP permissive up to 220/110; given recent CEA, defer to primary team for permissive BP management  [] MRI brain without contrast   [] Repeat CTH in 24 hrs (if MRI not done)  [] consider repeat TTE   [] monitor on telemetry  [] can continue ASA 81 mg PO daily and DVT ppx for now  [] start atorvastatin 80mg PO daily (titrate to LDL < 70)   [] from neurovascular perspective, can likely restart anticoagulation for known atrial fibrillation in 5 days if cleared by vascular surgery given recent CEA  [] stroke risk factor modification and counseling   [] check HA1c, lipid panel  [] NPO until bedside dysphagia screen  [] PT/OT/S&S/SW consults  [] Patient can follow up with Dr. Riki Hernandez after discharge. Please instruct the patient to call [phone] to schedule an appointment within the next 2-3 days. Office is located at [address] [place] [address].       Case discussed with stroke fellow Dr. Sina Garza under supervision of attending Dr. Riki Hernandez  Case to be seen and discussed with attending in AM     77M RH Urdu speaking with a PMH T2DM, CHF, CAD, aortic stenosis, atrial fibrillation, COVID+ presented with chest pain on 12/1/22. Pt now s/p LHC 12/4 with prox LAD 70% stenosis. Pt s/p AVR and CABG on 12/22/22. Pt had R CEA for asymptomatic carotid stenosis on 1/3/23 at 19:00. Code stroke called on 1/4/23. LKN 1/3/23 at 19:00 prior to R CEA surgery. No deficits noted in PACU. Pt on aspirin and lovenox ppx. NIHSS: 11, preMRS: 0. Neuro exam w/ LHH, mild L hemiparesis, R gaze preference, moderate dysarthria. subseuqent exam with some improvement    used [id number], vascular attending exam with LUE drift, slurred speech and mild L facial.     Impression:  R hemisphere infarct/frontal, possible large artery athero wtih A-A embolism,  possible related to procedure but unclear , can also be CE from Afib     Recommendations:  - Keep BP permissive up to 220/110; given recent CEA, defer to primary team for permissive BP management  - MRI brain without contrast,  Repeat CTH in 24 hrs (if MRI not done)  - consider repeat TTE   - monitor on telemetry  - can continue ASA 81 mg PO daily and DVT ppx for now  - start atorvastatin 80mg PO daily (titrate to LDL < 70)   - from neurovascular perspective, can likely restart anticoagulation for known atrial fibrillation in 5 days if cleared by vascular surgery given recent CEA  - stroke risk factor modification and counseling   -  check HA1c, lipid panel  - NPO until bedside dysphagia screen  - PT/OT/S&S/SW consults  - spoke with mihaela at bedside (granddaughter)   - Patient can follow up with Dr. Riki Hernandez after discharge. Please instruct the patient to call [phone] to schedule an appointment within the next 2-3 days. Office is located at [address] [facility], [place] [address].     Riki Hernandez MD  Vascular Neurology  Office: [phone]

## 2023-01-04 NOTE — SWALLOW BEDSIDE ASSESSMENT ADULT - SLP PERTINENT HISTORY OF CURRENT PROBLEM
77M RH Korean speaking with a PMH T2DM, CHF, CAD, aortic stenosis, atrial fibrillation, COVID+ presented with chest pain on 12/1/22. Pt now s/p C 12/4 with prox LAD 70% stenosis. Pt s/p AVR and CABG on 12/22/22. Pt had R CEA for asymptomatic carotid stenosis on 1/3/23 at 19:00. Code stroke called on 1/4/23. LKN 1/3/23 at 19:00 prior to R CEA surgery. No deficits noted in PACU. Pt on aspirin and lovenox ppx. NIHSS: 11, preMRS: 0. Neuro exam w/ LHH, mild L hemiparesis, R gaze preference, moderate dysarthria. Impression: R frontal infarct seen on CTH. Mechanism likely post operative complication from R CEA.

## 2023-01-04 NOTE — PROGRESS NOTE ADULT - ASSESSMENT
77M with a PMH T2DM and recently diagnosed CHF? who had recent admission for cardiac work up. Pt found to have CAD and aortic stenosis. On CT neck found to have R ICA stenosis. now s/p R CEA 1/3, course complicated by L hand weakness.    Plan:  - Neuro consult called  - CTH and CTA H/N ordered urgently as per neurology recomendations   - Pain control as needed  - regular diet  - ASA  - Lovenox DVT ppx  - OOB and ambulating as tolerated  - F/u AM labs    Vascular 9005

## 2023-01-04 NOTE — CHART NOTE - NSCHARTNOTEFT_GEN_A_CORE
POST-OPERATIVE NOTE    Patient is s/p R CEA    Subjective:  Patient reports pain at the incisional site, controlled with medication  Denies chest pain, shortness of breath, nausea, vomiting  Is not yet passing gas or having bowel movements  Not yet urinating independently or ambulating independently    Vital Signs Last 24 Hrs  T(C): 36.5 (04 Jan 2023 00:00), Max: 36.7 (03 Jan 2023 15:21)  T(F): 97.7 (04 Jan 2023 00:00), Max: 98.1 (03 Jan 2023 15:21)  HR: 80 (04 Jan 2023 03:00) (65 - 90)  BP: 107/48 (04 Jan 2023 03:00) (98/61 - 142/59)  BP(mean): 69 (04 Jan 2023 03:00) (69 - 101)  RR: 14 (04 Jan 2023 03:00) (14 - 18)  SpO2: 100% (04 Jan 2023 03:00) (93% - 100%)    Parameters below as of 04 Jan 2023 03:00  Patient On (Oxygen Delivery Method): room air        I&O's Detail    02 Jan 2023 07:01  -  03 Jan 2023 07:00  --------------------------------------------------------  IN:    Heparin: 175 mL    Oral Fluid: 680 mL  Total IN: 855 mL    OUT:    Voided (mL): 900 mL  Total OUT: 900 mL    Total NET: -45 mL      03 Jan 2023 07:01  -  04 Jan 2023 03:05  --------------------------------------------------------  IN:  Total IN: 0 mL    OUT:    Indwelling Catheter - Urethral (mL): 350 mL    Oral Fluid: 0 mL    Voided (mL): 950 mL  Total OUT: 1300 mL    Total NET: -1300 mL          Physical Exam:  General: NAD, resting comfortably in bed  Neuro: Cranial nerves intact  HEENT: Incision c/d/i, no hematoma  Pulmonary: Nonlabored breathing, no respiratory distress  Abdominal: soft, appropriately tender, nondistended  Extremities: WWP      LABS:                        9.4    5.88  )-----------( 242      ( 03 Jan 2023 04:21 )             29.2     01-03    137  |  100  |  17  ----------------------------<  146<H>  4.5   |  25  |  1.10    Ca    10.4      03 Jan 2023 04:21  Phos  4.3     01-03  Mg     1.8     01-03      PT/INR - ( 03 Jan 2023 04:21 )   PT: 14.6 sec;   INR: 1.26 ratio         PTT - ( 03 Jan 2023 04:21 )  PTT:30.6 sec      Assessment:  The patient is a 77y Male who is now several hours post-op from a R CEA    Plan:  - Pain control as needed  - regular diet  - ASA  - Lovenox DVT ppx  - OOB and ambulating as tolerated  - F/u AM labs    Vascular 6268 POST-OPERATIVE NOTE    Patient is s/p R CEA    Subjective:  Patient reports pain at the incisional site, controlled with medication  Denies chest pain, shortness of breath, nausea, vomiting  Denies any numbness or paresthesias  Is not yet passing gas or having bowel movements  Not yet urinating independently or ambulating independently    Vital Signs Last 24 Hrs  T(C): 36.5 (04 Jan 2023 00:00), Max: 36.7 (03 Jan 2023 15:21)  T(F): 97.7 (04 Jan 2023 00:00), Max: 98.1 (03 Jan 2023 15:21)  HR: 80 (04 Jan 2023 03:00) (65 - 90)  BP: 107/48 (04 Jan 2023 03:00) (98/61 - 142/59)  BP(mean): 69 (04 Jan 2023 03:00) (69 - 101)  RR: 14 (04 Jan 2023 03:00) (14 - 18)  SpO2: 100% (04 Jan 2023 03:00) (93% - 100%)    Parameters below as of 04 Jan 2023 03:00  Patient On (Oxygen Delivery Method): room air        I&O's Detail    02 Jan 2023 07:01  -  03 Jan 2023 07:00  --------------------------------------------------------  IN:    Heparin: 175 mL    Oral Fluid: 680 mL  Total IN: 855 mL    OUT:    Voided (mL): 900 mL  Total OUT: 900 mL    Total NET: -45 mL      03 Jan 2023 07:01  -  04 Jan 2023 03:05  --------------------------------------------------------  IN:  Total IN: 0 mL    OUT:    Indwelling Catheter - Urethral (mL): 350 mL    Oral Fluid: 0 mL    Voided (mL): 950 mL  Total OUT: 1300 mL    Total NET: -1300 mL      Physical Exam:  General: NAD, resting comfortably in bed  Neuro: Cranial nerves intact  HEENT: Incision c/d/i, no hematoma  Pulmonary: Nonlabored breathing, no respiratory distress  Abdominal: soft, nontender, nondistended  Extremities: WWP, RUE: NVI, SILT      LABS:                        9.4    5.88  )-----------( 242      ( 03 Jan 2023 04:21 )             29.2     01-03    137  |  100  |  17  ----------------------------<  146<H>  4.5   |  25  |  1.10    Ca    10.4      03 Jan 2023 04:21  Phos  4.3     01-03  Mg     1.8     01-03      PT/INR - ( 03 Jan 2023 04:21 )   PT: 14.6 sec;   INR: 1.26 ratio         PTT - ( 03 Jan 2023 04:21 )  PTT:30.6 sec      Assessment:  The patient is a 77y Male who is now several hours post-op from a R CEA    Plan:  - Pain control as needed  - regular diet  - ASA  - Lovenox DVT ppx  - OOB and ambulating as tolerated  - F/u AM labs    Vascular 8131

## 2023-01-04 NOTE — PROGRESS NOTE ADULT - SUBJECTIVE AND OBJECTIVE BOX
VASCULAR SURGERY DAILY PROGRESS NOTE:     SUBJECTIVE/ROS: Patient seen and examined, no complaints. Resting comfortably.    MEDICATIONS  (STANDING):  aMIOdarone    Tablet   Oral   aMIOdarone    Tablet 400 milliGRAM(s) Oral every 8 hours  aspirin enteric coated 81 milliGRAM(s) Oral daily  atorvastatin 40 milliGRAM(s) Oral at bedtime  bisacodyl Suppository 10 milliGRAM(s) Rectal once  enoxaparin Injectable 40 milliGRAM(s) SubCutaneous every 24 hours  insulin glargine Injectable (LANTUS) 20 Unit(s) SubCutaneous at bedtime  insulin lispro (ADMELOG) corrective regimen sliding scale   SubCutaneous three times a day before meals  insulin lispro Injectable (ADMELOG) 10 Unit(s) SubCutaneous three times a day before meals  metoprolol succinate ER 75 milliGRAM(s) Oral daily  nystatin    Suspension [id number] Unit(s) Oral every 6 hours  pantoprazole    Tablet 40 milliGRAM(s) Oral before breakfast  polyethylene glycol 3350 17 Gram(s) Oral daily  senna 2 Tablet(s) Oral at bedtime  sodium chloride 0.9% lock flush 3 milliLiter(s) IV Push every 8 hours    MEDICATIONS  (PRN):  acetaminophen     Tablet .. 650 milliGRAM(s) Oral every 6 hours PRN Mild Pain (1 - 3)  oxyCODONE    IR 10 milliGRAM(s) Oral every 4 hours PRN Severe Pain (7 - 10)      OBJECTIVE:    Vital Signs Last 24 Hrs  T(C): 36.8 (2023 05:12), Max: 36.8 (2023 05:12)  T(F): 98.2 (2023 05:12), Max: 98.2 (2023 05:12)  HR: 83 (2023 05:12) (65 - 91)  BP: 133/57 (2023 05:12) (98/61 - 142/59)  BP(mean): 82 (2023 05:12) (69 - 101)  RR: 16 (2023 05:12) (14 - 18)  SpO2: 93% (2023 05:12) (93% - 100%)    Parameters below as of 2023 05:12  Patient On (Oxygen Delivery Method): room air            I&O's Detail    2023 07:01  -  2023 07:00  --------------------------------------------------------  IN:    Oral Fluid: 100 mL  Total IN: 100 mL    OUT:    Indwelling Catheter - Urethral (mL): 875 mL    Voided (mL): 950 mL  Total OUT: 1825 mL    Total NET: -1725 mL          Daily Height in cm: 162.56 (2023 16:58)    Daily Weight in k.4 (2023 08:36)    LABS:                        8.3    7.33  )-----------( 200      ( 2023 04:49 )             25.3     01-04    135  |  101  |  17  ----------------------------<  182<H>  4.4   |  22  |  0.93    Ca    8.7      2023 04:49  Phos  4.3     01-03  Mg     1.8     01-03      PT/INR - ( 2023 04:49 )   PT: 16.8 sec;   INR: 1.44 ratio         PTT - ( 2023 04:21 )  PTT:30.6 sec    Physical Exam:  General: NAD, resting comfortably in bed  Neuro: Cranial nerves intact,  HEENT: Incision c/d/i, no hematoma  Pulmonary: Nonlabored breathing, no respiratory distress  Abdominal: soft, nontender, nondistended  Extremities: WWP, RUE: NVI, SILT, LUE hand weakness

## 2023-01-04 NOTE — SWALLOW BEDSIDE ASSESSMENT ADULT - SLP GENERAL OBSERVATIONS
Pt encountered in bed, awake/alert, on 2L NC. VSS. Granddaughter at bedside opted to serve as . Pt A&Ox3. Able to follow directives. Dysarthric, with reduced articulatory precision. Vocal quality WFL.

## 2023-01-04 NOTE — PROGRESS NOTE ADULT - ASSESSMENT
Patient is a 77 M with recently diagnosed T2D, CHF, CAD s/p C 12/4 with prox LAD 70% stenosis, aortic stenosis  s/p  CABG/SAVR. 12/22. Had carotid endarterectomy yesterday 1/3/23 and is now back in CTU after having  R frontal infarct s/p procedure with L hand weakness. Pt is now NPO. BG values at goal on current basal regimen but noted all insulin orders discontinued by primary team. Discussed with team to restart basal and correction scale to keep Bg goal 100 to 180s.

## 2023-01-04 NOTE — PROGRESS NOTE ADULT - SUBJECTIVE AND OBJECTIVE BOX
DIABETES FOLLOW UP NOTE: Saw pt earlier today    Chief Complaint: Endocrine consult requested for management of T2DM    INTERVAL HX: Pt seen in CTU after he was transferred this am due to L hand weakness s/p CEA yesterday. Pt found to have + R frontal stroke and is now NPO with BG levels at goal while on present insulin doses. Noted primary team discontinue insulin orders this pm. No hypoglycemia. Reports having some sore throat and some CP> CTU team aware. Family at bedside.       Review of Systems:  General: As above  Cardiovascular: mild chest pain, palpitations  Respiratory: No SOB, no cough  GI: No nausea, vomiting, abdominal pain  Endocrine: No polyuria, polydipsia or S&Sx of hypoglycemia    Allergies    No Known Allergies    Intolerances      MEDICATIONS:  atorvastatin 80 milliGRAM(s) Oral at bedtime    PHYSICAL EXAM:  VITALS: T(C): 36.6 (01-04-23 @ 16:00)  T(F): 97.9 (01-04-23 @ 16:00), Max: 98.2 (01-04-23 @ 05:12)  HR: 64 (01-04-23 @ 19:00) (61 - 91)  BP: 139/64 (01-04-23 @ 19:00) (107/48 - 174/76)  RR:  (12 - 35)  SpO2:  (93% - 100%)  Wt(kg): --  GENERAL: Male laying in bed in NAD. Family at bedside  HEENT: R neck dressing D&I  cHEST:  Sternal incision dry and intact.   Abdomen: Soft, nontender, non distended, + obesity  Extremities: Warm, no edema in all 4 exts  NEURO: Alert and able to answer simple questions. Encounter done in [place]    LABS:  POCT Blood Glucose.: 156 mg/dL (01-04-23 @ 11:20)  POCT Blood Glucose.: 174 mg/dL (01-04-23 @ 08:13)  POCT Blood Glucose.: 200 mg/dL (01-04-23 @ 07:18)  POCT Blood Glucose.: 176 mg/dL (01-04-23 @ 00:43)  POCT Blood Glucose.: 165 mg/dL (01-03-23 @ 22:52)  POCT Blood Glucose.: 133 mg/dL (01-03-23 @ 11:13)  POCT Blood Glucose.: 132 mg/dL (01-03-23 @ 07:28)  POCT Blood Glucose.: 117 mg/dL (01-02-23 @ 21:33)  POCT Blood Glucose.: 123 mg/dL (01-02-23 @ 16:27)  POCT Blood Glucose.: 165 mg/dL (01-02-23 @ 11:25)  POCT Blood Glucose.: 137 mg/dL (01-02-23 @ 07:13)  POCT Blood Glucose.: 153 mg/dL (01-01-23 @ 21:28)                            8.3    7.33  )-----------( 200      ( 04 Jan 2023 04:49 )             25.3       01-04    135  |  101  |  17  ----------------------------<  182<H>  4.4   |  22  |  0.93    eGFR: 85    Ca    8.7      01-04  Mg     1.8     01-03  Phos  4.3     01-03    Thyroid Function Tests:  12-07 @ 13:19 TSH -- FreeT4 -- T3 100 Anti TPO -- Anti Thyroglobulin Ab -- TSI --      A1C with Estimated Average Glucose Result: 9.4 % (12-02-22 @ 05:54)      Estimated Average Glucose: 223 mg/dL (12-02-22 @ 05:54)        12-02 Chol 185 Direct LDL -- LDL calculated 110<H> HDL 34<L> Trig 208<H>

## 2023-01-04 NOTE — SWALLOW BEDSIDE ASSESSMENT ADULT - ASR SWALLOW RECOMMEND DIAG
Instrumental exam unlikely to change clinical management at this time, will f/u at the bedside for re-evaluation.

## 2023-01-04 NOTE — CONSULT NOTE ADULT - SUBJECTIVE AND OBJECTIVE BOX
HPI:  77M with a PMH T2DM, CHF?, CAD, aortic stenosis, COVID+ presented with chest pain on 12/1. Pt now s/p C 12/4 with prox LAD 70% stenosis. Pt s/p CABG/SAVR on ___. Pt had R CEA for asymptomatic carotid stenosis on 1/3/22 at ___.     Code stroke called on 1/4/22. LKN 1/3/22 at ____.    (19 Dec 2022 13:52)    (Stroke only)  LKN: 1/3/22 at ___  NIHSS:   preMRS: 0  Pt is not a candidate for tpa due to 2 recent major surgeries  Pt is not a candidate for mechanical thrombectomy due to    REVIEW OF SYSTEMS    A 10-system ROS was performed and is negative except for those items noted above and/or in the HPI.    PAST MEDICAL & SURGICAL HISTORY:  Chronic CHF      HTN (hypertension)      Diabetes      Aortic stenosis      No significant past surgical history        FAMILY HISTORY:    SOCIAL HISTORY:   T/E/D:   Occupation:   Lives with:     MEDICATIONS (HOME):  Home Medications:    MEDICATIONS  (STANDING):  aMIOdarone    Tablet   Oral   aMIOdarone    Tablet 400 milliGRAM(s) Oral every 8 hours  aspirin enteric coated 81 milliGRAM(s) Oral daily  atorvastatin 40 milliGRAM(s) Oral at bedtime  bisacodyl Suppository 10 milliGRAM(s) Rectal once  insulin glargine Injectable (LANTUS) 20 Unit(s) SubCutaneous at bedtime  insulin lispro (ADMELOG) corrective regimen sliding scale   SubCutaneous three times a day before meals  insulin lispro Injectable (ADMELOG) 10 Unit(s) SubCutaneous three times a day before meals  metoprolol succinate ER 75 milliGRAM(s) Oral daily  nystatin    Suspension 786357 Unit(s) Oral every 6 hours  pantoprazole    Tablet 40 milliGRAM(s) Oral before breakfast  polyethylene glycol 3350 17 Gram(s) Oral daily  senna 2 Tablet(s) Oral at bedtime  sodium chloride 0.9% lock flush 3 milliLiter(s) IV Push every 8 hours    MEDICATIONS  (PRN):  acetaminophen     Tablet .. 650 milliGRAM(s) Oral every 6 hours PRN Mild Pain (1 - 3)  oxyCODONE    IR 10 milliGRAM(s) Oral every 4 hours PRN Severe Pain (7 - 10)    ALLERGIES/INTOLERANCES:  Allergies  No Known Allergies    Intolerances    VITALS & EXAMINATION:  Vital Signs Last 24 Hrs  T(C): 36.8 (04 Jan 2023 05:12), Max: 36.8 (04 Jan 2023 05:12)  T(F): 98.2 (04 Jan 2023 05:12), Max: 98.2 (04 Jan 2023 05:12)  HR: 87 (04 Jan 2023 08:00) (65 - 91)  BP: 125/68 (04 Jan 2023 08:00) (98/61 - 142/59)  BP(mean): 87 (04 Jan 2023 08:00) (69 - 101)  RR: 18 (04 Jan 2023 08:00) (14 - 18)  SpO2: 95% (04 Jan 2023 08:00) (93% - 100%)    Parameters below as of 04 Jan 2023 08:00  Patient On (Oxygen Delivery Method): room air      General:  Constitutional: Obese Male, appears stated age, in no apparent distress including pain  Head: Normocephalic & atraumatic.  ENT: Patent ear canals, intact TM, mucus membranes moist & pink, neck supple, no lymphadenopathy.   Respiratory: Patent airway. All lung fields are clear to auscultation bilaterally.  Extremities: No cyanosis, clubbing, or edema.  Skin: No rashes, bruising, or discoloration.    Cardiovascular (>2): RRR no murmurs. Carotid pulsations symmetric, no bruits. Normal capillary beds refill, 1-2 seconds or less.     Neurological (>12):  MS: Awake, alert, oriented to person, place, situation, time. Normal affect. Follows all commands.    Language: Speech is clear, fluent with good repetition & comprehension (able to name objects___)    CNs: PERRLA (R = 3mm, L = 3mm). VFF. EOMI no nystagmus, no diplopia. V1-3 intact to LT/pinprick, well developed masseter muscles b/l. No facial asymmetry b/l, full eye closure strength b/l. Hearing grossly normal (rubbing fingers) b/l. Symmetric palate elevation in midline. Gag reflex deferred. Head turning & shoulder shrug intact b/l. Tongue midline, normal movements, no atrophy.    Fundoscopic: unable to assess due to COVID19 precautions    Motor: Normal muscle bulk & tone. No noticeable tremor or seizure. No pronator drift.              Deltoid	Biceps	Triceps	Wrist	Finger ABd	   R	5	5	5	5	5		5 	  L	5	5	5	5	5		5    	H-Flex	H-Ext	H-ABd	H-ADd	K-Flex	K-Ext	D-Flex	P-Flex  R	5	5	5	5	5	5	5	5 	   L	5	5	5	5	5	5	5	5	     Sensation: Intact to LT/PP/Temp/Vibration/Position b/l throughout.     Cortical: Extinction on DSS (neglect): none    Reflexes:              Biceps(C5)       BR(C6)     Triceps(C7)               Patellar(L4)    Achilles(S1)    Plantar Resp  R	2	          2	             2		        2		    2		Down   L	2	          2	             2		        2		    2		Down     Coordination: intact rapid-alt movements. No dysmetria to FTN/HTS    Gait: Normal Romberg. No postural instability. Normal stance and tandem gait.     LABORATORY:  CBC                       8.3    7.33  )-----------( 200      ( 04 Jan 2023 04:49 )             25.3     Chem 01-04    135  |  101  |  17  ----------------------------<  182<H>  4.4   |  22  |  0.93    Ca    8.7      04 Jan 2023 04:49  Phos  4.3     01-03  Mg     1.8     01-03      LFTs   Coagulopathy PT/INR - ( 04 Jan 2023 04:49 )   PT: 16.8 sec;   INR: 1.44 ratio         PTT - ( 03 Jan 2023 04:21 )  PTT:30.6 sec    STUDIES & IMAGING:  Studies (EKG, EEG, EMG, etc):     Radiology (XR, CT, MR, U/S, TTE/MORIAH): HPI:  77M Marshall[place] speaking with a PMH T2DM, CHF, CAD, aortic stenosis, atrial fibrillation, COVID+ presented with chest pain on 12/1/22. Pt now s/p C 12/4 with prox LAD 70% stenosis. Pt s/p AVR and CABG on 12/22/22. Pt had R CEA for asymptomatic carotid stenosis on 1/3/23 at 19:00.    Code stroke called on 1/4/23. LKN 1/3/23 at 19:00 prior to R CEA surgery. No deficits noted in PACU. Pt on aspirin and lovenox ppx.      (Stroke only)  LKN: 1/3/23 at 19:00   NIHSS: 10  preMRS: 0  Pt is not a candidate for tpa due to 2 recent major surgeries  Pt is not a candidate for mechanical thrombectomy due to no LVO    REVIEW OF SYSTEMS    A 10-system ROS was performed and is negative except for those items noted above and/or in the HPI.    PAST MEDICAL & SURGICAL HISTORY:  Chronic CHF      HTN (hypertension)      Diabetes      Aortic stenosis      No significant past surgical history        FAMILY HISTORY:    SOCIAL HISTORY:   T/E/D:   Occupation:   Lives with:     MEDICATIONS (HOME):  Home Medications:    MEDICATIONS  (STANDING):  aMIOdarone    Tablet   Oral   aMIOdarone    Tablet 400 milliGRAM(s) Oral every 8 hours  aspirin enteric coated 81 milliGRAM(s) Oral daily  atorvastatin 40 milliGRAM(s) Oral at bedtime  bisacodyl Suppository 10 milliGRAM(s) Rectal once  insulin glargine Injectable (LANTUS) 20 Unit(s) SubCutaneous at bedtime  insulin lispro (ADMELOG) corrective regimen sliding scale   SubCutaneous three times a day before meals  insulin lispro Injectable (ADMELOG) 10 Unit(s) SubCutaneous three times a day before meals  metoprolol succinate ER 75 milliGRAM(s) Oral daily  nystatin    Suspension 998004 Unit(s) Oral every 6 hours  pantoprazole    Tablet 40 milliGRAM(s) Oral before breakfast  polyethylene glycol 3350 17 Gram(s) Oral daily  senna 2 Tablet(s) Oral at bedtime  sodium chloride 0.9% lock flush 3 milliLiter(s) IV Push every 8 hours    MEDICATIONS  (PRN):  acetaminophen     Tablet .. 650 milliGRAM(s) Oral every 6 hours PRN Mild Pain (1 - 3)  oxyCODONE    IR 10 milliGRAM(s) Oral every 4 hours PRN Severe Pain (7 - 10)    ALLERGIES/INTOLERANCES:  Allergies  No Known Allergies    Intolerances    VITALS & EXAMINATION:  Vital Signs Last 24 Hrs  T(C): 36.8 (04 Jan 2023 05:12), Max: 36.8 (04 Jan 2023 05:12)  T(F): 98.2 (04 Jan 2023 05:12), Max: 98.2 (04 Jan 2023 05:12)  HR: 87 (04 Jan 2023 08:00) (65 - 91)  BP: 125/68 (04 Jan 2023 08:00) (98/61 - 142/59)  BP(mean): 87 (04 Jan 2023 08:00) (69 - 101)  RR: 18 (04 Jan 2023 08:00) (14 - 18)  SpO2: 95% (04 Jan 2023 08:00) (93% - 100%)    Parameters below as of 04 Jan 2023 08:00  Patient On (Oxygen Delivery Method): room air      General:  Constitutional: Male, appears stated age, in no apparent distress including pain  Head: Normocephalic & atraumatic.  Respiratory: No increased work of breathing at rest  Extremities: No cyanosis, clubbing, or edema.  Skin: No rashes, bruising, or discoloration.    Neurological (>12):  MS: Awake, alert, oriented to person, place, situation, time. Normal affect. Follows all commands.    Language: Speech is moderately dysarthric, fluent with good repetition & comprehension (able to name objects___)    CNs: PERRL  (R = 3mm, L = 3mm). VFF. R gaze preference but crosses midline. V1-3 intact to LT, well developed masseter muscles b/l. R facial droop, full eye closure strength b/l. Hearing grossly normal (rubbing fingers) b/l. Symmetric palate elevation in midline. Gag reflex deferred. Head turning & shoulder shrug intact b/l. Tongue midline, normal movements, no atrophy.    Fundoscopic: unable to assess due to COVID19 precautions    Motor: Normal muscle bulk & tone. No noticeable tremor or seizure. No pronator drift.              Deltoid	Biceps	Triceps	Wrist	Finger ABd	   R	5	5	5	5	5		5 	  L	5	5	5	5	5		5    	H-Flex	H-Ext	H-ABd	H-ADd	K-Flex	K-Ext	D-Flex	P-Flex  R	5	5	5	5	5	5	5	5 	   L	5	5	5	5	5	5	5	5	     Sensation: Intact to LT b/l throughout.     Cortical: Extinction on DSS (neglect):  neglect of L    Reflexes:              Biceps(C5)       BR(C6)     Triceps(C7)               Patellar(L4)    Achilles(S1)    Plantar Resp  R	2	          2	             2		        2		    2		Down   L	2	          2	             2		        2		    2		Down     Coordination: intact rapid-alt movements. No dysmetria to FTN     Gait: unable to assess due to fall precautions    LABORATORY:  CBC                       8.3    7.33  )-----------( 200      ( 04 Jan 2023 04:49 )             25.3     Chem 01-04    135  |  101  |  17  ----------------------------<  182<H>  4.4   |  22  |  0.93    Ca    8.7      04 Jan 2023 04:49  Phos  4.3     01-03  Mg     1.8     01-03      LFTs   Coagulopathy PT/INR - ( 04 Jan 2023 04:49 )   PT: 16.8 sec;   INR: 1.44 ratio         PTT - ( 03 Jan 2023 04:21 )  PTT:30.6 sec    STUDIES & IMAGING:  Studies (EKG, EEG, EMG, etc):     Radiology (XR, CT, MR, U/S, TTE/MORIAH): HPI:  77M RH Sami speaking with a PMH T2DM, CHF, CAD, aortic stenosis, atrial fibrillation, COVID+ presented with chest pain on 12/1/22. Pt now s/p C 12/4 with prox LAD 70% stenosis. Pt s/p AVR and CABG on 12/22/22. Pt had R CEA for asymptomatic carotid stenosis on 1/3/23 at 19:00.    Code stroke called on 1/4/23. LKN 1/3/23 at 19:00 prior to R CEA surgery. No deficits noted in PACU. Pt on aspirin and lovenox ppx.      LKN: 1/3/23 at 19:00   NIHSS: 11  preMRS: 0  Pt is not a candidate for tpa due to 2 recent major surgeries  Pt is not a candidate for mechanical thrombectomy due to no LVO    REVIEW OF SYSTEMS    A 10-system ROS was performed and is negative except for those items noted above and/or in the HPI.    PAST MEDICAL & SURGICAL HISTORY:  Chronic CHF    HTN (hypertension)    Diabetes    Aortic stenosis    SOCIAL HISTORY:   Pt is undocumented    MEDICATIONS (HOME):  Home Medications:    MEDICATIONS  (STANDING):  aMIOdarone    Tablet   Oral   aMIOdarone    Tablet 400 milliGRAM(s) Oral every 8 hours  aspirin enteric coated 81 milliGRAM(s) Oral daily  atorvastatin 40 milliGRAM(s) Oral at bedtime  bisacodyl Suppository 10 milliGRAM(s) Rectal once  insulin glargine Injectable (LANTUS) 20 Unit(s) SubCutaneous at bedtime  insulin lispro (ADMELOG) corrective regimen sliding scale   SubCutaneous three times a day before meals  insulin lispro Injectable (ADMELOG) 10 Unit(s) SubCutaneous three times a day before meals  metoprolol succinate ER 75 milliGRAM(s) Oral daily  nystatin    Suspension 997422 Unit(s) Oral every 6 hours  pantoprazole    Tablet 40 milliGRAM(s) Oral before breakfast  polyethylene glycol 3350 17 Gram(s) Oral daily  senna 2 Tablet(s) Oral at bedtime  sodium chloride 0.9% lock flush 3 milliLiter(s) IV Push every 8 hours    MEDICATIONS  (PRN):  acetaminophen     Tablet .. 650 milliGRAM(s) Oral every 6 hours PRN Mild Pain (1 - 3)  oxyCODONE    IR 10 milliGRAM(s) Oral every 4 hours PRN Severe Pain (7 - 10)    ALLERGIES/INTOLERANCES:  Allergies  No Known Allergies    Intolerances    VITALS & EXAMINATION:  Vital Signs Last 24 Hrs  T(C): 36.8 (04 Jan 2023 05:12), Max: 36.8 (04 Jan 2023 05:12)  T(F): 98.2 (04 Jan 2023 05:12), Max: 98.2 (04 Jan 2023 05:12)  HR: 87 (04 Jan 2023 08:00) (65 - 91)  BP: 125/68 (04 Jan 2023 08:00) (98/61 - 142/59)  BP(mean): 87 (04 Jan 2023 08:00) (69 - 101)  RR: 18 (04 Jan 2023 08:00) (14 - 18)  SpO2: 95% (04 Jan 2023 08:00) (93% - 100%)    Parameters below as of 04 Jan 2023 08:00  Patient On (Oxygen Delivery Method): room air      General:  Constitutional: Male, appears stated age, in no apparent distress including pain  Head: Normocephalic & atraumatic.  Respiratory: No increased work of breathing at rest  Extremities: No cyanosis, clubbing, or edema.  Skin: No rashes, bruising, or discoloration.    Neurological (>12):  MS: Awake, alert, oriented to person, place, situation, time. Normal affect. Follows all commands.    Language: Speech is moderately dysarthric, fluent with good repetition & comprehension (able to name objects gloves and follows commands    CNs: PERRL (R = 3mm, L = 3mm). No BTT on L. R gaze preference but crosses midline. V1-3 intact to LT, well developed masseter muscles b/l. L facial droop. Hearing grossly normal to conversation. Tongue midline, normal movements, no atrophy.    Fundoscopic: unable to assess due to COVID19 precautions    Motor: Normal muscle bulk. RUE and RLE no drift, 5/5. LUE with slight drift but doesn't hit bed in 10 seconds,  strength 3/5. R  strength 5/5     Sensation: Intact to noxious stimuli b/l throughout.     Cortical: Extinction on DSS (neglect): none, able to recognize his own hand    Reflexes: b/l plantar responses downgoing    Coordination: no dysmetria to FTN, limited by strength on LUE but no ataxia    Gait: unable to assess due to fall precautions    LABORATORY:  CBC                       8.3    7.33  )-----------( 200      ( 04 Jan 2023 04:49 )             25.3     Chem 01-04    135  |  101  |  17  ----------------------------<  182<H>  4.4   |  22  |  0.93    Ca    8.7      04 Jan 2023 04:49  Phos  4.3     01-03  Mg     1.8     01-03      LFTs   Coagulopathy PT/INR - ( 04 Jan 2023 04:49 )   PT: 16.8 sec;   INR: 1.44 ratio         PTT - ( 03 Jan 2023 04:21 )  PTT:30.6 sec    STUDIES & IMAGING:    < from: CT Head No Cont (01.04.23 @ 08:57) >  IMPRESSION: Small right frontal and temporal opercular acute infarct   local sulcal effacement and mass effect. No hemorrhage. CTA ofthe neck   demonstrates postoperative changes at the right carotid bifurcation with   a patent endarterectomy graft. No large vessel occlusion intracranially   although a small distal branch occlusion is not excluded.       < from: CT Angio/Perfusion Head w/ IV Cont (01.04.23 @ 08:59) >  CBF<30% volume; 0 ml  Tmax> 6.0s volume: 47 ml  Mismatch volume: 47 ml  Mismatch ratio: infinite

## 2023-01-04 NOTE — SWALLOW BEDSIDE ASSESSMENT ADULT - SWALLOW EVAL: DIAGNOSIS
77M admitted w/ chest pain, s/p AVR/ CABG on 12/22, s/p R CEA for asymptomatic carotid stenosis on 1/3 @ 1900, code stroke called on 1/4 found to have R-frontal infarct. Pt presents with clinical signs of an oropharyngeal dysphagia. Swallow function characterized by delayed oral transit vs delay in pharyngeal swallow trigger, and wet aggressive coughing post-swallow with puree textures, suggestive of laryngeal penetration/aspiration. Pt does not appear safe for initiation of an oral diet at this time.

## 2023-01-04 NOTE — PROGRESS NOTE ADULT - SUBJECTIVE AND OBJECTIVE BOX
Patient seen and examined at the bedside.    Remained critically ill on continuous ICU monitoring.    OBJECTIVE:  Vital Signs Last 24 Hrs  T(C): 36.6 (04 Jan 2023 09:00), Max: 36.8 (04 Jan 2023 05:12)  T(F): 97.9 (04 Jan 2023 09:00), Max: 98.2 (04 Jan 2023 05:12)  HR: 73 (04 Jan 2023 10:00) (65 - 91)  BP: 134/62 (04 Jan 2023 10:00) (98/61 - 142/59)  BP(mean): 89 (04 Jan 2023 10:00) (69 - 101)  RR: 19 (04 Jan 2023 10:00) (13 - 21)  SpO2: 100% (04 Jan 2023 10:00) (93% - 100%)    Parameters below as of 04 Jan 2023 09:00  Patient On (Oxygen Delivery Method): nasal cannula  O2 Flow (L/min): 3      Physical Exam:   General: NAD   Neurology: nonfocal   Eyes: bilateral pupils equal and reactive   ENT/Neck: Neck supple, trachea midline, No JVD   Respiratory: Clear bilaterally   CV: S1S2, no murmur        [x] Sinus rhythm [x] Temporary pacing  Abdominal: Soft, NT, ND +BS   Extremities: 1-2+ pedal edema noted, + peripheral pulses   Skin: No Rashes, Hematoma, Ecchymosis                           Assessment:  77M with a PMH T2DM and recently diagnosed CHF? who had recent admission for cardiac work up. Pt found to have CAD and aortic stenosis. At that time, pt tested positive for COVID, however grossly asymptomatic. Pt was d/c home for outpatient follow up with Dr Bowles. Now presents to ED with chest pain starting yesterday afternoon.     Stenosis of right carotid artery s/p Carotid endarterectomy 1/3/23  CAD s/p AVR (T) / CABG X 1 12/22/2022  Hemodynamic instability  Cardiac arrhythmias - Atrial fibrillation  Post op respiratory insufficiency  Acute blood loss anemia    Plan:   ***Neuro***  Code stroke called 1/4/23   [x] Nonfocal  Post operative neuro assessment     ***Cardiovascular***  Invasive hemodynamic monitoring, assess perfusion indices   SR / Hct 25.3%/ Lactate 1.0  Amiodarone for afib prophylaxis   Reassessment of hemodynamics post resuscitation   Monitor chest tube outputs   [x]  ASA [x] Statin   Serial EKG and cardiac enzymes     ***Pulmonary***  [x] 3L NC  Encourage incentive spirometry, continue pulse ox monitoring, follow ABGs     ***GI***  Plan for speech and swallow consult today   [x] Diet: NPO.  Bowel regimen with Miralax and Senna    ***Renal***  Continue to monitor I/Os, BUN/Creatinine.   Replete lytes PRN  Smiley present     ***ID***  Nystatin for oral thrush prophylaxis     ***Endocrine***  [x]  DM2: HbA1c 9.4%           - continue to monitor glucose for need to initiate sliding scale       Patient requires continuous monitoring with bedside rhythm monitoring, pulse oximetry monitoring, and continuous central venous and arterial pressure monitoring; and intermittent blood gas analysis. Care plan discussed with the ICU care team.   Patient remained critical, at risk for life threatening decompensation.    I have spent 30 minutes providing critical care management to this patient.    By signing my name below, I, Tashia Kwong, attest that this documentation has been prepared under the direction and in the presence of NICOLASA Kay.  Electronically signed: Ruby Su, 01-04-23 @ 10:28    I, Fernanda Devine, personally performed the services described in this documentation. all medical record entries made by the scribe were at my direction and in my presence. I have reviewed the chart and agree that the record reflects my personal performance and is accurate and complete  Electronically signed: NICOLASA Kay. Patient seen and examined at the bedside.    Remained critically ill on continuous ICU monitoring.    Interval Events: Pt went for CEA at approximately 19:00 on 1/3. Pt was found to have decreased  strength and slurred speech between 5-8am. Code stroke was called around 8am, CT head showed Small right frontal and temporal opercular acute infarct. Pt returned to CTU at this time with slurred speech, left upper extremity weakness and drift.        OBJECTIVE:  Vital Signs Last 24 Hrs  T(C): 36.6 (04 Jan 2023 09:00), Max: 36.8 (04 Jan 2023 05:12)  T(F): 97.9 (04 Jan 2023 09:00), Max: 98.2 (04 Jan 2023 05:12)  HR: 73 (04 Jan 2023 10:00) (65 - 91)  BP: 134/62 (04 Jan 2023 10:00) (98/61 - 142/59)  BP(mean): 89 (04 Jan 2023 10:00) (69 - 101)  RR: 19 (04 Jan 2023 10:00) (13 - 21)  SpO2: 100% (04 Jan 2023 10:00) (93% - 100%)    Parameters below as of 04 Jan 2023 09:00  Patient On (Oxygen Delivery Method): nasal cannula  O2 Flow (L/min): 3      Physical Exam:   General: NAD   Neurology: nonfocal   Eyes: bilateral pupils equal and reactive   ENT/Neck: Neck supple, trachea midline, No JVD   Respiratory: Clear bilaterally   CV: S1S2, no murmur        [x] Sinus rhythm [x] Temporary pacing  Abdominal: Soft, NT, ND +BS   Extremities: 1-2+ pedal edema noted, + peripheral pulses   Skin: No Rashes, Hematoma, Ecchymosis                           Assessment:  77M with a PMH T2DM and recently diagnosed CHF? who had recent admission for cardiac work up. Pt found to have CAD and aortic stenosis. At that time, pt tested positive for COVID, however grossly asymptomatic. Pt was d/c home for outpatient follow up with Dr Bowles. Now presents to ED with chest pain starting yesterday afternoon.     Stenosis of right carotid artery s/p Carotid endarterectomy 1/3/23  CAD s/p AVR (T) / CABG X 1 12/22/2022  Hemodynamic instability  Cardiac arrhythmias - Atrial fibrillation  Post op respiratory insufficiency  Acute blood loss anemia    Plan:   ***Neuro***  Code stroke called 1/4/23. CT head showed Small right frontal and temporal opercular acute infarct.  q1 hours neuro checks. Imaging with any changes.  MR Head 24 hours from previous imaging.  Lipitor 80mg  Post operative neuro assessment     ***Cardiovascular***  Invasive hemodynamic monitoring, assess perfusion indices   SR / Hct 25.3%/ Lactate 1.0  Amiodarone for afib prophylaxis   Reassessment of hemodynamics post resuscitation   Toprol on hold for permissive hypertension  [x]  ASA [x] Statin     s/p CEA - Vascular okay with ASA, lovenox, and permissive HTN    ***Pulmonary***  [x] 3L NC  Encourage incentive spirometry, continue pulse ox monitoring, follow ABGs     ***GI***  Plan for speech and swallow consult today   [x] Diet: NPO.  Bowel regimen with Miralax and Senna    ***Heme***  Lovenox for DVT ppx - okay with neuro and vascular    ***Renal***  Continue to monitor I/Os, BUN/Creatinine.   Replete lytes PRN  Smiley present     ***ID***  Nystatin for oral thrush     ***Endocrine***  [x]  DM2: HbA1c 9.4%           - continue to monitor glucose for need to initiate sliding scale       Patient requires continuous monitoring with bedside rhythm monitoring, pulse oximetry monitoring, and continuous central venous and arterial pressure monitoring; and intermittent blood gas analysis. Care plan discussed with the ICU care team.   Patient remained critical, at risk for life threatening decompensation.    I have spent 50 minutes providing critical care management to this patient.    By signing my name below, I, Tashia Kwong, attest that this documentation has been prepared under the direction and in the presence of NICOLASA Kay.  Electronically signed: Ruby Su, 01-04-23 @ 10:28    I, Fernanda Devine, personally performed the services described in this documentation. all medical record entries made by the jillibe were at my direction and in my presence. I have reviewed the chart and agree that the record reflects my personal performance and is accurate and complete  Electronically signed: NICOLASA Kay. no

## 2023-01-04 NOTE — SWALLOW BEDSIDE ASSESSMENT ADULT - PHARYNGEAL PHASE
Delayed pharyngeal swallow/Throat clear post oral intake +Expectoration of mucous tinged with PO/Delayed pharyngeal swallow/Cough post oral intake/Delayed cough post oral intake

## 2023-01-04 NOTE — PROGRESS NOTE ADULT - SUBJECTIVE AND OBJECTIVE BOX
Patient seen and examined at the bedside.    Remained critically ill on continuous ICU monitoring.    OBJECTIVE:  Vital Signs Last 24 Hrs  T(C): 36.6 (04 Jan 2023 20:00), Max: 36.8 (04 Jan 2023 05:12)  T(F): 97.8 (04 Jan 2023 20:00), Max: 98.2 (04 Jan 2023 05:12)  HR: 77 (04 Jan 2023 20:00) (60 - 91)  BP: 122/64 (04 Jan 2023 20:00) (107/48 - 174/76)  BP(mean): 88 (04 Jan 2023 20:00) (69 - 109)  RR: 17 (04 Jan 2023 20:00) (12 - 35)  SpO2: 97% (04 Jan 2023 20:00) (93% - 100%)    Parameters below as of 04 Jan 2023 20:00  Patient On (Oxygen Delivery Method): room air      Physical Exam:   General: NAD   Neurology: nonfocal   Eyes: bilateral pupils equal and reactive   ENT/Neck: Neck supple, trachea midline, No JVD   Respiratory: Clear bilaterally   CV: S1S2, no murmur        [x] Sinus rhythm [x] Temporary pacing  Abdominal: Soft, NT, ND +BS   Extremities: 1-2+ pedal edema noted, + peripheral pulses   Skin: No Rashes, Hematoma, Ecchymosis                          Assessment:  77M with a PMH T2DM and recently diagnosed CHF? who had recent admission for cardiac work up. Pt found to have CAD and aortic stenosis. At that time, pt tested positive for COVID, however grossly asymptomatic. Pt was d/c home for outpatient follow up with Dr Bowles. Now presents to ED with chest pain starting yesterday afternoon.     Stenosis of right carotid artery s/p Carotid endarterectomy 1/3/23  CAD s/p AVR (T) / CABG X 1 12/22/2022  Hemodynamic instability  Cardiac arrhythmias - Atrial fibrillation  Post op respiratory insufficiency  Acute blood loss anemia      Plan:   ***Neuro***  Code stroke called 1/4/23 at 8 AM. CT head showed Small right frontal and temporal opercular acute infarct.  q1 hours neuro checks. Imaging with any changes.  MR Head 24 hours from previous imaging.  Lipitor 80mg  Left facial droop  Post operative neuro assessment     ***Cardiovascular***  Invasive hemodynamic monitoring, assess perfusion indices   SR / Hct 25.3%/ Lactate 0.6  [x] Anderson 0.8 -> 0.3 mcg for Hypotension  PO Amiodarone for rate control  Reassessment of hemodynamics post resuscitation   Toprol on hold for permissive hypertension  [x]  ASA [x] Statin   VTE prophylaxis with Lovenox  s/p CEA - Vascular okay with ASA, lovenox, and permissive HTN    ***Pulmonary***  [x] Room air, SpO2 97%  Encourage incentive spirometry, continue pulse ox monitoring, follow ABGs     ***GI***  [x] Diet: NPO, NG tube placed  Bowel regimen with Miralax and Senna    ***Heme***  Lovenox for DVT ppx - okay with neuro and vascular    ***Renal***  Continue to monitor I/Os, BUN/Creatinine.   Replete lytes PRN  Smiley present     ***ID***  Nystatin for oral thrush     ***Endocrine***  [x]  DM2: HbA1c 9.4%           - [x] ISS [x] Lantus      Patient requires continuous monitoring with bedside rhythm monitoring, pulse oximetry monitoring, and continuous central venous and arterial pressure monitoring; and intermittent blood gas analysis. Care plan discussed with the ICU care team.   Patient remained critical, at risk for life threatening decompensation.    I have spent 40 minutes providing critical care management to this patient.    By signing my name below, I, Maria D'Amico, attest that this documentation has been prepared under the direction and in the presence of Bharath Loaiza NP   Electronically signed: Maria D'Amico, Scribe, 01-04-23 @ 20:17    I, Bharath Loaiza NP , personally performed the services described in this documentation. all medical record entries made by the [facility] were at my direction and in my presence. I have reviewed the chart and agree that the record reflects my personal performance and is accurate and complete  Electronically signed: Bharath Loaiza NP

## 2023-01-05 LAB
ALBUMIN SERPL ELPH-MCNC: 3.7 G/DL — SIGNIFICANT CHANGE UP (ref 3.3–5)
ALP SERPL-CCNC: 92 U/L — SIGNIFICANT CHANGE UP (ref 40–120)
ALT FLD-CCNC: 17 U/L — SIGNIFICANT CHANGE UP (ref 10–45)
ANION GAP SERPL CALC-SCNC: 12 MMOL/L — SIGNIFICANT CHANGE UP (ref 5–17)
APTT BLD: 30.9 SEC — SIGNIFICANT CHANGE UP (ref 27.5–35.5)
AST SERPL-CCNC: 13 U/L — SIGNIFICANT CHANGE UP (ref 10–40)
BASOPHILS # BLD AUTO: 0.01 K/UL — SIGNIFICANT CHANGE UP (ref 0–0.2)
BASOPHILS NFR BLD AUTO: 0.2 % — SIGNIFICANT CHANGE UP (ref 0–2)
BILIRUB SERPL-MCNC: 1.5 MG/DL — HIGH (ref 0.2–1.2)
BLD GP AB SCN SERPL QL: NEGATIVE — SIGNIFICANT CHANGE UP
BUN SERPL-MCNC: 14 MG/DL — SIGNIFICANT CHANGE UP (ref 7–23)
CALCIUM SERPL-MCNC: 9.3 MG/DL — SIGNIFICANT CHANGE UP (ref 8.4–10.5)
CHLORIDE SERPL-SCNC: 100 MMOL/L — SIGNIFICANT CHANGE UP (ref 96–108)
CO2 SERPL-SCNC: 24 MMOL/L — SIGNIFICANT CHANGE UP (ref 22–31)
CREAT SERPL-MCNC: 0.88 MG/DL — SIGNIFICANT CHANGE UP (ref 0.5–1.3)
EGFR: 89 ML/MIN/1.73M2 — SIGNIFICANT CHANGE UP
EOSINOPHIL # BLD AUTO: 0.01 K/UL — SIGNIFICANT CHANGE UP (ref 0–0.5)
EOSINOPHIL NFR BLD AUTO: 0.2 % — SIGNIFICANT CHANGE UP (ref 0–6)
GAS PNL BLDA: SIGNIFICANT CHANGE UP
GLUCOSE BLDC GLUCOMTR-MCNC: 139 MG/DL — HIGH (ref 70–99)
GLUCOSE BLDC GLUCOMTR-MCNC: 149 MG/DL — HIGH (ref 70–99)
GLUCOSE BLDC GLUCOMTR-MCNC: 164 MG/DL — HIGH (ref 70–99)
GLUCOSE SERPL-MCNC: 119 MG/DL — HIGH (ref 70–99)
HCT VFR BLD CALC: 25 % — LOW (ref 39–50)
HGB BLD-MCNC: 8 G/DL — LOW (ref 13–17)
IMM GRANULOCYTES NFR BLD AUTO: 0.5 % — SIGNIFICANT CHANGE UP (ref 0–0.9)
INR BLD: 1.41 RATIO — HIGH (ref 0.88–1.16)
LYMPHOCYTES # BLD AUTO: 1.02 K/UL — SIGNIFICANT CHANGE UP (ref 1–3.3)
LYMPHOCYTES # BLD AUTO: 15.4 % — SIGNIFICANT CHANGE UP (ref 13–44)
MAGNESIUM SERPL-MCNC: 2 MG/DL — SIGNIFICANT CHANGE UP (ref 1.6–2.6)
MCHC RBC-ENTMCNC: 29.9 PG — SIGNIFICANT CHANGE UP (ref 27–34)
MCHC RBC-ENTMCNC: 32 GM/DL — SIGNIFICANT CHANGE UP (ref 32–36)
MCV RBC AUTO: 93.3 FL — SIGNIFICANT CHANGE UP (ref 80–100)
MONOCYTES # BLD AUTO: 0.5 K/UL — SIGNIFICANT CHANGE UP (ref 0–0.9)
MONOCYTES NFR BLD AUTO: 7.6 % — SIGNIFICANT CHANGE UP (ref 2–14)
NEUTROPHILS # BLD AUTO: 5.05 K/UL — SIGNIFICANT CHANGE UP (ref 1.8–7.4)
NEUTROPHILS NFR BLD AUTO: 76.1 % — SIGNIFICANT CHANGE UP (ref 43–77)
NRBC # BLD: 0 /100 WBCS — SIGNIFICANT CHANGE UP (ref 0–0)
PHOSPHATE SERPL-MCNC: 4.3 MG/DL — SIGNIFICANT CHANGE UP (ref 2.5–4.5)
PLATELET # BLD AUTO: 207 K/UL — SIGNIFICANT CHANGE UP (ref 150–400)
POTASSIUM SERPL-MCNC: 3.9 MMOL/L — SIGNIFICANT CHANGE UP (ref 3.5–5.3)
POTASSIUM SERPL-SCNC: 3.9 MMOL/L — SIGNIFICANT CHANGE UP (ref 3.5–5.3)
PROT SERPL-MCNC: 6.8 G/DL — SIGNIFICANT CHANGE UP (ref 6–8.3)
PROTHROM AB SERPL-ACNC: 16.3 SEC — HIGH (ref 10.5–13.4)
RBC # BLD: 2.68 M/UL — LOW (ref 4.2–5.8)
RBC # FLD: 14 % — SIGNIFICANT CHANGE UP (ref 10.3–14.5)
RH IG SCN BLD-IMP: POSITIVE — SIGNIFICANT CHANGE UP
SODIUM SERPL-SCNC: 136 MMOL/L — SIGNIFICANT CHANGE UP (ref 135–145)
WBC # BLD: 6.62 K/UL — SIGNIFICANT CHANGE UP (ref 3.8–10.5)
WBC # FLD AUTO: 6.62 K/UL — SIGNIFICANT CHANGE UP (ref 3.8–10.5)

## 2023-01-05 PROCEDURE — 71045 X-RAY EXAM CHEST 1 VIEW: CPT | Mod: 26,76

## 2023-01-05 PROCEDURE — 99232 SBSQ HOSP IP/OBS MODERATE 35: CPT

## 2023-01-05 PROCEDURE — 70551 MRI BRAIN STEM W/O DYE: CPT | Mod: 26

## 2023-01-05 PROCEDURE — 99291 CRITICAL CARE FIRST HOUR: CPT | Mod: 24

## 2023-01-05 RX ORDER — MAGNESIUM SULFATE 500 MG/ML
1 VIAL (ML) INJECTION ONCE
Refills: 0 | Status: COMPLETED | OUTPATIENT
Start: 2023-01-05 | End: 2023-01-05

## 2023-01-05 RX ORDER — POTASSIUM CHLORIDE 20 MEQ
20 PACKET (EA) ORAL ONCE
Refills: 0 | Status: COMPLETED | OUTPATIENT
Start: 2023-01-05 | End: 2023-01-05

## 2023-01-05 RX ORDER — DEXMEDETOMIDINE HYDROCHLORIDE IN 0.9% SODIUM CHLORIDE 4 UG/ML
0.3 INJECTION INTRAVENOUS
Qty: 200 | Refills: 0 | Status: DISCONTINUED | OUTPATIENT
Start: 2023-01-05 | End: 2023-01-05

## 2023-01-05 RX ORDER — ACETAMINOPHEN 500 MG
650 TABLET ORAL EVERY 6 HOURS
Refills: 0 | Status: DISCONTINUED | OUTPATIENT
Start: 2023-01-05 | End: 2023-01-11

## 2023-01-05 RX ADMIN — Medication 650 MILLIGRAM(S): at 19:21

## 2023-01-05 RX ADMIN — Medication 81 MILLIGRAM(S): at 17:16

## 2023-01-05 RX ADMIN — AMIODARONE HYDROCHLORIDE 400 MILLIGRAM(S): 400 TABLET ORAL at 05:36

## 2023-01-05 RX ADMIN — SODIUM CHLORIDE 3 MILLILITER(S): 9 INJECTION INTRAMUSCULAR; INTRAVENOUS; SUBCUTANEOUS at 22:48

## 2023-01-05 RX ADMIN — Medication 100 GRAM(S): at 01:36

## 2023-01-05 RX ADMIN — Medication 1: at 17:31

## 2023-01-05 RX ADMIN — Medication 650 MILLIGRAM(S): at 20:33

## 2023-01-05 RX ADMIN — SODIUM CHLORIDE 3 MILLILITER(S): 9 INJECTION INTRAMUSCULAR; INTRAVENOUS; SUBCUTANEOUS at 05:28

## 2023-01-05 RX ADMIN — ENOXAPARIN SODIUM 40 MILLIGRAM(S): 100 INJECTION SUBCUTANEOUS at 11:36

## 2023-01-05 RX ADMIN — AMIODARONE HYDROCHLORIDE 400 MILLIGRAM(S): 400 TABLET ORAL at 17:16

## 2023-01-05 RX ADMIN — Medication 650 MILLIGRAM(S): at 06:38

## 2023-01-05 RX ADMIN — SODIUM CHLORIDE 3 MILLILITER(S): 9 INJECTION INTRAMUSCULAR; INTRAVENOUS; SUBCUTANEOUS at 15:18

## 2023-01-05 RX ADMIN — SENNA PLUS 2 TABLET(S): 8.6 TABLET ORAL at 21:41

## 2023-01-05 RX ADMIN — Medication 650 MILLIGRAM(S): at 07:08

## 2023-01-05 RX ADMIN — Medication 20 MILLIEQUIVALENT(S): at 01:36

## 2023-01-05 RX ADMIN — ATORVASTATIN CALCIUM 80 MILLIGRAM(S): 80 TABLET, FILM COATED ORAL at 21:41

## 2023-01-05 NOTE — PROGRESS NOTE ADULT - ASSESSMENT
Patient is a 77 M with recently diagnosed T2D, CHF, CAD s/p [facility] 12/4 with prox LAD 70% stenosis, aortic stenosis  s/p  CABG/SAVR. 12/22. Had carotid endarterectomy yesterday 1/3/23 and is now back in CTU after having  R frontal infarct s/p procedure with L hand weakness. Pt is now NPO.     Type 2 diabetes mellitus with hyperglycemia.   Plan:   - BG Goal 100-180mg/dl   - FS TID AC qHS while tolerating diet or q6h while npo    WHILE NPO:  - remains NPO pending speech and swallow  - c/w Lantus low dose 10 units while NPO   - c/wAdmelog low correction scale  q6h while NPO   -contact endo team when pt able to take POs or TFs    ·	**if started on CHO REGULAR consistency diet: **  recommend increase lantus to 15 units sq qhs        recommend add admelog 10 units TID AC if tolerating full meals        recommendadmelog low correctional scales tid ac and low qhs scale     ·	**if started on Tube feeding:**        NPH timing will depend on last lantus administration dose- please contact endocrine  for further recommendations         would change to moderate correction scale q6h if tolerating tube feeding         would change to NPH q6h (Dose tbd)     Discharge planning:  Per  pt has [facility]- can discharge on basal bolus insulin pens + metformin 1000mg BID   final recs pending hospital course  Send Rx for glucometer, test strips, lancets, alcohol pads, and pen needles  At home, Patient should check FSBG premeals and bedtime. Pt should call their doctor when FSBG <70 or above >400 and or consistently above 200s as changes in the regimen will have to be made.  Recommend outpatient routine dilated eye exam/ophthalmology f/u; podiatry referral and endocrinology   -Primary team should arrange medicine clinic follow up prior to discharge as pt is uninsured and visiting from [facility] & planning to go back once he recuperates from surgery. & the medicine clinic can address multiple medical concerns at once. phone: [phone].     Problem/Plan - 2:  ·  Problem: Hypertension.   ·  Plan: - BP goal 130/80  - Manage per primary team.     Problem/Plan - 3:  ·  Problem: Hyperlipidemia.   ·  Plan: - Continue with high intensity statin atorvastatin 80 mg daily   - Manage as outpatient.        discussed with patient and team Karla NUNEZ CTUsx   Contact via [employer] Teams during business hours  On evenings and weekends (or if no response on [employer] Teams) please call [phone] or page endocrine fellow on call.   For nonurgent matters please email [email or web address]    Please note that this patient may be followed by different provider tomorrow.  Notify endocrine 24 hours prior to discharge for final recommendations    greater than 50% of the encounter was spent counseling and/or coordination of care.   29 minutes spent on total encounter; The necessity of the timespent during the encounter on this date of service was due to development of plan of care/coordination of care/glycemic control through review of labs, blood glucose values and vital signs.

## 2023-01-05 NOTE — OCCUPATIONAL THERAPY INITIAL EVALUATION ADULT - PLANNED THERAPY INTERVENTIONS, OT EVAL
ADL retraining/balance training/bed mobility training/transfer training
ADL retraining/balance training/bed mobility training/fine motor coordination training/transfer training

## 2023-01-05 NOTE — OCCUPATIONAL THERAPY INITIAL EVALUATION ADULT - GENERAL OBSERVATIONS, REHAB EVAL
Pt received ambulating in [place] with PT +external pacer, +IV, +tele
Pt received ambulating in [place] with PT +external pacer, +IV, +tele

## 2023-01-05 NOTE — PROGRESS NOTE ADULT - ASSESSMENT
77M with a PMH T2DM and recently diagnosed CHF? who had recent admission for cardiac work up. Pt found to have CAD and aortic stenosis. On CT neck found to have R ICA stenosis. now s/p R CEA 1/3, post-op course c/b CODE STROKE on POD1 1/4/23 patient found to have right frontal/temporal opercular acute infarct with local sulcal effacement and mass effect. Patient transferred to CTU for hemodynamic and neuro-monitoring. Stable at this time, AAOx4    Plan:  - Appreciate continued neurology recommendations  - c/w neuro checks q1h  - High dose statin  - f/u MRI 24hr post-CTH  - Pain control PRN  - NPO 2/2 new dysarthria for now, advance as per S&S recs  - ASA  - Lovenox DVT ppx  - ISS    Vascular [id number]

## 2023-01-05 NOTE — OCCUPATIONAL THERAPY INITIAL EVALUATION ADULT - ADL RETRAINING, OT EVAL
GOAL: Patient will be independent with UB dressing within 4 weeks GOAL: Pt will perform LB dressing independently in 4 weeks GOAL: Patient will perform grooming standing sink level independently in 4 weeks
GOAL: Patient will be independent with UB dressing within 4 weeks GOAL: Pt will perform LB dressing independently in 4 weeks GOAL: Patient will perform grooming standing sink level independently in 4 weeks

## 2023-01-05 NOTE — OCCUPATIONAL THERAPY INITIAL EVALUATION ADULT - PERTINENT HX OF CURRENT PROBLEM, REHAB EVAL
77M with a PMH T2DM and recently diagnosed CHF? who had recent admission for cardiac work up. Pt found to have CAD and aortic stenosis. At that time, pt tested positive for COVID, however grossly asymptomatic. Pt was d/c home for outpatient follow up with Dr Bowles. Now presents to ED with chest pain starting yesterday afternoon.seen by cardio in ED. Pt now s/p LHC 12/4 with prox LAD 70% stenosis and CT Surgery consulted for CABG/SAVR evaluation at that time. now s/p aortic valve replacement with CABG x 1 with MORIAH 12/22.
77M with a PMH T2DM and recently diagnosed CHF? who had recent admission for cardiac work up. Pt found to have CAD and aortic stenosis. At that time, pt tested positive for COVID, however grossly asymptomatic. Pt was d/c home for outpatient follow up with Dr Bowles. Now presents to ED with chest pain starting yesterday afternoon.seen by cardio in ED. Pt now s/p LHC 12/4 with prox LAD 70% stenosis and CT Surgery consulted for CABG/SAVR evaluation at that time. now s/p aortic valve replacement with CABG x 1 with MORIAH 12/22.  Pt had R CEA for asymptomatic carotid stenosis on 1/3/23 at 19:00. Code stroke called on 1/4/23. LKN 1/3/23 at 19:00 prior to R CEA surgery. No deficits noted in PACU. Pt on aspirin and lovenox ppx. NIHSS: 11.  MRI HEAD 1/5: Acute right MCA territory infarct (centered within right frontal operculum-insular regions). This is grossly stable compared to 1/4/2023  CT. No hemorrhagic transformation of infarct or midline shift. Consider imaging follow-up as clinically indicated.

## 2023-01-05 NOTE — OCCUPATIONAL THERAPY INITIAL EVALUATION ADULT - TRANSFER TRAINING, PT EVAL
GOAL: Patient will be independent with functional transfers with use of rolling walker  in 4 weeks
GOAL: Patient will be independent with functional transfers with use of rolling walker  in 4 weeks

## 2023-01-05 NOTE — OCCUPATIONAL THERAPY INITIAL EVALUATION ADULT - LIVES WITH, PROFILE
Pt lives in a house with his son, 3 steps to enter, IND PTA without AD.
Pt lives in a house with his son, 3 steps to enter, IND PTA without AD.  Family at bedside states someone will be with patient at all times upon d/c

## 2023-01-05 NOTE — OCCUPATIONAL THERAPY INITIAL EVALUATION ADULT - BALANCE TRAINING, PT EVAL
GOAL: Pt will increase dynamic standing balance by 1/2 grade to facilitate ability to perform ADLs and functional mobility within 4 weeks
GOAL: Pt will increase dynamic standing balance by 1/2 grade to facilitate ability to perform ADLs and functional mobility within 4 weeks

## 2023-01-05 NOTE — PROGRESS NOTE ADULT - SUBJECTIVE AND OBJECTIVE BOX
Neurology Progress Note    S: Patient seen and examined in CTICU. MRI done confirms infarct. some mild weakness noted but better     Medication:  acetaminophen     Tablet .. 650 milliGRAM(s) Oral every 6 hours PRN  aMIOdarone    Tablet 400 milliGRAM(s) Oral every 8 hours  aMIOdarone    Tablet 200 milliGRAM(s) Oral daily  aMIOdarone    Tablet   Oral   aspirin  chewable 81 milliGRAM(s) Oral daily  atorvastatin 80 milliGRAM(s) Oral at bedtime  bisacodyl Suppository 10 milliGRAM(s) Rectal once  dextrose 5% + sodium chloride 0.45%. 1000 milliLiter(s) IV Continuous <Continuous>  enoxaparin Injectable 40 milliGRAM(s) SubCutaneous every 24 hours  insulin glargine Injectable (LANTUS) 10 Unit(s) SubCutaneous at bedtime  insulin lispro (ADMELOG) corrective regimen sliding scale   SubCutaneous every 6 hours  nystatin    Suspension 565770 Unit(s) Oral every 6 hours  polyethylene glycol 3350 17 Gram(s) Oral daily  senna 2 Tablet(s) Oral at bedtime  sodium chloride 0.9% lock flush 3 milliLiter(s) IV Push every 8 hours      Vitals:  Vital Signs Last 24 Hrs  T(C): 36.5 (05 Jan 2023 12:00), Max: 36.8 (05 Jan 2023 00:00)  T(F): 97.7 (05 Jan 2023 12:00), Max: 98.2 (05 Jan 2023 00:00)  HR: 73 (05 Jan 2023 13:00) (60 - 84)  BP: 140/65 (04 Jan 2023 22:15) (122/64 - 174/76)  BP(mean): 94 (04 Jan 2023 22:15) (88 - 109)  RR: 23 (05 Jan 2023 13:00) (13 - 35)  SpO2: 99% (05 Jan 2023 13:00) (93% - 100%)    Parameters below as of 05 Jan 2023 12:00  Patient On (Oxygen Delivery Method): nasal cannula  O2 Flow (L/min): 2      General Exam:   General Appearance: Appropriately dressed and in no acute distress       Head: Normocephalic, atraumatic and no dysmorphic features  Ear, Nose, and Throat: Moist mucous membranes  CVS: S1S2+  Resp: No SOB, no wheeze or rhonchi  Abd: soft NTND  Extremities: No edema, no cyanosis  Skin: No bruises, no rashes     Neurological Exam:  Mental Status: Awake, alert and oriented x 2.  Able to follow simple verbal commands. Able to name and repeat. fluent speech. No obvious aphasia but mild to mod dysarthria noted.   Cranial Nerves: PERRL, R gaze pref, L HHA , sensation V1-V3 intact,  no obvious facial asymmetry , equal elevation of palate, scm/trap 5/5, tongue is midline on protrusion. no obvious papilledema on fundoscopic exam. Hearing is grossly intact.   Motor: mild LUE nad LLE drift but significant improvement in strength.   Sensation: Intact to light touch and pinprick throughout. no right/left confusion. no extinction to tactile on DSS.    Reflexes: 1+ throughout at biceps, brachioradialis, triceps, patellars and ankles bilaterally and equal. No clonus. R toe and L toe were both downgoing.  Coordination: No dysmetria on FNF   Gait: deferred     I personally reviewed the below data/images/labs:      CBC Full  -  ( 05 Jan 2023 00:46 )  WBC Count : 6.62 K/uL  RBC Count : 2.68 M/uL  Hemoglobin : 8.0 g/dL  Hematocrit : 25.0 %  Platelet Count - Automated : 207 K/uL  Mean Cell Volume : 93.3 fl  Mean Cell Hemoglobin : 29.9 pg  Mean Cell Hemoglobin Concentration : 32.0 gm/dL  Auto Neutrophil # : 5.05 K/uL  Auto Lymphocyte # : 1.02 K/uL  Auto Monocyte # : 0.50 K/uL  Auto Eosinophil # : 0.01 K/uL  Auto Basophil # : 0.01 K/uL  Auto Neutrophil % : 76.1 %  Auto Lymphocyte % : 15.4 %  Auto Monocyte % : 7.6 %  Auto Eosinophil % : 0.2 %  Auto Basophil % : 0.2 %    01-05    136  |  100  |  14  ----------------------------<  119<H>  3.9   |  24  |  0.88    Ca    9.3      05 Jan 2023 00:46  Phos  4.3     01-05  Mg     2.0     01-05    TPro  6.8  /  Alb  3.7  /  TBili  1.5<H>  /  DBili  x   /  AST  13  /  ALT  17  /  AlkPhos  92  01-05    LIVER FUNCTIONS - ( 05 Jan 2023 00:46 )  Alb: 3.7 g/dL / Pro: 6.8 g/dL / ALK PHOS: 92 U/L / ALT: 17 U/L / AST: 13 U/L / GGT: x           PT/INR - ( 05 Jan 2023 00:46 )   PT: 16.3 sec;   INR: 1.41 ratio         PTT - ( 05 Jan 2023 00:46 )  PTT:30.9 sec     < from: CT Head No Cont (01.04.23 @ 08:57) >  IMPRESSION: Small right frontal and temporal opercular acute infarct   local sulcal effacement and mass effect. No hemorrhage. CTA ofthe neck   demonstrates postoperative changes at the right carotid bifurcation with   a patent endarterectomy graft. No large vessel occlusion intracranially   although a small distal branch occlusion is not excluded.       < from: CT Angio/Perfusion Head w/ IV Cont (01.04.23 @ 08:59) >  CBF<30% volume; 0 ml  Tmax> 6.0s volume: 47 ml  Mismatch volume: 47 ml  Mismatch ratio: infinite  < from: MR Head No Cont (01.05.23 @ 10:04) >    ACC: [id number] EXAM:  MR BRAIN                          PROCEDURE DATE:  01/05/2023          INTERPRETATION:  MRI BRAIN WITHOUT AND WITH IV CONTRAST    INDICATION: CVA. Additional history per EMR: now s/p R CEA 1/3, post-op   course c/b CODE STROKE on POD1  1/4/23 patient found to have right frontal/temporal opercular acute   infarct  with local sulcal effacement and mass effect.    COMPARISON: CT/CTA/CTP head neck 1/4/2023    TECHNIQUE: Multisequential multiplanar MRI of the brain is performed   without the administration of intravenous gadolinium contrast.    FINDINGS:    Examination is slightly degraded due to patient motion.    Cortical/subcortical restricted diffusion (DWI hyperintense/ADC   hypointense signal) is centered within the right frontal opercular and   insular regions, with slight involvement of the right temporal operculum.   At least two additional punctate foci of restricted diffusion within the   right frontal centrum semiovale and lentiform nucleus (images 17-22,   series 3). Corresponding FLAIR hyperintensity is noted. The infarct   appears grossly similar compared to hypodensity on recent noncontrast CT.    No abnormal SWI signal loss to suggest hemorrhagic transformation of   infarct.    ADC values less than 620 on rapid postprocessed sequence in the region of   diffusion and about the, is consistent with acute core ischemic infarct   (volume 52 mL).    There is no signal mass effect or midline shift. The basal cisterns are   patent.  There is no hydrocephalus.  No extra-axial collection is seen.  Generalized parenchymal volume loss is noted.    Large retrocerebellar CSF intensity extra-axial space, likely reflects an   incidental arachnoid cyst. Mild mass effect on the adjacent cerebellum.   Scalloping of the adjacent calvarium and thin internal septation are   noted.    The corpus callosum, optic chiasm, pituitary gland and pineal gland are   normal in configuration.  No cerebellar tonsillar herniation/ectopia. No Chiari malformation.    The visualized orbits are unremarkable. Mild mucosal thickening within   the paranasal sinuses. Bilateral mastoid/middle ear cavities are clear.    Preserved T2 flow-voids along the dural venous sinuses.      IMPRESSION:    Acute right MCA territory infarct (centered within right frontal   operculum-insular regions). This is grossly stable compared to 1/4/2023   CT. No hemorrhagic transformation of infarct or midline shift. Consider   imaging follow-up as clinically indicated.      Findings were discussedwith covering provider (Karla Gallegos NP) via   telephone on 1/5/2023 and 10:33 AM with verbal read back    --- End of Report ---            SORAYA PEDRAZA MD; Attending Radiologist  This document has been electronically signed. Jan 5 2023 10:44AM    < end of copied text >

## 2023-01-05 NOTE — CHART NOTE - NSCHARTNOTEFT_GEN_A_CORE
Nutrition Follow Up Note  Patient seen for: Follow up in CTU     Chart reviewed, events noted.  "77M with a PMH T2DM and recently diagnosed CHF? who had recent admission for cardiac work up. Pt found to have CAD and aortic stenosis. On CT neck found to have R ICA stenosis. now s/p R CEA 1/3, post-op course c/b CODE STROKE on POD1 1/4/23 patient found to have right frontal/temporal opercular acute infarct with local sulcal effacement and mass effect. Patient transferred to CTU for hemodynamic and neuro-monitoring."    Source: [] Patient       [x] Medical Record        [] RN        [] Family at bedside       [] Other:    -If unable to interview patient: [] Trach/Vent/BiPAP  [] Disoriented/confused/inappropriate to interview    Nutrition-Related Events:   - Pressors:  [] Yes    [X] No   - Propofol:  [] Yes    [X] No    Diet Order:   Diet, NPO (01-04-23)    Is current diet order appropriate/adequate? See recommendations below    PO intake :   [] >75%  Adequate    [] 50-75%  Fair       [] <50%  Poor   [x] N/A    Nutrition-related concerns:  - stenosis of right carotid artery s/p carotid endarterectomy 1/3   - Code stroke 1/4 8 am   - failed bedside swallow eval on 1/4   - Currently on dextrose 5% + sodium chloride 0.45%  - A1C: 9.4% 12/2. Indicates suboptimal glycemic control at home. Newly diagnosed with DM. Currently on Lantus, insulin lispro sliding scale     GI:  Last BM 1/4 per flow sheets.   Bowel Regimen? [X] Yes: senna, bisacodyl suppository, polyethylene glycol   [] No    Weights: Per Chart  177.4 pounds on 1/5 (bed)  166.2 pounds on 1/3 (standing)  166.4 pounds on 1/2 (standing)  172.1 pounds on 1/1 (standing)  171.7 pounds on 12/30 (standing)  UBW per chart: 172 pounds   Weight fluctuations noted. ? accuracy of bed scale weight. Weight changes likely in setting of fluid shifts, pt is currently +1 edematous. Will continue to monitor weights.      MEDICATIONS  (STANDING):  atorvastatin  bisacodyl Suppository  dextrose 5% + sodium chloride 0.45%.  insulin glargine   insulin lispro sliding scale  polyethylene glycol 3350  senna    Pertinent Labs: 1/5 Glucose: 119, POCT ranges in the past 24 hours: 120-200  12/2 A1C: 9.4 %     Skin per nursing documentation: surgical incision: midsternal incision  Edema per nursing documentation: +1 generalized     (based on dosing wt 78kg):   Estimated Energy Needs: (25-30kcal/kg): 1950-2340kcal   Estimated Protein Needs: (1.2-1.4g protein/kg): 94-109g protein  Estimated Fluid Needs: Defer fluids to medical team    Previous Nutrition Diagnosis: Increased nutrient needs; Food and nutrition related knowledge deficit   Nutrition Diagnosis is: [X] ongoing  [] resolved [] not applicable     Nutrition Care Plan:  [X] In Progress - If appropriate will be addressed via EN   [] Achieved  [] Not applicable    New Nutrition Diagnosis: [X] Not applicable    Nutrition Interventions:     Education Provided:       [] Yes:  [X] No: Education inappropriate at this time.     Recommendations:   1. If EN Recommend Glucerna 1.5 @ goal rate of 55ml/hr x24 hours. Provides total volume: 1320ml, 1980kcal, 108.9g pro, 1002ml of free water. Meets 25kcal/kg, 1.4g pro/kg based on dosing wt 78kg.   2. Monitor tube feeding tolerance, weight trends, labs, and skin integrity and bowel movement regularity.   3. RD remains available upon request to adjust if needed.     Monitoring and Evaluation:   Continue to monitor nutritional intake, tolerance to diet prescription, weights, labs, skin integrity    RD remains available upon request and will follow up per protocol  Nitin Dominique, Dietetic Intern Pager# [phone] Nutrition Follow Up Note  Patient seen for: Follow up in CTU     Chart reviewed, events noted.  "77M with a PMH T2DM and recently diagnosed CHF? who had recent admission for cardiac work up. Pt found to have CAD and aortic stenosis. On CT neck found to have R ICA stenosis. now s/p R CEA 1/3, post-op course c/b CODE STROKE on POD1 1/4/23 patient found to have right frontal/temporal opercular acute infarct with local sulcal effacement and mass effect. Patient transferred to CTU for hemodynamic and neuro-monitoring."    Source: [] Patient       [x] Medical Record        [] RN        [] Family at bedside       [] Other:    -If unable to interview patient: [] Trach/Vent/BiPAP  [] Disoriented/confused/inappropriate to interview    Nutrition-Related Events:   - Pressors:  [] Yes    [X] No   - Propofol:  [] Yes    [X] No    Diet Order:   Diet, NPO (01-04-23)    Is current diet order appropriate/adequate? See recommendations below    PO intake :   [] >75%  Adequate    [] 50-75%  Fair       [] <50%  Poor   [x] N/A    Nutrition-related concerns:  - stenosis of right carotid artery s/p carotid endarterectomy 1/3   - Code stroke 1/4 8 am   - failed bedside swallow eval on 1/4   - Currently on dextrose 5% + sodium chloride 0.45%  - A1C: 9.4% 12/2. Indicates suboptimal glycemic control at home. Newly diagnosed with DM. Currently on Lantus, insulin lispro sliding scale     GI:  Last BM 1/4 per flow sheets.   Bowel Regimen? [X] Yes: senna, bisacodyl suppository, polyethylene glycol   [] No    Weights: Per Chart  177.4 pounds on 1/5 (bed)  166.2 pounds on 1/3 (standing)  166.4 pounds on 1/2 (standing)  172.1 pounds on 1/1 (standing)  171.7 pounds on 12/30 (standing)  UBW per chart: 172 pounds   Weight fluctuations noted. ? accuracy of bed scale weight. Weight changes likely in setting of fluid shifts, pt is currently +1 edematous. Will continue to monitor weights.      MEDICATIONS  (STANDING):  atorvastatin  bisacodyl Suppository  dextrose 5% + sodium chloride 0.45%.  insulin glargine   insulin lispro sliding scale  polyethylene glycol 3350  senna    Pertinent Labs: 1/5 Glucose: 119, POCT ranges in the past 24 hours: 120-200  12/2 A1C: 9.4 %     Skin per nursing documentation: surgical incision: midsternal incision  Edema per nursing documentation: +1 generalized     (based on dosing wt 78kg):   Estimated Energy Needs: (25-30kcal/kg): 1950-2340kcal   Estimated Protein Needs: (1.2-1.4g protein/kg): 94-109g protein  Estimated Fluid Needs: Defer fluids to medical team    Previous Nutrition Diagnosis: Increased nutrient needs; Food and nutrition related knowledge deficit   Nutrition Diagnosis is: [X] ongoing  [] resolved [] not applicable     Nutrition Care Plan:  [X] In Progress - If appropriate will be addressed via EN   [] Achieved  [] Not applicable    New Nutrition Diagnosis: [X] Not applicable    Nutrition Interventions:     Education Provided:       [] Yes:  [X] No: Education inappropriate at this time.     Recommendations:   1. If EN Recommend Glucerna 1.5 @ goal rate of 55ml/hr x24 hours. Provides total volume: 1320ml, 1980kcal, 108.9g pro, 1002ml of free water. Meets 25kcal/kg, 1.4g pro/kg based on dosing wt 78kg.   2. Recommend micronutrient supplementation: multivitamin to prevent micronutrient deficiencies unless otherwise contraindicated.   3. Monitor GI tolerance to feeds, RD remains available to adjust TF regimen/formulary as needed/upon request.      Monitoring and Evaluation:   Continue to monitor nutritional intake, tolerance to diet prescription, weights, labs, skin integrity    RD remains available upon request and will follow up per protocol  Nitin Dominique, Dietetic Intern Pager# [phone] Nutrition Follow Up Note  Patient seen for: Follow up in CTU     Chart reviewed, events noted.  "77M with a PMH T2DM and recently diagnosed CHF? who had recent admission for cardiac work up. Pt found to have CAD and aortic stenosis. On CT neck found to have R ICA stenosis. now s/p R CEA 1/3, post-op course c/b CODE STROKE on POD1 1/4/23 patient found to have right frontal/temporal opercular acute infarct with local sulcal effacement and mass effect. Patient transferred to CTU for hemodynamic and neuro-monitoring."    Source: [X] Patient       [x] Medical Record        [] RN        [X] Family at bedside       [X] Other:  services #[id number] Claribel    -If unable to interview patient: [] Trach/Vent/BiPAP  [] Disoriented/confused/inappropriate to interview    Nutrition-Related Events:   - Pressors:  [] Yes    [X] No   - Propofol:  [] Yes    [X] No    Diet Order:   Diet, NPO (01-04-23)    Is current diet order appropriate/adequate? See recommendations below    PO intake :   [] >75%  Adequate    [] 50-75%  Fair       [] <50%  Poor   [x] N/A    Nutrition-related concerns:  - stenosis of right carotid artery s/p carotid endarterectomy 1/3   - Code stroke 1/4 8 am   - failed bedside swallow eval on 1/4   - Currently on dextrose 5% + sodium chloride 0.45%  - A1C: 9.4% 12/2. Indicates suboptimal glycemic control at home. Newly diagnosed with DM. Currently on Lantus, insulin lispro sliding scale     GI:  Last BM 1/4 per flow sheets. No issues with nausea, vomiting, diarrhea reported at time of visit. Pt reports feeling constipated.  Bowel Regimen? [X] Yes: senna, bisacodyl suppository, polyethylene glycol   [] No    Weights: Per Chart  177.4 pounds on 1/5 (bed)  166.2 pounds on 1/3 (standing)  166.4 pounds on 1/2 (standing)  172.1 pounds on 1/1 (standing)  171.7 pounds on 12/30 (standing)  UBW per chart: 172 pounds   Weight fluctuations noted. ? accuracy of bed scale weight. Weight changes likely in setting of fluid shifts, pt is currently +1 edematous. Will continue to monitor weights.      MEDICATIONS  (STANDING):  atorvastatin  bisacodyl Suppository  dextrose 5% + sodium chloride 0.45%.  insulin glargine   insulin lispro sliding scale  polyethylene glycol 3350  senna    Pertinent Labs: 1/5 Glucose: 119, POCT ranges in the past 24 hours: 120-200  12/2 A1C: 9.4 %     Skin per nursing documentation: surgical incision: midsternal incision  Edema per nursing documentation: +1 generalized     (based on dosing wt 78kg):   Estimated Energy Needs: (25-30kcal/kg): 1950-2340kcal   Estimated Protein Needs: (1.2-1.4g protein/kg): 94-109g protein  Estimated Fluid Needs: Defer fluids to medical team    Previous Nutrition Diagnosis: Increased nutrient needs; Food and nutrition related knowledge deficit   Nutrition Diagnosis is: [X] ongoing  [] resolved [] not applicable     Nutrition Care Plan:  [X] In Progress - If appropriate will be addressed via EN   [] Achieved  [] Not applicable    New Nutrition Diagnosis: [X] Not applicable    Nutrition Interventions:     Education Provided:       [X] Yes: Per conversation with pt and family member, pt states that he is hungry and wants to eat/drink. Reassured that at this time it would be best advised to avoid any consumption orally as it can be an aspiration risk post SLP bedside eval. Explained that we are following and when medically appropriate he will be receiving nutrition. Pt/family receptive and agreeable.  [] No:     Recommendations:   1. If EN Recommend Glucerna 1.5 @ goal rate of 55ml/hr x24 hours. Provides total volume: 1320ml, 1980kcal, 108.9g pro, 1002ml of free water. Meets 25kcal/kg, 1.4g pro/kg based on dosing wt 78kg.   2. If PO diet is warranted recommend Consistent Carbohydrate. Defer diet/fluid consistencies to medical team/SLP recommendations.    3. Monitor GI tolerance to feeds, RD remains available to adjust TF regimen/formulary as needed/upon request.      Monitoring and Evaluation:   Continue to monitor nutritional intake, tolerance to diet prescription, weights, labs, skin integrity    RD remains available upon request and will follow up per protocol  Nitin Dominique, Dietetic Intern Pager# [phone] Nutrition Follow Up Note  Patient seen for: follow up/transfer to CTU     Chart reviewed, events noted. Pt is a "77M with a PMH T2DM and recently diagnosed CHF? who had recent admission for cardiac work up. Pt found to have CAD and aortic stenosis. On CT neck found to have R ICA stenosis. now s/p R CEA 1/3, post-op course c/b CODE STROKE on POD1 1/4/23 patient found to have right frontal/temporal opercular acute infarct with local sulcal effacement and mass effect. Patient transferred to CTU for hemodynamic and neuro-monitoring."    Source: [X] Patient       [x] Medical Record        [] RN        [X] Family at bedside       [X] Other:  services #[id number] Claribel    -If unable to interview patient: [] Trach/Vent/BiPAP  [] Disoriented/confused/inappropriate to interview    Nutrition-Related Events:   - Pressors:  [] Yes    [X] No   - Propofol:  [] Yes    [X] No    Diet Order:   Diet, NPO (01-04-23)    Is current diet order appropriate/adequate? See recommendations below    PO intake :   [] >75%  Adequate    [] 50-75%  Fair       [] <50%  Poor   [x] N/A   - Pt made NPO 1/4 s/p code stroke, per flow sheets pt previously with good PO intake.    Nutrition-related concerns:  - stenosis of right carotid artery s/p carotid endarterectomy 1/3   - Code stroke 1/4 - pt with R hemisphere infarct/frontal per Neurology  - failed bedside swallow eval on 1/4, recommended to remain NPO with FEES on 1/5 by SLP.  - Currently on dextrose 5% + sodium chloride 0.45%  - A1C: 9.4% 12/2. Indicates suboptimal glycemic control at home. Newly diagnosed with DM. Currently on Lantus, insulin lispro sliding scale     GI:  Last BM 1/4 per flow sheets. No issues with nausea, vomiting, diarrhea reported at time of visit. Pt reports feeling constipated.  Bowel Regimen? [X] Yes: senna, bisacodyl suppository, polyethylene glycol   [] No    Weights: Per Chart  177.4 pounds on 1/5 (bed)  166.2 pounds on 1/3 (standing)  166.4 pounds on 1/2 (standing)  172.1 pounds on 1/1 (standing)  171.7 pounds on 12/30 (standing)  UBW per chart: 172 pounds   Weight fluctuations noted. ? accuracy of bed scale weight. Weight changes likely in setting of fluid shifts, pt is currently +1 edematous. Will continue to monitor weights.      MEDICATIONS  (STANDING):  atorvastatin  bisacodyl Suppository  dextrose 5% + sodium chloride 0.45%.  insulin glargine   insulin lispro sliding scale  polyethylene glycol 3350  senna    Pertinent Labs: 1/5 Glucose: 119, POCT ranges in the past 24 hours: 120-200  12/2 A1C: 9.4 %     Skin per nursing documentation: surgical incision: midsternal incision  Edema per nursing documentation: +1 generalized     (based on dosing wt 78kg):   Estimated Energy Needs: (25-30kcal/kg): 1950-2340kcal   Estimated Protein Needs: (1.2-1.4g protein/kg): 94-109g protein  Estimated Fluid Needs: Defer fluids to medical team    Previous Nutrition Diagnosis: Increased nutrient needs; Food and nutrition related knowledge deficit   Nutrition Diagnosis is: [X] ongoing  [] resolved [] not applicable     Nutrition Care Plan:  [X] In Progress - If appropriate will be addressed via EN   [] Achieved  [] Not applicable    New Nutrition Diagnosis: [X] Not applicable    Nutrition Interventions:     Education Provided:       [X] Yes: Per conversation with pt and family member, pt states that he is hungry and wants to eat/drink. Reassured that at this time it would be best advised to avoid any consumption orally as it can be an aspiration risk post SLP bedside eval. Explained that we are following and when medically appropriate he will be receiving nutrition. Pt/family receptive and agreeable.  [] No:     Recommendations:   1. If EN warranted recommend Glucerna 1.5 at 10mL/hr advancing as tolerated to goal rate of 55ml/hr x 24 hours. Provides total volume: 1320ml, 1980kcal, 109g protein, 1002ml of free water. Meets 25kcal/kg, 1.4g protein/kg based on dosing wt 78kg.    - Defer free water flushes to team.  2. If PO diet is warranted recommend Consistent Carbohydrate w/ Evening Snack restriction. Defer diet/fluid consistencies to medical team/SLP recommendations.    3. Monitor GI tolerance to feeds, RD remains available to adjust TF regimen/formulary as needed/upon request.      Monitoring and Evaluation:   Continue to monitor nutritional intake, tolerance to diet prescription, weights, labs, skin integrity    RD remains available upon request and will follow up per protocol  Nitin Dominique, Dietetic Intern Pager# [phone]

## 2023-01-05 NOTE — PROGRESS NOTE ADULT - SUBJECTIVE AND OBJECTIVE BOX
Patient seen and examined at the bedside.    Remained critically ill on continuous ICU monitoring.    OBJECTIVE:  Vital Signs Last 24 Hrs  T(C): 36.7 (05 Jan 2023 04:00), Max: 36.8 (05 Jan 2023 00:00)  T(F): 98 (05 Jan 2023 04:00), Max: 98.2 (05 Jan 2023 00:00)  HR: 68 (05 Jan 2023 06:00) (60 - 87)  BP: 140/65 (04 Jan 2023 22:15) (122/64 - 174/76)  BP(mean): 94 (04 Jan 2023 22:15) (84 - 109)  RR: 15 (05 Jan 2023 06:00) (12 - 35)  SpO2: 100% (05 Jan 2023 06:00) (93% - 100%)    Parameters below as of 05 Jan 2023 04:00  Patient On (Oxygen Delivery Method): room air  O2 Flow (L/min): 2      Physical Exam:   General: NAD   Neurology: nonfocal   Eyes: bilateral pupils equal and reactive   ENT/Neck: Neck supple, trachea midline, No JVD   Respiratory: Clear bilaterally   CV: S1S2, no murmur        [x] Sinus rhythm [x] Temporary pacing  Abdominal: Soft, NT, ND +BS   Extremities: 1-2+ pedal edema noted, + peripheral pulses   Skin: No Rashes, Hematoma, Ecchymosis                            Assessment:  77M with a PMH T2DM and recently diagnosed CHF? who had recent admission for cardiac work up. Pt found to have CAD and aortic stenosis. At that time, pt tested positive for COVID, however grossly asymptomatic. Pt was d/c home for outpatient follow up with Dr Bowles. Now presents to ED with chest pain starting yesterday afternoon.     Stenosis of right carotid artery s/p Carotid endarterectomy 1/3/23  CAD s/p AVR (T) / CABG X 1 12/22/2022  Hemodynamic instability  Cardiac arrhythmias - Atrial fibrillation  Post op respiratory insufficiency  Acute blood loss anemia    Plan:   ***Neuro***  Code stroke called 1/4/23 at 8 AM. CT head showed Small right frontal and temporal opercular acute infarct.  q1 hours neuro checks. Imaging with any changes.  MR Head 24 hours from previous imaging.  Left facial droop  Post operative neuro assessment     ***Cardiovascular***  Invasive hemodynamic monitoring, assess perfusion indices   SR / Hct 25.3%/ Lactate 0.6  PO Amiodarone for rate control  Reassessment of hemodynamics post resuscitation   [x] Phenylephrine - 0.1mcgs   [x]  ASA [x] Statin   VTE prophylaxis with Lovenox  s/p CEA - Vascular okay with ASA, lovenox, and permissive HTN    ***Pulmonary***  [x] Room air, SpO2 97%  Encourage incentive spirometry, continue pulse ox monitoring, follow ABGs     ***GI***  [x] Diet: NPO, NG tube placed  Bowel regimen with Miralax and Senna    ***Renal***  Continue to monitor I/Os, BUN/Creatinine.   Replete lytes PRN  Smiley present     ***ID***  Nystatin for oral thrush     ***Endocrine***  [x]  DM2: HbA1c 9.4%           - [x] ISS [x] Lantus        Patient requires continuous monitoring with bedside rhythm monitoring, pulse oximetry monitoring, and continuous central venous and arterial pressure monitoring; and intermittent blood gas analysis. Care plan discussed with the ICU care team.   Patient remained critical, at risk for life threatening decompensation.    I have spent 30 minutes providing critical care management to this patient.    By signing my name below, I, Tashia Kwong, attest that this documentation has been prepared under the direction and in the presence of Karla Gallegos NP.  Electronically signed: Ruby Su, 01-05-23 @ 06:16    I, Karla Gallegos, personally performed the services described in this documentation. all medical record entries made by the ijllibstone were at my direction and in my presence. I have reviewed the chart and agree that the record reflects my personal performance and is accurate and complete  Electronically signed: Karla Gallegos NP. Patient seen and examined at the bedside.    Remained critically ill on continuous ICU monitoring.    OBJECTIVE:  Vital Signs Last 24 Hrs  T(C): 36.7 (05 Jan 2023 04:00), Max: 36.8 (05 Jan 2023 00:00)  T(F): 98 (05 Jan 2023 04:00), Max: 98.2 (05 Jan 2023 00:00)  HR: 68 (05 Jan 2023 06:00) (60 - 87)  BP: 140/65 (04 Jan 2023 22:15) (122/64 - 174/76)  BP(mean): 94 (04 Jan 2023 22:15) (84 - 109)  RR: 15 (05 Jan 2023 06:00) (12 - 35)  SpO2: 100% (05 Jan 2023 06:00) (93% - 100%)    Parameters below as of 05 Jan 2023 04:00  Patient On (Oxygen Delivery Method): room air  O2 Flow (L/min): 2      Physical Exam:   General: NAD   Neurology: A&Ox2 (disoriented to place), left facial droop, LUE 4/5, LLE 5/5, clear speech, sensation intact  Eyes: bilateral pupils equal and reactive   ENT/Neck: Neck supple, trachea midline, No JVD   Respiratory: Clear bilaterally   CV: S1S2, no murmur        [x] Sinus rhythm   Abdominal: Soft, NT, ND +BS   Extremities: no peripheral edema noted, + peripheral pulses   Skin: No Rashes, Hematoma, Ecchymosis                            Assessment:  77M with a PMH T2DM and recently diagnosed CHF? who had recent admission for cardiac work up. Pt found to have CAD and aortic stenosis. At that time, pt tested positive for COVID, however grossly asymptomatic. Pt was d/c home for outpatient follow up with Dr Bowles. Now presents to ED with chest pain starting yesterday afternoon.     Stenosis of right carotid artery s/p Carotid endarterectomy 1/3/23  CAD s/p AVR (T) / CABG X 1 12/22/2022  Hemodynamic instability  Cardiac arrhythmias - Atrial fibrillation  Post op respiratory insufficiency  Acute blood loss anemia    Plan:   ***Neuro***  Code stroke called 1/4/23 at 8 AM. CT head showed Small right frontal and temporal opercular acute infarct. MR Head this morning, f/u read  q1 hours neuro checks.   Neuro following, appreciate recommendations  PT/OT    ***Cardiovascular***  Invasive hemodynamic monitoring, assess perfusion indices   SR / Hct 25.3%/ Lactate 0.6  PO Amiodarone for rate control (hx of pAF)  [x] Phenylephrine - 0.1mcgs overnight for SBP goal >140 for neuro perfusion, currently off, will f/u neuro with BP recs post MRI  [x]  ASA [x] Statin   VTE prophylaxis with Lovenox  s/p CEA - Vascular okay with ASA, lovenox, and permissive HTN    ***Pulmonary***  [x] 2L NC, wean to Room air as tolerated, SpO2 97%  Encourage incentive spirometry, continue pulse ox monitoring, follow ABGs     ***GI***  [x] Diet: NPO, NG tube placed (will have to remove for MRI), repeat S&S today (failed yesterday), reassess need for keofeed this afternoon for meds/nutrition  continue IV hydration while NPO  Bowel regimen with Miralax and Senna    ***Renal***  Continue to monitor I/Os, BUN/Creatinine.   Replete manuel Smiley present - DC for trial void    ***ID***  Nystatin for oral thrush     ***Endocrine***  [x]  DM2: HbA1c 9.4%           - [x] ISS [x] Lantus        Patient requires continuous monitoring with bedside rhythm monitoring, pulse oximetry monitoring, and continuous central venous and arterial pressure monitoring; and intermittent blood gas analysis. Care plan discussed with the ICU care team.   Patient remained critical, at risk for life threatening decompensation.    I have spent 45 minutes providing critical care management to this patient.    By signing my name below, I, Tashia Kwong, attest that this documentation has been prepared under the direction and in the presence of Karla Gallegos NP.  Electronically signed: Ruby Su, 01-05-23 @ 06:16    I, Karla Gallegos, personally performed the services described in this documentation. all medical record entries made by the jillibstone were at my direction and in my presence. I have reviewed the chart and agree that the record reflects my personal performance and is accurate and complete  Electronically signed: Karla Gallegos NP. Patient seen and examined at the bedside.    Remained critically ill on continuous ICU monitoring.    24 hour events:   code stroke for left weakness, CT scan revealed right frontal and temporal acute infarct, plan for f/u MRI this AM  ankush overnight for permissive HTN with SBP goal 140-160  failed speech and swallow, maintained NPO, IVF for hydration    OBJECTIVE:  Vital Signs Last 24 Hrs  T(C): 36.7 (05 Jan 2023 04:00), Max: 36.8 (05 Jan 2023 00:00)  T(F): 98 (05 Jan 2023 04:00), Max: 98.2 (05 Jan 2023 00:00)  HR: 68 (05 Jan 2023 06:00) (60 - 87)  BP: 140/65 (04 Jan 2023 22:15) (122/64 - 174/76)  BP(mean): 94 (04 Jan 2023 22:15) (84 - 109)  RR: 15 (05 Jan 2023 06:00) (12 - 35)  SpO2: 100% (05 Jan 2023 06:00) (93% - 100%)    Parameters below as of 05 Jan 2023 04:00  Patient On (Oxygen Delivery Method): NC  O2 Flow (L/min): 2      Physical Exam:   General: NAD   Neurology: A&Ox2 (disoriented to place), left facial droop, LUE 4/5, LLE 5/5, clear speech, sensation intact  Eyes: bilateral pupils equal and reactive   ENT/Neck: Neck supple, trachea midline, No JVD   Respiratory: Clear bilaterally   CV: S1S2, no murmur        [x] Sinus rhythm   Abdominal: Soft, NT, ND +BS   Extremities: no peripheral edema noted, + peripheral pulses   Skin: No Rashes, Hematoma, Ecchymosis                            Assessment:  77M with a PMH T2DM and recently diagnosed CHF? who had recent admission for cardiac work up. Pt found to have CAD and aortic stenosis. At that time, pt tested positive for COVID, however grossly asymptomatic. Pt was d/c home for outpatient follow up with Dr Bowles. Now presents to ED with chest pain starting yesterday afternoon.     Stenosis of right carotid artery s/p Carotid endarterectomy 1/3/23  CAD s/p AVR (T) / CABG X 1 12/22/2022  Hemodynamic instability  Cardiac arrhythmias - Atrial fibrillation  Post op respiratory insufficiency  Acute blood loss anemia    Plan:   ***Neuro***  Code stroke called 1/4/23 at 8 AM. CT head showed Small right frontal and temporal opercular acute infarct. MR Head this morning, f/u read  q1 hours neuro checks.   Neuro following, appreciate recommendations  PT/OT    ***Cardiovascular***  Invasive hemodynamic monitoring, assess perfusion indices   SR / Hct 25.3%/ Lactate 0.6  PO Amiodarone for rate control (hx of pAF)  [x] Phenylephrine - 0.1mcgs overnight for SBP goal >140 for neuro perfusion, currently off, will f/u neuro with BP recs post MRI  [x]  ASA [x] Statin   VTE prophylaxis with Lovenox  s/p CEA - Vascular okay with ASA, lovenox, and permissive HTN    ***Pulmonary***  [x] 2L NC, wean to Room air as tolerated, SpO2 97%  Encourage incentive spirometry, continue pulse ox monitoring, follow ABGs     ***GI***  [x] Diet: NPO, NG tube placed (will have to remove for MRI), repeat S&S today (failed yesterday), reassess need for keofeed this afternoon for meds/nutrition  continue IV hydration while NPO  Bowel regimen with Miralax and Senna    ***Renal***  Continue to monitor I/Os, BUN/Creatinine.   Replete lytes PRN  Sherice present - DC for trial void    ***ID***  Nystatin for oral thrush     ***Endocrine***  [x]  DM2: HbA1c 9.4%           - [x] ISS [x] Lantus        Patient requires continuous monitoring with bedside rhythm monitoring, pulse oximetry monitoring, and continuous central venous and arterial pressure monitoring; and intermittent blood gas analysis. Care plan discussed with the ICU care team.   Patient remained critical, at risk for life threatening decompensation.    I have spent 45 minutes providing critical care management to this patient.    By signing my name below, I, Tashia Kwong, attest that this documentation has been prepared under the direction and in the presence of Karla Gallegos NP.  Electronically signed: Ruby Su, 01-05-23 @ 06:16    I, Karla Gallegos, personally performed the services described in this documentation. all medical record entries made by the scribe were at my direction and in my presence. I have reviewed the chart and agree that the record reflects my personal performance and is accurate and complete  Electronically signed: Karla Gallegos NP.

## 2023-01-05 NOTE — PROGRESS NOTE ADULT - ASSESSMENT
77M RH Setsw[place] speaking with a PMH T2DM, CHF, CAD, aortic stenosis, atrial fibrillation, COVID+ presented with chest pain on 12/1/22. Pt now s/p C 12/4 with prox LAD 70% stenosis. Pt s/p AVR and CABG on 12/22/22. Pt had R CEA for asymptomatic carotid stenosis on 1/3/23 at 19:00. Code stroke called on 1/4/23. LKN 1/3/23 at 19:00 prior to R CEA surgery. No deficits noted in PACU. Pt on aspirin and lovenox ppx. NIHSS: 11, preMRS: 0. Neuro exam w/ LHH, mild L hemiparesis, R gaze preference, moderate dysarthria. subseuqent exam with some improvement    used [id number], vascular attending exam with LUE drift, slurred speech and mild L facial.   MRI confirms R hemisphere infarct   A1c 9.4  LDL 11-  Impression:  R hemisphere infarct/frontal, possible large artery athero wtih A-A embolism,  possible related to procedure but unclear , can also be CE from Afib     Recommendations:  - BP parameters can be normotensive at this piont.  if there is worsening in symptoms with drop in BP would raise BP target   - monitor on telemetry  - can continue ASA 81 mg PO daily and DVT ppx for now;  consider starting AC with heparin drip 1/6 with no bolus if no objection from vascular sx.  once on AC no need for ASA from stroke standpoint   - start atorvastatin 80mg PO daily (titrate to LDL < 70)   - stroke risk factor modification and counseling   - NPO until bedside dysphagia screen  - PT/OT/S&S/SW consults  - spoke with mihaela at bedside (granddaughter)   - Patient can follow up with Dr. Riki Hernandez after discharge. Please instruct the patient to call [phone] to schedule an appointment within the next 2-3 days. Office is located at [address] [facility], [place] [address].     Riki Hernandez MD  Vascular Neurology  Office: [phone]

## 2023-01-05 NOTE — CHART NOTE - NSCHARTNOTEFT_GEN_A_CORE
*full note to follow     Recommendations:  1. Continue NPO, with non-oral nutrition/hydration/medications.   2. FEES  3. Good oral care  4. Ice chips sparingly throughout the day     Odalys Davies CCC-SLP pgr #[phone] 77M RH Syri[place] speaking with a PMH T2DM, CHF, CAD, aortic stenosis, atrial fibrillation, COVID+ presented with chest pain on 12/1/22. Pt now s/p [facility] 12/4 with prox LAD 70% stenosis. Pt s/p AVR and CABG on 12/22/22. Pt had R CEA for asymptomatic carotid stenosis on 1/3/23 at 19:00. Code stroke called on 1/4/23. LKN 1/3/23 at 19:00 prior to R CEA surgery. No deficits noted in PACU. Pt on aspirin and lovenox ppx. NIHSS: 11, preMRS: 0. Neuro exam w/ LHH, mild L hemiparesis, R gaze preference, moderate dysarthria. Impression: R frontal infarct seen on CTH. Mechanism likely post operative complication from R CEA.     Pt seen for initial bedside swallow evaluation on 1/4 with recommendations for NPO, with non-oral nutrition/hydration/medications.     Pt seen today for swallow re-evaluation. Pt encountered in bed, awake/alert, on 2L NC. Daughter at bedside.  utilized. Pt A&Ox3, improving speech intelligibility. Remains with slight left facial weakness. Pt wants to eat and drink. Reports occasional cough at baseline though not appreciated during evaluation. Oral cavity clear and clean. Pt provided with PO trials of puree, moderately thick liquids via tsp/ cup, and thin liquids via tsp. Swallow function characterized by  adequate bolus acceptance/ transfer, suspected reduced hyolaryngeal excursion, slight throat clearing post-swallow with liquids (thick and thin) and delayed coughing post-swallow with thick puree, suggestive of laryngeal penetration/aspiration. Dysphagia plan of care discussed with pt/ daughter. Pt left in NAD.     Impressions: Pt continues to present with clinical signs of an oropharyngeal dysphagia. Would proceed with instrumental exam to further assess swallow function.     Recommendations:  1. Continue NPO, with non-oral nutrition/hydration/medications.   2. FEES  3. Good oral care  4. Ice chips sparingly throughout the day     Odalys Davies CCC-SLP pgr #[phone] 77M RH Icelandic speaking with a PMH T2DM, CHF, CAD, aortic stenosis, atrial fibrillation, COVID+ presented with chest pain on 12/1/22. Pt now s/p [facility] 12/4 with prox LAD 70% stenosis. Pt s/p AVR and CABG on 12/22/22. Pt had R CEA for asymptomatic carotid stenosis on 1/3/23 at 19:00. Code stroke called on 1/4/23. LKN 1/3/23 at 19:00 prior to R CEA surgery. No deficits noted in PACU. Pt on aspirin and lovenox ppx. NIHSS: 11, preMRS: 0. Neuro exam w/ LHH, mild L hemiparesis, R gaze preference, moderate dysarthria. Impression: R frontal infarct seen on CTH. Mechanism likely post operative complication from R CEA.     Pt seen for initial bedside swallow evaluation on 1/4 with recommendations for NPO, with non-oral nutrition/hydration/medications.     Pt seen today for swallow re-evaluation. Pt encountered in bed, awake/alert, on 2L NC. Daughter at bedside.  utilized. Pt A&Ox3, improving speech intelligibility. Remains with slight left facial weakness. Pt wants to eat and drink. Reports occasional cough at baseline though not appreciated during evaluation. Oral cavity clear and clean. Pt provided with PO trials of puree, moderately thick liquids via tsp/ cup, and thin liquids via tsp. Swallow function characterized by  adequate bolus acceptance/ transfer, suspected reduced hyolaryngeal excursion, slight throat clearing post-swallow with liquids (thick and thin) and delayed coughing post-swallow with thick puree, suggestive of laryngeal penetration/aspiration. Dysphagia plan of care discussed with pt/ daughter. Pt left in NAD.     Impressions: Pt continues to present with clinical signs of an oropharyngeal dysphagia. Would proceed with instrumental exam to further assess swallow function.     Recommendations:  1. Continue NPO, with non-oral nutrition/hydration/medications.   2. FEES  3. Good oral care  4. Ice chips sparingly throughout the day     Recs discussed with ASMITA Blood and NICOLASA Anderson.     Odalys Davies, [facility]-SLP pgr #[phone]

## 2023-01-05 NOTE — OCCUPATIONAL THERAPY INITIAL EVALUATION ADULT - DIAGNOSIS, OT EVAL
Pt presents with decreased strength, endurance, balance, and coordination, all impacting ability to perform ADLs and functional mobility.
Pt presents with decreased strength, endurance, balance, and coordination, all impacting ability to perform ADLs and functional mobility.

## 2023-01-05 NOTE — OCCUPATIONAL THERAPY INITIAL EVALUATION ADULT - RANGE OF MOTION EXAMINATION, UPPER EXTREMITY
UEs tested within sternal precaution limits/bilateral UE Active ROM was WFL  (within functional limits)
UEs tested within sternal precaution limits/bilateral UE Active ROM was WFL  (within functional limits)

## 2023-01-05 NOTE — OCCUPATIONAL THERAPY INITIAL EVALUATION ADULT - IMPAIRED TRANSFERS: SIT/STAND, REHAB EVAL
impaired balance/impaired postural control/decreased strength
impaired balance/impaired coordination/impaired postural control/decreased strength

## 2023-01-05 NOTE — OCCUPATIONAL THERAPY INITIAL EVALUATION ADULT - PRECAUTIONS/LIMITATIONS, REHAB EVAL
cardiac precautions/fall precautions/sternal precautions/surgical precautions
cardiac precautions/fall precautions/sternal precautions/surgical precautions

## 2023-01-05 NOTE — PROGRESS NOTE ADULT - SUBJECTIVE AND OBJECTIVE BOX
VASCULAR SURGERY DAILY PROGRESS NOTE:     SUBJECTIVE/ROS: Patient seen and examined at bedside during morning rounds. Patient transferred to CTU following CODE STROKE on floor 1/4/23. Patient now AAOx4, but with continued L sided facial and LUE weakness. Patient states he is feeling better than yesterday with subjective improvement in left-sided strength.    OBJECTIVE:    MEDICATIONS  (STANDING):  aMIOdarone    Tablet   Oral   aMIOdarone    Tablet 400 milliGRAM(s) Oral every 8 hours  aMIOdarone    Tablet 200 milliGRAM(s) Oral daily  aspirin  chewable 81 milliGRAM(s) Oral daily  atorvastatin 80 milliGRAM(s) Oral at bedtime  bisacodyl Suppository 10 milliGRAM(s) Rectal once  dextrose 5% + sodium chloride 0.45%. 1000 milliLiter(s) (50 mL/Hr) IV Continuous <Continuous>  enoxaparin Injectable 40 milliGRAM(s) SubCutaneous every 24 hours  insulin glargine Injectable (LANTUS) 10 Unit(s) SubCutaneous at bedtime  insulin lispro (ADMELOG) corrective regimen sliding scale   SubCutaneous every 6 hours  nystatin    Suspension 767638 Unit(s) Oral every 6 hours  polyethylene glycol 3350 17 Gram(s) Oral daily  senna 2 Tablet(s) Oral at bedtime  sodium chloride 0.9% lock flush 3 milliLiter(s) IV Push every 8 hours    MEDICATIONS  (PRN):  acetaminophen     Tablet .. 650 milliGRAM(s) Oral every 6 hours PRN Mild Pain (1 - 3)    ICU Vital Signs Last 24 Hrs  T(C): 36.7 (05 Jan 2023 04:00), Max: 36.8 (05 Jan 2023 00:00)  T(F): 98 (05 Jan 2023 04:00), Max: 98.2 (05 Jan 2023 00:00)  HR: 66 (05 Jan 2023 07:00) (60 - 87)  BP: 140/65 (04 Jan 2023 22:15) (122/64 - 174/76)  BP(mean): 94 (04 Jan 2023 22:15) (84 - 109)  ABP: 147/42 (05 Jan 2023 07:00) (137/43 - 167/52)  ABP(mean): 69 (05 Jan 2023 07:00) (64 - 86)  RR: 17 (05 Jan 2023 07:00) (12 - 35)  SpO2: 100% (05 Jan 2023 07:00) (93% - 100%)    O2 Parameters below as of 05 Jan 2023 04:00  Patient On (Oxygen Delivery Method): room air  O2 Flow (L/min): 2    I&O's Detail  04 Jan 2023 07:01  -  05 Jan 2023 07:00  IN:    Albumin 5%  - 250 mL: 250 mL    dextrose 5% + sodium chloride 0.45%: 550 mL    Enteral Tube Flush: 120 mL    IV PiggyBack: 300 mL    Phenylephrine: 161.3 mL    sodium chloride 0.9%: 140 mL  Total IN: 1521.3 mL  OUT:    Indwelling Catheter - Urethral (mL): 2905 mL  Total OUT: 2905 mL  Total NET: -1383.7 mL    Physical Exam:  General: NAD, resting comfortably in bed  HEENT: Incision c/d/i, no hematoma, +L facial droop, mild dysarthria, +L gaze palsy  Pulmonary: Nonlabored breathing, no respiratory distress  Abdominal: soft, nontender, nondistended  Extremities: WWP, RUE: NVI, SILT, LUE hand/proximal arm weakness, slight LLE weakness on leg raise    LABS:             8.0    6.62  )-----------( 207      ( 05 Jan 2023 00:46 )             25.0     136  |  100  |  14  ----------------------------<  119<H>  3.9   |  24  |  0.88    Ca    9.3      05 Jan 2023 00:46  Phos  4.3     01-05  Mg     2.0     01-05    TPro  6.8  /  Alb  3.7  /  TBili  1.5<H>  /  DBili  x   /  AST  13  /  ALT  17  /  AlkPhos  92  01-05    PT/INR - ( 05 Jan 2023 00:46 )   PT: 16.3 sec;   INR: 1.41 ratio    PTT - ( 05 Jan 2023 00:46 )  PTT:30.9 sec    Imaging:    ACC: [id number] EXAM:  CT ANGIO BRAIN (W)AW IC                        ACC: [id number] EXAM:  CT ANGIO NECK (W)AW IC                        ACC: [id number] EXAM:  CT BRAIN                        ACC: [id number] EXAM:  CT PERFUSION W MAPS IC                          PROCEDURE DATE:  01/04/2023      INTERPRETATION:  CLINICAL INDICATION: One day postop right CEA, code   stroke    5mm axial sections of the brain were obtained from base to vertex,   without the intravenous administration of contrast material. Coronal and   sagittal computer generated reconstructed views are available.    No prior brain imaging is available for comparison.    The fourth, third and lateral ventricles are normal size and position.   There is no hemorrhage, mass or shift of the midline structures. There is   lucency in the right frontal and temporal operculum consistent with a   recent infarct in a small portion of the right middle cerebral artery   territory. There is local sulcal effacement and slight mass effect on the   right lateral ventricle. Small vessel white matter ischemic changes are   noted. There is a prominent cisterna magna which is a normal variant.   Bone window examination is unremarkable.    CT perfusion:    After the intravenous administration of Omnipaque 350 serial thin   sections were obtained through the brain for the purposes of evaluating   CT perfusion. Raw data was sent to the rapid ischemia view software for   postprocessing.    There is no core infarct despite the presence of lucency on CT which may   suggest over 24 hours. There is delayed mean transit time of 47 mL in the   region of the visualized infarct.    CBF<30% volume; 0 ml  Tmax> 6.0s volume: 47 ml  Mismatch volume: 47 ml  Mismatch ratio: infinite    CTA head and neck:    After the intravenous power injection of 70 cc of Omnipaque 350 using a   bolus kendall timing run  serial thin sections were obtained through the   neck from the thoracic inlet through the intracranial circulation   centered at the [id number] on a  multislice CT scanner reformatted   with coronal and sagittal 2 D-MIP projections, including 3 D   reconstructions using a separate 3D [employer] software workstation.A total   of  120 cc of Omnipaque were intravenously injected.  0 cc were discarded.    There is calcification along the aortic arch and there has been a   previous sternotomy. The origins of the carotid and vertebral arteries   are normal, the left vertebral artery is dominant. There is calcification   at the left carotid bifurcation without significant stenosis. There is   been recent surgery with a right-sided carotid endarterectomy with a   patulous appearance of the distal right common and proximal right   internal carotid artery. The endarterectomy graft is patent.    The distal vertebral arteries are well identified as are the   posterior-inferior cerebellar arteries bilaterally. There is distal   vertebral V4 calcification bilaterally. The region of the vertebral   basilar junction is normal. The basilar artery is normal. The posterior   cerebral and superior cerebellar arteries are normal.    Evaluation of the carotid arteries demonstrate normal appearance to the   distal cervical, petrous cavernous and supraclinoid internal carotid   arteries. The anterior cerebral arteries anterior communicating artery   and middle cerebral arteries are visualized. 10 is not excluded distal   middle cerebral artery occlusion. There is no large vessel occlusion.    Postoperative changes in the right carotid space are identified with soft   tissue and air. There is no hematoma.    The normal intracranial venous circulation is identified. The left   transverse sinus is dominant. The superior sagittal sinus, internal   cerebral veins, vein of Pedro Luis, straight sinus, transverse sinuses,   sigmoid sinuses and internal jugular veins are normal. Cortical veins are   normal.    IMPRESSION: Small right frontal and temporal opercular acute infarct   local sulcal effacement and mass effect. No hemorrhage. CTA of the neck   demonstrates postoperative changes at the right carotid bifurcation with   a patent endarterectomy graft. No large vessel occlusion intracranially   although a small distal branch occlusion is not excluded.

## 2023-01-05 NOTE — PROGRESS NOTE ADULT - SUBJECTIVE AND OBJECTIVE BOX
seen earlier today     Chief Complaint: Type 2 Diabetes Mellitus     INTERVAL HX: bg at goal , remains npo ,  Rand ID [id number] pt reports he is very hungry & asking when he can eat as he hasnt eaten in 3 days, counseled pt and daughter at bedside awaiting speech and swallow test due to risk of aspiration after stroke pt verbalized understanding- counseled pt will add premeal insulin once on diet if he is starting a diet      Review of Systems:  General: As above  Cardiovascular: c/o chest discomfort 2/2 coughing - notified primary team on floor   Respiratory: No SOB  GI: No nausea, vomiting  Endocrine: no  S&Sx of hypoglycemia    Allergies    No Known Allergies    Intolerances      MEDICATIONS  (STANDING):  aMIOdarone    Tablet 400 milliGRAM(s) Oral every 8 hours  aMIOdarone    Tablet 200 milliGRAM(s) Oral daily  aMIOdarone    Tablet   Oral   aspirin  chewable 81 milliGRAM(s) Oral daily  atorvastatin 80 milliGRAM(s) Oral at bedtime  bisacodyl Suppository 10 milliGRAM(s) Rectal once  dextrose 5% + sodium chloride 0.45%. 1000 milliLiter(s) (50 mL/Hr) IV Continuous <Continuous>  enoxaparin Injectable 40 milliGRAM(s) SubCutaneous every 24 hours  insulin glargine Injectable (LANTUS) 10 Unit(s) SubCutaneous at bedtime  insulin lispro (ADMELOG) corrective regimen sliding scale   SubCutaneous every 6 hours  nystatin    Suspension 051433 Unit(s) Oral every 6 hours  polyethylene glycol 3350 17 Gram(s) Oral daily  senna 2 Tablet(s) Oral at bedtime  sodium chloride 0.9% lock flush 3 milliLiter(s) IV Push every 8 hours        atorvastatin   80 milliGRAM(s) Oral (01-04-23 @ 21:27)    insulin glargine Injectable (LANTUS)   10 Unit(s) SubCutaneous (01-04-23 @ 21:27)        PHYSICAL EXAM:  VITALS: T(C): 36.5 (01-05-23 @ 12:00)  T(F): 97.7 (01-05-23 @ 12:00), Max: 98.2 (01-05-23 @ 00:00)  HR: 81 (01-05-23 @ 14:00) (60 - 84)  BP: 142/70 (01-05-23 @ 14:00) (122/64 - 174/76)  RR:  (13 - 35)  SpO2:  (93% - 100%)  Wt(kg): --  GENERAL: male laying in bed  in NAD right neck dressing cdi, msi skylar   Respiratory: Respirations unlabored   Extremities: Warm, no edema  NEURO: Alert , appropriate     LABS:  POCT Blood Glucose.: 139 mg/dL (01-05-23 @ 11:35)  POCT Blood Glucose.: 149 mg/dL (01-05-23 @ 06:31)  POCT Blood Glucose.: 132 mg/dL (01-04-23 @ 23:57)  POCT Blood Glucose.: 120 mg/dL (01-04-23 @ 21:10)  POCT Blood Glucose.: 156 mg/dL (01-04-23 @ 11:20)  POCT Blood Glucose.: 174 mg/dL (01-04-23 @ 08:13)  POCT Blood Glucose.: 200 mg/dL (01-04-23 @ 07:18)  POCT Blood Glucose.: 176 mg/dL (01-04-23 @ 00:43)  POCT Blood Glucose.: 165 mg/dL (01-03-23 @ 22:52)  POCT Blood Glucose.: 133 mg/dL (01-03-23 @ 11:13)  POCT Blood Glucose.: 132 mg/dL (01-03-23 @ 07:28)  POCT Blood Glucose.: 117 mg/dL (01-02-23 @ 21:33)  POCT Blood Glucose.: 123 mg/dL (01-02-23 @ 16:27)                          8.0    6.62  )-----------( 207      ( 05 Jan 2023 00:46 )             25.0     01-05    136  |  100  |  14  ----------------------------<  119<H>  3.9   |  24  |  0.88    Ca    9.3      05 Jan 2023 00:46  Phos  4.3     01-05  Mg     2.0     01-05    TPro  6.8  /  Alb  3.7  /  TBili  1.5<H>  /  DBili  x   /  AST  13  /  ALT  17  /  AlkPhos  92  01-05    eGFR: 89 mL/min/1.73m2 (05 Jan 2023 00:46)    12-02 Chol 185 Direct LDL -- LDL calculated 110<H> HDL 34<L> Trig 208<H>  Thyroid Function Tests:  12-07 @ 13:19 TSH -- FreeT4 -- T3 100 Anti TPO -- Anti Thyroglobulin Ab -- TSI --      A1C with Estimated Average Glucose Result: 9.4 % (12-02-22 @ 05:54)    Estimated Average Glucose: 223 mg/dL (12-02-22 @ 05:54)        Diet, NPO (01-04-23 @ 09:42) [Active]

## 2023-01-05 NOTE — OCCUPATIONAL THERAPY INITIAL EVALUATION ADULT - BED MOBILITY TRAINING, PT EVAL
GOAL: Patient will perform bed mobility independently in 4 weeks
GOAL: Patient will perform bed mobility independently in 4 weeks

## 2023-01-06 LAB
ALBUMIN SERPL ELPH-MCNC: 3.7 G/DL — SIGNIFICANT CHANGE UP (ref 3.3–5)
ALP SERPL-CCNC: 94 U/L — SIGNIFICANT CHANGE UP (ref 40–120)
ALT FLD-CCNC: 17 U/L — SIGNIFICANT CHANGE UP (ref 10–45)
ANION GAP SERPL CALC-SCNC: 12 MMOL/L — SIGNIFICANT CHANGE UP (ref 5–17)
APTT BLD: 27.4 SEC — LOW (ref 27.5–35.5)
APTT BLD: 34.7 SEC — SIGNIFICANT CHANGE UP (ref 27.5–35.5)
AST SERPL-CCNC: 14 U/L — SIGNIFICANT CHANGE UP (ref 10–40)
BILIRUB SERPL-MCNC: 1.4 MG/DL — HIGH (ref 0.2–1.2)
BUN SERPL-MCNC: 15 MG/DL — SIGNIFICANT CHANGE UP (ref 7–23)
CALCIUM SERPL-MCNC: 9.4 MG/DL — SIGNIFICANT CHANGE UP (ref 8.4–10.5)
CHLORIDE SERPL-SCNC: 100 MMOL/L — SIGNIFICANT CHANGE UP (ref 96–108)
CO2 SERPL-SCNC: 23 MMOL/L — SIGNIFICANT CHANGE UP (ref 22–31)
CREAT SERPL-MCNC: 0.9 MG/DL — SIGNIFICANT CHANGE UP (ref 0.5–1.3)
EGFR: 88 ML/MIN/1.73M2 — SIGNIFICANT CHANGE UP
GLUCOSE BLDC GLUCOMTR-MCNC: 145 MG/DL — HIGH (ref 70–99)
GLUCOSE BLDC GLUCOMTR-MCNC: 169 MG/DL — HIGH (ref 70–99)
GLUCOSE BLDC GLUCOMTR-MCNC: 200 MG/DL — HIGH (ref 70–99)
GLUCOSE BLDC GLUCOMTR-MCNC: 202 MG/DL — HIGH (ref 70–99)
GLUCOSE BLDC GLUCOMTR-MCNC: 212 MG/DL — HIGH (ref 70–99)
GLUCOSE SERPL-MCNC: 151 MG/DL — HIGH (ref 70–99)
HCT VFR BLD CALC: 30.7 % — LOW (ref 39–50)
HGB BLD-MCNC: 9.8 G/DL — LOW (ref 13–17)
INR BLD: 1.28 RATIO — HIGH (ref 0.88–1.16)
MAGNESIUM SERPL-MCNC: 2 MG/DL — SIGNIFICANT CHANGE UP (ref 1.6–2.6)
MCHC RBC-ENTMCNC: 29.3 PG — SIGNIFICANT CHANGE UP (ref 27–34)
MCHC RBC-ENTMCNC: 31.9 GM/DL — LOW (ref 32–36)
MCV RBC AUTO: 91.9 FL — SIGNIFICANT CHANGE UP (ref 80–100)
NRBC # BLD: 0 /100 WBCS — SIGNIFICANT CHANGE UP (ref 0–0)
PHOSPHATE SERPL-MCNC: 4.2 MG/DL — SIGNIFICANT CHANGE UP (ref 2.5–4.5)
PLATELET # BLD AUTO: 251 K/UL — SIGNIFICANT CHANGE UP (ref 150–400)
POTASSIUM SERPL-MCNC: 4 MMOL/L — SIGNIFICANT CHANGE UP (ref 3.5–5.3)
POTASSIUM SERPL-SCNC: 4 MMOL/L — SIGNIFICANT CHANGE UP (ref 3.5–5.3)
PROT SERPL-MCNC: 7.3 G/DL — SIGNIFICANT CHANGE UP (ref 6–8.3)
PROTHROM AB SERPL-ACNC: 14.9 SEC — HIGH (ref 10.5–13.4)
RBC # BLD: 3.34 M/UL — LOW (ref 4.2–5.8)
RBC # FLD: 13.7 % — SIGNIFICANT CHANGE UP (ref 10.3–14.5)
SODIUM SERPL-SCNC: 135 MMOL/L — SIGNIFICANT CHANGE UP (ref 135–145)
UFH PPP CHRO-ACNC: 0.03 IU/ML — LOW (ref 0.3–0.7)
WBC # BLD: 6.36 K/UL — SIGNIFICANT CHANGE UP (ref 3.8–10.5)
WBC # FLD AUTO: 6.36 K/UL — SIGNIFICANT CHANGE UP (ref 3.8–10.5)

## 2023-01-06 PROCEDURE — 99291 CRITICAL CARE FIRST HOUR: CPT | Mod: 24

## 2023-01-06 PROCEDURE — 71045 X-RAY EXAM CHEST 1 VIEW: CPT | Mod: 26

## 2023-01-06 PROCEDURE — 99232 SBSQ HOSP IP/OBS MODERATE 35: CPT

## 2023-01-06 RX ORDER — INSULIN LISPRO 100/ML
2 VIAL (ML) SUBCUTANEOUS
Refills: 0 | Status: DISCONTINUED | OUTPATIENT
Start: 2023-01-06 | End: 2023-01-07

## 2023-01-06 RX ORDER — PANTOPRAZOLE SODIUM 20 MG/1
40 TABLET, DELAYED RELEASE ORAL DAILY
Refills: 0 | Status: DISCONTINUED | OUTPATIENT
Start: 2023-01-06 | End: 2023-01-06

## 2023-01-06 RX ORDER — PANTOPRAZOLE SODIUM 20 MG/1
40 TABLET, DELAYED RELEASE ORAL
Refills: 0 | Status: DISCONTINUED | OUTPATIENT
Start: 2023-01-06 | End: 2023-01-11

## 2023-01-06 RX ORDER — METOPROLOL TARTRATE 50 MG
12.5 TABLET ORAL ONCE
Refills: 0 | Status: COMPLETED | OUTPATIENT
Start: 2023-01-06 | End: 2023-01-06

## 2023-01-06 RX ORDER — INSULIN GLARGINE 100 [IU]/ML
12 INJECTION, SOLUTION SUBCUTANEOUS AT BEDTIME
Refills: 0 | Status: DISCONTINUED | OUTPATIENT
Start: 2023-01-06 | End: 2023-01-11

## 2023-01-06 RX ORDER — HEPARIN SODIUM 5000 [USP'U]/ML
750 INJECTION INTRAVENOUS; SUBCUTANEOUS
Qty: 25000 | Refills: 0 | Status: DISCONTINUED | OUTPATIENT
Start: 2023-01-06 | End: 2023-01-10

## 2023-01-06 RX ORDER — INSULIN LISPRO 100/ML
VIAL (ML) SUBCUTANEOUS
Refills: 0 | Status: DISCONTINUED | OUTPATIENT
Start: 2023-01-06 | End: 2023-01-06

## 2023-01-06 RX ORDER — MAGNESIUM SULFATE 500 MG/ML
1 VIAL (ML) INJECTION ONCE
Refills: 0 | Status: COMPLETED | OUTPATIENT
Start: 2023-01-06 | End: 2023-01-06

## 2023-01-06 RX ORDER — INSULIN LISPRO 100/ML
VIAL (ML) SUBCUTANEOUS
Refills: 0 | Status: DISCONTINUED | OUTPATIENT
Start: 2023-01-06 | End: 2023-01-11

## 2023-01-06 RX ORDER — METOPROLOL TARTRATE 50 MG
12.5 TABLET ORAL EVERY 12 HOURS
Refills: 0 | Status: DISCONTINUED | OUTPATIENT
Start: 2023-01-06 | End: 2023-01-11

## 2023-01-06 RX ADMIN — Medication 500000 UNIT(S): at 17:53

## 2023-01-06 RX ADMIN — Medication 2: at 05:21

## 2023-01-06 RX ADMIN — SODIUM CHLORIDE 3 MILLILITER(S): 9 INJECTION INTRAMUSCULAR; INTRAVENOUS; SUBCUTANEOUS at 21:37

## 2023-01-06 RX ADMIN — Medication 1: at 16:25

## 2023-01-06 RX ADMIN — Medication 2: at 11:11

## 2023-01-06 RX ADMIN — Medication 650 MILLIGRAM(S): at 02:24

## 2023-01-06 RX ADMIN — Medication 650 MILLIGRAM(S): at 22:01

## 2023-01-06 RX ADMIN — PANTOPRAZOLE SODIUM 40 MILLIGRAM(S): 20 TABLET, DELAYED RELEASE ORAL at 11:05

## 2023-01-06 RX ADMIN — AMIODARONE HYDROCHLORIDE 200 MILLIGRAM(S): 400 TABLET ORAL at 05:22

## 2023-01-06 RX ADMIN — SODIUM CHLORIDE 3 MILLILITER(S): 9 INJECTION INTRAMUSCULAR; INTRAVENOUS; SUBCUTANEOUS at 14:00

## 2023-01-06 RX ADMIN — ATORVASTATIN CALCIUM 80 MILLIGRAM(S): 80 TABLET, FILM COATED ORAL at 22:01

## 2023-01-06 RX ADMIN — Medication 12.5 MILLIGRAM(S): at 17:53

## 2023-01-06 RX ADMIN — HEPARIN SODIUM 7.5 UNIT(S)/HR: 5000 INJECTION INTRAVENOUS; SUBCUTANEOUS at 17:52

## 2023-01-06 RX ADMIN — INSULIN GLARGINE 12 UNIT(S): 100 INJECTION, SOLUTION SUBCUTANEOUS at 22:01

## 2023-01-06 RX ADMIN — INSULIN GLARGINE 10 UNIT(S): 100 INJECTION, SOLUTION SUBCUTANEOUS at 01:24

## 2023-01-06 RX ADMIN — Medication 650 MILLIGRAM(S): at 23:01

## 2023-01-06 RX ADMIN — Medication 650 MILLIGRAM(S): at 01:25

## 2023-01-06 RX ADMIN — SODIUM CHLORIDE 3 MILLILITER(S): 9 INJECTION INTRAMUSCULAR; INTRAVENOUS; SUBCUTANEOUS at 05:30

## 2023-01-06 RX ADMIN — Medication 2 UNIT(S): at 16:25

## 2023-01-06 RX ADMIN — Medication 12.5 MILLIGRAM(S): at 09:05

## 2023-01-06 RX ADMIN — POLYETHYLENE GLYCOL 3350 17 GRAM(S): 17 POWDER, FOR SOLUTION ORAL at 11:05

## 2023-01-06 RX ADMIN — Medication 81 MILLIGRAM(S): at 11:05

## 2023-01-06 RX ADMIN — Medication 100 GRAM(S): at 03:43

## 2023-01-06 NOTE — SWALLOW FEES ASSESSMENT ADULT - LARYNGEAL PENETRATION DURING SWALLOW - COUGH
With consecutive straw sips, there is deep laryngeal penetration to the vocal cords. Spontaneous cough does not completely clear penetrated material.

## 2023-01-06 NOTE — SWALLOW FEES ASSESSMENT ADULT - ASPIRATION PRECAUTIONS
Monitor for s/s aspiration/laryngeal penetration. If noted:  D/C p.o. intake, provide non-oral nutrition/hydration/meds, and contact this service @ [phone]

## 2023-01-06 NOTE — PROGRESS NOTE ADULT - SUBJECTIVE AND OBJECTIVE BOX
SURGERY DAILY PROGRESS NOTE:       SUBJECTIVE/ROS: Patient seen and evaluated on AM rounds. Patient states he is feeling better than yesterday with subjective improvement in left-sided strength.           OBJECTIVE:    Vital Signs Last 24 Hrs  T(C): 36.8 (2023 08:00), Max: 36.8 (2023 08:00)  T(F): 98.2 (2023 08:00), Max: 98.2 (2023 08:00)  HR: 83 (2023 08:00) (69 - 138)  BP: 122/73 (2023 08:00) (104/61 - 142/70)  BP(mean): 93 (2023 08:00) (72 - 108)  RR: 23 (2023 08:00) (13 - 31)  SpO2: 98% (2023 08:00) (94% - 100%)    Parameters below as of 2023 08:00  Patient On (Oxygen Delivery Method): room air      I&O's Detail    2023 07:01  -  2023 07:00  --------------------------------------------------------  IN:    Dexmedetomidine: 5.7 mL    dextrose 5% + sodium chloride 0.45%: 400 mL    IV PiggyBack: 100 mL    Vital High Protein: 480 mL  Total IN: 985.7 mL    OUT:    Indwelling Catheter - Urethral (mL): 765 mL    Voided (mL): 1275 mL  Total OUT: 2040 mL    Total NET: -1054.3 mL        Daily     Daily Weight in k (2023 00:00)  MEDICATIONS  (STANDING):  aMIOdarone    Tablet   Oral   aMIOdarone    Tablet 400 milliGRAM(s) Oral every 8 hours  aMIOdarone    Tablet 200 milliGRAM(s) Oral daily  aspirin  chewable 81 milliGRAM(s) Oral daily  atorvastatin 80 milliGRAM(s) Oral at bedtime  bisacodyl Suppository 10 milliGRAM(s) Rectal once  enoxaparin Injectable 40 milliGRAM(s) SubCutaneous every 24 hours  insulin glargine Injectable (LANTUS) 10 Unit(s) SubCutaneous at bedtime  insulin lispro (ADMELOG) corrective regimen sliding scale   SubCutaneous every 6 hours  metoprolol tartrate 12.5 milliGRAM(s) Oral once  metoprolol tartrate 12.5 milliGRAM(s) Oral every 12 hours  nystatin    Suspension 126628 Unit(s) Oral every 6 hours  pantoprazole  Injectable 40 milliGRAM(s) IV Push daily  polyethylene glycol 3350 17 Gram(s) Oral daily  senna 2 Tablet(s) Oral at bedtime  sodium chloride 0.9% lock flush 3 milliLiter(s) IV Push every 8 hours    MEDICATIONS  (PRN):  acetaminophen     Tablet .. 650 milliGRAM(s) Oral every 6 hours PRN Mild Pain (1 - 3)      LABS:                        9.8    6.36  )-----------( 251      ( 2023 00:58 )             30.7     01-06    135  |  100  |  15  ----------------------------<  151<H>  4.0   |  23  |  0.90    Ca    9.4      2023 00:58  Phos  4.2     01-06  Mg     2.0     01-06    TPro  7.3  /  Alb  3.7  /  TBili  1.4<H>  /  DBili  x   /  AST  14  /  ALT  17  /  AlkPhos  94  01-06    PT/INR - ( 2023 00:58 )   PT: 14.9 sec;   INR: 1.28 ratio         PTT - ( 2023 00:58 )  PTT:27.4 sec        Physical Exam:  General: NAD, resting comfortably in bed  HEENT: Incision c/d/i, no hematoma, +L facial droop, mild dysarthria, +L gaze palsy  Pulmonary: Nonlabored breathing, no respiratory distress  Abdominal: soft, nontender, nondistended  Extremities: LUE still with decreased  strength but there is improvement

## 2023-01-06 NOTE — SWALLOW FEES ASSESSMENT ADULT - PRELIMINARY ENDOSCOPIC EXAMINATIONS
Watery edema of the R-arytenoid; Small hematoma on the R-vocal cord -> suspect related to intubation.

## 2023-01-06 NOTE — PROGRESS NOTE ADULT - PROBLEM SELECTOR PLAN 1
-test BG ac and hs and 2am every day for now  -Increase Lantus dose to 12 units for now  -Start Admelog 2 units ac meals. HOLD IF NOT EATING!  -C/w Admelog low correction scale  q6h   -Will follow and adjust insulin as needed  -contact endo team with any changes on PO status  Discharge:  Will be determined according to BG/insulin needs/PO intake at time of discharge.  Per SW pt has [employer] [facility] benefit- can discharge on basal bolus insulin pens + metformin 1000mg BID   final recs pending hospital course  Send Rx for glucometer, test strips, lancets, alcohol pads, and pen needles  At home, patient should check FSBG premeals and bedtime. Pt should call their doctor when FSBG <70 or above >400 and or consistently above 200s as changes in the regimen will have to be made.  Recommend outpatient routine dilated eye exam/ophthalmology f/u; podiatry referral  -Primary team should arrange medicine clinic follow up prior to discharge as pt is uninsured and visiting from [facility] & planning to go back once he recuperates from surgery. & the medicine clinic can address multiple medical concerns at once. phone: [phone]

## 2023-01-06 NOTE — SWALLOW FEES ASSESSMENT ADULT - SLP GENERAL OBSERVATIONS
Pt encountered OOB to chair, awake/alert, on room air. Setswana speaking,  utilized. Pt cooperative with assessment.

## 2023-01-06 NOTE — PROGRESS NOTE ADULT - SUBJECTIVE AND OBJECTIVE BOX
Neurology Progress Note    S: Patient seen and examined in CTICU. MRI done confirms infarct. some mild weakness noted but better ; sitting up in chair     Medication:  MEDICATIONS  (STANDING):  aMIOdarone    Tablet   Oral   aMIOdarone    Tablet 400 milliGRAM(s) Oral every 8 hours  aMIOdarone    Tablet 200 milliGRAM(s) Oral daily  aspirin  chewable 81 milliGRAM(s) Oral daily  atorvastatin 80 milliGRAM(s) Oral at bedtime  bisacodyl Suppository 10 milliGRAM(s) Rectal once  heparin  Infusion 750 Unit(s)/Hr (8.5 mL/Hr) IV Continuous <Continuous>  insulin glargine Injectable (LANTUS) 12 Unit(s) SubCutaneous at bedtime  insulin lispro (ADMELOG) corrective regimen sliding scale   SubCutaneous <User Schedule>  insulin lispro (ADMELOG) corrective regimen sliding scale   SubCutaneous three times a day before meals  insulin lispro Injectable (ADMELOG) 2 Unit(s) SubCutaneous three times a day before meals  metoprolol tartrate 12.5 milliGRAM(s) Oral every 12 hours  nystatin    Suspension 048080 Unit(s) Oral every 6 hours  pantoprazole    Tablet 40 milliGRAM(s) Oral before breakfast  polyethylene glycol 3350 17 Gram(s) Oral daily  senna 2 Tablet(s) Oral at bedtime  sodium chloride 0.9% lock flush 3 milliLiter(s) IV Push every 8 hours    MEDICATIONS  (PRN):  acetaminophen     Tablet .. 650 milliGRAM(s) Oral every 6 hours PRN Mild Pain (1 - 3)      Vitals:  Vital Signs Last 24 Hrs  T(C): 37.1 (06 Jan 2023 16:00), Max: 37.1 (06 Jan 2023 16:00)  T(F): 98.7 (06 Jan 2023 16:00), Max: 98.7 (06 Jan 2023 16:00)  HR: 82 (06 Jan 2023 16:00) (75 - 112)  BP: 115/70 (06 Jan 2023 16:00) (104/61 - 130/70)  BP(mean): 87 (06 Jan 2023 16:00) (73 - 93)  RR: 16 (06 Jan 2023 16:00) (13 - 25)  SpO2: 100% (06 Jan 2023 16:00) (97% - 100%)    Parameters below as of 06 Jan 2023 16:00  Patient On (Oxygen Delivery Method): room air        General Exam:   General Appearance: Appropriately dressed and in no acute distress       Head: Normocephalic, atraumatic and no dysmorphic features  Ear, Nose, and Throat: Moist mucous membranes  CVS: S1S2+  Resp: No SOB, no wheeze or rhonchi  Abd: soft NTND  Extremities: No edema, no cyanosis  Skin: No bruises, no rashes     Neurological Exam:  Mental Status: Awake, alert and oriented x 2.  Able to follow simple verbal commands. Able to name and repeat. fluent speech. No obvious aphasia but mild to mod dysarthria noted.   Cranial Nerves: PERRL, R gaze pref, L HHA , sensation V1-V3 intact,  no obvious facial asymmetry , equal elevation of palate, scm/trap 5/5, tongue is midline on protrusion. no obvious papilledema on fundoscopic exam. Hearing is grossly intact.   Motor: mild LUE nad LLE drift but significant improvement in strength.   Sensation: Intact to light touch and pinprick throughout. no right/left confusion. no extinction to tactile on DSS.    Reflexes: 1+ throughout at biceps, brachioradialis, triceps, patellars and ankles bilaterally and equal. No clonus. R toe and L toe were both downgoing.  Coordination: No dysmetria on FNF   Gait: deferred     I personally reviewed the below data/images/labs:    CBC Full  -  ( 06 Jan 2023 00:58 )  WBC Count : 6.36 K/uL  RBC Count : 3.34 M/uL  Hemoglobin : 9.8 g/dL  Hematocrit : 30.7 %  Platelet Count - Automated : 251 K/uL  Mean Cell Volume : 91.9 fl  Mean Cell Hemoglobin : 29.3 pg  Mean Cell Hemoglobin Concentration : 31.9 gm/dL  Auto Neutrophil # : x  Auto Lymphocyte # : x  Auto Monocyte # : x  Auto Eosinophil # : x  Auto Basophil # : x  Auto Neutrophil % : x  Auto Lymphocyte % : x  Auto Monocyte % : x  Auto Eosinophil % : x  Auto Basophil % : x    01-06    135  |  100  |  15  ----------------------------<  151<H>  4.0   |  23  |  0.90    Ca    9.4      06 Jan 2023 00:58  Phos  4.2     01-06  Mg     2.0     01-06    TPro  7.3  /  Alb  3.7  /  TBili  1.4<H>  /  DBili  x   /  AST  14  /  ALT  17  /  AlkPhos  94  01-06    < from: CT Head No Cont (01.04.23 @ 08:57) >  IMPRESSION: Small right frontal and temporal opercular acute infarct   local sulcal effacement and mass effect. No hemorrhage. CTA ofthe neck   demonstrates postoperative changes at the right carotid bifurcation with   a patent endarterectomy graft. No large vessel occlusion intracranially   although a small distal branch occlusion is not excluded.       < from: CT Angio/Perfusion Head w/ IV Cont (01.04.23 @ 08:59) >  CBF<30% volume; 0 ml  Tmax> 6.0s volume: 47 ml  Mismatch volume: 47 ml  Mismatch ratio: infinite  < from: MR Head No Cont (01.05.23 @ 10:04) >    ACC: [id number] EXAM:  MR BRAIN                          PROCEDURE DATE:  01/05/2023          INTERPRETATION:  MRI BRAIN WITHOUT AND WITH IV CONTRAST    INDICATION: CVA. Additional history per EMR: now s/p R CEA 1/3, post-op   course c/b CODE STROKE on POD1  1/4/23 patient found to have right frontal/temporal opercular acute   infarct  with local sulcal effacement and mass effect.    COMPARISON: CT/CTA/CTP head neck 1/4/2023    TECHNIQUE: Multisequential multiplanar MRI of the brain is performed   without the administration of intravenous gadolinium contrast.    FINDINGS:    Examination is slightly degraded due to patient motion.    Cortical/subcortical restricted diffusion (DWI hyperintense/ADC   hypointense signal) is centered within the right frontal opercular and   insular regions, with slight involvement of the right temporal operculum.   At least two additional punctate foci of restricted diffusion within the   right frontal centrum semiovale and lentiform nucleus (images 17-22,   series 3). Corresponding FLAIR hyperintensity is noted. The infarct   appears grossly similar compared to hypodensity on recent noncontrast CT.    No abnormal SWI signal loss to suggest hemorrhagic transformation of   infarct.    ADC values less than 620 on rapid postprocessed sequence in the region of   diffusion and about the, is consistent with acute core ischemic infarct   (volume 52 mL).    There is no signal mass effect or midline shift. The basal cisterns are   patent.  There is no hydrocephalus.  No extra-axial collection is seen.  Generalized parenchymal volume loss is noted.    Large retrocerebellar CSF intensity extra-axial space, likely reflects an   incidental arachnoid cyst. Mild mass effect on the adjacent cerebellum.   Scalloping of the adjacent calvarium and thin internal septation are   noted.    The corpus callosum, optic chiasm, pituitary gland and pineal gland are   normal in configuration.  No cerebellar tonsillar herniation/ectopia. No Chiari malformation.    The visualized orbits are unremarkable. Mild mucosal thickening within   the paranasal sinuses. Bilateral mastoid/middle ear cavities are clear.    Preserved T2 flow-voids along the dural venous sinuses.      IMPRESSION:    Acute right MCA territory infarct (centered within right frontal   operculum-insular regions). This is grossly stable compared to 1/4/2023   CT. No hemorrhagic transformation of infarct or midline shift. Consider   imaging follow-up as clinically indicated.      Findings were discussedwith covering provider (Karla Gallegos NP) via   telephone on 1/5/2023 and 10:33 AM with verbal read back    --- End of Report ---            SORAYA PEDRAZA MD; Attending Radiologist  This document has been electronically signed. Jan 5 2023 10:44AM    < end of copied text >

## 2023-01-06 NOTE — SWALLOW FEES ASSESSMENT ADULT - SLP PERTINENT HISTORY OF CURRENT PROBLEM
77M RH Urdu speaking with a PMH T2DM, CHF, CAD, aortic stenosis, atrial fibrillation, COVID+ presented with chest pain on 12/1/22. Pt now s/p C 12/4 with prox LAD 70% stenosis. Pt s/p AVR and CABG on 12/22/22. Pt had R CEA for asymptomatic carotid stenosis on 1/3/23 at 19:00. Code stroke called on 1/4/23. LKN 1/3/23 at 19:00 prior to R CEA surgery. No deficits noted in PACU. Pt on aspirin and lovenox ppx. NIHSS: 11, preMRS: 0. Neuro exam w/ LHH, mild L hemiparesis, R gaze preference, moderate dysarthria. Impression: R frontal infarct seen on CTH. Mechanism likely post operative complication from R CEA.

## 2023-01-06 NOTE — PROGRESS NOTE ADULT - SUBJECTIVE AND OBJECTIVE BOX
Patient seen and examined at the bedside.    Remained critically ill on continuous ICU monitoring.    OBJECTIVE:  Vital Signs Last 24 Hrs  T(C): 36.8 (06 Jan 2023 08:00), Max: 36.8 (06 Jan 2023 08:00)  T(F): 98.2 (06 Jan 2023 08:00), Max: 98.2 (06 Jan 2023 08:00)  HR: 81 (06 Jan 2023 09:00) (73 - 138)  BP: 126/57 (06 Jan 2023 09:00) (104/61 - 142/70)  BP(mean): 82 (06 Jan 2023 09:00) (72 - 108)  RR: 19 (06 Jan 2023 09:00) (13 - 31)  SpO2: 97% (06 Jan 2023 09:00) (94% - 100%)    Parameters below as of 06 Jan 2023 08:00  Patient On (Oxygen Delivery Method): room air          Physical Exam:   General: NAD   Neurology: A&Ox2 (disoriented to place), left facial droop, LUE 4/5, LLE 5/5, clear speech, sensation intact  Eyes: bilateral pupils equal and reactive   ENT/Neck: Neck supple, trachea midline, No JVD   Respiratory: Clear bilaterally   CV: S1S2, no murmur        [x] Sinus rhythm [x] TPM VVI 35   Abdominal: Soft, NT, ND +BS   Extremities: no peripheral edema noted, + peripheral pulses   Skin: No Rashes, Hematoma, Ecchymosis                                   Assessment:  77M with a PMH T2DM and recently diagnosed CHF? who had recent admission for cardiac work up. Pt found to have CAD and aortic stenosis. At that time, pt tested positive for COVID, however grossly asymptomatic. Pt was d/c home for outpatient follow up with Dr Bowles. Now presents to ED with chest pain starting yesterday afternoon.     Stenosis of right carotid artery s/p Carotid endarterectomy 1/3/23  CAD s/p AVR (T) / CABG X 1 12/22/2022  Hemodynamic instability  Cardiac arrhythmias - Atrial fibrillation  Post op respiratory insufficiency  Acute blood loss anemia        Plan:   ***Neuro***  Code stroke called 1/4/23 at 8 AM. CT head showed Small right frontal and temporal opercular acute infarct.   1/5/23 MRI showed acute right MCA territory infarct within right frontal operculum-insular regions  q1 hours neuro checks.   Neuro following, appreciate recommendations  PT/OT      ***Cardiovascular***  Invasive hemodynamic monitoring, assess perfusion indices   SR / Hct 30.7%/ Lactate 0.7  PO Amiodarone for rate control (hx of pAF)  Phenylephrine dc'ed   [x]  ASA [x] Statin   AC therapy with IV Heparin   Lopressor for BP management   Amiodarone for rate control   s/p CEA - Vascular okay with ASA, lovenox, and permissive HTN      ***Pulmonary***  Comfortable on room air  Encourage incentive spirometry, continue pulse ox monitoring, follow ABGs                    ***GI***  [x] Diet: Tolerating TF Vital High Protein , @ 60 with goal 75cc/hr.  Failed S&S on 1/5/23 / Plan for one today   Bowel regimen with Miralax and Senna      ***Renal***  Continue to monitor I/Os, BUN/Creatinine.   Replete lytes PRN  Smiley dc'ed yesterday       ***ID***  Nystatin for oral thrush     ***Endocrine***  [x]  DM2: HbA1c 9.4%           - [x] ISS [x] Lantus          Patient requires continuous monitoring with bedside rhythm monitoring, pulse oximetry monitoring, and continuous central venous and arterial pressure monitoring; and intermittent blood gas analysis. Care plan discussed with the ICU care team.   Patient remained critical, at risk for life threatening decompensation.    I have spent 30 minutes providing critical care management to this patient.    By signing my name below, I, Radha Malone, attest that this documentation has been prepared under the direction and in the presence of Reno Reyna NP,  Electronically signed:Ruby Arriaza, 01-06-23 @ 10:37    I, Reno Reyna NP,, personally performed the services described in this documentation. all medical record entries made by the jillibe were at my direction and in my presence. I have reviewed the chart and agree that the record reflects my personal performance and is accurate and complete  Electronically signed: Reno Reyna NP, Patient seen and examined at the bedside.    Remained critically ill on continuous ICU monitoring.    24 hr events:  - Plan to start heparin gtt for AC s/c AF   - Plan for FEES 1/6  - BB initiated     OBJECTIVE:  Vital Signs Last 24 Hrs  T(C): 36.8 (06 Jan 2023 08:00), Max: 36.8 (06 Jan 2023 08:00)  T(F): 98.2 (06 Jan 2023 08:00), Max: 98.2 (06 Jan 2023 08:00)  HR: 81 (06 Jan 2023 09:00) (73 - 138)  BP: 126/57 (06 Jan 2023 09:00) (104/61 - 142/70)  BP(mean): 82 (06 Jan 2023 09:00) (72 - 108)  RR: 19 (06 Jan 2023 09:00) (13 - 31)  SpO2: 97% (06 Jan 2023 09:00) (94% - 100%)    Parameters below as of 06 Jan 2023 08:00  Patient On (Oxygen Delivery Method): room air    Physical Exam:   General: NAD   Neurology: A&Ox3, left facial droop, LUE 5/5, LLE 5/5, clear speech, sensation intact  Eyes: bilateral pupils equal and reactive   ENT/Neck: Neck supple, trachea midline, No JVD   Respiratory: Clear bilaterally   CV: S1S2, no murmur        [x] Sinus rhythm  Abdominal: Soft, NT, ND +BS   Extremities: no peripheral edema noted, + peripheral pulses   Skin: No Rashes, Hematoma, Ecchymosis                                   Assessment:  77M with a PMH T2DM and recently diagnosed CHF? who had recent admission for cardiac work up. Pt found to have CAD and aortic stenosis. At that time, pt tested positive for COVID, however grossly asymptomatic. Pt was d/c home for outpatient follow up with Dr Bowles. Now presents to ED with chest pain starting yesterday afternoon.     Stenosis of right carotid artery s/p Carotid endarterectomy 1/3/23  CAD s/p AVR (T) / CABG X 1 12/22/2022  Hemodynamic instability  Cardiac arrhythmias - Atrial fibrillation  Post op respiratory insufficiency  Acute blood loss anemia        Plan:   ***Neuro***  Code stroke called 1/4/23 at 8 AM. CT head showed Small right frontal and temporal opercular acute infarct.   1/5/23 MRI showed acute right MCA territory infarct within right frontal operculum-insular regions  q4 hours neuro checks.   Neuro following, appreciate recommendations  PT/OT      ***Cardiovascular***  Invasive hemodynamic monitoring, assess perfusion indices   SR / Hct 30.7%/ Lactate 0.7  PO Amiodarone for rate control (hx of pAF)  Phenylephrine dc'ed   [x]  ASA [x] Statin   AC therapy with Heparin gtt   Lopressor initiated for BP management / AF prophylaxis    Amiodarone for rate control (hx pAF)  s/p CEA - Vascular okay with ASA and permissive HTN      ***Pulmonary***  Comfortable on room air  Encourage incentive spirometry, continue pulse ox monitoring, follow ABGs                  ***GI***  [x] Diet: Tolerating TF Vital High Protein , @ 60 with goal 75cc/hr.  FEES planned for today  Bowel regimen with Miralax and Senna      ***Renal***  Continue to monitor I/Os, BUN/Creatinine.   Replete lytes PRN  Smiley dc'ed yesterday       ***ID***  Nystatin for oral thrush     ***Endocrine***  [x]  DM2: HbA1c 9.4%           - [x] ISS [x] Lantus          Patient requires continuous monitoring with bedside rhythm monitoring, pulse oximetry monitoring, and continuous central venous and arterial pressure monitoring; and intermittent blood gas analysis. Care plan discussed with the ICU care team.   Patient remained critical, at risk for life threatening decompensation.    I have spent 30 minutes providing critical care management to this patient.    By signing my name below, I, Radha Malone, attest that this documentation has been prepared under the direction and in the presence of Reno Reyna NP,  Electronically signed:Ruby Arriaza, 01-06-23 @ 10:37    I, Reno Reyna NP,, personally performed the services described in this documentation. all medical record entries made by the scribe were at my direction and in my presence. I have reviewed the chart and agree that the record reflects my personal performance and is accurate and complete  Electronically signed: Reno Reyna NP, Patient seen and examined at the bedside.    Remained critically ill on continuous ICU monitoring.    24 hr events:  - Plan to start heparin gtt for AC s/c AF   - Plan for FEES 1/6  - BB initiated     OBJECTIVE:  Vital Signs Last 24 Hrs  T(C): 36.8 (06 Jan 2023 08:00), Max: 36.8 (06 Jan 2023 08:00)  T(F): 98.2 (06 Jan 2023 08:00), Max: 98.2 (06 Jan 2023 08:00)  HR: 81 (06 Jan 2023 09:00) (73 - 138)  BP: 126/57 (06 Jan 2023 09:00) (104/61 - 142/70)  BP(mean): 82 (06 Jan 2023 09:00) (72 - 108)  RR: 19 (06 Jan 2023 09:00) (13 - 31)  SpO2: 97% (06 Jan 2023 09:00) (94% - 100%)    Parameters below as of 06 Jan 2023 08:00  Patient On (Oxygen Delivery Method): room air    Physical Exam:   General: NAD   Neurology: A&Ox3, left facial droop, LUE 5/5, LLE 5/5, clear speech, sensation intact  Eyes: bilateral pupils equal and reactive   ENT/Neck: Neck supple, trachea midline, No JVD   Respiratory: Clear bilaterally   CV: S1S2, no murmur        [x] Sinus rhythm  Abdominal: Soft, NT, ND +BS   Extremities: no peripheral edema noted, + peripheral pulses   Skin: No Rashes, Hematoma, Ecchymosis                                   Assessment:  77M with a PMH T2DM and recently diagnosed CHF? who had recent admission for cardiac work up. Pt found to have CAD and aortic stenosis. At that time, pt tested positive for COVID, however grossly asymptomatic. Pt was d/c home for outpatient follow up with Dr Bowles. Now presents to ED with chest pain starting yesterday afternoon.     Stenosis of right carotid artery s/p Carotid endarterectomy 1/3/23  CAD s/p AVR (T) / CABG X 1 12/22/2022  Hemodynamic instability  Cardiac arrhythmias - Atrial fibrillation  Post op respiratory insufficiency  Acute blood loss anemia        Plan:   ***Neuro***  Code stroke called 1/4/23 at 8 AM. CT head showed Small right frontal and temporal opercular acute infarct.   1/5/23 MRI showed acute right MCA territory infarct within right frontal operculum-insular regions  q4 hours neuro checks.   Neuro following, appreciate recommendations  PT/OT      ***Cardiovascular***  Invasive hemodynamic monitoring, assess perfusion indices   SR / Hct 30.7%/ Lactate 0.7  [x]  ASA [x] Statin   AC therapy with Heparin gtt   Lopressor initiated for BP management / AF prophylaxis    Amiodarone for rate control (hx pAF)  s/p CEA - Vascular okay with ASA only      ***Pulmonary***  Comfortable on room air  Encourage incentive spirometry, continue pulse ox monitoring                 ***GI***  [x] Diet: Tolerating TF Vital High Protein , @ 60 with goal 75cc/hr.  FEES planned for today  Bowel regimen with Miralax and Senna      ***Renal***  Continue to monitor I/Os, BUN/Creatinine.   Replete lytes PRN        ***ID***  Nystatin for oral thrush     ***Endocrine***  [x]  DM2: HbA1c 9.4%           - [x] ISS [x] Lantus    Stable for transfer to [facility]       Patient requires continuous monitoring with bedside rhythm monitoring, pulse oximetry monitoring, and continuous central venous and arterial pressure monitoring; and intermittent blood gas analysis. Care plan discussed with the ICU care team.   Patient remained critical, at risk for life threatening decompensation.    I have spent 30 minutes providing critical care management to this patient.    By signing my name below, I, Radha Malone, attest that this documentation has been prepared under the direction and in the presence of Reno Reyna NP,  Electronically signed:Ruby Arriaza, 01-06-23 @ 10:37    I, Reno Reyna NP,, personally performed the services described in this documentation. all medical record entries made by the jillibe were at my direction and in my presence. I have reviewed the chart and agree that the record reflects my personal performance and is accurate and complete  Electronically signed: Reno Reyna NP,

## 2023-01-06 NOTE — PROGRESS NOTE ADULT - ASSESSMENT
77M with a PMH T2DM and recently diagnosed CHF? who had recent admission for cardiac work up. Pt found to have CAD and aortic stenosis. On CT neck found to have R ICA stenosis. now s/p R CEA 1/3, post-op course c/b CODE STROKE on POD1 1/4/23 patient found to have right frontal/temporal opercular acute infarct with local sulcal effacement and mass effect. Patient transferred to CTU for hemodynamic and neuro-monitoring. Stable at this time, AAOx4.     Plan:  - Appreciate continued neurology recommendations  - High dose statin  - Pain control PRN  - ASA  - ok to start AC from a vascular standpoint     Vascular   [id number]

## 2023-01-06 NOTE — SWALLOW FEES ASSESSMENT ADULT - LARYNGEAL PENETRATION BEFORE THE SWALLOW - SILENT
On initial trials, there is laryngeal penetration without retrieval. However, pt noted with neck hyperextended 2/2 watching FEES monitor. Pt instructed to keep head in neutral position. No further laryngeal penetration noted with moderately thick liquids.

## 2023-01-06 NOTE — SWALLOW FEES ASSESSMENT ADULT - COMMENTS
CT Head/ Neck 1/4:  IMPRESSION: Small right frontal and temporal opercular acute infarct   local sulcal effacement and mass effect. No hemorrhage. CTA of the neck   demonstrates postoperative changes at the right carotid bifurcation with   a patent endarterectomy graft. No large vessel occlusion intracranially   although a small distal branch occlusion is not excluded.    Swallow history: Pt is unknown to this service. Per RN, dysphagia screen administered this AM and pt coughing with 5 cc's of water.

## 2023-01-06 NOTE — SWALLOW FEES ASSESSMENT ADULT - DIAGNOSTIC IMPRESSIONS
Pt presents with an oropharyngeal dysphagia, with premature spillage of less viscous consistencies, deep laryngeal penetration with uncontrolled sips of mildly thick liquids, and aspiration of thin liquids via teaspoon. Pt with cough response to penetrant/ aspirant, though it is difficult to determine whether material is completely retrieved. While no laryngeal penetration/aspiration is seen with controlled sips of mildly thick liquids, there is concern whether pt will independently carryover compensatory strategy.

## 2023-01-06 NOTE — PROGRESS NOTE ADULT - SUBJECTIVE AND OBJECTIVE BOX
DIABETES FOLLOW UP NOTE: Saw pt earlier today    Chief Complaint: Endocrine consult requested for management of T2DM    INTERVAL HX: Saw pt in CTU. Per staff tolerating TFs of Vital at 60cc/hr at time of visit going up to 70cc at 1pm. However, per staff and pt, he passed FEES and noted TF order changed to POs for this pm. Pt reports feeling much better. BG in 200s today while on TFs. No hypoglycemia      Review of Systems:  General: As above  Cardiovascular: No chest pain, palpitations  Respiratory: No SOB, no cough  GI: No nausea, vomiting, abdominal pain  Endocrine: No polyuria, polydipsia or S&Sx of hypoglycemia    Allergies    No Known Allergies    Intolerances      MEDICATIONS:  atorvastatin 80 milliGRAM(s) Oral at bedtime  insulin glargine Injectable (LANTUS) 10 Unit(s) SubCutaneous at bedtime  insulin lispro (ADMELOG) corrective regimen sliding scale   SubCutaneous Before meals and at bedtime    PHYSICAL EXAM:  VITALS: T(C): 36.6 (01-06-23 @ 12:00)  T(F): 97.9 (01-06-23 @ 12:00), Max: 98.2 (01-06-23 @ 08:00)  HR: 81 (01-06-23 @ 15:00) (75 - 115)  BP: 125/59 (01-06-23 @ 15:00) (104/61 - 130/70)  RR:  (13 - 25)  SpO2:  (94% - 100%)  Wt(kg): --  GENERAL: Male sitting in chair in NAD. Son at bedside  HEENT: R neck dressing D&I  cHEST:  Sternal incision dry and intact.   Abdomen: Soft, nontender, non distended, + obesity  Extremities: Warm, no edema in all 4 exts  NEURO: Alert and able to answer simple questions. Encounter done in [place]. + dysarthria and LUE weakness 4/5 when compared to all other extremities 5/5       LABS:  POCT Blood Glucose.: 212 mg/dL (01-06-23 @ 11:10)  POCT Blood Glucose.: 202 mg/dL (01-06-23 @ 05:20)  POCT Blood Glucose.: 145 mg/dL (01-06-23 @ 00:57)  POCT Blood Glucose.: 164 mg/dL (01-05-23 @ 17:29)  POCT Blood Glucose.: 139 mg/dL (01-05-23 @ 11:35)  POCT Blood Glucose.: 149 mg/dL (01-05-23 @ 06:31)  POCT Blood Glucose.: 132 mg/dL (01-04-23 @ 23:57)  POCT Blood Glucose.: 120 mg/dL (01-04-23 @ 21:10)  POCT Blood Glucose.: 156 mg/dL (01-04-23 @ 11:20)  POCT Blood Glucose.: 174 mg/dL (01-04-23 @ 08:13)  POCT Blood Glucose.: 200 mg/dL (01-04-23 @ 07:18)  POCT Blood Glucose.: 176 mg/dL (01-04-23 @ 00:43)  POCT Blood Glucose.: 165 mg/dL (01-03-23 @ 22:52)                            9.8    6.36  )-----------( 251      ( 06 Jan 2023 00:58 )             30.7       01-06    135  |  100  |  15  ----------------------------<  151<H>  4.0   |  23  |  0.90    eGFR: 88    Ca    9.4      01-06  Mg     2.0     01-06  Phos  4.2     01-06    TPro  7.3  /  Alb  3.7  /  TBili  1.4<H>  /  DBili  x   /  AST  14  /  ALT  17  /  AlkPhos  94  01-06    A1C with Estimated Average Glucose Result: 9.4 % (12-02-22 @ 05:54)    Estimated Average Glucose: 223 mg/dL (12-02-22 @ 05:54)    12-02 Chol 185 Direct LDL -- LDL calculated 110<H> HDL 34<L> Trig 208<H>

## 2023-01-06 NOTE — SWALLOW FEES ASSESSMENT ADULT - ORAL PHASE
Spillage to valleculae/Uncontrolled bolus/spillover in ember-pharynx To the AE folds/Uncontrolled bolus/spillover in hypopharynx Spillage to the pyriform sinuses Premature spillage to the pyriform sinuses

## 2023-01-06 NOTE — SWALLOW FEES ASSESSMENT ADULT - ROSENBEK'S PENETRATION ASPIRATION SCALE
(1) no aspiration, material does not enter airway (3) material remains above the vocal cords, visible residue remains (penetration) (5) material contacts vocal cords, visible residue remains (penetration) vs score of 7, difficult to assess/(6) material passes glottis, no subglottic residue remains (aspiration)

## 2023-01-06 NOTE — PROGRESS NOTE ADULT - ASSESSMENT
77 M with recently diagnosed T2D (A1C 9.4%) on Metformin PTA. DM complicated by CAD s/p [facility] 12/4 with prox LAD 70% stenosis, aortic stenosis. Also + CHF now s/p  CABG/SAVR. 12/22 and s/p carotid endarterectomy 1/3/23 with R frontal infarct s/p procedure with L hand weakness> improved and some dysarthria requiring TFs that were discontinued today > starting POs this pm. BG in 200s today while on TFs. Pt was on higher insulin doses when he was eating and prior to stroke so will increase insulin doses slowly until assessing PO intake. BG goal 100 to 180s.

## 2023-01-06 NOTE — PROGRESS NOTE ADULT - ASSESSMENT
77M RH Maltese speaking with a PMH T2DM, CHF, CAD, aortic stenosis, atrial fibrillation, COVID+ presented with chest pain on 12/1/22. Pt now s/p C 12/4 with prox LAD 70% stenosis. Pt s/p AVR and CABG on 12/22/22. Pt had R CEA for asymptomatic carotid stenosis on 1/3/23 at 19:00. Code stroke called on 1/4/23. LKN 1/3/23 at 19:00 prior to R CEA surgery. No deficits noted in PACU. Pt on aspirin and lovenox ppx. NIHSS: 11, preMRS: 0. Neuro exam w/ LHH, mild L hemiparesis, R gaze preference, moderate dysarthria. subseuqent exam with some improvement    used [id number], vascular attending exam with LUE drift, slurred speech and mild L facial.   MRI confirms R hemisphere infarct   A1c 9.4    exam improving     Impression:  R hemisphere infarct/frontal, possible large artery athero wtih A-A embolism,  possible related to procedure but unclear , can also be CE from Afib     Recommendations:  - BP parameters can be normotensive at this piont.  if there is worsening in symptoms with drop in BP would raise BP target   - monitor on telemetry  - can continue ASA 81 mg PO daily and DVT ppx for now;  consider starting AC with heparin drip 1/6 with no bolus if no objection from vascular sx.  once on AC no need for ASA from stroke standpoint   - start atorvastatin 80mg PO daily (titrate to LDL < 70)   - stroke risk factor modification and counseling   - NPO until bedside dysphagia screen  - PT/OT/S&S/SW consults  - spoke with mihaela at bedside (granddaughter)   - Patient can follow up with Dr. Riki Hernandez after discharge. Please instruct the patient to call [phone] to schedule an appointment within the next 2-3 days. Office is located at [address] [facility], [place] [address].     Riki Hernandez MD  Vascular Neurology  Office: [phone]

## 2023-01-07 LAB
ALBUMIN SERPL ELPH-MCNC: 3.7 G/DL — SIGNIFICANT CHANGE UP (ref 3.3–5)
ALP SERPL-CCNC: 90 U/L — SIGNIFICANT CHANGE UP (ref 40–120)
ALT FLD-CCNC: 13 U/L — SIGNIFICANT CHANGE UP (ref 10–45)
ANION GAP SERPL CALC-SCNC: 12 MMOL/L — SIGNIFICANT CHANGE UP (ref 5–17)
APTT BLD: 38.1 SEC — HIGH (ref 27.5–35.5)
APTT BLD: 44.1 SEC — HIGH (ref 27.5–35.5)
APTT BLD: 54.7 SEC — HIGH (ref 27.5–35.5)
APTT BLD: 57.6 SEC — HIGH (ref 27.5–35.5)
AST SERPL-CCNC: 16 U/L — SIGNIFICANT CHANGE UP (ref 10–40)
BILIRUB SERPL-MCNC: 1.2 MG/DL — SIGNIFICANT CHANGE UP (ref 0.2–1.2)
BUN SERPL-MCNC: 21 MG/DL — SIGNIFICANT CHANGE UP (ref 7–23)
CALCIUM SERPL-MCNC: 9.7 MG/DL — SIGNIFICANT CHANGE UP (ref 8.4–10.5)
CHLORIDE SERPL-SCNC: 101 MMOL/L — SIGNIFICANT CHANGE UP (ref 96–108)
CO2 SERPL-SCNC: 22 MMOL/L — SIGNIFICANT CHANGE UP (ref 22–31)
CREAT SERPL-MCNC: 0.9 MG/DL — SIGNIFICANT CHANGE UP (ref 0.5–1.3)
EGFR: 88 ML/MIN/1.73M2 — SIGNIFICANT CHANGE UP
GLUCOSE BLDC GLUCOMTR-MCNC: 146 MG/DL — HIGH (ref 70–99)
GLUCOSE BLDC GLUCOMTR-MCNC: 170 MG/DL — HIGH (ref 70–99)
GLUCOSE BLDC GLUCOMTR-MCNC: 184 MG/DL — HIGH (ref 70–99)
GLUCOSE BLDC GLUCOMTR-MCNC: 219 MG/DL — HIGH (ref 70–99)
GLUCOSE SERPL-MCNC: 150 MG/DL — HIGH (ref 70–99)
HCT VFR BLD CALC: 27.5 % — LOW (ref 39–50)
HGB BLD-MCNC: 9.1 G/DL — LOW (ref 13–17)
INR BLD: 1.23 RATIO — HIGH (ref 0.88–1.16)
MAGNESIUM SERPL-MCNC: 2 MG/DL — SIGNIFICANT CHANGE UP (ref 1.6–2.6)
MCHC RBC-ENTMCNC: 29.7 PG — SIGNIFICANT CHANGE UP (ref 27–34)
MCHC RBC-ENTMCNC: 33.1 GM/DL — SIGNIFICANT CHANGE UP (ref 32–36)
MCV RBC AUTO: 89.9 FL — SIGNIFICANT CHANGE UP (ref 80–100)
NRBC # BLD: 0 /100 WBCS — SIGNIFICANT CHANGE UP (ref 0–0)
PHOSPHATE SERPL-MCNC: 3.5 MG/DL — SIGNIFICANT CHANGE UP (ref 2.5–4.5)
PLATELET # BLD AUTO: 258 K/UL — SIGNIFICANT CHANGE UP (ref 150–400)
POTASSIUM SERPL-MCNC: 3.9 MMOL/L — SIGNIFICANT CHANGE UP (ref 3.5–5.3)
POTASSIUM SERPL-SCNC: 3.9 MMOL/L — SIGNIFICANT CHANGE UP (ref 3.5–5.3)
PROT SERPL-MCNC: 7.1 G/DL — SIGNIFICANT CHANGE UP (ref 6–8.3)
PROTHROM AB SERPL-ACNC: 14.2 SEC — HIGH (ref 10.5–13.4)
RBC # BLD: 3.06 M/UL — LOW (ref 4.2–5.8)
RBC # FLD: 13.7 % — SIGNIFICANT CHANGE UP (ref 10.3–14.5)
SODIUM SERPL-SCNC: 135 MMOL/L — SIGNIFICANT CHANGE UP (ref 135–145)
WBC # BLD: 5.31 K/UL — SIGNIFICANT CHANGE UP (ref 3.8–10.5)
WBC # FLD AUTO: 5.31 K/UL — SIGNIFICANT CHANGE UP (ref 3.8–10.5)

## 2023-01-07 PROCEDURE — 99291 CRITICAL CARE FIRST HOUR: CPT | Mod: 24

## 2023-01-07 PROCEDURE — 71045 X-RAY EXAM CHEST 1 VIEW: CPT | Mod: 26

## 2023-01-07 RX ORDER — INSULIN LISPRO 100/ML
3 VIAL (ML) SUBCUTANEOUS
Refills: 0 | Status: DISCONTINUED | OUTPATIENT
Start: 2023-01-07 | End: 2023-01-09

## 2023-01-07 RX ORDER — SODIUM CHLORIDE 9 MG/ML
3 INJECTION INTRAMUSCULAR; INTRAVENOUS; SUBCUTANEOUS EVERY 8 HOURS
Refills: 0 | Status: DISCONTINUED | OUTPATIENT
Start: 2023-01-07 | End: 2023-01-11

## 2023-01-07 RX ORDER — POTASSIUM CHLORIDE 20 MEQ
20 PACKET (EA) ORAL ONCE
Refills: 0 | Status: COMPLETED | OUTPATIENT
Start: 2023-01-07 | End: 2023-01-07

## 2023-01-07 RX ADMIN — POLYETHYLENE GLYCOL 3350 17 GRAM(S): 17 POWDER, FOR SOLUTION ORAL at 12:20

## 2023-01-07 RX ADMIN — Medication 12.5 MILLIGRAM(S): at 05:06

## 2023-01-07 RX ADMIN — Medication 1: at 07:31

## 2023-01-07 RX ADMIN — Medication 81 MILLIGRAM(S): at 12:19

## 2023-01-07 RX ADMIN — INSULIN GLARGINE 12 UNIT(S): 100 INJECTION, SOLUTION SUBCUTANEOUS at 21:46

## 2023-01-07 RX ADMIN — SODIUM CHLORIDE 3 MILLILITER(S): 9 INJECTION INTRAMUSCULAR; INTRAVENOUS; SUBCUTANEOUS at 22:23

## 2023-01-07 RX ADMIN — ATORVASTATIN CALCIUM 80 MILLIGRAM(S): 80 TABLET, FILM COATED ORAL at 21:46

## 2023-01-07 RX ADMIN — HEPARIN SODIUM 10 UNIT(S)/HR: 5000 INJECTION INTRAVENOUS; SUBCUTANEOUS at 05:06

## 2023-01-07 RX ADMIN — Medication 500000 UNIT(S): at 23:51

## 2023-01-07 RX ADMIN — PANTOPRAZOLE SODIUM 40 MILLIGRAM(S): 20 TABLET, DELAYED RELEASE ORAL at 05:07

## 2023-01-07 RX ADMIN — Medication 500000 UNIT(S): at 12:20

## 2023-01-07 RX ADMIN — Medication 3 UNIT(S): at 12:22

## 2023-01-07 RX ADMIN — Medication 20 MILLIEQUIVALENT(S): at 05:06

## 2023-01-07 RX ADMIN — Medication 2 UNIT(S): at 07:31

## 2023-01-07 RX ADMIN — SODIUM CHLORIDE 3 MILLILITER(S): 9 INJECTION INTRAMUSCULAR; INTRAVENOUS; SUBCUTANEOUS at 05:12

## 2023-01-07 RX ADMIN — Medication 500000 UNIT(S): at 00:04

## 2023-01-07 RX ADMIN — Medication 500000 UNIT(S): at 05:07

## 2023-01-07 RX ADMIN — HEPARIN SODIUM 11 UNIT(S)/HR: 5000 INJECTION INTRAVENOUS; SUBCUTANEOUS at 21:45

## 2023-01-07 RX ADMIN — Medication 2: at 12:18

## 2023-01-07 RX ADMIN — SODIUM CHLORIDE 3 MILLILITER(S): 9 INJECTION INTRAMUSCULAR; INTRAVENOUS; SUBCUTANEOUS at 14:00

## 2023-01-07 RX ADMIN — Medication 500000 UNIT(S): at 17:09

## 2023-01-07 RX ADMIN — Medication 3 UNIT(S): at 17:08

## 2023-01-07 RX ADMIN — AMIODARONE HYDROCHLORIDE 200 MILLIGRAM(S): 400 TABLET ORAL at 05:06

## 2023-01-07 RX ADMIN — SENNA PLUS 2 TABLET(S): 8.6 TABLET ORAL at 21:46

## 2023-01-07 RX ADMIN — Medication 12.5 MILLIGRAM(S): at 17:09

## 2023-01-07 NOTE — CHART NOTE - NSCHARTNOTEFT_GEN_A_CORE
Age: 77y    Gender: Male    POCT Blood Glucose:200 (01-06-23 @ 16:00)    219 mg/dL (01-07-23 @ 11:36)  170 mg/dL (01-07-23 @ 06:36)  169 mg/dL (01-06-23 @ 21:58)  200 mg/dL (01-06-23 @ 16:16)      eMAR:  atorvastatin   80 milliGRAM(s) Oral (01-06-23 @ 22:01)    insulin glargine Injectable (LANTUS)   12 Unit(s) SubCutaneous (01-06-23 @ 22:01)    insulin lispro (ADMELOG) corrective regimen sliding scale   1  SubCutaneous (01-07-23 @ 07:31)   1 Unit(s) SubCutaneous (01-06-23 @ 16:25)    insulin lispro Injectable (ADMELOG)   2 Unit(s) SubCutaneous (01-07-23 @ 07:31)   2 Unit(s) SubCutaneous (01-06-23 @ 16:25)    Diet, Regular:   Consistent Carbohydrate {No Snacks} (CSTCHO)  DASH/TLC {Sodium & Cholesterol Restricted} (DASH)  Moderately Thick Liquids (MODTHICKLIQS) (01-06-23 @ 12:13) [Active]       POC glucose, insulin requirements, lab values reviewed.  FBG reasonable with o/n serum BG at goal, continue with lantus 12 units sq qhs  Prandial BG 160s-210s - increase admelog to 3 units tid ac hold if not tolerating PO   continue with LOW correction scales AC & Low qHS scale

## 2023-01-07 NOTE — PROGRESS NOTE ADULT - SUBJECTIVE AND OBJECTIVE BOX
Neurology Progress Note    S: Patient seen and examined in CTICU. MRI done confirms infarct. some mild weakness noted but better ; sitting up in chair ; family bed side     Medication:    MEDICATIONS  (STANDING):  aMIOdarone    Tablet   Oral   aMIOdarone    Tablet 400 milliGRAM(s) Oral every 8 hours  aMIOdarone    Tablet 200 milliGRAM(s) Oral daily  aspirin  chewable 81 milliGRAM(s) Oral daily  atorvastatin 80 milliGRAM(s) Oral at bedtime  bisacodyl Suppository 10 milliGRAM(s) Rectal once  heparin  Infusion 750 Unit(s)/Hr (11 mL/Hr) IV Continuous <Continuous>  insulin glargine Injectable (LANTUS) 12 Unit(s) SubCutaneous at bedtime  insulin lispro (ADMELOG) corrective regimen sliding scale   SubCutaneous <User Schedule>  insulin lispro (ADMELOG) corrective regimen sliding scale   SubCutaneous three times a day before meals  insulin lispro Injectable (ADMELOG) 2 Unit(s) SubCutaneous three times a day before meals  metoprolol tartrate 12.5 milliGRAM(s) Oral every 12 hours  nystatin    Suspension 854624 Unit(s) Oral every 6 hours  pantoprazole    Tablet 40 milliGRAM(s) Oral before breakfast  polyethylene glycol 3350 17 Gram(s) Oral daily  senna 2 Tablet(s) Oral at bedtime  sodium chloride 0.9% lock flush 3 milliLiter(s) IV Push every 8 hours    MEDICATIONS  (PRN):  acetaminophen     Tablet .. 650 milliGRAM(s) Oral every 6 hours PRN Mild Pain (1 - 3)      Vitals:    Vital Signs Last 24 Hrs  T(C): 36.6 (01-07-23 @ 08:00), Max: 37.1 (01-06-23 @ 16:00)  T(F): 97.8 (01-07-23 @ 08:00), Max: 98.7 (01-06-23 @ 16:00)  HR: 89 (01-07-23 @ 08:00) (69 - 91)  BP: 124/68 (01-07-23 @ 08:00) (102/55 - 170/75)  BP(mean): 89 (01-07-23 @ 08:00) (72 - 108)  RR: 17 (01-07-23 @ 08:00) (16 - 34)  SpO2: 100% (01-07-23 @ 08:00) (97% - 100%)        General Exam:   General Appearance: Appropriately dressed and in no acute distress       Head: Normocephalic, atraumatic and no dysmorphic features  Ear, Nose, and Throat: Moist mucous membranes  CVS: S1S2+  Resp: No SOB, no wheeze or rhonchi  Abd: soft NTND  Extremities: No edema, no cyanosis  Skin: No bruises, no rashes     Neurological Exam:  Mental Status: Awake, alert and oriented x 2.  Able to follow simple verbal commands. Able to name and repeat. fluent speech. No obvious aphasia but mild to mod dysarthria noted.   Cranial Nerves: PERRL, R gaze pref, L HHA , sensation V1-V3 intact,  no obvious facial asymmetry , equal elevation of palate, scm/trap 5/5, tongue is midline on protrusion. no obvious papilledema on fundoscopic exam. Hearing is grossly intact.   Motor: mild LUE nad LLE drift but significant improvement in strength.   Sensation: Intact to light touch and pinprick throughout. no right/left confusion. no extinction to tactile on DSS.    Reflexes: 1+ throughout at biceps, brachioradialis, triceps, patellars and ankles bilaterally and equal. No clonus. R toe and L toe were both downgoing.  Coordination: No dysmetria on FNF   Gait: deferred     I personally reviewed the below data/images/labs:  CBC Full  -  ( 07 Jan 2023 00:54 )  WBC Count : 5.31 K/uL  RBC Count : 3.06 M/uL  Hemoglobin : 9.1 g/dL  Hematocrit : 27.5 %  Platelet Count - Automated : 258 K/uL  Mean Cell Volume : 89.9 fl  Mean Cell Hemoglobin : 29.7 pg  Mean Cell Hemoglobin Concentration : 33.1 gm/dL  Auto Neutrophil # : x  Auto Lymphocyte # : x  Auto Monocyte # : x  Auto Eosinophil # : x  Auto Basophil # : x  Auto Neutrophil % : x  Auto Lymphocyte % : x  Auto Monocyte % : x  Auto Eosinophil % : x  Auto Basophil % : x    01-07    135  |  101  |  21  ----------------------------<  150<H>  3.9   |  22  |  0.90    Ca    9.7      07 Jan 2023 00:50  Phos  3.5     01-07  Mg     2.0     01-07    TPro  7.1  /  Alb  3.7  /  TBili  1.2  /  DBili  x   /  AST  16  /  ALT  13  /  AlkPhos  90  01-07    < from: CT Head No Cont (01.04.23 @ 08:57) >  IMPRESSION: Small right frontal and temporal opercular acute infarct   local sulcal effacement and mass effect. No hemorrhage. CTA ofthe neck   demonstrates postoperative changes at the right carotid bifurcation with   a patent endarterectomy graft. No large vessel occlusion intracranially   although a small distal branch occlusion is not excluded.       < from: CT Angio/Perfusion Head w/ IV Cont (01.04.23 @ 08:59) >  CBF<30% volume; 0 ml  Tmax> 6.0s volume: 47 ml  Mismatch volume: 47 ml  Mismatch ratio: infinite  < from: MR Head No Cont (01.05.23 @ 10:04) >    ACC: [id number] EXAM:  MR BRAIN                          PROCEDURE DATE:  01/05/2023          INTERPRETATION:  MRI BRAIN WITHOUT AND WITH IV CONTRAST    INDICATION: CVA. Additional history per EMR: now s/p R CEA 1/3, post-op   course c/b CODE STROKE on POD1  1/4/23 patient found to have right frontal/temporal opercular acute   infarct  with local sulcal effacement and mass effect.    COMPARISON: CT/CTA/CTP head neck 1/4/2023    TECHNIQUE: Multisequential multiplanar MRI of the brain is performed   without the administration of intravenous gadolinium contrast.    FINDINGS:    Examination is slightly degraded due to patient motion.    Cortical/subcortical restricted diffusion (DWI hyperintense/ADC   hypointense signal) is centered within the right frontal opercular and   insular regions, with slight involvement of the right temporal operculum.   At least two additional punctate foci of restricted diffusion within the   right frontal centrum semiovale and lentiform nucleus (images 17-22,   series 3). Corresponding FLAIR hyperintensity is noted. The infarct   appears grossly similar compared to hypodensity on recent noncontrast CT.    No abnormal SWI signal loss to suggest hemorrhagic transformation of   infarct.    ADC values less than 620 on rapid postprocessed sequence in the region of   diffusion and about the, is consistent with acute core ischemic infarct   (volume 52 mL).    There is no signal mass effect or midline shift. The basal cisterns are   patent.  There is no hydrocephalus.  No extra-axial collection is seen.  Generalized parenchymal volume loss is noted.    Large retrocerebellar CSF intensity extra-axial space, likely reflects an   incidental arachnoid cyst. Mild mass effect on the adjacent cerebellum.   Scalloping of the adjacent calvarium and thin internal septation are   noted.    The corpus callosum, optic chiasm, pituitary gland and pineal gland are   normal in configuration.  No cerebellar tonsillar herniation/ectopia. No Chiari malformation.    The visualized orbits are unremarkable. Mild mucosal thickening within   the paranasal sinuses. Bilateral mastoid/middle ear cavities are clear.    Preserved T2 flow-voids along the dural venous sinuses.      IMPRESSION:    Acute right MCA territory infarct (centered within right frontal   operculum-insular regions). This is grossly stable compared to 1/4/2023   CT. No hemorrhagic transformation of infarct or midline shift. Consider   imaging follow-up as clinically indicated.      Findings were discussedwith covering provider (Karla Gallegos NP) via   telephone on 1/5/2023 and 10:33 AM with verbal read back    --- End of Report ---            SORAYA PEDRAZA MD; Attending Radiologist  This document has been electronically signed. Jan 5 2023 10:44AM    < end of copied text >

## 2023-01-07 NOTE — PROGRESS NOTE ADULT - ASSESSMENT
77M RH Kiswahili speaking with a PMH T2DM, CHF, CAD, aortic stenosis, atrial fibrillation, COVID+ presented with chest pain on 12/1/22. Pt now s/p C 12/4 with prox LAD 70% stenosis. Pt s/p AVR and CABG on 12/22/22. Pt had R CEA for asymptomatic carotid stenosis on 1/3/23 at 19:00. Code stroke called on 1/4/23. LKN 1/3/23 at 19:00 prior to R CEA surgery. No deficits noted in PACU. Pt on aspirin and lovenox ppx. NIHSS: 11, preMRS: 0. Neuro exam w/ LHH, mild L hemiparesis, R gaze preference, moderate dysarthria. subseuqent exam with some improvement    used [id number], vascular attending exam with LUE drift, slurred speech and mild L facial.   MRI confirms R hemisphere infarct   A1c 9.4    exam improving     Impression:  R hemisphere infarct/frontal, possible large artery athero wtih A-A embolism,  possible related to procedure but unclear , can also be CE from Afib     Recommendations:  - BP parameters can be normotensive at this piont.  if there is worsening in symptoms with drop in BP would raise BP target   - monitor on telemetry  -  started AC with heparin drip 1/6 with no bolus if no objection from vascular sx.  once on AC no need for ASA from stroke standpoint   - start atorvastatin 80mg PO daily (titrate to LDL < 70)   - stroke risk factor modification and counseling   - NPO until bedside dysphagia screen  - PT/OT/S&S/SW consults  - remaining per primary team and CTS   - spoke with mihaela at bedside (granddaughter)   - Patient can follow up with Dr. Riki Hernandez after discharge. Please instruct the patient to call [phone] to schedule an appointment within the next 2-3 days. Office is located at [address] [facility], [place] [address].     Riki Hernandez MD  Vascular Neurology  Office: [phone]

## 2023-01-07 NOTE — PROGRESS NOTE ADULT - SUBJECTIVE AND OBJECTIVE BOX
Patient seen and examined at the bedside.    Remained critically ill on continuous ICU monitoring.    24 Hour Events:    OBJECTIVE:  Vital Signs Last 24 Hrs  T(C): 36.6 (07 Jan 2023 08:00), Max: 37.1 (06 Jan 2023 16:00)  T(F): 97.8 (07 Jan 2023 08:00), Max: 98.7 (06 Jan 2023 16:00)  HR: 89 (07 Jan 2023 08:00) (69 - 91)  BP: 124/68 (07 Jan 2023 08:00) (102/55 - 170/75)  BP(mean): 89 (07 Jan 2023 08:00) (72 - 108)  RR: 17 (07 Jan 2023 08:00) (16 - 34)  SpO2: 100% (07 Jan 2023 08:00) (97% - 100%)    Parameters below as of 07 Jan 2023 08:00  Patient On (Oxygen Delivery Method): room air      Physical Exam:   General: NAD   Neurology: A&Ox3, left facial droop, LUE 5/5, LLE 5/5, clear speech, sensation intact  Eyes: bilateral pupils equal and reactive   ENT/Neck: Neck supple, trachea midline, No JVD   Respiratory: Clear bilaterally   CV: S1S2, no murmur        [x] Sinus rhythm  Abdominal: Soft, NT, ND +BS   Extremities: no peripheral edema noted, + peripheral pulses   Skin: No Rashes, Hematoma, Ecchymosis     Assessment:  77M with a PMH T2DM and recently diagnosed CHF? who had recent admission for cardiac work up. Pt found to have CAD and aortic stenosis. At that time, pt tested positive for COVID, however grossly asymptomatic. Pt was d/c home for outpatient follow up with Dr Bowles. Now presents to ED with chest pain starting yesterday afternoon.     Stenosis of right carotid artery s/p Carotid endarterectomy 1/3/23  CAD s/p AVR (T) / CABG X 1 12/22/2022  Hemodynamic instability  Cardiac arrhythmias - Atrial fibrillation  Post op respiratory insufficiency  Acute blood loss anemia      Plan:   ***Neuro***  Code stroke called 1/4/23 at 8 AM. CT head showed Small right frontal and temporal opercular acute infarct.   1/5/23 MRI showed acute right MCA territory infarct within right frontal operculum-insular regions  q4 hours neuro checks.   Neuro following, appreciate recommendations  PT/OT    ***Cardiovascular***  Invasive hemodynamic monitoring, assess perfusion indices   SR / Hct 27.5%/ Lactate 0.7  [x]  ASA [x] Statin   AC therapy with Heparin gtt   Lopressor 12.5 mg q12 for BP management / AF prophylaxis    PO Amiodarone for rate control (hx pAF)  [x] ASA  [x] Statin   AC therapy with Heparin gtt   s/p CEA - Vascular okay with ASA only    ***Pulmonary***  Comfortable on room air  Encourage incentive spirometry, continue pulse ox monitoring      ***GI***  [x] Diet: Consistent carb  Bowel regimen with Miralax and Senna    ***Renal***  Continue to monitor I/Os, BUN/Creatinine.   Replete lytes PRN    ***ID***  Nystatin for oral thrush     ***Endocrine***  [x]  DM2: HbA1c 9.4%           - [x] ISS [x] Lantus    Stable for transfer to [facility]         Patient requires continuous monitoring with bedside rhythm monitoring, pulse oximetry monitoring, and continuous central venous and arterial pressure monitoring; and intermittent blood gas analysis. Care plan discussed with the ICU care team.   Patient remained critical, at risk for life threatening decompensation.    I have spent 30 minutes providing critical care management to this patient.    By signing my name below, I, Maria D'Amico, attest that this documentation has been prepared under the direction and in the presence of Karla Gallegos NP  Electronically signed: Maria D'Amico, Scribe, 01-07-23 @ 08:34    I, Karla Gallegos, personally performed the services described in this documentation. all medical record entries made by the [facility]illibstone were at my direction and in my presence. I have reviewed the chart and agree that the record reflects my personal performance and is accurate and complete  Electronically signed: Karla Gallegos NP Patient seen and examined at the bedside.    Remained critically ill on continuous ICU monitoring.    24 Hour Events:  Remains in SR, started on heparin drip yesterday for pAF  passed FEES for regular diet with mod thick liquids, feeding tube dc'd  still waiting floor bed    OBJECTIVE:  Vital Signs Last 24 Hrs  T(C): 36.6 (07 Jan 2023 08:00), Max: 37.1 (06 Jan 2023 16:00)  T(F): 97.8 (07 Jan 2023 08:00), Max: 98.7 (06 Jan 2023 16:00)  HR: 89 (07 Jan 2023 08:00) (69 - 91)  BP: 124/68 (07 Jan 2023 08:00) (102/55 - 170/75)  BP(mean): 89 (07 Jan 2023 08:00) (72 - 108)  RR: 17 (07 Jan 2023 08:00) (16 - 34)  SpO2: 100% (07 Jan 2023 08:00) (97% - 100%)    Parameters below as of 07 Jan 2023 08:00  Patient On (Oxygen Delivery Method): room air      Physical Exam:   General: NAD   Neurology: A&Ox3, mild left facial droop- much improved, LUE 5/5, LLE 5/5, left hand squeeze slightly weaker than right, clear speech, sensation intact  Eyes: bilateral pupils equal and reactive   ENT/Neck: Neck supple, trachea midline, No JVD   Respiratory: Clear bilaterally   CV: S1S2, no murmur        [x] Sinus rhythm  Abdominal: Soft, NT, ND +BS   Extremities: no peripheral edema noted, + peripheral pulses   Skin: No Rashes, Hematoma, Ecchymosis     Assessment:  77M with a PMH T2DM and recently diagnosed CHF? who had recent admission for cardiac work up. Pt found to have CAD and aortic stenosis. At that time, pt tested positive for COVID, however grossly asymptomatic. Pt was d/c home for outpatient follow up with Dr Bowles. Now presents to ED with chest pain starting yesterday afternoon.     Stenosis of right carotid artery s/p Carotid endarterectomy 1/3/23  CAD s/p AVR (T) / CABG X 1 12/22/2022  Hemodynamic instability  Cardiac arrhythmias - Atrial fibrillation  Post op respiratory insufficiency  Acute blood loss anemia      Plan:   ***Neuro***  Code stroke called 1/4/23 at 8 AM. CT head showed Small right frontal and temporal opercular acute infarct.   1/5/23 MRI showed acute right MCA territory infarct within right frontal operculum-insular regions  q4 hours neuro checks.   Neuro following, appreciate recommendations  PT/OT    ***Cardiovascular***  Invasive hemodynamic monitoring, assess perfusion indices   SR / Hct 27.5%/ Lactate 0.7  [x]  ASA [x] Statin   AC therapy with Heparin gtt   Lopressor 12.5 mg q12 for BP management / AF prophylaxis    PO Amiodarone for rate control (hx pAF)  [x] ASA  [x] Statin   AC therapy with Heparin gtt   s/p CEA - Vascular okay with ASA only (no plavix)    ***Pulmonary***  Comfortable on room air  Encourage incentive spirometry, continue pulse ox monitoring      ***GI***  [x] Diet: Consistent carb  Bowel regimen with Miralax and Senna    ***Renal***  Continue to monitor I/Os, BUN/Creatinine.   Replete lytes PRN    ***ID***  Nystatin for oral thrush     ***Endocrine***  [x]  DM2: HbA1c 9.4%           - [x] ISS [x] Lantus [x] pre-meal  endocrine following, appreciate recommendations    Stable for transfer to [facility]         Patient requires continuous monitoring with bedside rhythm monitoring, pulse oximetry monitoring, and continuous central venous and arterial pressure monitoring; and intermittent blood gas analysis. Care plan discussed with the ICU care team.   Patient remained critical, at risk for life threatening decompensation.    I have spent 30 minutes providing critical care management to this patient.    By signing my name below, I, Maria D'Amico, attest that this documentation has been prepared under the direction and in the presence of Karla Gallegos NP  Electronically signed: Maria D'Amico, Scribe, 01-07-23 @ 08:34    I, Karla Gallegos, personally performed the services described in this documentation. all medical record entries made by the jillibe were at my direction and in my presence. I have reviewed the chart and agree that the record reflects my personal performance and is accurate and complete  Electronically signed: Karla Gallegos NP

## 2023-01-08 DIAGNOSIS — Z98.890 OTHER SPECIFIED POSTPROCEDURAL STATES: ICD-10-CM

## 2023-01-08 DIAGNOSIS — I63.9 CEREBRAL INFARCTION, UNSPECIFIED: ICD-10-CM

## 2023-01-08 DIAGNOSIS — Z95.1 PRESENCE OF AORTOCORONARY BYPASS GRAFT: ICD-10-CM

## 2023-01-08 LAB
ALBUMIN SERPL ELPH-MCNC: 3.8 G/DL — SIGNIFICANT CHANGE UP (ref 3.3–5)
ALP SERPL-CCNC: 96 U/L — SIGNIFICANT CHANGE UP (ref 40–120)
ALT FLD-CCNC: 13 U/L — SIGNIFICANT CHANGE UP (ref 10–45)
ANION GAP SERPL CALC-SCNC: 12 MMOL/L — SIGNIFICANT CHANGE UP (ref 5–17)
APTT BLD: 62 SEC — HIGH (ref 27.5–35.5)
AST SERPL-CCNC: 17 U/L — SIGNIFICANT CHANGE UP (ref 10–40)
BASOPHILS # BLD AUTO: 0.02 K/UL — SIGNIFICANT CHANGE UP (ref 0–0.2)
BASOPHILS NFR BLD AUTO: 0.4 % — SIGNIFICANT CHANGE UP (ref 0–2)
BILIRUB SERPL-MCNC: 1.2 MG/DL — SIGNIFICANT CHANGE UP (ref 0.2–1.2)
BUN SERPL-MCNC: 17 MG/DL — SIGNIFICANT CHANGE UP (ref 7–23)
CALCIUM SERPL-MCNC: 10 MG/DL — SIGNIFICANT CHANGE UP (ref 8.4–10.5)
CHLORIDE SERPL-SCNC: 98 MMOL/L — SIGNIFICANT CHANGE UP (ref 96–108)
CO2 SERPL-SCNC: 25 MMOL/L — SIGNIFICANT CHANGE UP (ref 22–31)
CREAT SERPL-MCNC: 0.88 MG/DL — SIGNIFICANT CHANGE UP (ref 0.5–1.3)
EGFR: 89 ML/MIN/1.73M2 — SIGNIFICANT CHANGE UP
EOSINOPHIL # BLD AUTO: 0.04 K/UL — SIGNIFICANT CHANGE UP (ref 0–0.5)
EOSINOPHIL NFR BLD AUTO: 0.8 % — SIGNIFICANT CHANGE UP (ref 0–6)
GLUCOSE BLDC GLUCOMTR-MCNC: 121 MG/DL — HIGH (ref 70–99)
GLUCOSE BLDC GLUCOMTR-MCNC: 131 MG/DL — HIGH (ref 70–99)
GLUCOSE BLDC GLUCOMTR-MCNC: 142 MG/DL — HIGH (ref 70–99)
GLUCOSE BLDC GLUCOMTR-MCNC: 188 MG/DL — HIGH (ref 70–99)
GLUCOSE BLDC GLUCOMTR-MCNC: 192 MG/DL — HIGH (ref 70–99)
GLUCOSE SERPL-MCNC: 143 MG/DL — HIGH (ref 70–99)
HCT VFR BLD CALC: 29 % — LOW (ref 39–50)
HGB BLD-MCNC: 9.4 G/DL — LOW (ref 13–17)
IMM GRANULOCYTES NFR BLD AUTO: 0.2 % — SIGNIFICANT CHANGE UP (ref 0–0.9)
LYMPHOCYTES # BLD AUTO: 1.26 K/UL — SIGNIFICANT CHANGE UP (ref 1–3.3)
LYMPHOCYTES # BLD AUTO: 25.3 % — SIGNIFICANT CHANGE UP (ref 13–44)
MCHC RBC-ENTMCNC: 29.7 PG — SIGNIFICANT CHANGE UP (ref 27–34)
MCHC RBC-ENTMCNC: 32.4 GM/DL — SIGNIFICANT CHANGE UP (ref 32–36)
MCV RBC AUTO: 91.8 FL — SIGNIFICANT CHANGE UP (ref 80–100)
MONOCYTES # BLD AUTO: 0.32 K/UL — SIGNIFICANT CHANGE UP (ref 0–0.9)
MONOCYTES NFR BLD AUTO: 6.4 % — SIGNIFICANT CHANGE UP (ref 2–14)
NEUTROPHILS # BLD AUTO: 3.34 K/UL — SIGNIFICANT CHANGE UP (ref 1.8–7.4)
NEUTROPHILS NFR BLD AUTO: 66.9 % — SIGNIFICANT CHANGE UP (ref 43–77)
NRBC # BLD: 0 /100 WBCS — SIGNIFICANT CHANGE UP (ref 0–0)
PLATELET # BLD AUTO: 286 K/UL — SIGNIFICANT CHANGE UP (ref 150–400)
POTASSIUM SERPL-MCNC: 4 MMOL/L — SIGNIFICANT CHANGE UP (ref 3.5–5.3)
POTASSIUM SERPL-SCNC: 4 MMOL/L — SIGNIFICANT CHANGE UP (ref 3.5–5.3)
PROT SERPL-MCNC: 7.1 G/DL — SIGNIFICANT CHANGE UP (ref 6–8.3)
RBC # BLD: 3.16 M/UL — LOW (ref 4.2–5.8)
RBC # FLD: 13.6 % — SIGNIFICANT CHANGE UP (ref 10.3–14.5)
SODIUM SERPL-SCNC: 135 MMOL/L — SIGNIFICANT CHANGE UP (ref 135–145)
WBC # BLD: 4.99 K/UL — SIGNIFICANT CHANGE UP (ref 3.8–10.5)
WBC # FLD AUTO: 4.99 K/UL — SIGNIFICANT CHANGE UP (ref 3.8–10.5)

## 2023-01-08 RX ADMIN — Medication 3 UNIT(S): at 12:25

## 2023-01-08 RX ADMIN — Medication 3 UNIT(S): at 08:17

## 2023-01-08 RX ADMIN — Medication 3 UNIT(S): at 17:29

## 2023-01-08 RX ADMIN — Medication 650 MILLIGRAM(S): at 06:29

## 2023-01-08 RX ADMIN — HEPARIN SODIUM 11 UNIT(S)/HR: 5000 INJECTION INTRAVENOUS; SUBCUTANEOUS at 17:39

## 2023-01-08 RX ADMIN — SENNA PLUS 2 TABLET(S): 8.6 TABLET ORAL at 21:50

## 2023-01-08 RX ADMIN — SODIUM CHLORIDE 3 MILLILITER(S): 9 INJECTION INTRAMUSCULAR; INTRAVENOUS; SUBCUTANEOUS at 21:57

## 2023-01-08 RX ADMIN — Medication 500000 UNIT(S): at 12:26

## 2023-01-08 RX ADMIN — SODIUM CHLORIDE 3 MILLILITER(S): 9 INJECTION INTRAMUSCULAR; INTRAVENOUS; SUBCUTANEOUS at 13:47

## 2023-01-08 RX ADMIN — Medication 500000 UNIT(S): at 23:44

## 2023-01-08 RX ADMIN — Medication 1: at 17:28

## 2023-01-08 RX ADMIN — Medication 12.5 MILLIGRAM(S): at 17:28

## 2023-01-08 RX ADMIN — Medication 500000 UNIT(S): at 17:28

## 2023-01-08 RX ADMIN — INSULIN GLARGINE 12 UNIT(S): 100 INJECTION, SOLUTION SUBCUTANEOUS at 21:50

## 2023-01-08 RX ADMIN — ATORVASTATIN CALCIUM 80 MILLIGRAM(S): 80 TABLET, FILM COATED ORAL at 21:51

## 2023-01-08 RX ADMIN — Medication 1: at 08:17

## 2023-01-08 RX ADMIN — PANTOPRAZOLE SODIUM 40 MILLIGRAM(S): 20 TABLET, DELAYED RELEASE ORAL at 06:00

## 2023-01-08 RX ADMIN — Medication 500000 UNIT(S): at 06:01

## 2023-01-08 RX ADMIN — Medication 12.5 MILLIGRAM(S): at 05:59

## 2023-01-08 RX ADMIN — SODIUM CHLORIDE 3 MILLILITER(S): 9 INJECTION INTRAMUSCULAR; INTRAVENOUS; SUBCUTANEOUS at 05:40

## 2023-01-08 RX ADMIN — Medication 81 MILLIGRAM(S): at 12:26

## 2023-01-08 RX ADMIN — AMIODARONE HYDROCHLORIDE 200 MILLIGRAM(S): 400 TABLET ORAL at 05:59

## 2023-01-08 RX ADMIN — Medication 650 MILLIGRAM(S): at 05:59

## 2023-01-08 NOTE — PROGRESS NOTE ADULT - ASSESSMENT
77M with a PMH T2DM and recently diagnosed CHF? who had recent admission for cardiac work up. Pt found to have CAD and aortic stenosis. On CT neck found to have R ICA stenosis. now s/p R CEA 1/3, post-op course c/b CODE STROKE on POD1 1/4/23 patient found to have right frontal/temporal opercular acute infarct with local sulcal effacement and mass effect. Patient transferred to CTU for hemodynamic and neuro-monitoring, now downgraded to floor with improving L-sided weakness. Stable at this time, AAOx4.     Plan:  - Appreciate continued neurology recommendations  - High dose statin  - Pain control PRN  - ASA  - ok to c/w AC from a vascular standpoint     Vascular   [id number]

## 2023-01-08 NOTE — PROGRESS NOTE ADULT - PROBLEM SELECTOR PLAN 5
A1C 9.4%  FS qAC and qHS  Diabetic Diet- moderately thickened liquids  Endocrinology following  c/w Lantus 12u qHS and Admelog 3u qAC

## 2023-01-08 NOTE — PROGRESS NOTE ADULT - ASSESSMENT
77M RH Portuguese speaking with a PMH T2DM, CHF, CAD, aortic stenosis, atrial fibrillation, COVID+ presented with chest pain on 12/1/22. Pt now s/p [facility] 12/4 with prox LAD 70% stenosis. Pt s/p AVR and CABG on 12/22/22. Pt had R CEA for asymptomatic carotid stenosis on 1/3/23 at 19:00. Code stroke called on 1/4/23. LKN 1/3/23 at 19:00 prior to R CEA surgery. No deficits noted in PACU. Pt on aspirin and lovenox ppx. NIHSS: 11, preMRS: 0. Neuro exam w/ LHH, mild L hemiparesis, R gaze preference, moderate dysarthria. Impression: R frontal infarct seen on CTH. Mechanism likely post operative complication from R CEA.    12/22 s/p AVR-t and CABGx1 with Dr. Bowles   1/3 R CEA  1/4 Code stroke called, CTH right frontal/temporal opercular acute infarct with local sulcal effacement and mass effect  1/5 MR Head: Acute R MCA infarct  1/6 FEES study- regular solids w/ mod thickened liquids  1/8 Transferred from CTU to 2 MAR [facility]

## 2023-01-08 NOTE — PROGRESS NOTE ADULT - SUBJECTIVE AND OBJECTIVE BOX
SURGERY DAILY PROGRESS NOTE:     SUBJECTIVE/ROS: Patient seen and evaluated on AM rounds. Patient states he is doing well, obvious improvements in L-sided weakness.    OBJECTIVE:  Vital Signs Last 24 Hrs  T(C): 36.6 (08 Jan 2023 04:57), Max: 36.8 (07 Jan 2023 20:00)  T(F): 97.9 (08 Jan 2023 04:57), Max: 98.2 (07 Jan 2023 20:00)  HR: 80 (08 Jan 2023 04:57) (72 - 88)  BP: 104/62 (08 Jan 2023 04:57) (103/52 - 133/57)  BP(mean): 76 (08 Jan 2023 04:57) (74 - 88)  RR: 18 (08 Jan 2023 04:57) (17 - 26)  SpO2: 94% (08 Jan 2023 04:57) (94% - 100%)  Parameters below as of 08 Jan 2023 04:57  Patient On (Oxygen Delivery Method): room air    I&O's Detail  07 Jan 2023 07:01  -  08 Jan 2023 07:00  IN:    Heparin: 230 mL    Oral Fluid: 400 mL  Total IN: 630 mL  OUT:    Voided (mL): 1075 mL  Total OUT: 1075 mL  Total NET: -445 mL    LABS:              9.4    4.99  )-----------( 286      ( 08 Jan 2023 06:37 )             29.0     135  |  98  |  17  ----------------------------<  143<H>  4.0   |  25  |  0.88    Ca    10.0      08 Jan 2023 06:37  Phos  3.5     01-07  Mg     2.0     01-07    TPro  7.1  /  Alb  3.8  /  TBili  1.2  /  DBili  x   /  AST  17  /  ALT  13  /  AlkPhos  96  01-08    PT/INR - ( 07 Jan 2023 00:50 )   PT: 14.2 sec;   INR: 1.23 ratio    PTT - ( 08 Jan 2023 06:37 )  PTT:62.0 sec    Physical Exam:  General: NAD, resting comfortably in bed  HEENT: R neck dressing removed, incision c/d/i, no hematoma, greatly improving L facial droop, no obvious dysarthria, nearly resolved L gaze palsy  Pulmonary: Nonlabored breathing, no respiratory distress  Abdominal: soft, nontender, nondistended  Extremities: significantly improved L  strength

## 2023-01-08 NOTE — PROGRESS NOTE ADULT - SUBJECTIVE AND OBJECTIVE BOX
VITAL SIGNS    Telemetry:  SR  Overnight Events: Transferred from [facility]    Vital Signs Last 24 Hrs  T(C): 36.8 (01-07-23 @ 22:17), Max: 36.9 (01-07-23 @ 04:00)  T(F): 98.2 (01-07-23 @ 22:17), Max: 98.4 (01-07-23 @ 04:00)  HR: 74 (01-07-23 @ 22:17) (72 - 89)  BP: 127/69 (01-07-23 @ 22:17) (103/52 - 133/57)  RR: 18 (01-07-23 @ 22:17) (17 - 26)  SpO2: 98% (01-07-23 @ 22:17) (95% - 100%)            01-06 @ 07:01  -  01-07 @ 07:00  --------------------------------------------------------  IN: 1138 mL / OUT: 1600 mL / NET: -462 mL    01-07 @ 07:01  -  01-08 @ 03:48  --------------------------------------------------------  IN: 630 mL / OUT: 1075 mL / NET: -445 mL       Daily     Daily   Admit Wt: Drug Dosing Weight  Height (cm): 162.6 (03 Jan 2023 16:58)  Weight (kg): 75.4 (03 Jan 2023 16:58)  BMI (kg/m2): 28.5 (03 Jan 2023 16:58)  BSA (m2): 1.81 (03 Jan 2023 16:58)      CAPILLARY BLOOD GLUCOSE      POCT Blood Glucose.: 131 mg/dL (08 Jan 2023 02:22)  POCT Blood Glucose.: 184 mg/dL (07 Jan 2023 21:40)  POCT Blood Glucose.: 146 mg/dL (07 Jan 2023 17:04)  POCT Blood Glucose.: 219 mg/dL (07 Jan 2023 11:36)  POCT Blood Glucose.: 170 mg/dL (07 Jan 2023 06:36)          acetaminophen     Tablet .. 650 milliGRAM(s) Oral every 6 hours PRN  aMIOdarone    Tablet   Oral   aMIOdarone    Tablet 400 milliGRAM(s) Oral every 8 hours  aMIOdarone    Tablet 200 milliGRAM(s) Oral daily  aspirin  chewable 81 milliGRAM(s) Oral daily  atorvastatin 80 milliGRAM(s) Oral at bedtime  bisacodyl Suppository 10 milliGRAM(s) Rectal once  heparin  Infusion 750 Unit(s)/Hr IV Continuous <Continuous>  insulin glargine Injectable (LANTUS) 12 Unit(s) SubCutaneous at bedtime  insulin lispro (ADMELOG) corrective regimen sliding scale   SubCutaneous <User Schedule>  insulin lispro (ADMELOG) corrective regimen sliding scale   SubCutaneous three times a day before meals  insulin lispro Injectable (ADMELOG) 3 Unit(s) SubCutaneous three times a day before meals  metoprolol tartrate 12.5 milliGRAM(s) Oral every 12 hours  nystatin    Suspension 059070 Unit(s) Oral every 6 hours  pantoprazole    Tablet 40 milliGRAM(s) Oral before breakfast  polyethylene glycol 3350 17 Gram(s) Oral daily  senna 2 Tablet(s) Oral at bedtime  sodium chloride 0.9% lock flush 3 milliLiter(s) IV Push every 8 hours  sodium chloride 0.9% lock flush 3 milliLiter(s) IV Push every 8 hours                            9.1    5.31  )-----------( 258      ( 07 Jan 2023 00:54 )             27.5     01-07    135  |  101  |  21  ----------------------------<  150<H>  3.9   |  22  |  0.90    Ca    9.7      07 Jan 2023 00:50  Phos  3.5     01-07  Mg     2.0     01-07    TPro  7.1  /  Alb  3.7  /  TBili  1.2  /  DBili  x   /  AST  16  /  ALT  13  /  AlkPhos  90  01-07    PT/INR - ( 07 Jan 2023 00:50 )   PT: 14.2 sec;   INR: 1.23 ratio         PTT - ( 07 Jan 2023 21:43 )  PTT:54.7 sec    PHYSICAL EXAM    Subjective: "Martir"  General: well appearing male, in no acute distress, laying in bed/chair. POD #17  Neurology: A&Ox3, mild left facial droop- much improved, LUE 5/5, LLE 5/5, left hand squeeze slightly weaker than right, clear speech, sensation intact  CV : S1/S2, no murmurs/rubs/gallops  Sternal Wound : CDI with dressing, Stable  Lungs: clear. RR easy, unlabored   Abdomen: soft, nontender, nondistended, positive bowel sounds, bowel movement, Neg N/V/D   :  pt voiding without difficulty   Extremities: CERDA; no edema, neg calf tenderness. PPP bilaterally, groins stable          Coumadin    [ ] YES          [x]      NO         Eliquis    [ ] YES          [x]      NO       Heparin gtt   [ x ] YES              []   NO  Dose: 11cc/hr    Disposition:  Home [   ]     Rehab [   ]       OR Date:    Plan of care discussed with Cardiothoracic Team at AM rounds.

## 2023-01-08 NOTE — PROGRESS NOTE ADULT - PROBLEM SELECTOR PLAN 1
s/p CABG x 1 with Dr. Bowles on 12/22/22  c/w ASA 81, Atorvastatin 80, Lopressor 12.5 BID  c/w Amiodarone Load  Incentive spirometry x 10 q 1hr, pulmonary toileting, increase ambulation, PT eval

## 2023-01-08 NOTE — PROGRESS NOTE ADULT - SUBJECTIVE AND OBJECTIVE BOX
Neurology Progress Note    S: Patient seen and examined in CTICU. MRI done confirms infarct. some mild weakness noted but better ; sitting up in chair      Medication:  MEDICATIONS  (STANDING):  aMIOdarone    Tablet 200 milliGRAM(s) Oral daily  aMIOdarone    Tablet   Oral   aspirin  chewable 81 milliGRAM(s) Oral daily  atorvastatin 80 milliGRAM(s) Oral at bedtime  bisacodyl Suppository 10 milliGRAM(s) Rectal once  heparin  Infusion 750 Unit(s)/Hr (11 mL/Hr) IV Continuous <Continuous>  insulin glargine Injectable (LANTUS) 12 Unit(s) SubCutaneous at bedtime  insulin lispro (ADMELOG) corrective regimen sliding scale   SubCutaneous <User Schedule>  insulin lispro (ADMELOG) corrective regimen sliding scale   SubCutaneous three times a day before meals  insulin lispro Injectable (ADMELOG) 3 Unit(s) SubCutaneous three times a day before meals  metoprolol tartrate 12.5 milliGRAM(s) Oral every 12 hours  nystatin    Suspension 269670 Unit(s) Oral every 6 hours  pantoprazole    Tablet 40 milliGRAM(s) Oral before breakfast  polyethylene glycol 3350 17 Gram(s) Oral daily  senna 2 Tablet(s) Oral at bedtime  sodium chloride 0.9% lock flush 3 milliLiter(s) IV Push every 8 hours  sodium chloride 0.9% lock flush 3 milliLiter(s) IV Push every 8 hours    MEDICATIONS  (PRN):  acetaminophen     Tablet .. 650 milliGRAM(s) Oral every 6 hours PRN Mild Pain (1 - 3)    Vitals:         Vital Signs Last 24 Hrs  T(C): 36.6 (01-08-23 @ 04:57), Max: 36.8 (01-07-23 @ 20:00)  T(F): 97.9 (01-08-23 @ 04:57), Max: 98.2 (01-07-23 @ 20:00)  HR: 80 (01-08-23 @ 04:57) (72 - 88)  BP: 104/62 (01-08-23 @ 04:57) (103/52 - 133/57)  BP(mean): 76 (01-08-23 @ 04:57) (74 - 88)  RR: 18 (01-08-23 @ 04:57) (17 - 26)  SpO2: 94% (01-08-23 @ 04:57) (94% - 100%)      General Exam:   General Appearance: Appropriately dressed and in no acute distress       Head: Normocephalic, atraumatic and no dysmorphic features  Ear, Nose, and Throat: Moist mucous membranes  CVS: S1S2+  Resp: No SOB, no wheeze or rhonchi  Abd: soft NTND  Extremities: No edema, no cyanosis  Skin: No bruises, no rashes     Neurological Exam:  Mental Status: Awake, alert and oriented x 2.  Able to follow simple verbal commands. Able to name and repeat. fluent speech. No obvious aphasia but mild to mod dysarthria noted.   Cranial Nerves: PERRL, R gaze pref, L HHA , sensation V1-V3 intact,  no obvious facial asymmetry , equal elevation of palate, scm/trap 5/5, tongue is midline on protrusion. no obvious papilledema on fundoscopic exam. Hearing is grossly intact.   Motor: mild LUE nad LLE drift but significant improvement in strength.   Sensation: Intact to light touch and pinprick throughout. no right/left confusion. no extinction to tactile on DSS.    Reflexes: 1+ throughout at biceps, brachioradialis, triceps, patellars and ankles bilaterally and equal. No clonus. R toe and L toe were both downgoing.  Coordination: No dysmetria on FNF   Gait: deferred     I personally reviewed the below data/images/labs:  CBC Full  -  ( 08 Jan 2023 06:37 )  WBC Count : 4.99 K/uL  RBC Count : 3.16 M/uL  Hemoglobin : 9.4 g/dL  Hematocrit : 29.0 %  Platelet Count - Automated : 286 K/uL  Mean Cell Volume : 91.8 fl  Mean Cell Hemoglobin : 29.7 pg  Mean Cell Hemoglobin Concentration : 32.4 gm/dL  Auto Neutrophil # : 3.34 K/uL  Auto Lymphocyte # : 1.26 K/uL  Auto Monocyte # : 0.32 K/uL  Auto Eosinophil # : 0.04 K/uL  Auto Basophil # : 0.02 K/uL  Auto Neutrophil % : 66.9 %  Auto Lymphocyte % : 25.3 %  Auto Monocyte % : 6.4 %  Auto Eosinophil % : 0.8 %  Auto Basophil % : 0.4 %    01-08    135  |  98  |  17  ----------------------------<  143<H>  4.0   |  25  |  0.88    Ca    10.0      08 Jan 2023 06:37  Phos  3.5     01-07  Mg     2.0     01-07    TPro  7.1  /  Alb  3.8  /  TBili  1.2  /  DBili  x   /  AST  17  /  ALT  13  /  AlkPhos  96  01-08      < from: CT Head No Cont (01.04.23 @ 08:57) >  IMPRESSION: Small right frontal and temporal opercular acute infarct   local sulcal effacement and mass effect. No hemorrhage. CTA ofthe neck   demonstrates postoperative changes at the right carotid bifurcation with   a patent endarterectomy graft. No large vessel occlusion intracranially   although a small distal branch occlusion is not excluded.       < from: CT Angio/Perfusion Head w/ IV Cont (01.04.23 @ 08:59) >  CBF<30% volume; 0 ml  Tmax> 6.0s volume: 47 ml  Mismatch volume: 47 ml  Mismatch ratio: infinite  < from: MR Head No Cont (01.05.23 @ 10:04) >    ACC: [id number] EXAM:  MR BRAIN                          PROCEDURE DATE:  01/05/2023          INTERPRETATION:  MRI BRAIN WITHOUT AND WITH IV CONTRAST    INDICATION: CVA. Additional history per EMR: now s/p R CEA 1/3, post-op   course c/b CODE STROKE on POD1  1/4/23 patient found to have right frontal/temporal opercular acute   infarct  with local sulcal effacement and mass effect.    COMPARISON: CT/CTA/CTP head neck 1/4/2023    TECHNIQUE: Multisequential multiplanar MRI of the brain is performed   without the administration of intravenous gadolinium contrast.    FINDINGS:    Examination is slightly degraded due to patient motion.    Cortical/subcortical restricted diffusion (DWI hyperintense/ADC   hypointense signal) is centered within the right frontal opercular and   insular regions, with slight involvement of the right temporal operculum.   At least two additional punctate foci of restricted diffusion within the   right frontal centrum semiovale and lentiform nucleus (images 17-22,   series 3). Corresponding FLAIR hyperintensity is noted. The infarct   appears grossly similar compared to hypodensity on recent noncontrast CT.    No abnormal SWI signal loss to suggest hemorrhagic transformation of   infarct.    ADC values less than 620 on rapid postprocessed sequence in the region of   diffusion and about the, is consistent with acute core ischemic infarct   (volume 52 mL).    There is no signal mass effect or midline shift. The basal cisterns are   patent.  There is no hydrocephalus.  No extra-axial collection is seen.  Generalized parenchymal volume loss is noted.    Large retrocerebellar CSF intensity extra-axial space, likely reflects an   incidental arachnoid cyst. Mild mass effect on the adjacent cerebellum.   Scalloping of the adjacent calvarium and thin internal septation are   noted.    The corpus callosum, optic chiasm, pituitary gland and pineal gland are   normal in configuration.  No cerebellar tonsillar herniation/ectopia. No Chiari malformation.    The visualized orbits are unremarkable. Mild mucosal thickening within   the paranasal sinuses. Bilateral mastoid/middle ear cavities are clear.    Preserved T2 flow-voids along the dural venous sinuses.      IMPRESSION:    Acute right MCA territory infarct (centered within right frontal   operculum-insular regions). This is grossly stable compared to 1/4/2023   CT. No hemorrhagic transformation of infarct or midline shift. Consider   imaging follow-up as clinically indicated.      Findings were discussedwith covering provider (Karla Gallegos NP) via   telephone on 1/5/2023 and 10:33 AM with verbal read back    --- End of Report ---            SORAYA PEDRAZA MD; Attending Radiologist  This document has been electronically signed. Jan 5 2023 10:44AM    < end of copied text >

## 2023-01-08 NOTE — CHART NOTE - NSCHARTNOTESELECT_GEN_ALL_CORE
Nutrition Services
Vascular Surgery Chart Note
Event Note
Nutrition Services
Post Op Check
Speech and Swallow
endocrine/Event Note

## 2023-01-08 NOTE — PROGRESS NOTE ADULT - ASSESSMENT
77M RH Georgian speaking with a PMH T2DM, CHF, CAD, aortic stenosis, atrial fibrillation, COVID+ presented with chest pain on 12/1/22. Pt now s/p LHC 12/4 with prox LAD 70% stenosis. Pt s/p AVR and CABG on 12/22/22. Pt had R CEA for asymptomatic carotid stenosis on 1/3/23 at 19:00. Code stroke called on 1/4/23. LKN 1/3/23 at 19:00 prior to R CEA surgery. No deficits noted in PACU. Pt on aspirin and lovenox ppx. NIHSS: 11, preMRS: 0. Neuro exam w/ LHH, mild L hemiparesis, R gaze preference, moderate dysarthria. subseuqent exam with some improvement    used [id number], vascular attending exam with LUE drift, slurred speech and mild L facial.   MRI confirms R hemisphere infarct   A1c 9.4    exam improving     Impression:  R hemisphere infarct/frontal, possible large artery athero wtih A-A embolism,  possible related to procedure but unclear , can also be CE from Afib     Recommendations:  - BP parameters can be normotensive at this piont.  if there is worsening in symptoms with drop in BP would raise BP target   - monitor on telemetry  -  started AC with heparin drip 1/6 with no bolus if no objection from vascular sx.  once on AC no need for ASA from stroke standpoint ; now on both heparin and asa. stat CTH if change in neuro exam   - start atorvastatin 80mg PO daily (titrate to LDL < 70)   - stroke risk factor modification and counseling   - NPO until bedside dysphagia screen  - PT/OT/S&S/SW consults  - remaining per primary team and CTS   - spoke with mihaela at bedside (granddaughter)   - Patient can follow up with Dr. Riki Hernandez after discharge. Please instruct the patient to call [phone] to schedule an appointment within the next 2-3 days. Office is located at [address] [facility], [place] [address].     Riki Hernandez MD  Vascular Neurology  Office: [phone]

## 2023-01-08 NOTE — PROGRESS NOTE ADULT - PROBLEM SELECTOR PLAN 4
Code Stroke on 1/4 1/4 CTH right frontal/temporal opercular acute infarct with local sulcal effacement and mass effect  1/5 MR Head: Acute R MCA infarct  Neuro following Dr. Hernandez

## 2023-01-08 NOTE — PROGRESS NOTE ADULT - PROBLEM SELECTOR PLAN 2
s/p AVR-t with Dr. Bowles on 12/22/22  c/w ASA 81, Atorvastatin 80, Lopressor 12.5 BID  c/w Amiodarone Load  Incentive spirometry x 10 q 1hr, pulmonary toileting, increase ambulation, PT eval

## 2023-01-09 LAB
ALBUMIN SERPL ELPH-MCNC: 3.7 G/DL — SIGNIFICANT CHANGE UP (ref 3.3–5)
ALP SERPL-CCNC: 95 U/L — SIGNIFICANT CHANGE UP (ref 40–120)
ALT FLD-CCNC: 17 U/L — SIGNIFICANT CHANGE UP (ref 10–45)
ANION GAP SERPL CALC-SCNC: 11 MMOL/L — SIGNIFICANT CHANGE UP (ref 5–17)
APTT BLD: 51.1 SEC — HIGH (ref 27.5–35.5)
APTT BLD: 56.1 SEC — HIGH (ref 27.5–35.5)
APTT BLD: 71.5 SEC — HIGH (ref 27.5–35.5)
AST SERPL-CCNC: 19 U/L — SIGNIFICANT CHANGE UP (ref 10–40)
BILIRUB SERPL-MCNC: 1.3 MG/DL — HIGH (ref 0.2–1.2)
BUN SERPL-MCNC: 16 MG/DL — SIGNIFICANT CHANGE UP (ref 7–23)
CALCIUM SERPL-MCNC: 10.1 MG/DL — SIGNIFICANT CHANGE UP (ref 8.4–10.5)
CHLORIDE SERPL-SCNC: 99 MMOL/L — SIGNIFICANT CHANGE UP (ref 96–108)
CO2 SERPL-SCNC: 26 MMOL/L — SIGNIFICANT CHANGE UP (ref 22–31)
CREAT SERPL-MCNC: 1 MG/DL — SIGNIFICANT CHANGE UP (ref 0.5–1.3)
EGFR: 78 ML/MIN/1.73M2 — SIGNIFICANT CHANGE UP
GLUCOSE BLDC GLUCOMTR-MCNC: 117 MG/DL — HIGH (ref 70–99)
GLUCOSE BLDC GLUCOMTR-MCNC: 124 MG/DL — HIGH (ref 70–99)
GLUCOSE BLDC GLUCOMTR-MCNC: 157 MG/DL — HIGH (ref 70–99)
GLUCOSE BLDC GLUCOMTR-MCNC: 185 MG/DL — HIGH (ref 70–99)
GLUCOSE BLDC GLUCOMTR-MCNC: 202 MG/DL — HIGH (ref 70–99)
GLUCOSE SERPL-MCNC: 118 MG/DL — HIGH (ref 70–99)
HCT VFR BLD CALC: 28.9 % — LOW (ref 39–50)
HGB BLD-MCNC: 9.4 G/DL — LOW (ref 13–17)
INR BLD: 1.31 RATIO — HIGH (ref 0.88–1.16)
MCHC RBC-ENTMCNC: 29.1 PG — SIGNIFICANT CHANGE UP (ref 27–34)
MCHC RBC-ENTMCNC: 32.5 GM/DL — SIGNIFICANT CHANGE UP (ref 32–36)
MCV RBC AUTO: 89.5 FL — SIGNIFICANT CHANGE UP (ref 80–100)
NRBC # BLD: 0 /100 WBCS — SIGNIFICANT CHANGE UP (ref 0–0)
PLATELET # BLD AUTO: 276 K/UL — SIGNIFICANT CHANGE UP (ref 150–400)
POTASSIUM SERPL-MCNC: 4.2 MMOL/L — SIGNIFICANT CHANGE UP (ref 3.5–5.3)
POTASSIUM SERPL-SCNC: 4.2 MMOL/L — SIGNIFICANT CHANGE UP (ref 3.5–5.3)
PROT SERPL-MCNC: 7.2 G/DL — SIGNIFICANT CHANGE UP (ref 6–8.3)
PROTHROM AB SERPL-ACNC: 15.2 SEC — HIGH (ref 10.5–13.4)
RBC # BLD: 3.23 M/UL — LOW (ref 4.2–5.8)
RBC # FLD: 13.5 % — SIGNIFICANT CHANGE UP (ref 10.3–14.5)
SODIUM SERPL-SCNC: 136 MMOL/L — SIGNIFICANT CHANGE UP (ref 135–145)
WBC # BLD: 4.83 K/UL — SIGNIFICANT CHANGE UP (ref 3.8–10.5)
WBC # FLD AUTO: 4.83 K/UL — SIGNIFICANT CHANGE UP (ref 3.8–10.5)

## 2023-01-09 PROCEDURE — 99232 SBSQ HOSP IP/OBS MODERATE 35: CPT

## 2023-01-09 RX ORDER — INSULIN LISPRO 100/ML
4 VIAL (ML) SUBCUTANEOUS
Refills: 0 | Status: DISCONTINUED | OUTPATIENT
Start: 2023-01-10 | End: 2023-01-10

## 2023-01-09 RX ADMIN — Medication 12.5 MILLIGRAM(S): at 18:09

## 2023-01-09 RX ADMIN — SODIUM CHLORIDE 3 MILLILITER(S): 9 INJECTION INTRAMUSCULAR; INTRAVENOUS; SUBCUTANEOUS at 12:22

## 2023-01-09 RX ADMIN — Medication 3 UNIT(S): at 08:13

## 2023-01-09 RX ADMIN — Medication 3 UNIT(S): at 12:23

## 2023-01-09 RX ADMIN — SODIUM CHLORIDE 3 MILLILITER(S): 9 INJECTION INTRAMUSCULAR; INTRAVENOUS; SUBCUTANEOUS at 05:35

## 2023-01-09 RX ADMIN — ATORVASTATIN CALCIUM 80 MILLIGRAM(S): 80 TABLET, FILM COATED ORAL at 21:28

## 2023-01-09 RX ADMIN — INSULIN GLARGINE 12 UNIT(S): 100 INJECTION, SOLUTION SUBCUTANEOUS at 21:27

## 2023-01-09 RX ADMIN — Medication 3 UNIT(S): at 18:09

## 2023-01-09 RX ADMIN — Medication 12.5 MILLIGRAM(S): at 05:23

## 2023-01-09 RX ADMIN — Medication 500000 UNIT(S): at 05:22

## 2023-01-09 RX ADMIN — SENNA PLUS 2 TABLET(S): 8.6 TABLET ORAL at 21:28

## 2023-01-09 RX ADMIN — Medication 500000 UNIT(S): at 18:09

## 2023-01-09 RX ADMIN — Medication 81 MILLIGRAM(S): at 12:24

## 2023-01-09 RX ADMIN — PANTOPRAZOLE SODIUM 40 MILLIGRAM(S): 20 TABLET, DELAYED RELEASE ORAL at 05:23

## 2023-01-09 RX ADMIN — Medication 2: at 12:17

## 2023-01-09 RX ADMIN — Medication 1: at 18:09

## 2023-01-09 RX ADMIN — AMIODARONE HYDROCHLORIDE 200 MILLIGRAM(S): 400 TABLET ORAL at 05:22

## 2023-01-09 RX ADMIN — SODIUM CHLORIDE 3 MILLILITER(S): 9 INJECTION INTRAMUSCULAR; INTRAVENOUS; SUBCUTANEOUS at 21:18

## 2023-01-09 RX ADMIN — Medication 500000 UNIT(S): at 12:24

## 2023-01-09 NOTE — PROGRESS NOTE ADULT - ASSESSMENT
77M RH Indonesian speaking with a PMH T2DM, CHF, CAD, aortic stenosis, atrial fibrillation, COVID+ presented with chest pain on 12/1/22. Pt now s/p LHC 12/4 with prox LAD 70% stenosis. Pt s/p AVR and CABG on 12/22/22. Pt had R CEA for asymptomatic carotid stenosis on 1/3/23 at 19:00. Code stroke called on 1/4/23. LKN 1/3/23 at 19:00 prior to R CEA surgery. No deficits noted in PACU. Pt on aspirin and lovenox ppx. NIHSS: 11, preMRS: 0. Neuro exam w/ LHH, mild L hemiparesis, R gaze preference, moderate dysarthria. subseuqent exam with some improvement    used [id number], vascular attending exam with LUE drift, slurred speech and mild L facial.   MRI confirms R hemisphere infarct   A1c 9.4    exam improving     Impression:  R hemisphere infarct/frontal, possible large artery athero wtih A-A embolism,  possible related to procedure but unclear , can also be CE from Afib     Recommendations:  - BP parameters can be normotensive at this piont.  if there is worsening in symptoms with drop in BP would raise BP target   - monitor on telemetry  -  started AC with heparin drip 1/6 with no bolus if no objection from vascular sx.  once on AC no need for ASA from stroke standpoint ; now on both heparin and asa. stat CTH if change in neuro exam   - start atorvastatin 80mg PO daily (titrate to LDL < 70)   - stroke risk factor modification and counseling   - NPO until bedside dysphagia screen  - PT/OT/S&S/SW consults  - remaining per primary team and CTS   - spoke with mihaela at bedside (granddaughter)   - Patient can follow up with Dr. Riki Hernandez after discharge. Please instruct the patient to call [phone] to schedule an appointment within the next 2-3 days. Office is located at [address] [facility], [place] [address].     Riki Hernandez MD  Vascular Neurology  Office: [phone]

## 2023-01-09 NOTE — PROGRESS NOTE ADULT - SUBJECTIVE AND OBJECTIVE BOX
DIABETES FOLLOW UP NOTE: Saw pt earlier today    Chief Complaint: Endocrine consult requested for management of T2DM    INTERVAL HX: Pt stable, reports tolerating POs with BG mostly at goal while on present insulin doses. Noted pt requiring total of 3 to 5 units of premeal insulin to keep BG at goal. No hypoglycemia. Pt and family stated no education given yet on insulin administration.  Pt denies any CP/SOB/weakness and has improved dysarthria.       Review of Systems:  General: As above  Cardiovascular: No chest pain, palpitations  Respiratory: No SOB, no cough  GI: No nausea, vomiting, abdominal pain  Endocrine: No polyuria, polydipsia or S&Sx of hypoglycemia    Allergies    No Known Allergies    Intolerances      MEDICATIONS:  atorvastatin 80 milliGRAM(s) Oral at bedtime  insulin glargine Injectable (LANTUS) 12 Unit(s) SubCutaneous at bedtime  insulin lispro (ADMELOG) corrective regimen sliding scale   SubCutaneous <User Schedule>  insulin lispro (ADMELOG) corrective regimen sliding scale   SubCutaneous three times a day before meals  insulin lispro Injectable (ADMELOG) 3 Unit(s) SubCutaneous three times a day before meals      PHYSICAL EXAM:  VITALS: T(C): 36.4 (01-09-23 @ 11:43)  T(F): 97.5 (01-09-23 @ 11:43), Max: 98.4 (01-09-23 @ 04:56)  HR: 90 (01-09-23 @ 18:18) (79 - 90)  BP: 116/64 (01-09-23 @ 18:18) (98/64 - 116/64)  RR:  (18 - 18)  SpO2:  (94% - 98%)  Wt(kg): --  GENERAL: Male sitting in chair in NAD. Daughter at bedside  CHEST:  Sternal incision dry and intact.   Abdomen: Soft, nontender, non distended, + obesity  Extremities: Warm, no edema in all 4 exts  NEURO: Alert and able to answer simple questions. Encounter done in Mongolian. Improved dysarthria and LUE weakness       LABS:  POCT Blood Glucose.: 185 mg/dL (01-09-23 @ 17:23)  POCT Blood Glucose.: 202 mg/dL (01-09-23 @ 11:17)  POCT Blood Glucose.: 124 mg/dL (01-09-23 @ 07:08)  POCT Blood Glucose.: 117 mg/dL (01-09-23 @ 02:33)  POCT Blood Glucose.: 121 mg/dL (01-08-23 @ 21:43)  POCT Blood Glucose.: 188 mg/dL (01-08-23 @ 17:03)  POCT Blood Glucose.: 142 mg/dL (01-08-23 @ 11:19)  POCT Blood Glucose.: 192 mg/dL (01-08-23 @ 07:35)  POCT Blood Glucose.: 131 mg/dL (01-08-23 @ 02:22)  POCT Blood Glucose.: 184 mg/dL (01-07-23 @ 21:40)  POCT Blood Glucose.: 146 mg/dL (01-07-23 @ 17:04)  POCT Blood Glucose.: 219 mg/dL (01-07-23 @ 11:36)  POCT Blood Glucose.: 170 mg/dL (01-07-23 @ 06:36)  POCT Blood Glucose.: 169 mg/dL (01-06-23 @ 21:58)                            9.4    4.83  )-----------( 276      ( 09 Jan 2023 05:46 )             28.9       01-09    136  |  99  |  16  ----------------------------<  118<H>  4.2   |  26  |  1.00    eGFR: 78    Ca    10.1      01-09  Mg     2.0     01-07  Phos  3.5     01-07    TPro  7.2  /  Alb  3.7  /  TBili  1.3<H>  /  DBili  x   /  AST  19  /  ALT  17  /  AlkPhos  95  01-09      A1C with Estimated Average Glucose Result: 9.4 % (12-02-22 @ 05:54)      Estimated Average Glucose: 223 mg/dL (12-02-22 @ 05:54)        12-02 Chol 185 Direct LDL -- LDL calculated 110<H> HDL 34<L> Trig 208<H>

## 2023-01-09 NOTE — PROGRESS NOTE ADULT - ASSESSMENT
77M with a PMH T2DM and recently diagnosed CHF? who had recent admission for cardiac work up. Pt found to have CAD and aortic stenosis. On CT neck found to have R ICA stenosis. now s/p R CEA 1/3, post-op course c/b CODE STROKE on POD1 1/4/23 patient found to have right frontal/temporal opercular acute infarct with local sulcal effacement and mass effect. Patient transferred to CTU for hemodynamic and neuro-monitoring. Stable at this time, AAOx4. Now back on floor, with much improved muscle strength    Plan:  - Appreciate neurology recs  - High dose statin  - Pain control PRN  - ASA  - Continue AC    Vascular   [id number]

## 2023-01-09 NOTE — PROGRESS NOTE ADULT - SUBJECTIVE AND OBJECTIVE BOX
VITAL SIGNS    Telemetry:    Vital Signs Last 24 Hrs  T(C): 36.9 (23 @ 04:56), Max: 36.9 (23 @ 04:56)  T(F): 98.4 (23 @ 04:56), Max: 98.4 (23 @ 04:56)  HR: 79 (23 @ 04:56) (78 - 89)  BP: 107/66 (23 @ 04:56) (95/57 - 123/72)  RR: 18 (23 @ 04:56) (18 - 18)  SpO2: 94% (23 @ 04:56) (94% - 98%)             @ 07:01  -   @ 07:00  --------------------------------------------------------  IN: 1001 mL / OUT: 1600 mL / NET: -599 mL       Daily     Daily Weight in k.8 (2023 06:05)  Admit Wt: Drug Dosing Weight  Height (cm): 162.6 (2023 16:58)  Weight (kg): 75.4 (2023 16:58)  BMI (kg/m2): 28.5 (2023 16:58)  BSA (m2): 1.81 (2023 16:58)    Bilirubin Total, Serum: 1.3 mg/dL ( @ 05:46)    CAPILLARY BLOOD GLUCOSE      POCT Blood Glucose.: 124 mg/dL (2023 07:08)  POCT Blood Glucose.: 117 mg/dL (2023 02:33)  POCT Blood Glucose.: 121 mg/dL (2023 21:43)  POCT Blood Glucose.: 188 mg/dL (2023 17:03)  POCT Blood Glucose.: 142 mg/dL (2023 11:19)          acetaminophen     Tablet .. 650 milliGRAM(s) Oral every 6 hours PRN  aMIOdarone    Tablet   Oral   aMIOdarone    Tablet 200 milliGRAM(s) Oral daily  aspirin  chewable 81 milliGRAM(s) Oral daily  atorvastatin 80 milliGRAM(s) Oral at bedtime  bisacodyl Suppository 10 milliGRAM(s) Rectal once  heparin  Infusion 750 Unit(s)/Hr IV Continuous <Continuous>  insulin glargine Injectable (LANTUS) 12 Unit(s) SubCutaneous at bedtime  insulin lispro (ADMELOG) corrective regimen sliding scale   SubCutaneous <User Schedule>  insulin lispro (ADMELOG) corrective regimen sliding scale   SubCutaneous three times a day before meals  insulin lispro Injectable (ADMELOG) 3 Unit(s) SubCutaneous three times a day before meals  metoprolol tartrate 12.5 milliGRAM(s) Oral every 12 hours  nystatin    Suspension 486654 Unit(s) Oral every 6 hours  pantoprazole    Tablet 40 milliGRAM(s) Oral before breakfast  polyethylene glycol 3350 17 Gram(s) Oral daily  senna 2 Tablet(s) Oral at bedtime  sodium chloride 0.9% lock flush 3 milliLiter(s) IV Push every 8 hours  sodium chloride 0.9% lock flush 3 milliLiter(s) IV Push every 8 hours      PHYSICAL EXAM    Subjective: "Hi.   Neurology: alert and oriented x 3, nonfocal, no gross deficits  CV : tele:  RSR  Sternal Wound :  CDI with dressing , Stable  Lungs: clear. RR easy, unlabored   Abdomen: soft, nontender, nondistended, positive bowel sounds, bowel movement   Neg N/V/D   :  pt voiding without difficulty   Extremities:   CERDA; edema, neg calf tenderness.   PPP bilaterally      PW:  Chest tubes:                 VITAL SIGNS    Telemetry:  rsr    Vital Signs Last 24 Hrs  T(C): 36.9 (23 @ 04:56), Max: 36.9 (23 @ 04:56)  T(F): 98.4 (23 @ 04:56), Max: 98.4 (23 @ 04:56)  HR: 79 (23 @ 04:56) (78 - 89)  BP: 107/66 (23 @ 04:56) (95/57 - 123/72)  RR: 18 (23 @ 04:56) (18 - 18)  SpO2: 94% (23 @ 04:56) (94% - 98%)             @ 07:01  -   @ 07:00  --------------------------------------------------------  IN: 1001 mL / OUT: 1600 mL / NET: -599 mL       Daily     Daily Weight in k.8 (2023 06:05)  Admit Wt: Drug Dosing Weight  Height (cm): 162.6 (2023 16:58)  Weight (kg): 75.4 (2023 16:58)  BMI (kg/m2): 28.5 (2023 16:58)  BSA (m2): 1.81 (2023 16:58)    Bilirubin Total, Serum: 1.3 mg/dL ( @ 05:46)    CAPILLARY BLOOD GLUCOSE      POCT Blood Glucose.: 124 mg/dL (2023 07:08)  POCT Blood Glucose.: 117 mg/dL (2023 02:33)  POCT Blood Glucose.: 121 mg/dL (2023 21:43)  POCT Blood Glucose.: 188 mg/dL (2023 17:03)  POCT Blood Glucose.: 142 mg/dL (2023 11:19)          acetaminophen     Tablet .. 650 milliGRAM(s) Oral every 6 hours PRN  aMIOdarone    Tablet   Oral   aMIOdarone    Tablet 200 milliGRAM(s) Oral daily  aspirin  chewable 81 milliGRAM(s) Oral daily  atorvastatin 80 milliGRAM(s) Oral at bedtime  bisacodyl Suppository 10 milliGRAM(s) Rectal once  heparin  Infusion 750 Unit(s)/Hr IV Continuous <Continuous>  insulin glargine Injectable (LANTUS) 12 Unit(s) SubCutaneous at bedtime  insulin lispro (ADMELOG) corrective regimen sliding scale   SubCutaneous <User Schedule>  insulin lispro (ADMELOG) corrective regimen sliding scale   SubCutaneous three times a day before meals  insulin lispro Injectable (ADMELOG) 3 Unit(s) SubCutaneous three times a day before meals  metoprolol tartrate 12.5 milliGRAM(s) Oral every 12 hours  nystatin    Suspension 714189 Unit(s) Oral every 6 hours  pantoprazole    Tablet 40 milliGRAM(s) Oral before breakfast  polyethylene glycol 3350 17 Gram(s) Oral daily  senna 2 Tablet(s) Oral at bedtime  sodium chloride 0.9% lock flush 3 milliLiter(s) IV Push every 8 hours  sodium chloride 0.9% lock flush 3 milliLiter(s) IV Push every 8 hours      PHYSICAL EXAM    Subjective: "Martir."   Neurology: alert and oriented x 3, left side hemiparesis noted  CV : tele:  RSR    Sternal Wound :  CDI AVINASH, sternum Stable  Lungs: clear. RR easy, unlabored   Abdomen: soft, nontender, nondistended, positive bowel sounds, +BM;  Neg N/V/D   :  pt voiding without difficulty   Extremities: lt side hemiparesis;  neg LE edema, neg calf tenderness.   PPP bilaterally      PW: no  Chest tubes: none

## 2023-01-09 NOTE — PROGRESS NOTE ADULT - PROBLEM SELECTOR PLAN 4
Code Stroke on 1/4 1/4 CTH right frontal/temporal opercular acute infarct with local sulcal effacement and mass effect  1/5 MR Head: Acute R MCA infarct  Neuro following Dr. Hernandez Code Stroke on 1/4 1/4 CTH right frontal/temporal opercular acute infarct with local sulcal effacement and mass effect  1/5 MR Head: Acute R MCA infarct  Neuro following Dr. Hernandez  continue pt/ ot  mod thick diet  aspiration precautions

## 2023-01-09 NOTE — PROGRESS NOTE ADULT - ASSESSMENT
77M RH Slovak speaking with a PMH T2DM, CHF, CAD, aortic stenosis, atrial fibrillation, COVID+ presented with chest pain on 12/1/22. Pt now s/p [facility] 12/4 with prox LAD 70% stenosis. Pt s/p AVR and CABG on 12/22/22. Pt had R CEA for asymptomatic carotid stenosis on 1/3/23 at 19:00. Code stroke called on 1/4/23. LKN 1/3/23 at 19:00 prior to R CEA surgery. No deficits noted in PACU. Pt on aspirin and lovenox ppx. NIHSS: 11, preMRS: 0. Neuro exam w/ LHH, mild L hemiparesis, R gaze preference, moderate dysarthria. Impression: R frontal infarct seen on CTH. Mechanism likely post operative complication from R CEA.    12/22 s/p AVR-t and CABGx1 with Dr. Bowles   1/3 R CEA  1/4 Code stroke called, CTH right frontal/temporal opercular acute infarct with local sulcal effacement and mass effect  1/5 MR Head: Acute R MCA infarct  1/6 FEES study- regular solids w/ mod thickened liquids  1/8 Transferred from CTU to 2 MAR ivey       77M RH Arabic speaking with a PMH T2DM, CHF, CAD, aortic stenosis, atrial fibrillation, COVID+ presented with chest pain on 12/1/22. Pt now s/p [facility] 12/4 with prox LAD 70% stenosis. Pt s/p AVR and CABG on 12/22/22. Pt had R CEA for asymptomatic carotid stenosis on 1/3/23 at 19:00. Code stroke called on 1/4/23. LKN 1/3/23 at 19:00 prior to R CEA surgery. No deficits noted in PACU. Pt on aspirin and lovenox ppx. NIHSS: 11, preMRS: 0. Neuro exam w/ LH, mild L hemiparesis, R gaze preference, moderate dysarthria. Impression: R frontal infarct seen on CTH. Mechanism likely post operative complication from R CEA.    12/22 s/p AVR-t and CABGx1 with Dr. Bowles   1/3 R CEA  1/4 Code stroke called, CTH right frontal/temporal opercular acute infarct with local sulcal effacement and mass effect  1/5 MR Head: Acute R MCA infarct  1/6 FEES study- regular solids w/ mod thickened liquids  1/8 Transferred from CTU to 2 MAR ivey  1/9 VSS; RSR ; heparin gttp for AC PTT 71 today; decrease heparin gttp 1000 units/hr; ?oral AC medication w/ Dr. Bowles; d/w Endo- home dm discharge recs  discharge planning- home ?tue/wed

## 2023-01-09 NOTE — PROGRESS NOTE ADULT - PROBLEM SELECTOR PLAN 2
s/p AVR-t with Dr. Bowles on 12/22/22  c/w ASA 81, Atorvastatin 80, Lopressor 12.5 BID  c/w Amiodarone Load  Incentive spirometry x 10 q 1hr, pulmonary toileting, increase ambulation, PT eval s/p 12/22 C1L/AVR (t)   continue postop care  continue asa/ statin and bb   continue amio 200 qd  pulm toilet  pain management  bowel regimen  increase activity as tolerated  Discharge planning- home when stable

## 2023-01-09 NOTE — PROGRESS NOTE ADULT - PROBLEM SELECTOR PLAN 5
A1C 9.4%  FS qAC and qHS  Diabetic Diet- moderately thickened liquids  Endocrinology following  c/w Lantus 12u qHS and Admelog 3u qAC A1C 9.4%  FS qAC and qHS  Diabetic Diet- moderately thickened liquids  Endocrinology following  c/w Lantus 12u qHS and Admelog 3u qAC  d/w endo - home dm recs

## 2023-01-09 NOTE — PROGRESS NOTE ADULT - PROBLEM SELECTOR PLAN 6
c/w Heparin gtt  c/w Amiodarone load  c/w Lopressor 12.5 BID, up titrate as tolerated c/w Heparin gtt  c/w Amiodarone load  c/w Lopressor 12.5 BID, up titrate as tolerated   heparin gttp for AC PTT 71 today; decrease heparin gttp 1000 units/hr; ?oral AC medication w/ Dr. Bowles continue Heparin gtt  amio load completed- now on amio 200 qd   continue  Lopressor 12.5 BID, up titrate as tolerated  heparin gttp for AC PTT 71 today; decrease heparin gttp 1000 units/hr; ?oral AC medication w/ Dr. Bowles

## 2023-01-09 NOTE — PROGRESS NOTE ADULT - PROBLEM SELECTOR PLAN 1
s/p CABG x 1 with Dr. Bowles on 12/22/22  c/w ASA 81, Atorvastatin 80, Lopressor 12.5 BID  c/w Amiodarone Load  Incentive spirometry x 10 q 1hr, pulmonary toileting, increase ambulation, PT eval s/p 12/22 C1L/AVR (t)   continue postop care  continue asa/ statin and bb   continue amio 200 qd  pulm toilet  pain management  bowel regimen  increase activity as tolerated  Discharge planning- home when stable

## 2023-01-09 NOTE — PROGRESS NOTE ADULT - ASSESSMENT
77 M with recently diagnosed T2D (A1C 9.4%) on Metformin PTA. DM complicated by CAD s/p [facility] 12/4 with prox LAD 70% stenosis, aortic stenosis. Also + CHF now s/p  CABG/SAVR. 12/22 and s/p carotid endarterectomy 1/3/23 with R frontal infarct s/p procedure with L hand weakness> greatly improved now.  Tolerating POs with BG mosty at goal but requiring higher premeal doses. Will increase premeal insulin to keep  BG goal 100 to 180s.    Reinforced diet education since pt stated he eats much more at home than in hospital. Reviewed education given last week with pt and daughter as well

## 2023-01-09 NOTE — PROGRESS NOTE ADULT - SUBJECTIVE AND OBJECTIVE BOX
Vascular Surgery Progress Note    SUBJECTIVE  No acute events overnight. Pt seen and examined on mornings rounds.    OBJECTIVE  ___________________________________________________  VITAL SIGNS / I&O's   Vital Signs Last 24 Hrs  T(C): 36.9 (09 Jan 2023 04:56), Max: 36.9 (09 Jan 2023 04:56)  T(F): 98.4 (09 Jan 2023 04:56), Max: 98.4 (09 Jan 2023 04:56)  HR: 79 (09 Jan 2023 04:56) (78 - 89)  BP: 107/66 (09 Jan 2023 04:56) (95/57 - 123/72)  BP(mean): 83 (08 Jan 2023 19:16) (70 - 83)  RR: 18 (09 Jan 2023 04:56) (18 - 18)  SpO2: 94% (09 Jan 2023 04:56) (94% - 98%)    Parameters below as of 09 Jan 2023 04:56  Patient On (Oxygen Delivery Method): room air          08 Jan 2023 07:01  -  09 Jan 2023 07:00  --------------------------------------------------------  IN:    Heparin: 121 mL    Oral Fluid: 880 mL  Total IN: 1001 mL    OUT:    Voided (mL): 1600 mL  Total OUT: 1600 mL    Total NET: -599 mL        ___________________________________________________  PHYSICAL EXAM    General: NAD, resting comfortably in bed  HEENT: Incision c/d/i, no hematoma,  Pulmonary: Nonlabored breathing, no respiratory distress  Abdominal: soft, nontender, nondistended  Extremities: Strong  strength b/l, moves all ext    ___________________________________________________  LABS                        9.4    4.83  )-----------( 276      ( 09 Jan 2023 05:46 )             28.9     09 Jan 2023 05:46    136    |  99     |  16     ----------------------------<  118    4.2     |  26     |  1.00     Ca    10.1       09 Jan 2023 05:46    TPro  7.2    /  Alb  3.7    /  TBili  1.3    /  DBili  x      /  AST  19     /  ALT  17     /  AlkPhos  95     09 Jan 2023 05:46    PTT - ( 09 Jan 2023 05:46 )  PTT:71.5 sec  CAPILLARY BLOOD GLUCOSE      POCT Blood Glucose.: 124 mg/dL (09 Jan 2023 07:08)  POCT Blood Glucose.: 117 mg/dL (09 Jan 2023 02:33)  POCT Blood Glucose.: 121 mg/dL (08 Jan 2023 21:43)  POCT Blood Glucose.: 188 mg/dL (08 Jan 2023 17:03)  POCT Blood Glucose.: 142 mg/dL (08 Jan 2023 11:19)          ___________________________________________________  MICRO  Recent Cultures:    ___________________________________________________  MEDICATIONS  (STANDING):  aMIOdarone    Tablet   Oral   aMIOdarone    Tablet 200 milliGRAM(s) Oral daily  aspirin  chewable 81 milliGRAM(s) Oral daily  atorvastatin 80 milliGRAM(s) Oral at bedtime  bisacodyl Suppository 10 milliGRAM(s) Rectal once  heparin  Infusion 750 Unit(s)/Hr (11 mL/Hr) IV Continuous <Continuous>  insulin glargine Injectable (LANTUS) 12 Unit(s) SubCutaneous at bedtime  insulin lispro (ADMELOG) corrective regimen sliding scale   SubCutaneous <User Schedule>  insulin lispro (ADMELOG) corrective regimen sliding scale   SubCutaneous three times a day before meals  insulin lispro Injectable (ADMELOG) 3 Unit(s) SubCutaneous three times a day before meals  metoprolol tartrate 12.5 milliGRAM(s) Oral every 12 hours  nystatin    Suspension 159186 Unit(s) Oral every 6 hours  pantoprazole    Tablet 40 milliGRAM(s) Oral before breakfast  polyethylene glycol 3350 17 Gram(s) Oral daily  senna 2 Tablet(s) Oral at bedtime  sodium chloride 0.9% lock flush 3 milliLiter(s) IV Push every 8 hours  sodium chloride 0.9% lock flush 3 milliLiter(s) IV Push every 8 hours    MEDICATIONS  (PRN):  acetaminophen     Tablet .. 650 milliGRAM(s) Oral every 6 hours PRN Mild Pain (1 - 3)

## 2023-01-09 NOTE — PROGRESS NOTE ADULT - SUBJECTIVE AND OBJECTIVE BOX
Neurology Progress Note    S: Patient seen and examined. doing well     Medication:  MEDICATIONS  (STANDING):  aMIOdarone    Tablet   Oral   aMIOdarone    Tablet 200 milliGRAM(s) Oral daily  aspirin  chewable 81 milliGRAM(s) Oral daily  atorvastatin 80 milliGRAM(s) Oral at bedtime  bisacodyl Suppository 10 milliGRAM(s) Rectal once  heparin  Infusion 750 Unit(s)/Hr (10 mL/Hr) IV Continuous <Continuous>  insulin glargine Injectable (LANTUS) 12 Unit(s) SubCutaneous at bedtime  insulin lispro (ADMELOG) corrective regimen sliding scale   SubCutaneous <User Schedule>  insulin lispro (ADMELOG) corrective regimen sliding scale   SubCutaneous three times a day before meals  insulin lispro Injectable (ADMELOG) 3 Unit(s) SubCutaneous three times a day before meals  metoprolol tartrate 12.5 milliGRAM(s) Oral every 12 hours  nystatin    Suspension 529571 Unit(s) Oral every 6 hours  pantoprazole    Tablet 40 milliGRAM(s) Oral before breakfast  polyethylene glycol 3350 17 Gram(s) Oral daily  senna 2 Tablet(s) Oral at bedtime  sodium chloride 0.9% lock flush 3 milliLiter(s) IV Push every 8 hours  sodium chloride 0.9% lock flush 3 milliLiter(s) IV Push every 8 hours    MEDICATIONS  (PRN):  acetaminophen     Tablet .. 650 milliGRAM(s) Oral every 6 hours PRN Mild Pain (1 - 3)      Vitals:         Vital Signs Last 24 Hrs  T(C): 36.9 (01-09-23 @ 04:56), Max: 36.9 (01-09-23 @ 04:56)  T(F): 98.4 (01-09-23 @ 04:56), Max: 98.4 (01-09-23 @ 04:56)  HR: 79 (01-09-23 @ 04:56) (78 - 89)  BP: 107/66 (01-09-23 @ 04:56) (95/57 - 123/72)  BP(mean): 83 (01-08-23 @ 19:16) (70 - 83)  RR: 18 (01-09-23 @ 04:56) (18 - 18)  SpO2: 94% (01-09-23 @ 04:56) (94% - 98%)          General Exam:   General Appearance: Appropriately dressed and in no acute distress       Head: Normocephalic, atraumatic and no dysmorphic features  Ear, Nose, and Throat: Moist mucous membranes  CVS: S1S2+  Resp: No SOB, no wheeze or rhonchi  Abd: soft NTND  Extremities: No edema, no cyanosis  Skin: No bruises, no rashes     Neurological Exam:  Mental Status: Awake, alert and oriented x 2.  Able to follow simple verbal commands. Able to name and repeat. fluent speech. No obvious aphasia but mild to mod dysarthria noted.   Cranial Nerves: PERRL, R gaze pref, L HHA , sensation V1-V3 intact,  no obvious facial asymmetry , equal elevation of palate, scm/trap 5/5, tongue is midline on protrusion. no obvious papilledema on fundoscopic exam. Hearing is grossly intact.   Motor: mild LUE nad LLE drift but significant improvement in strength.   Sensation: Intact to light touch and pinprick throughout. no right/left confusion. no extinction to tactile on DSS.    Reflexes: 1+ throughout at biceps, brachioradialis, triceps, patellars and ankles bilaterally and equal. No clonus. R toe and L toe were both downgoing.  Coordination: No dysmetria on FNF   Gait: deferred     I personally reviewed the below data/images/labs:  CBC Full  -  ( 09 Jan 2023 05:46 )  WBC Count : 4.83 K/uL  RBC Count : 3.23 M/uL  Hemoglobin : 9.4 g/dL  Hematocrit : 28.9 %  Platelet Count - Automated : 276 K/uL  Mean Cell Volume : 89.5 fl  Mean Cell Hemoglobin : 29.1 pg  Mean Cell Hemoglobin Concentration : 32.5 gm/dL  Auto Neutrophil # : x  Auto Lymphocyte # : x  Auto Monocyte # : x  Auto Eosinophil # : x  Auto Basophil # : x  Auto Neutrophil % : x  Auto Lymphocyte % : x  Auto Monocyte % : x  Auto Eosinophil % : x  Auto Basophil % : x    01-09    136  |  99  |  16  ----------------------------<  118<H>  4.2   |  26  |  1.00    Ca    10.1      09 Jan 2023 05:46    TPro  7.2  /  Alb  3.7  /  TBili  1.3<H>  /  DBili  x   /  AST  19  /  ALT  17  /  AlkPhos  95  01-09    < from: CT Head No Cont (01.04.23 @ 08:57) >  IMPRESSION: Small right frontal and temporal opercular acute infarct   local sulcal effacement and mass effect. No hemorrhage. CTA ofthe neck   demonstrates postoperative changes at the right carotid bifurcation with   a patent endarterectomy graft. No large vessel occlusion intracranially   although a small distal branch occlusion is not excluded.       < from: CT Angio/Perfusion Head w/ IV Cont (01.04.23 @ 08:59) >  CBF<30% volume; 0 ml  Tmax> 6.0s volume: 47 ml  Mismatch volume: 47 ml  Mismatch ratio: infinite  < from: MR Head No Cont (01.05.23 @ 10:04) >    ACC: [id number] EXAM:  MR BRAIN                          PROCEDURE DATE:  01/05/2023          INTERPRETATION:  MRI BRAIN WITHOUT AND WITH IV CONTRAST    INDICATION: CVA. Additional history per EMR: now s/p R CEA 1/3, post-op   course c/b CODE STROKE on POD1  1/4/23 patient found to have right frontal/temporal opercular acute   infarct  with local sulcal effacement and mass effect.    COMPARISON: CT/CTA/CTP head neck 1/4/2023    TECHNIQUE: Multisequential multiplanar MRI of the brain is performed   without the administration of intravenous gadolinium contrast.    FINDINGS:    Examination is slightly degraded due to patient motion.    Cortical/subcortical restricted diffusion (DWI hyperintense/ADC   hypointense signal) is centered within the right frontal opercular and   insular regions, with slight involvement of the right temporal operculum.   At least two additional punctate foci of restricted diffusion within the   right frontal centrum semiovale and lentiform nucleus (images 17-22,   series 3). Corresponding FLAIR hyperintensity is noted. The infarct   appears grossly similar compared to hypodensity on recent noncontrast CT.    No abnormal SWI signal loss to suggest hemorrhagic transformation of   infarct.    ADC values less than 620 on rapid postprocessed sequence in the region of   diffusion and about the, is consistent with acute core ischemic infarct   (volume 52 mL).    There is no signal mass effect or midline shift. The basal cisterns are   patent.  There is no hydrocephalus.  No extra-axial collection is seen.  Generalized parenchymal volume loss is noted.    Large retrocerebellar CSF intensity extra-axial space, likely reflects an   incidental arachnoid cyst. Mild mass effect on the adjacent cerebellum.   Scalloping of the adjacent calvarium and thin internal septation are   noted.    The corpus callosum, optic chiasm, pituitary gland and pineal gland are   normal in configuration.  No cerebellar tonsillar herniation/ectopia. No Chiari malformation.    The visualized orbits are unremarkable. Mild mucosal thickening within   the paranasal sinuses. Bilateral mastoid/middle ear cavities are clear.    Preserved T2 flow-voids along the dural venous sinuses.      IMPRESSION:    Acute right MCA territory infarct (centered within right frontal   operculum-insular regions). This is grossly stable compared to 1/4/2023   CT. No hemorrhagic transformation of infarct or midline shift. Consider   imaging follow-up as clinically indicated.      Findings were discussedwith covering provider (Karla Gallegos NP) via   telephone on 1/5/2023 and 10:33 AM with verbal read back    --- End of Report ---            SORAYA PEDRAZA MD; Attending Radiologist  This document has been electronically signed. Jan 5 2023 10:44AM    < end of copied text >

## 2023-01-10 ENCOUNTER — NON-APPOINTMENT (OUTPATIENT)
Age: 78
End: 2023-01-10

## 2023-01-10 LAB
ANION GAP SERPL CALC-SCNC: 12 MMOL/L — SIGNIFICANT CHANGE UP (ref 5–17)
APTT BLD: 60.9 SEC — HIGH (ref 27.5–35.5)
BUN SERPL-MCNC: 17 MG/DL — SIGNIFICANT CHANGE UP (ref 7–23)
CALCIUM SERPL-MCNC: 10.1 MG/DL — SIGNIFICANT CHANGE UP (ref 8.4–10.5)
CHLORIDE SERPL-SCNC: 99 MMOL/L — SIGNIFICANT CHANGE UP (ref 96–108)
CO2 SERPL-SCNC: 24 MMOL/L — SIGNIFICANT CHANGE UP (ref 22–31)
CREAT SERPL-MCNC: 0.96 MG/DL — SIGNIFICANT CHANGE UP (ref 0.5–1.3)
EGFR: 81 ML/MIN/1.73M2 — SIGNIFICANT CHANGE UP
GLUCOSE BLDC GLUCOMTR-MCNC: 129 MG/DL — HIGH (ref 70–99)
GLUCOSE BLDC GLUCOMTR-MCNC: 145 MG/DL — HIGH (ref 70–99)
GLUCOSE BLDC GLUCOMTR-MCNC: 167 MG/DL — HIGH (ref 70–99)
GLUCOSE BLDC GLUCOMTR-MCNC: 199 MG/DL — HIGH (ref 70–99)
GLUCOSE BLDC GLUCOMTR-MCNC: 208 MG/DL — HIGH (ref 70–99)
GLUCOSE SERPL-MCNC: 122 MG/DL — HIGH (ref 70–99)
HCT VFR BLD CALC: 29 % — LOW (ref 39–50)
HGB BLD-MCNC: 9.6 G/DL — LOW (ref 13–17)
INR BLD: 1.26 RATIO — HIGH (ref 0.88–1.16)
MCHC RBC-ENTMCNC: 29.4 PG — SIGNIFICANT CHANGE UP (ref 27–34)
MCHC RBC-ENTMCNC: 33.1 GM/DL — SIGNIFICANT CHANGE UP (ref 32–36)
MCV RBC AUTO: 89 FL — SIGNIFICANT CHANGE UP (ref 80–100)
NRBC # BLD: 0 /100 WBCS — SIGNIFICANT CHANGE UP (ref 0–0)
PLATELET # BLD AUTO: 268 K/UL — SIGNIFICANT CHANGE UP (ref 150–400)
POTASSIUM SERPL-MCNC: 4 MMOL/L — SIGNIFICANT CHANGE UP (ref 3.5–5.3)
POTASSIUM SERPL-SCNC: 4 MMOL/L — SIGNIFICANT CHANGE UP (ref 3.5–5.3)
PROTHROM AB SERPL-ACNC: 14.7 SEC — HIGH (ref 10.5–13.4)
RBC # BLD: 3.26 M/UL — LOW (ref 4.2–5.8)
RBC # FLD: 13.8 % — SIGNIFICANT CHANGE UP (ref 10.3–14.5)
SODIUM SERPL-SCNC: 135 MMOL/L — SIGNIFICANT CHANGE UP (ref 135–145)
SURGICAL PATHOLOGY STUDY: SIGNIFICANT CHANGE UP
WBC # BLD: 5.01 K/UL — SIGNIFICANT CHANGE UP (ref 3.8–10.5)
WBC # FLD AUTO: 5.01 K/UL — SIGNIFICANT CHANGE UP (ref 3.8–10.5)

## 2023-01-10 PROCEDURE — 99232 SBSQ HOSP IP/OBS MODERATE 35: CPT

## 2023-01-10 RX ORDER — APIXABAN 2.5 MG/1
5 TABLET, FILM COATED ORAL
Refills: 0 | Status: DISCONTINUED | OUTPATIENT
Start: 2023-01-10 | End: 2023-01-11

## 2023-01-10 RX ORDER — INSULIN LISPRO 100/ML
6 VIAL (ML) SUBCUTANEOUS
Refills: 0 | Status: DISCONTINUED | OUTPATIENT
Start: 2023-01-11 | End: 2023-01-11

## 2023-01-10 RX ADMIN — POLYETHYLENE GLYCOL 3350 17 GRAM(S): 17 POWDER, FOR SOLUTION ORAL at 11:27

## 2023-01-10 RX ADMIN — SODIUM CHLORIDE 3 MILLILITER(S): 9 INJECTION INTRAMUSCULAR; INTRAVENOUS; SUBCUTANEOUS at 21:37

## 2023-01-10 RX ADMIN — Medication 1: at 11:26

## 2023-01-10 RX ADMIN — Medication 500000 UNIT(S): at 05:39

## 2023-01-10 RX ADMIN — SODIUM CHLORIDE 3 MILLILITER(S): 9 INJECTION INTRAMUSCULAR; INTRAVENOUS; SUBCUTANEOUS at 13:41

## 2023-01-10 RX ADMIN — Medication 12.5 MILLIGRAM(S): at 17:01

## 2023-01-10 RX ADMIN — Medication 12.5 MILLIGRAM(S): at 05:38

## 2023-01-10 RX ADMIN — SENNA PLUS 2 TABLET(S): 8.6 TABLET ORAL at 21:48

## 2023-01-10 RX ADMIN — APIXABAN 5 MILLIGRAM(S): 2.5 TABLET, FILM COATED ORAL at 21:48

## 2023-01-10 RX ADMIN — Medication 500000 UNIT(S): at 17:01

## 2023-01-10 RX ADMIN — PANTOPRAZOLE SODIUM 40 MILLIGRAM(S): 20 TABLET, DELAYED RELEASE ORAL at 05:38

## 2023-01-10 RX ADMIN — Medication 500000 UNIT(S): at 11:27

## 2023-01-10 RX ADMIN — SODIUM CHLORIDE 3 MILLILITER(S): 9 INJECTION INTRAMUSCULAR; INTRAVENOUS; SUBCUTANEOUS at 05:22

## 2023-01-10 RX ADMIN — ATORVASTATIN CALCIUM 80 MILLIGRAM(S): 80 TABLET, FILM COATED ORAL at 21:48

## 2023-01-10 RX ADMIN — HEPARIN SODIUM 10 UNIT(S)/HR: 5000 INJECTION INTRAVENOUS; SUBCUTANEOUS at 07:02

## 2023-01-10 RX ADMIN — Medication 81 MILLIGRAM(S): at 11:27

## 2023-01-10 RX ADMIN — Medication 500000 UNIT(S): at 00:40

## 2023-01-10 RX ADMIN — Medication 2: at 17:00

## 2023-01-10 RX ADMIN — AMIODARONE HYDROCHLORIDE 200 MILLIGRAM(S): 400 TABLET ORAL at 05:38

## 2023-01-10 RX ADMIN — Medication 4 UNIT(S): at 17:00

## 2023-01-10 RX ADMIN — Medication 4 UNIT(S): at 08:01

## 2023-01-10 RX ADMIN — INSULIN GLARGINE 12 UNIT(S): 100 INJECTION, SOLUTION SUBCUTANEOUS at 21:48

## 2023-01-10 RX ADMIN — APIXABAN 5 MILLIGRAM(S): 2.5 TABLET, FILM COATED ORAL at 12:37

## 2023-01-10 RX ADMIN — Medication 4 UNIT(S): at 11:27

## 2023-01-10 NOTE — PROGRESS NOTE ADULT - ASSESSMENT
77M RH Urdu speaking with a PMH T2DM, CHF, CAD, aortic stenosis, atrial fibrillation, COVID+ presented with chest pain on 12/1/22. Pt now s/p [facility] 12/4 with prox LAD 70% stenosis. Pt s/p AVR and CABG on 12/22/22. Pt had R CEA for asymptomatic carotid stenosis on 1/3/23 at 19:00. Code stroke called on 1/4/23. LKN 1/3/23 at 19:00 prior to R CEA surgery. No deficits noted in PACU. Pt on aspirin and lovenox ppx. NIHSS: 11, preMRS: 0. Neuro exam w/ LH, mild L hemiparesis, R gaze preference, moderate dysarthria. Impression: R frontal infarct seen on CTH. Mechanism likely post operative complication from R CEA.    12/22 s/p AVR-t and CABGx1 with Dr. Bowles   1/3 R CEA  1/4 Code stroke called, CTH right frontal/temporal opercular acute infarct with local sulcal effacement and mass effect  1/5 MR Head: Acute R MCA infarct  1/6 FEES study- regular solids w/ mod thickened liquids  1/8 Transferred from CTU to 2 [facility] VSS  1/9 VSS; RSR ; heparin gttp for AC PTT 71 today; decrease heparin gttp 1000 units/hr; ?oral AC medication w/ Dr. Bowles; d/w Endo- home dm discharge recs  1/10  MBS  am, Hep gtt  discharge planning- home wed

## 2023-01-10 NOTE — PROGRESS NOTE ADULT - PROBLEM SELECTOR PLAN 2
TRANSFER - OUT REPORT:    Verbal report given to April(name) on Marcela King  being transferred to 3rd floor tele(unit) for routine progression of care       Report consisted of patients Situation, Background, Assessment and   Recommendations(SBAR). Information from the following report(s) SBAR, Kardex, ED Summary, Intake/Output, MAR and Cardiac Rhythm NSR was reviewed with the receiving nurse. Lines:   Peripheral IV 06/02/87 Left Cephalic (Active)   Site Assessment Clean, dry, & intact 07/27/21 1016   Phlebitis Assessment 0 07/27/21 1016   Infiltration Assessment 0 07/27/21 1016   Dressing Status Clean, dry, & intact 07/27/21 1016   Dressing Type Tape;Transparent 07/27/21 1016   Hub Color/Line Status Pink 07/27/21 1016   Alcohol Cap Used Yes 07/27/21 1016        Opportunity for questions and clarification was provided.       Patient transported with:   Entellium - BP goal 130/80  - Manage per primary team

## 2023-01-10 NOTE — PROGRESS NOTE ADULT - SUBJECTIVE AND OBJECTIVE BOX
Neurology Progress Note    S: Patient seen and examined. doing well     Medication:  MEDICATIONS  (STANDING):  aMIOdarone    Tablet   Oral   aMIOdarone    Tablet 200 milliGRAM(s) Oral daily  aspirin  chewable 81 milliGRAM(s) Oral daily  atorvastatin 80 milliGRAM(s) Oral at bedtime  bisacodyl Suppository 10 milliGRAM(s) Rectal once  heparin  Infusion 750 Unit(s)/Hr (10 mL/Hr) IV Continuous <Continuous>  insulin glargine Injectable (LANTUS) 12 Unit(s) SubCutaneous at bedtime  insulin lispro (ADMELOG) corrective regimen sliding scale   SubCutaneous <User Schedule>  insulin lispro (ADMELOG) corrective regimen sliding scale   SubCutaneous three times a day before meals  insulin lispro Injectable (ADMELOG) 4 Unit(s) SubCutaneous three times a day before meals  metoprolol tartrate 12.5 milliGRAM(s) Oral every 12 hours  nystatin    Suspension 086843 Unit(s) Oral every 6 hours  pantoprazole    Tablet 40 milliGRAM(s) Oral before breakfast  polyethylene glycol 3350 17 Gram(s) Oral daily  senna 2 Tablet(s) Oral at bedtime  sodium chloride 0.9% lock flush 3 milliLiter(s) IV Push every 8 hours  sodium chloride 0.9% lock flush 3 milliLiter(s) IV Push every 8 hours    MEDICATIONS  (PRN):  acetaminophen     Tablet .. 650 milliGRAM(s) Oral every 6 hours PRN Mild Pain (1 - 3)        Vitals:       Vital Signs Last 24 Hrs  T(C): 37.2 (10 Donovan 2023 04:14), Max: 37.2 (10 Donovan 2023 04:14)  T(F): 99 (10 Donovan 2023 04:14), Max: 99 (10 Donovan 2023 04:14)  HR: 81 (10 Donvoan 2023 04:14) (81 - 90)  BP: 115/69 (10 Donovan 2023 04:14) (98/64 - 121/71)  BP(mean): --  RR: 18 (10 Donovan 2023 04:14) (18 - 18)  SpO2: 95% (10 Donovan 2023 04:14) (95% - 98%)    Parameters below as of 10 Donovan 2023 04:14  Patient On (Oxygen Delivery Method): room air            General Exam:   General Appearance: Appropriately dressed and in no acute distress       Head: Normocephalic, atraumatic and no dysmorphic features  Ear, Nose, and Throat: Moist mucous membranes  CVS: S1S2+  Resp: No SOB, no wheeze or rhonchi  Abd: soft NTND  Extremities: No edema, no cyanosis  Skin: No bruises, no rashes     Neurological Exam:  Mental Status: Awake, alert and oriented x 2.  Able to follow simple verbal commands. Able to name and repeat. fluent speech. No obvious aphasia but mild to mod dysarthria noted.   Cranial Nerves: PERRL, R gaze pref, L HHA , sensation V1-V3 intact,  no obvious facial asymmetry , equal elevation of palate, scm/trap 5/5, tongue is midline on protrusion. no obvious papilledema on fundoscopic exam. Hearing is grossly intact.   Motor: mild LUE nad LLE drift but significant improvement in strength.   Sensation: Intact to light touch and pinprick throughout. no right/left confusion. no extinction to tactile on DSS.    Reflexes: 1+ throughout at biceps, brachioradialis, triceps, patellars and ankles bilaterally and equal. No clonus. R toe and L toe were both downgoing.  Coordination: No dysmetria on FNF   Gait: deferred     I personally reviewed the below data/images/labs:  CBC Full  -  ( 10 Donovan 2023 05:13 )  WBC Count : 5.01 K/uL  RBC Count : 3.26 M/uL  Hemoglobin : 9.6 g/dL  Hematocrit : 29.0 %  Platelet Count - Automated : 268 K/uL  Mean Cell Volume : 89.0 fl  Mean Cell Hemoglobin : 29.4 pg  Mean Cell Hemoglobin Concentration : 33.1 gm/dL  Auto Neutrophil # : x  Auto Lymphocyte # : x  Auto Monocyte # : x  Auto Eosinophil # : x  Auto Basophil # : x  Auto Neutrophil % : x  Auto Lymphocyte % : x  Auto Monocyte % : x  Auto Eosinophil % : x  Auto Basophil % : x    01-10    135  |  99  |  17  ----------------------------<  122<H>  4.0   |  24  |  0.96    Ca    10.1      10 Donovan 2023 05:13    TPro  7.2  /  Alb  3.7  /  TBili  1.3<H>  /  DBili  x   /  AST  19  /  ALT  17  /  AlkPhos  95  01-09    < from: CT Head No Cont (01.04.23 @ 08:57) >  IMPRESSION: Small right frontal and temporal opercular acute infarct   local sulcal effacement and mass effect. No hemorrhage. CTA ofthe neck   demonstrates postoperative changes at the right carotid bifurcation with   a patent endarterectomy graft. No large vessel occlusion intracranially   although a small distal branch occlusion is not excluded.       < from: CT Angio/Perfusion Head w/ IV Cont (01.04.23 @ 08:59) >  CBF<30% volume; 0 ml  Tmax> 6.0s volume: 47 ml  Mismatch volume: 47 ml  Mismatch ratio: infinite  < from: MR Head No Cont (01.05.23 @ 10:04) >    ACC: [id number] EXAM:  MR BRAIN                          PROCEDURE DATE:  01/05/2023          INTERPRETATION:  MRI BRAIN WITHOUT AND WITH IV CONTRAST    INDICATION: CVA. Additional history per EMR: now s/p R CEA 1/3, post-op   course c/b CODE STROKE on POD1  1/4/23 patient found to have right frontal/temporal opercular acute   infarct  with local sulcal effacement and mass effect.    COMPARISON: CT/CTA/CTP head neck 1/4/2023    TECHNIQUE: Multisequential multiplanar MRI of the brain is performed   without the administration of intravenous gadolinium contrast.    FINDINGS:    Examination is slightly degraded due to patient motion.    Cortical/subcortical restricted diffusion (DWI hyperintense/ADC   hypointense signal) is centered within the right frontal opercular and   insular regions, with slight involvement of the right temporal operculum.   At least two additional punctate foci of restricted diffusion within the   right frontal centrum semiovale and lentiform nucleus (images 17-22,   series 3). Corresponding FLAIR hyperintensity is noted. The infarct   appears grossly similar compared to hypodensity on recent noncontrast CT.    No abnormal SWI signal loss to suggest hemorrhagic transformation of   infarct.    ADC values less than 620 on rapid postprocessed sequence in the region of   diffusion and about the, is consistent with acute core ischemic infarct   (volume 52 mL).    There is no signal mass effect or midline shift. The basal cisterns are   patent.  There is no hydrocephalus.  No extra-axial collection is seen.  Generalized parenchymal volume loss is noted.    Large retrocerebellar CSF intensity extra-axial space, likely reflects an   incidental arachnoid cyst. Mild mass effect on the adjacent cerebellum.   Scalloping of the adjacent calvarium and thin internal septation are   noted.    The corpus callosum, optic chiasm, pituitary gland and pineal gland are   normal in configuration.  No cerebellar tonsillar herniation/ectopia. No Chiari malformation.    The visualized orbits are unremarkable. Mild mucosal thickening within   the paranasal sinuses. Bilateral mastoid/middle ear cavities are clear.    Preserved T2 flow-voids along the dural venous sinuses.      IMPRESSION:    Acute right MCA territory infarct (centered within right frontal   operculum-insular regions). This is grossly stable compared to 1/4/2023   CT. No hemorrhagic transformation of infarct or midline shift. Consider   imaging follow-up as clinically indicated.      Findings were discussedwith covering provider (Karla Gallegos NP) via   telephone on 1/5/2023 and 10:33 AM with verbal read back    --- End of Report ---            SORAYA PEDRAZA MD; Attending Radiologist  This document has been electronically signed. Jan 5 2023 10:44AM    < end of copied text >

## 2023-01-10 NOTE — PROGRESS NOTE ADULT - PROBLEM SELECTOR PLAN 1
-test BG ac and hs and 2am every day for now  -C/w Lantus dose to 12 units for now  -Increase Admelog dose to 6 units ac meals. HOLD IF NOT EATING!  -C/w Admelog low correction scale  q6h   -Will follow and adjust insulin as needed  -contact endo team with any changes on PO status  -Please teach and allow pt to do own finger sticks and insulin injections under RN supervision  Please teach use of insulin pen  Please document teach back  Discharge:  Will be determined according to BG/insulin needs/PO intake at time of discharge.  Per  pt has [facility] benefit- can discharge on basal + metformin 1000mg BID but now requiring more premeal insulin> Will evaluate at time of discharge   final recs pending hospital course  Send Rx for glucometer, test strips, lancets, alcohol pads, and pen needles  At home, patient should check FSBG premeals and bedtime. Pt should call their doctor when FSBG <70 or above >400 and or consistently above 200s as changes in the regimen will have to be made.  Recommend outpatient routine dilated eye exam/ophthalmology f/u; podiatry referral  -Primary team should arrange medicine clinic follow up prior to discharge as pt is uninsured and visiting from [facility] & planning to go back once he recuperates from surgery. & the medicine clinic can address multiple medical concerns at once. phone: [phone]

## 2023-01-10 NOTE — PROGRESS NOTE ADULT - ASSESSMENT
77 M with recently diagnosed T2D (A1C 9.4%) on Metformin PTA. DM complicated by CAD s/p [facility] 12/4 with prox LAD 70% stenosis, aortic stenosis. Also + CHF now s/p  CABG/SAVR. 12/22 and s/p carotid endarterectomy 1/3/23 with R frontal infarct s/p procedure.  Tolerating POs with BG mosty at goal but requiring higher premeal doses. Will continue to increase premeal insulin to keep  BG goal 100 to 180s.    Spoke to staff about the need to teach pt and family insulin administration and use of insulin pen. Left pt and daughter watching use of insulin pen in educational TV channel

## 2023-01-10 NOTE — PROGRESS NOTE ADULT - PROBLEM SELECTOR PLAN 4
Code Stroke on 1/4 1/4 CTH right frontal/temporal opercular acute infarct with local sulcal effacement and mass effect  1/5 MR Head: Acute R MCA infarct  Neuro following Dr. Hernandez  continue pt/ ot  mod thick diet  MBS am  aspiration precautions

## 2023-01-10 NOTE — PROGRESS NOTE ADULT - PROBLEM SELECTOR PLAN 2
s/p 12/22 C1L/AVR (t)   continue postop care  continue asa/ statin and bb   continue amio 200 qd  pulm toilet  pain management  bowel regimen  increase activity as tolerated  Discharge planning- home when stable

## 2023-01-10 NOTE — PROGRESS NOTE ADULT - PROBLEM SELECTOR PLAN 6
continue Heparin gtt  amio load completed- now on amio 200 qd   continue  Lopressor 12.5 BID, up titrate as tolerated  heparin gttp for AC PTT 71 today; decrease heparin gtt 1000 units/hr; oral AC medication w/ Dr. Bowles

## 2023-01-10 NOTE — PROGRESS NOTE ADULT - SUBJECTIVE AND OBJECTIVE BOX
DIABETES FOLLOW UP NOTE: Saw pt earlier today    Chief Complaint: Endocrine consult requested for management of T2DM    INTERVAL HX: Pt stable, reports tolerating POs with BG going up ac meals while on present insulin doses. Noted pt requiring total  5 units of premeal insulin moslt of the time to keep BG at goal. No hypoglycemia. Pt and family stated still no education given yet on insulin administration.  Pt denies any CP/SOB/weakness and has improved dysarthria.         Review of Systems:  General: As above  Cardiovascular: No chest pain, palpitations  Respiratory: No SOB, no cough  GI: No nausea, vomiting, abdominal pain  Endocrine: no polyuria, polydipsia or S&Sx of hypoglycemia    Allergies    No Known Allergies    Intolerances      MEDICATIONS:  atorvastatin 80 milliGRAM(s) Oral at bedtime  insulin glargine Injectable (LANTUS) 12 Unit(s) SubCutaneous at bedtime  insulin lispro (ADMELOG) corrective regimen sliding scale   SubCutaneous three times a day before meals  insulin lispro (ADMELOG) corrective regimen sliding scale   SubCutaneous <User Schedule>  insulin lispro Injectable (ADMELOG) 4 Unit(s) SubCutaneous three times a day before meals      PHYSICAL EXAM:  VITALS: T(C): 36.9 (01-10-23 @ 12:34)  T(F): 98.4 (01-10-23 @ 12:34), Max: 99 (01-10-23 @ 04:14)  HR: 78 (01-10-23 @ 17:00) (74 - 87)  BP: 114/62 (01-10-23 @ 17:00) (111/64 - 121/71)  RR:  (18 - 18)  SpO2:  (95% - 98%)  Wt(kg): --  GENERAL: Male sitting in chair in NAD. Daughter at bedside  CHEST:  Sternal incision dry and intact.   Abdomen: Soft, nontender, non distended, + obesity  Extremities: Warm, no edema in all 4 exts  NEURO: Alert and able to answer simple questions. Encounter done in [place]. Resolved dysarthria and LUE weakness     LABS:  POCT Blood Glucose.: 208 mg/dL (01-10-23 @ 16:31)  POCT Blood Glucose.: 199 mg/dL (01-10-23 @ 11:23)  POCT Blood Glucose.: 129 mg/dL (01-10-23 @ 07:53)  POCT Blood Glucose.: 145 mg/dL (01-10-23 @ 02:19)  POCT Blood Glucose.: 157 mg/dL (01-09-23 @ 21:02)  POCT Blood Glucose.: 185 mg/dL (01-09-23 @ 17:23)  POCT Blood Glucose.: 202 mg/dL (01-09-23 @ 11:17)  POCT Blood Glucose.: 124 mg/dL (01-09-23 @ 07:08)  POCT Blood Glucose.: 117 mg/dL (01-09-23 @ 02:33)  POCT Blood Glucose.: 121 mg/dL (01-08-23 @ 21:43)  POCT Blood Glucose.: 188 mg/dL (01-08-23 @ 17:03)  POCT Blood Glucose.: 142 mg/dL (01-08-23 @ 11:19)  POCT Blood Glucose.: 192 mg/dL (01-08-23 @ 07:35)  POCT Blood Glucose.: 131 mg/dL (01-08-23 @ 02:22)  POCT Blood Glucose.: 184 mg/dL (01-07-23 @ 21:40)                            9.6    5.01  )-----------( 268      ( 10 Donovan 2023 05:13 )             29.0       01-10    135  |  99  |  17  ----------------------------<  122<H>  4.0   |  24  |  0.96    eGFR: 81    Ca    10.1      01-10    TPro  7.2  /  Alb  3.7  /  TBili  1.3<H>  /  DBili  x   /  AST  19  /  ALT  17  /  AlkPhos  95  01-09    A1C with Estimated Average Glucose Result: 9.4 % (12-02-22 @ 05:54)      Estimated Average Glucose: 223 mg/dL (12-02-22 @ 05:54)        12-02 Chol 185 Direct LDL -- LDL calculated 110<H> HDL 34<L> Trig 208<H>

## 2023-01-10 NOTE — PROGRESS NOTE ADULT - SUBJECTIVE AND OBJECTIVE BOX
Subjective: "Martir"  OOB chair, family at bedside      Tele:  SR  70-80           V/S:                    T(F): 99 (01-10-23 @ 04:14), Max: 99 (01-10-23 @ 04:14)  HR: 81 (01-10-23 @ 04:14) (81 - 90)  BP: 115/69 (01-10-23 @ 04:14) (98/64 - 121/71)  RR: 18 (01-10-23 @ 04:14) (18 - 18)  SpO2: 95% (01-10-23 @ 04:14) (95% - 98%)  Wt(kg): --      LV EF:      Labs:  01-10    135  |  99  |  17  ----------------------------<  122<H>  4.0   |  24  |  0.96    Ca    10.1      10 Donovan 2023 05:13    TPro  7.2  /  Alb  3.7  /  TBili  1.3<H>  /  DBili  x   /  AST  19  /  ALT  17  /  AlkPhos  95                                 9.6    5.01  )-----------( 268      ( 10 Donovan 2023 05:13 )             29.0        PT/INR - ( 10 Donovan 2023 05:14 )   PT: 14.7 sec;   INR: 1.26 ratio         PTT - ( 10 Donovan 2023 05:14 )  PTT:60.9 sec     CAPILLARY BLOOD GLUCOSE      POCT Blood Glucose.: 129 mg/dL (10 Donovan 2023 07:53)  POCT Blood Glucose.: 145 mg/dL (10 Donovan 2023 02:19)  POCT Blood Glucose.: 157 mg/dL (2023 21:02)  POCT Blood Glucose.: 185 mg/dL (2023 17:23)  POCT Blood Glucose.: 202 mg/dL (2023 11:17)           CXR:    I&O's Detail    2023 07:01  -  10 Donovan 2023 07:00  --------------------------------------------------------  IN:    Heparin: 110 mL    Oral Fluid: 500 mL  Total IN: 610 mL    OUT:    Voided (mL): 850 mL  Total OUT: 850 mL    Total NET: -240 mL    BOWEL MOVEMENT:  [x ] YES [ ] NO      Daily     Daily Weight in k.2 (10 Donovan 2023 05:30)  Medications:  acetaminophen     Tablet .. 650 milliGRAM(s) Oral every 6 hours PRN  aMIOdarone    Tablet   Oral   aMIOdarone    Tablet 200 milliGRAM(s) Oral daily  aspirin  chewable 81 milliGRAM(s) Oral daily  atorvastatin 80 milliGRAM(s) Oral at bedtime  bisacodyl Suppository 10 milliGRAM(s) Rectal once  heparin  Infusion 750 Unit(s)/Hr IV Continuous <Continuous>  insulin glargine Injectable (LANTUS) 12 Unit(s) SubCutaneous at bedtime  insulin lispro (ADMELOG) corrective regimen sliding scale   SubCutaneous <User Schedule>  insulin lispro (ADMELOG) corrective regimen sliding scale   SubCutaneous three times a day before meals  insulin lispro Injectable (ADMELOG) 4 Unit(s) SubCutaneous three times a day before meals  metoprolol tartrate 12.5 milliGRAM(s) Oral every 12 hours  nystatin    Suspension 225921 Unit(s) Oral every 6 hours  pantoprazole    Tablet 40 milliGRAM(s) Oral before breakfast  polyethylene glycol 3350 17 Gram(s) Oral daily  senna 2 Tablet(s) Oral at bedtime  sodium chloride 0.9% lock flush 3 milliLiter(s) IV Push every 8 hours  sodium chloride 0.9% lock flush 3 milliLiter(s) IV Push every 8 hours        Physical Exam:    Neurology: alert and oriented x 3, left side hemiparesis noted  CV : teS1  S2  RRR  Sternal Wound :  CDI AVINASH, sternum Stable  Lungs: clear. RR easy, unlabored   Abdomen: soft, nontender, nondistended, positive bowel sounds, +BM;  Neg N/V/D   :  pt voiding without difficulty   Extremities: lt side hemiparesis;  neg LE edema, neg calf tenderness.   PPP bilaterally                     PAST MEDICAL & SURGICAL HISTORY:  Chronic CHF      HTN (hypertension)      Diabetes      Aortic stenosis      No significant past surgical history

## 2023-01-10 NOTE — PROGRESS NOTE ADULT - PROBLEM SELECTOR PLAN 5
A1C 9.4%  FS qAC and qHS  Diabetic Diet- moderately thickened liquids  Endocrinology following  c/w Lantus 12u qHS and Admelog 3u qAC  d/w endo - home dm recs

## 2023-01-10 NOTE — PROGRESS NOTE ADULT - ASSESSMENT
77M RH [place] speaking with a PMH T2DM, CHF, CAD, aortic stenosis, atrial fibrillation, COVID+ presented with chest pain on 12/1/22. Pt now s/p LHC 12/4 with prox LAD 70% stenosis. Pt s/p AVR and CABG on 12/22/22. Pt had R CEA for asymptomatic carotid stenosis on 1/3/23 at 19:00. Code stroke called on 1/4/23. LKN 1/3/23 at 19:00 prior to R CEA surgery. No deficits noted in PACU. Pt on aspirin and lovenox ppx. NIHSS: 11, preMRS: 0. Neuro exam w/ LHH, mild L hemiparesis, R gaze preference, moderate dysarthria. subseuqent exam with some improvement    used [id number], vascular attending exam with LUE drift, slurred speech and mild L facial.   MRI confirms R hemisphere infarct   A1c 9.4    exam improving     Impression:  R hemisphere infarct/frontal, possible large artery athero wtih A-A embolism,  possible related to procedure but unclear , can also be CE from Afib     Recommendations:  - plan for MBS today   - still on heparin drip. change to PO when able   - BP parameters can be normotensive at this piont.  if there is worsening in symptoms with drop in BP would raise BP target   - monitor on telemetry  -  started AC with heparin drip 1/6 with no bolus if no objection from vascular sx.  once on AC no need for ASA from stroke standpoint ; now on both heparin and asa. stat CTH if change in neuro exam   - start atorvastatin 80mg PO daily (titrate to LDL < 70)   - stroke risk factor modification and counseling   - NPO until bedside dysphagia screen  - PT/OT/S&S/SW consults  - remaining per primary team and CTS   - spoke with mihaela at bedside (granddaughter)   - Patient can follow up with Dr. Riki Hernandez after discharge. Please instruct the patient to call [phone] to schedule an appointment within the next 2-3 days. Office is located at [address] [facility], [place] [address].     Riki Hernandez MD  Vascular Neurology  Office: [phone]

## 2023-01-11 ENCOUNTER — TRANSCRIPTION ENCOUNTER (OUTPATIENT)
Age: 78
End: 2023-01-11

## 2023-01-11 VITALS
SYSTOLIC BLOOD PRESSURE: 118 MMHG | OXYGEN SATURATION: 100 % | HEART RATE: 87 BPM | DIASTOLIC BLOOD PRESSURE: 68 MMHG | RESPIRATION RATE: 18 BRPM | TEMPERATURE: 98 F

## 2023-01-11 PROBLEM — I35.0 NONRHEUMATIC AORTIC (VALVE) STENOSIS: Chronic | Status: ACTIVE | Noted: 2022-12-19

## 2023-01-11 LAB
ALBUMIN SERPL ELPH-MCNC: 3.9 G/DL — SIGNIFICANT CHANGE UP (ref 3.3–5)
ALP SERPL-CCNC: 104 U/L — SIGNIFICANT CHANGE UP (ref 40–120)
ALT FLD-CCNC: 18 U/L — SIGNIFICANT CHANGE UP (ref 10–45)
ANION GAP SERPL CALC-SCNC: 11 MMOL/L — SIGNIFICANT CHANGE UP (ref 5–17)
AST SERPL-CCNC: 18 U/L — SIGNIFICANT CHANGE UP (ref 10–40)
BILIRUB SERPL-MCNC: 1.2 MG/DL — SIGNIFICANT CHANGE UP (ref 0.2–1.2)
BUN SERPL-MCNC: 18 MG/DL — SIGNIFICANT CHANGE UP (ref 7–23)
CALCIUM SERPL-MCNC: 10.1 MG/DL — SIGNIFICANT CHANGE UP (ref 8.4–10.5)
CHLORIDE SERPL-SCNC: 99 MMOL/L — SIGNIFICANT CHANGE UP (ref 96–108)
CO2 SERPL-SCNC: 25 MMOL/L — SIGNIFICANT CHANGE UP (ref 22–31)
CREAT SERPL-MCNC: 0.87 MG/DL — SIGNIFICANT CHANGE UP (ref 0.5–1.3)
EGFR: 89 ML/MIN/1.73M2 — SIGNIFICANT CHANGE UP
GLUCOSE BLDC GLUCOMTR-MCNC: 128 MG/DL — HIGH (ref 70–99)
GLUCOSE BLDC GLUCOMTR-MCNC: 134 MG/DL — HIGH (ref 70–99)
GLUCOSE BLDC GLUCOMTR-MCNC: 221 MG/DL — HIGH (ref 70–99)
GLUCOSE SERPL-MCNC: 128 MG/DL — HIGH (ref 70–99)
HCT VFR BLD CALC: 29.9 % — LOW (ref 39–50)
HGB BLD-MCNC: 9.8 G/DL — LOW (ref 13–17)
MCHC RBC-ENTMCNC: 29.3 PG — SIGNIFICANT CHANGE UP (ref 27–34)
MCHC RBC-ENTMCNC: 32.8 GM/DL — SIGNIFICANT CHANGE UP (ref 32–36)
MCV RBC AUTO: 89.3 FL — SIGNIFICANT CHANGE UP (ref 80–100)
NRBC # BLD: 0 /100 WBCS — SIGNIFICANT CHANGE UP (ref 0–0)
PLATELET # BLD AUTO: 265 K/UL — SIGNIFICANT CHANGE UP (ref 150–400)
POTASSIUM SERPL-MCNC: 4 MMOL/L — SIGNIFICANT CHANGE UP (ref 3.5–5.3)
POTASSIUM SERPL-SCNC: 4 MMOL/L — SIGNIFICANT CHANGE UP (ref 3.5–5.3)
PROT SERPL-MCNC: 7.4 G/DL — SIGNIFICANT CHANGE UP (ref 6–8.3)
RBC # BLD: 3.35 M/UL — LOW (ref 4.2–5.8)
RBC # FLD: 13.8 % — SIGNIFICANT CHANGE UP (ref 10.3–14.5)
SODIUM SERPL-SCNC: 135 MMOL/L — SIGNIFICANT CHANGE UP (ref 135–145)
WBC # BLD: 4.96 K/UL — SIGNIFICANT CHANGE UP (ref 3.8–10.5)
WBC # FLD AUTO: 4.96 K/UL — SIGNIFICANT CHANGE UP (ref 3.8–10.5)

## 2023-01-11 PROCEDURE — 83605 ASSAY OF LACTIC ACID: CPT

## 2023-01-11 PROCEDURE — 70551 MRI BRAIN STEM W/O DYE: CPT

## 2023-01-11 PROCEDURE — 86900 BLOOD TYPING SEROLOGIC ABO: CPT

## 2023-01-11 PROCEDURE — 85520 HEPARIN ASSAY: CPT

## 2023-01-11 PROCEDURE — 97165 OT EVAL LOW COMPLEX 30 MIN: CPT

## 2023-01-11 PROCEDURE — 94002 VENT MGMT INPAT INIT DAY: CPT

## 2023-01-11 PROCEDURE — 87640 STAPH A DNA AMP PROBE: CPT

## 2023-01-11 PROCEDURE — 36415 COLL VENOUS BLD VENIPUNCTURE: CPT

## 2023-01-11 PROCEDURE — 82435 ASSAY OF BLOOD CHLORIDE: CPT

## 2023-01-11 PROCEDURE — 74230 X-RAY XM SWLNG FUNCJ C+: CPT

## 2023-01-11 PROCEDURE — 97535 SELF CARE MNGMENT TRAINING: CPT

## 2023-01-11 PROCEDURE — 81003 URINALYSIS AUTO W/O SCOPE: CPT

## 2023-01-11 PROCEDURE — 70450 CT HEAD/BRAIN W/O DYE: CPT

## 2023-01-11 PROCEDURE — 82962 GLUCOSE BLOOD TEST: CPT

## 2023-01-11 PROCEDURE — 93005 ELECTROCARDIOGRAM TRACING: CPT

## 2023-01-11 PROCEDURE — 88304 TISSUE EXAM BY PATHOLOGIST: CPT

## 2023-01-11 PROCEDURE — 82947 ASSAY GLUCOSE BLOOD QUANT: CPT

## 2023-01-11 PROCEDURE — 87641 MR-STAPH DNA AMP PROBE: CPT

## 2023-01-11 PROCEDURE — 99285 EMERGENCY DEPT VISIT HI MDM: CPT | Mod: 25

## 2023-01-11 PROCEDURE — 70498 CT ANGIOGRAPHY NECK: CPT

## 2023-01-11 PROCEDURE — 86891 AUTOLOGOUS BLOOD OP SALVAGE: CPT

## 2023-01-11 PROCEDURE — U0003: CPT

## 2023-01-11 PROCEDURE — 97161 PT EVAL LOW COMPLEX 20 MIN: CPT

## 2023-01-11 PROCEDURE — 82565 ASSAY OF CREATININE: CPT

## 2023-01-11 PROCEDURE — 86850 RBC ANTIBODY SCREEN: CPT

## 2023-01-11 PROCEDURE — C1751: CPT

## 2023-01-11 PROCEDURE — 84295 ASSAY OF SERUM SODIUM: CPT

## 2023-01-11 PROCEDURE — 82330 ASSAY OF CALCIUM: CPT

## 2023-01-11 PROCEDURE — 85384 FIBRINOGEN ACTIVITY: CPT

## 2023-01-11 PROCEDURE — 85730 THROMBOPLASTIN TIME PARTIAL: CPT

## 2023-01-11 PROCEDURE — C1889: CPT

## 2023-01-11 PROCEDURE — 70496 CT ANGIOGRAPHY HEAD: CPT

## 2023-01-11 PROCEDURE — 85027 COMPLETE CBC AUTOMATED: CPT

## 2023-01-11 PROCEDURE — 80053 COMPREHEN METABOLIC PANEL: CPT

## 2023-01-11 PROCEDURE — 85576 BLOOD PLATELET AGGREGATION: CPT

## 2023-01-11 PROCEDURE — 85025 COMPLETE CBC W/AUTO DIFF WBC: CPT

## 2023-01-11 PROCEDURE — 82803 BLOOD GASES ANY COMBINATION: CPT

## 2023-01-11 PROCEDURE — 92611 MOTION FLUOROSCOPY/SWALLOW: CPT

## 2023-01-11 PROCEDURE — 97530 THERAPEUTIC ACTIVITIES: CPT

## 2023-01-11 PROCEDURE — U0005: CPT

## 2023-01-11 PROCEDURE — 97116 GAIT TRAINING THERAPY: CPT

## 2023-01-11 PROCEDURE — 80048 BASIC METABOLIC PNL TOTAL CA: CPT

## 2023-01-11 PROCEDURE — 88311 DECALCIFY TISSUE: CPT

## 2023-01-11 PROCEDURE — 84132 ASSAY OF SERUM POTASSIUM: CPT

## 2023-01-11 PROCEDURE — 83880 ASSAY OF NATRIURETIC PEPTIDE: CPT

## 2023-01-11 PROCEDURE — P9045: CPT

## 2023-01-11 PROCEDURE — P9016: CPT

## 2023-01-11 PROCEDURE — 88305 TISSUE EXAM BY PATHOLOGIST: CPT

## 2023-01-11 PROCEDURE — 82550 ASSAY OF CK (CPK): CPT

## 2023-01-11 PROCEDURE — 82553 CREATINE MB FRACTION: CPT

## 2023-01-11 PROCEDURE — 84484 ASSAY OF TROPONIN QUANT: CPT

## 2023-01-11 PROCEDURE — 92610 EVALUATE SWALLOWING FUNCTION: CPT

## 2023-01-11 PROCEDURE — 92526 ORAL FUNCTION THERAPY: CPT

## 2023-01-11 PROCEDURE — 85018 HEMOGLOBIN: CPT

## 2023-01-11 PROCEDURE — C1769: CPT

## 2023-01-11 PROCEDURE — 86923 COMPATIBILITY TEST ELECTRIC: CPT

## 2023-01-11 PROCEDURE — C1768: CPT

## 2023-01-11 PROCEDURE — 92612 ENDOSCOPY SWALLOW (FEES) VID: CPT

## 2023-01-11 PROCEDURE — 36430 TRANSFUSION BLD/BLD COMPNT: CPT

## 2023-01-11 PROCEDURE — 85014 HEMATOCRIT: CPT

## 2023-01-11 PROCEDURE — 99232 SBSQ HOSP IP/OBS MODERATE 35: CPT

## 2023-01-11 PROCEDURE — 97168 OT RE-EVAL EST PLAN CARE: CPT

## 2023-01-11 PROCEDURE — 85396 CLOTTING ASSAY WHOLE BLOOD: CPT

## 2023-01-11 PROCEDURE — 71045 X-RAY EXAM CHEST 1 VIEW: CPT

## 2023-01-11 PROCEDURE — 74230 X-RAY XM SWLNG FUNCJ C+: CPT | Mod: 26

## 2023-01-11 PROCEDURE — 83735 ASSAY OF MAGNESIUM: CPT

## 2023-01-11 PROCEDURE — C1729: CPT

## 2023-01-11 PROCEDURE — 93306 TTE W/DOPPLER COMPLETE: CPT

## 2023-01-11 PROCEDURE — 84100 ASSAY OF PHOSPHORUS: CPT

## 2023-01-11 PROCEDURE — 86901 BLOOD TYPING SEROLOGIC RH(D): CPT

## 2023-01-11 PROCEDURE — 0042T: CPT

## 2023-01-11 PROCEDURE — 85610 PROTHROMBIN TIME: CPT

## 2023-01-11 RX ORDER — APIXABAN 2.5 MG/1
1 TABLET, FILM COATED ORAL
Qty: 60 | Refills: 11
Start: 2023-01-11 | End: 2024-01-05

## 2023-01-11 RX ORDER — REPAGLINIDE 1 MG/1
1 TABLET ORAL
Qty: 90 | Refills: 0
Start: 2023-01-11 | End: 2023-02-09

## 2023-01-11 RX ORDER — ATORVASTATIN CALCIUM 80 MG/1
2 TABLET, FILM COATED ORAL
Qty: 120 | Refills: 1
Start: 2023-01-11 | End: 2023-03-11

## 2023-01-11 RX ORDER — INSULIN GLARGINE 100 [IU]/ML
14 INJECTION, SOLUTION SUBCUTANEOUS
Qty: 100 | Refills: 1
Start: 2023-01-11 | End: 2023-03-11

## 2023-01-11 RX ORDER — METOPROLOL TARTRATE 50 MG
25 TABLET ORAL DAILY
Refills: 0 | Status: DISCONTINUED | OUTPATIENT
Start: 2023-01-11 | End: 2023-01-11

## 2023-01-11 RX ORDER — INSULIN GLARGINE 100 [IU]/ML
12 INJECTION, SOLUTION SUBCUTANEOUS
Qty: 1 | Refills: 0
Start: 2023-01-11 | End: 2023-02-09

## 2023-01-11 RX ORDER — ACETAMINOPHEN 500 MG
2 TABLET ORAL
Qty: 0 | Refills: 0 | DISCHARGE
Start: 2023-01-11

## 2023-01-11 RX ORDER — AMIODARONE HYDROCHLORIDE 400 MG/1
1 TABLET ORAL
Qty: 14 | Refills: 0
Start: 2023-01-11 | End: 2023-01-24

## 2023-01-11 RX ORDER — ATORVASTATIN CALCIUM 80 MG/1
2 TABLET, FILM COATED ORAL
Qty: 60 | Refills: 1
Start: 2023-01-11 | End: 2023-03-11

## 2023-01-11 RX ADMIN — Medication 12.5 MILLIGRAM(S): at 05:14

## 2023-01-11 RX ADMIN — Medication 500000 UNIT(S): at 12:29

## 2023-01-11 RX ADMIN — Medication 500000 UNIT(S): at 05:14

## 2023-01-11 RX ADMIN — Medication 25 MILLIGRAM(S): at 12:29

## 2023-01-11 RX ADMIN — Medication 6 UNIT(S): at 12:31

## 2023-01-11 RX ADMIN — PANTOPRAZOLE SODIUM 40 MILLIGRAM(S): 20 TABLET, DELAYED RELEASE ORAL at 05:15

## 2023-01-11 RX ADMIN — APIXABAN 5 MILLIGRAM(S): 2.5 TABLET, FILM COATED ORAL at 08:06

## 2023-01-11 RX ADMIN — AMIODARONE HYDROCHLORIDE 200 MILLIGRAM(S): 400 TABLET ORAL at 05:15

## 2023-01-11 RX ADMIN — Medication 500000 UNIT(S): at 02:19

## 2023-01-11 RX ADMIN — Medication 6 UNIT(S): at 08:06

## 2023-01-11 RX ADMIN — Medication 650 MILLIGRAM(S): at 05:57

## 2023-01-11 RX ADMIN — SODIUM CHLORIDE 3 MILLILITER(S): 9 INJECTION INTRAMUSCULAR; INTRAVENOUS; SUBCUTANEOUS at 05:20

## 2023-01-11 RX ADMIN — Medication 650 MILLIGRAM(S): at 05:20

## 2023-01-11 RX ADMIN — Medication 2: at 12:30

## 2023-01-11 RX ADMIN — Medication 81 MILLIGRAM(S): at 12:29

## 2023-01-11 NOTE — DISCHARGE NOTE NURSING/CASE MANAGEMENT/SOCIAL WORK - NSDCPEFALRISK_GEN_ALL_CORE
For information on Fall & Injury Prevention, visit: [email or web address] OR  [email or web address] OR  https://www.cdc.gov/steadi/patient.html 05-Nov-2017 18:47

## 2023-01-11 NOTE — DISCHARGE NOTE PROVIDER - CARE PROVIDER_API CALL
Ranjeet Bowles)  CTS Surgery  [address] [place] [address]  Phone: [phone]  Fax: [phone]  Scheduled Appointment: 01/17/2023 01:15 PM    Riki Hernandez)  Neurology; Vascular Neurology  [address] [facility], [address]  [place] [address]  Phone: [phone]  Fax: [phone]  Follow Up Time: 2 weeks   Ranjeet Bowels)  CTS Surgery  [address] [place] [address]  Phone: [phone]  Fax: [phone]  Scheduled Appointment: 01/17/2023 01:15 PM    Riki Hernandez)  Neurology; Vascular Neurology  [address] [facility], [address]  [place] [address]  Phone: [phone]  Fax: [phone]  Follow Up Time: 2 weeks    Bannazadeh, Mohsen (MD)  Surgery; Vascular Surgery  [address] [place] [address]  Phone: [phone]  Fax: [phone]  Follow Up Time: 2 weeks

## 2023-01-11 NOTE — DISCHARGE NOTE PROVIDER - NSDCFUADDINST_GEN_ALL_CORE_FT
Wash incisions with soap and water using washcloth in shower daily,do not apply any lotion,powder,oil,sunscreen to any surgical incision  Please come to ED or Call Cardio thoracic office at [phone] if Chest pain, Shortness of Breath, persistant Nausea & vomiting, oozing from wounds,palpitations or pain not relieved with medication  Weigh yourself daily>notify surgeons office if  2 lb increase in weight in 24 hours.  1. Take ALL of your medications as ordered. Fill your prescriptions the day you are discharged and take according to the schedule you were given. Continue to take a stool softener if you are taking narcotic pain medications. AVOID medications such as ibuprofen or naproxen if you have had bypass surgery. If you have any questions or are unable to fill the prescriptions, please call the office right away at [phone].  2. Shower daily. Clean all incisions daily while showering with warm water and mild soap, pat dry with a clean towel and do not cover with any dressings unless instructed to. No bathing, swimming in a pool or the ocean until instructed by MD.  DO NOT use creams or lotions on the wound.  3. We advise that you do not drive until instructed by MD. Use your red pillow between chest and seatbelt to avoid injury in the event of a motor vehicle accident until you see the surgeon again in the office.  4. You may not return to work until instructed by MD.   5. Please eat a low fat, low cholesterol, low salt diet. (No added/extra salt). A nutritional supplement like Ensure or Glucerna (for diabetics) may help you reach your nutritional goals after surgery and aid in your recovery.  6. Weigh yourself every day in the morning and record it in the weight log in your red folder. Notify the office of any weight gain more than 2-3 pounds in 24 hours.  **Please LIMIT YOUR FLUID INTAKE TO ABOUT 4 8 OUNCE GLASSES OF BEVERAGE DAILY.**  7. Continue breathing exercises several times a day. Continue to use your heart pillow when coughing.  8. No heavy lifting nothing greater than 5 pounds until cleared by MD. We recommend that you sleep on your back and not your side or stomach for the next 4-6 weeks.  9. Call / Notify MD any fever greater than 101.0, any drainage from incisions or if they become red, hot or very tender to the touch.  10. Increase activity as tolerated. Walk indoors and/or outdoors at least 3 times a day.

## 2023-01-11 NOTE — DISCHARGE NOTE NURSING/CASE MANAGEMENT/SOCIAL WORK - PATIENT PORTAL LINK FT
You can access the FollowMyHealth Patient Portal offered by [facility] by registering at the following website: [email or web address]. By joining [employer]’s FollowMyHealth portal, you will also be able to view your health information using other applications (apps) compatible with our system.

## 2023-01-11 NOTE — PROGRESS NOTE ADULT - SUBJECTIVE AND OBJECTIVE BOX
DIABETES FOLLOW UP NOTE: Saw pt earlier today    Chief Complaint: Endocrine consult requested for management of T2DM    INTERVAL HX: Pt stable, reports tolerating POs with BG still going up ac meals as PO intake improves. Passed MBS study today and per primary team he will be going home today. Pt and grand daughter report that pt and family recieved insulin administration education yesterday but they need to do teach back today. No hypoglycemia. Pt denies any CP/SOB/weakness and has improved dysarthria.       Review of Systems:  General: As above  Cardiovascular: No chest pain, palpitations  Respiratory: No SOB, no cough  GI: No nausea, vomiting, abdominal pain  Endocrine: No polyuria, polydipsia or S&Sx of hypoglycemia    Allergies    No Known Allergies    Intolerances      MEDICATIONS:  atorvastatin 80 milliGRAM(s) Oral at bedtime  insulin glargine Injectable (LANTUS) 12 Unit(s) SubCutaneous at bedtime  insulin lispro (ADMELOG) corrective regimen sliding scale   SubCutaneous <User Schedule>  insulin lispro (ADMELOG) corrective regimen sliding scale   SubCutaneous three times a day before meals  insulin lispro Injectable (ADMELOG) 6 Unit(s) SubCutaneous three times a day before meals    PHYSICAL EXAM:  VITALS: T(C): 36.6 (01-11-23 @ 12:16)  T(F): 97.9 (01-11-23 @ 12:16), Max: 98.2 (01-11-23 @ 06:16)  HR: 87 (01-11-23 @ 12:16) (75 - 87)  BP: 118/68 (01-11-23 @ 12:16) (108/68 - 118/68)  RR:  (18 - 18)  SpO2:  (94% - 100%)  Wt(kg): --  GENERAL: Male sitting in chair in NAD. Grand Daughter at bedside  CHEST:  Sternal incision dry and intact.   Abdomen: Soft, nontender, non distended, + obesity  Extremities: Warm, no edema in all 4 exts  NEURO: Alert and able to answer simple questions. Encounter done in [place]. Resolved dysarthria and LUE weakness     LABS:  POCT Blood Glucose.: 221 mg/dL (01-11-23 @ 11:56)  POCT Blood Glucose.: 134 mg/dL (01-11-23 @ 07:30)  POCT Blood Glucose.: 128 mg/dL (01-11-23 @ 01:53)  POCT Blood Glucose.: 167 mg/dL (01-10-23 @ 20:52)  POCT Blood Glucose.: 208 mg/dL (01-10-23 @ 16:31)  POCT Blood Glucose.: 199 mg/dL (01-10-23 @ 11:23)  POCT Blood Glucose.: 129 mg/dL (01-10-23 @ 07:53)  POCT Blood Glucose.: 145 mg/dL (01-10-23 @ 02:19)  POCT Blood Glucose.: 157 mg/dL (01-09-23 @ 21:02)  POCT Blood Glucose.: 185 mg/dL (01-09-23 @ 17:23)  POCT Blood Glucose.: 202 mg/dL (01-09-23 @ 11:17)  POCT Blood Glucose.: 124 mg/dL (01-09-23 @ 07:08)  POCT Blood Glucose.: 117 mg/dL (01-09-23 @ 02:33)  POCT Blood Glucose.: 121 mg/dL (01-08-23 @ 21:43)  POCT Blood Glucose.: 188 mg/dL (01-08-23 @ 17:03)                            9.8    4.96  )-----------( 265      ( 11 Jan 2023 05:46 )             29.9       01-11    135  |  99  |  18  ----------------------------<  128<H>  4.0   |  25  |  0.87    eGFR: 89    Ca    10.1      01-11    TPro  7.4  /  Alb  3.9  /  TBili  1.2  /  DBili  x   /  AST  18  /  ALT  18  /  AlkPhos  104  01-11      A1C with Estimated Average Glucose Result: 9.4 % (12-02-22 @ 05:54)    Estimated Average Glucose: 223 mg/dL (12-02-22 @ 05:54)    12-02 Chol 185 Direct LDL -- LDL calculated 110<H> HDL 34<L> Trig 208<H>

## 2023-01-11 NOTE — PROGRESS NOTE ADULT - PROBLEM SELECTOR PROBLEM 2
S/P AVR (aortic valve replacement)
S/P AVR (aortic valve replacement)
Hyperlipidemia
Hypertension
S/P AVR (aortic valve replacement)
Hyperlipidemia
Aortic stenosis
S/P AVR (aortic valve replacement)
Hyperlipidemia
Hypertension
S/P AVR (aortic valve replacement)
Hypertension
S/P AVR (aortic valve replacement)
Hypertension
Hypertension
S/P AVR (aortic valve replacement)

## 2023-01-11 NOTE — PROGRESS NOTE ADULT - PROBLEM SELECTOR PROBLEM 1
Type 2 diabetes mellitus with hyperglycemia
S/P CABG x 1
Type 2 diabetes mellitus with hyperglycemia
Type 2 diabetes mellitus with hyperglycemia
Diabetes mellitus
Type 2 diabetes mellitus with hyperglycemia
Diabetes mellitus
Type 2 diabetes mellitus with hyperglycemia
Diabetes mellitus
CAD (coronary artery disease)
Diabetes mellitus
Type 2 diabetes mellitus with hyperglycemia
Diabetes mellitus
Diabetes mellitus
Type 2 diabetes mellitus with hyperglycemia
S/P CABG x 1
S/P CABG x 1

## 2023-01-11 NOTE — PROGRESS NOTE ADULT - NUTRITIONAL ASSESSMENT
Diet, Consistent Carbohydrate/No Snacks:   DASH/TLC {Sodium & Cholesterol Restricted} (DASH) (12-23-22 @ 05:28) [Active]      Needs RD consult
Diet, NPO after Midnight:      NPO Start Date: 02-Jan-2023,   NPO Start Time: 23:59 (01-02-23 @ 12:02) [Active]  Diet, Consistent Carbohydrate/No Snacks:   DASH/TLC {Sodium & Cholesterol Restricted} (DASH) (12-23-22 @ 05:28) [Active]
Diet, Regular:   Consistent Carbohydrate {No Snacks} (CSTCHO)  DASH/TLC {Sodium & Cholesterol Restricted} (DASH)  Moderately Thick Liquids (MODTHICKLIQS) (01-06-23 @ 12:13) [Active]    Please see RD assessment and/or follow up.  Managed by primary team as well
Diet, Consistent Carbohydrate/No Snacks:   DASH/TLC {Sodium & Cholesterol Restricted} (DASH) (12-23-22 @ 05:28) [Active]      Please see RD assessment and/or follow up.  Managed by primary team as well
Diet, NPO (01-04-23 @ 09:42) [Active]
Diet, NPO (01-04-23 @ 09:42) [Active]      Please see RD assessment and/or follow up.  Managed by primary team as well
Diet, Regular:   Consistent Carbohydrate {No Snacks} (CSTCHO)  DASH/TLC {Sodium & Cholesterol Restricted} (DASH)  Moderately Thick Liquids (MODTHICKLIQS) (01-06-23 @ 12:13) [Active]    Please see RD assessment and/or follow up.  Managed by primary team as well

## 2023-01-11 NOTE — DISCHARGE NOTE PROVIDER - NSDCFUADDAPPT_GEN_ALL_CORE_FT
Follow up appointment with Jelena Kaye Jan 17 at 1:15 pm  Cardiac surgery office at [facility] entrance 3  first floor on left after entering lobby  Office telephone     Your Care Navigator Nurse Practitioner will be in touch to see you in your home within a few days from discharge. The Follow Your Heart program can help ensure you understand your medications, discharge instructions and answer any questions you may have at that time. They are also a great source to address concerns during the day and may be reached at [phone].   Follow up appointment with Jelena Kaye Jan 17 at 1:15 pm  Cardiac surgery office at [facility] entrance 3  first floor on left after entering lobby  Office telephone     Your Care Navigator Nurse Practitioner will be in touch to see you in your home within a few days from discharge. The Follow Your Heart program can help ensure you understand your medications, discharge instructions and answer any questions you may have at that time. They are also a great source to address concerns during the day and may be reached at [phone].  Neurology follow up with Dr. Riki Hernandez after discharge. Please instruct the patient to call [phone] to schedule an appointment within the next 2-3 days. Office is located at [facility], [place] [address].     Riki Hernandez MD  Vascular Neurology  Office: [phone]

## 2023-01-11 NOTE — DISCHARGE NOTE PROVIDER - NSDCPNSUBOBJ_GEN_ALL_CORE
Subjective:"Hello"  OOB chair, son at bedside      Language service PCA Giovany     Tele:  SR  70-80           V/S:                    T(F): 98.2 (23 @ 06:16), Max: 98.4 (01-10-23 @ 12:34)  HR: 84 (23 @ 06:16) (74 - 84)  BP: 115/61 (23 @ 06:16) (108/68 - 115/61)  RR: 18 (23 @ 06:16) (18 - 18)  SpO2: 94% (23 @ 06:16) (94% - 98%)  Wt(kg): --      LV EF:      Labs:      135  |  99  |  18  ----------------------------<  128<H>  4.0   |  25  |  0.87    Ca    10.1      2023 05:46    TPro  7.4  /  Alb  3.9  /  TBili  1.2  /  DBili  x   /  AST  18  /  ALT  18  /  AlkPhos  104                                 9.8    4.96  )-----------( 265      ( 2023 05:46 )             29.9        PT/INR - ( 10 Donovan 2023 05:14 )   PT: 14.7 sec;   INR: 1.26 ratio         PTT - ( 10 Donovan 2023 05:14 )  PTT:60.9 sec     CAPILLARY BLOOD GLUCOSE      POCT Blood Glucose.: 134 mg/dL (2023 07:30)  POCT Blood Glucose.: 128 mg/dL (2023 01:53)  POCT Blood Glucose.: 167 mg/dL (10 Donovan 2023 20:52)  POCT Blood Glucose.: 208 mg/dL (10 Donovan 2023 16:31)  POCT Blood Glucose.: 199 mg/dL (10 Donovan 2023 11:23)           CXR:    I&O's Detail    10 Donovan 2023 07:01  -  2023 07:00  --------------------------------------------------------  IN:    Heparin: 30 mL    Oral Fluid: 1260 mL  Total IN: 1290 mL    OUT:    Voided (mL): 1450 mL  Total OUT: 1450 mL    Total NET: -160 mL      2023 07:01  -  2023 09:24  --------------------------------------------------------  IN:    Oral Fluid: 360 mL  Total IN: 360 mL    OUT:  Total OUT: 0 mL    Total NET: 360 mL      BOWEL MOVEMENT:  [x] YES [ ] NO      Daily     Daily Weight in k.2 (2023 08:22)  Medications:  acetaminophen     Tablet .. 650 milliGRAM(s) Oral every 6 hours PRN  aMIOdarone    Tablet 200 milliGRAM(s) Oral daily  aMIOdarone    Tablet   Oral   apixaban 5 milliGRAM(s) Oral two times a day  aspirin  chewable 81 milliGRAM(s) Oral daily  atorvastatin 80 milliGRAM(s) Oral at bedtime  bisacodyl Suppository 10 milliGRAM(s) Rectal once  insulin glargine Injectable (LANTUS) 12 Unit(s) SubCutaneous at bedtime  insulin lispro (ADMELOG) corrective regimen sliding scale   SubCutaneous <User Schedule>  insulin lispro (ADMELOG) corrective regimen sliding scale   SubCutaneous three times a day before meals  insulin lispro Injectable (ADMELOG) 6 Unit(s) SubCutaneous three times a day before meals  metoprolol succinate ER 25 milliGRAM(s) Oral daily  nystatin    Suspension 455733 Unit(s) Oral every 6 hours  pantoprazole    Tablet 40 milliGRAM(s) Oral before breakfast  polyethylene glycol 3350 17 Gram(s) Oral daily  senna 2 Tablet(s) Oral at bedtime  sodium chloride 0.9% lock flush 3 milliLiter(s) IV Push every 8 hours  sodium chloride 0.9% lock flush 3 milliLiter(s) IV Push every 8 hours        Physical Exam:    Neurology: alert and oriented x 3, left side hemiparesis noted    CV : S1  S2  RRR    Sternal Wound :  CDI AVINASH, sternum Stable    Lungs: clear. RR easy, unlabored     Abdomen: soft, nontender, nondistended, positive bowel sounds, +BM;  Neg N/V/D     :  pt voiding without difficulty     Extremities: lt side hemiparesis;  neg LE edema, neg calf tenderness.   PPP bilaterally      Inc: R healing carotid incision with sl soft tissue swelling                 PAST MEDICAL & SURGICAL HISTORY:  Chronic CHF      HTN (hypertension)      Diabetes      Aortic stenosis      No significant past surgical history

## 2023-01-11 NOTE — PROGRESS NOTE ADULT - REASON FOR ADMISSION
C1L, AVR, R-CEA, RTCTU after code stroke on floor
chest pain x1.5 days
AVR, C1L
chest pain x1.5 days
C1LIMA  AVR  post op CVA  R CEA
C1LIMA   AVR
chest pain x1.5 days

## 2023-01-11 NOTE — PROGRESS NOTE ADULT - ASSESSMENT
77M RH Swedish speaking with a PMH T2DM, CHF, CAD, aortic stenosis, atrial fibrillation, COVID+ presented with chest pain on 12/1/22. Pt now s/p [facility] 12/4 with prox LAD 70% stenosis. Pt s/p AVR and CABG on 12/22/22. Pt had R CEA for asymptomatic carotid stenosis on 1/3/23 at 19:00. Code stroke called on 1/4/23. LKN 1/3/23 at 19:00 prior to R CEA surgery. No deficits noted in PACU. Pt on aspirin and lovenox ppx. NIHSS: 11, preMRS: 0. Neuro exam w/ LHH, mild L hemiparesis, R gaze preference, moderate dysarthria. subseuqent exam with some improvement    used [id number], vascular attending exam with LUE drift, slurred speech and mild L facial.   MRI confirms R hemisphere infarct   A1c 9.4    exam improving     Impression:  R hemisphere infarct/frontal, possible large artery athero wtih A-A embolism,  possible related to procedure but unclear , can also be CE from Afib     Recommendations:     - still on heparin drip. change to PO when able --> changed to DOAC   - BP parameters can be normotensive at this piont.  if there is worsening in symptoms with drop in BP would raise BP target   - monitor on telemetry  -  started AC with heparin drip 1/6 with no bolus if no objection from vascular sx.  once on AC no need for ASA from stroke standpoint ; now on both heparin and asa. stat CTH if change in neuro exam   - start atorvastatin 80mg PO daily (titrate to LDL < 70)   - stroke risk factor modification and counseling   - NPO until bedside dysphagia screen  - PT/OT/S&S/SW consults  - remaining per primary team and CTS   - spoke with mihaela at bedside (granddaughter)   - Patient can follow up with Dr. Riki Hernandez after discharge. Please instruct the patient to call [phone] to schedule an appointment within the next 2-3 days. Office is located at [address] [place] [address], [place] [address].   - d/c plan today   Riki Hernandez MD  Vascular Neurology  Office: [phone]

## 2023-01-11 NOTE — DISCHARGE NOTE PROVIDER - NSDCMRMEDTOKEN_GEN_ALL_CORE_FT
alcohol swabs : Apply topically to affected area 4 times a day   aspirin 81 mg oral delayed release tablet: 1 tab(s) orally once a day  atorvastatin 10 mg oral tablet: 2 tab(s) orally once a day (at bedtime)   glucometer (per patient&#x27;s insurance): Test blood sugars four times a day. Dispense #1 glucometer.  lancets: 1 application subcutaneously 4 times a day   metFORMIN 1000 mg oral tablet: 1 tab(s) orally 2 times a day   metoprolol succinate 25 mg oral tablet, extended release: 1 tab(s) orally once a day  olmesartan 5 mg oral tablet: 2 tab(s) orally once a day   pantoprazole 40 mg oral delayed release tablet: 1 tab(s) orally once a day (before a meal)  test strips (per patient&#x27;s insurance): 1 application subcutaneously 4 times a day. ** Compatible with patient&#x27;s glucometer **   acetaminophen 325 mg oral tablet: 2 tab(s) orally every 6 hours, As needed, Mild Pain (1 - 3)  alcohol swabs : Apply topically to affected area 4 times a day   amiodarone 200 mg oral tablet: 1 tab(s) orally once a day   aspirin 81 mg oral delayed release tablet: 1 tab(s) orally once a day  atorvastatin 40 mg oral tablet: 2 tab(s) orally 2 times a day   Basaglar KwikPen 100 units/mL subcutaneous solution: 14 unit(s) subcutaneous once a day (at bedtime)   Eliquis 5 mg oral tablet: 1 tab(s) orally 2 times a day   glucometer (per patient&#x27;s insurance): Test blood sugars four times a day. Dispense #1 glucometer.  Insulin Pen Needles, 4mm: 1 application subcutaneously 4 times a day. ** Use with insulin pen **   lancets: 1 application subcutaneously 4 times a day   metoprolol succinate 25 mg oral tablet, extended release: 1 tab(s) orally once a day  repaglinide 0.5 mg oral tablet: 1 tab(s) orally 3 times a day (before meals)   test strips (per patient&#x27;s insurance): 1 application subcutaneously 4 times a day. ** Compatible with patient&#x27;s glucometer **

## 2023-01-11 NOTE — DISCHARGE NOTE PROVIDER - HOSPITAL COURSE
77M RH Malay speaking with a PMH T2DM, CHF, CAD, aortic stenosis, atrial fibrillation, COVID+ presented with chest pain on 12/1/22. Pt now s/p [facility] 12/4 with prox LAD 70% stenosis. Pt s/p AVR and CABG on 12/22/22. Pt had R CEA for asymptomatic carotid stenosis on 1/3/23 at 19:00. Code stroke called on 1/4/23. LKN 1/3/23 at 19:00 prior to R CEA surgery. No deficits noted in PACU. Pt on aspirin and lovenox ppx. NIHSS: 11, preMRS: 0. Neuro exam w/ LHH, mild L hemiparesis, R gaze preference, moderate dysarthria. Impression: R frontal infarct seen on CTH. Mechanism likely post operative complication from R CEA.    12/22 s/p AVR-t and CABGx1 with Dr. Bowles   1/3 R CEA  1/4 Code stroke called, CTH right frontal/temporal opercular acute infarct with local sulcal effacement and mass effect  1/5 MR Head: Acute R MCA infarct  1/6 FEES study- regular solids w/ mod thickened liquids  1/8 Transferred from CTU to 2 MAR ivey  1/9 VSS; RSR ; heparin gttp for AC PTT 71 today; decrease heparin gttp 1000 units/hr; ?oral AC medication w/ Dr. Bowles; d/w Endo- home dm discharge recs  1/10  MBS  am, Hep gtt  discharge planning- home wed   1/11 MBS  DC home this afternoon 77M RH Mongolian speaking with a PMH T2DM, CHF, CAD, aortic stenosis, atrial fibrillation, COVID+ presented with chest pain on 12/1/22. Pt now s/p [facility] 12/4 with prox LAD 70% stenosis. Pt s/p AVR and CABG on 12/22/22. Pt had R CEA for asymptomatic carotid stenosis on 1/3/23 at 19:00. Code stroke called on 1/4/23. LKN 1/3/23 at 19:00 prior to R CEA surgery. No deficits noted in PACU. Pt on aspirin and lovenox ppx. NIHSS: 11, preMRS: 0. Neuro exam w/ LH, mild L hemiparesis, R gaze preference, moderate dysarthria. Impression: R frontal infarct seen on CTH. Mechanism likely post operative complication from R CEA.    12/22 s/p AVR-t and CABGx1 with Dr. Bowles   1/3 R CEA  1/4 Code stroke called, CTH right frontal/temporal opercular acute infarct with local sulcal effacement and mass effect  1/5 MR Head: Acute R MCA infarct  1/6 FEES study- regular solids w/ mod thickened liquids  1/8 Transferred from CTU to 2 MAR ivey  1/9 VSS; RSR ; heparin gttp for AC PTT 71 today; decrease heparin gttp 1000 units/hr; ?oral AC medication w/ Dr. Bowles; d/w Endo- home dm discharge recs  1/10  MBS  am, Hep gtt  discharge planning- home wed   1/11 Passed  [facility]  DC home this afternoon

## 2023-01-11 NOTE — PROGRESS NOTE ADULT - PROBLEM SELECTOR PLAN 1
-test BG ac and hs and 2am every day for now  -C/w Lantus dose to 12 units for now  -Increase Admelog dose to 7 units ac meals if pt s not discharged today. HOLD IF NOT EATING!  -C/w Admelog low correction scale  q6h   -Please continue to teach and allow pt to do own finger sticks and insulin injections under RN supervision  Please teach use of insulin pen  Please document teach back  Discharge:  Will be determined according to BG/insulin needs/PO intake at time of discharge.  Per  pt has [employer] [facility] [employer] benefit-  Discharge on:  Basaglar 12 units q hs   Prandin 0.5mg ac meals HOLD IF BG <100 OR NOT EATING!  Make sure pt has Rx for all DM supplies and insulin/ DM meds.  Send Rx for glucometer test strips, lancets, alcohol pads, and pen needles  At home, patient should check FSBG premeals and bedtime. Pt should call their doctor when FSBG <70 or above >400 and or consistently above 200s as changes in the regimen will have to be made.  Recommend outpatient routine dilated eye exam/ophthalmology  -Primary team should arrange medicine clinic follow up prior to discharge as pt is uninsured and visiting from [facility] & planning to go back once he recuperates from surgery. & the medicine clinic can address multiple medical concerns at once. phone: [phone]  -Will need home care upon discharge due to new insulin therapy. Per  already arranged

## 2023-01-11 NOTE — PROGRESS NOTE ADULT - NSPROGADDITIONALINFOA_GEN_ALL_CORE
Indy Biggs, AGACNP-BC  Cardiovascular & Thoracic Surgery  [address] [place] [address]  Office: [phone]    Available on Microsoft Teams  Spectra: [id number]  New Consults: [id number]
-Plan discussed with pt/team.  Contact info: [phone] (24/7). pager [phone]  [email or web address] password [email or web address]  Teams  Spent over 35  minutes providing face to face education which was more than 50% of encounter that also included assessing pt/labs/meds and discussing plan of care with primary team/pt and son.  Adjusting insulin  Discharge plan  Follow up care
-Plan discussed with pt/team.  Contact info: [phone] (24/7). pager [phone]  [email or web address] password [email or web address]  Teams  Spent 28 minutes assessing  pt/labs/meds and discussing plan with primary team  Adjusting insulin  Discharge plan  Follow up care
-Plan discussed with pt/team.  Contact info: [phone] (24/7). pager [phone]  [email or web address] password [email or web address]  Teams  Spent  28minutes assessing pt/labs/meds and discussing plan of care with primary team  Adjusting insulin  Discharge plan  Follow up care
26 minutes spent on the development of plan of care/coordination of care/glycemic control through review of labs, blood glucose values and vital signs.
-Plan discussed with pt/team.  Contact info: [phone] (24/7). pager [phone]  [email or web address] password [email or web address]  Teams  Spent 28 minutes providing face to face education which was more than 50% of encounter that also included assessing pt/labs/meds and discussing plan of care with primary team/pt and son.  Adjusting insulin  Discharge plan  Follow up care
-Plan discussed with pt/team.  Contact info: [phone] (24/7). pager [phone]  [email or web address] password [email or web address]  Teams  Spent over 25 minutes providing face to face education as well as assessing  pt/labs/meds and discussing plan with primary team  Adjusting insulin  Discharge plan  Follow up care
-Plan discussed with pt/team.  Contact info: [phone] (24/7). pager [phone]  [email or web address] password [email or web address]  Teams  Spent  28minutes assessing pt/labs/meds and discussing plan of care with primary team  Adjusting insulin  Discharge plan  Follow up care
-Plan discussed with pt/team.  Contact info: [phone] (24/7). pager [phone]  [email or web address] password [email or web address]  Teams  Spent 30 minutes assessing  pt/labs/meds and discussing plan with primary team  Adjusting insulin  Discharge plan  Follow up care

## 2023-01-11 NOTE — PROGRESS NOTE ADULT - PROBLEM SELECTOR PROBLEM 3
Hyperlipidemia
Hypertension
Afib
Hyperlipidemia
Hypertension
Hyperlipidemia
Hypertension
Afib
Diabetes mellitus
Hyperlipidemia
Hyperlipidemia
Afib
Hyperlipidemia
Hyperlipidemia
S/P carotid endarterectomy
Afib
S/P carotid endarterectomy
S/P carotid endarterectomy

## 2023-01-11 NOTE — SWALLOW VFSS/MBS ASSESSMENT ADULT - ORAL PHASE
Trace spillage to the pyriform sinuses; minimal oral cavity residue clears with spontaneous repeat swallow/Incomplete tongue to palate contact Spillage to the pyriform sinuses/Incomplete tongue to palate contact

## 2023-01-11 NOTE — SWALLOW VFSS/MBS ASSESSMENT ADULT - SLP PERTINENT HISTORY OF CURRENT PROBLEM
77M RH Polish speaking with a PMH T2DM, CHF, CAD, aortic stenosis, atrial fibrillation, COVID+ presented with chest pain on 12/1/22. Pt now s/p [facility] 12/4 with prox LAD 70% stenosis. Pt s/p AVR and CABG on 12/22/22. Pt had R CEA for asymptomatic carotid stenosis on 1/3/23 at 19:00. Code stroke called on 1/4/23. LKN 1/3/23 at 19:00 prior to R CEA surgery. No deficits noted in PACU. Pt on aspirin and lovenox ppx. NIHSS: 11, preMRS: 0. Neuro exam w/ LHH, mild L hemiparesis, R gaze preference, moderate dysarthria. Impression: R frontal infarct seen on CTH. Mechanism likely post operative complication from R CEA.

## 2023-01-11 NOTE — DISCHARGE NOTE NURSING/CASE MANAGEMENT/SOCIAL WORK - NSDCFUADDAPPT_GEN_ALL_CORE_FT
Follow up appointment with Jelena Kaye Jan 17 at 1:15 pm  Cardiac surgery office at [facility] entrance 3  first floor on left after entering lobby  Office telephone     Your Care Navigator Nurse Practitioner will be in touch to see you in your home within a few days from discharge. The Follow Your Heart program can help ensure you understand your medications, discharge instructions and answer any questions you may have at that time. They are also a great source to address concerns during the day and may be reached at [phone].  Neurology follow up with Dr. Riki Hernandez after discharge. Please instruct the patient to call [phone] to schedule an appointment within the next 2-3 days. Office is located at [facility], [place] [address].     Riki Hernandez MD  Vascular Neurology  Office: [phone]

## 2023-01-11 NOTE — DISCHARGE NOTE PROVIDER - NSDCFUSCHEDAPPT_GEN_ALL_CORE_FT
Ranjeet Bowles  [facility]  CTSURG [address]  Scheduled Appointment: 01/17/2023

## 2023-01-11 NOTE — PROGRESS NOTE ADULT - SUBJECTIVE AND OBJECTIVE BOX
Neurology Progress Note    S: Patient seen and examined. doing well ; dc plan today     Medication:  MEDICATIONS  (STANDING):  aMIOdarone    Tablet 200 milliGRAM(s) Oral daily  aMIOdarone    Tablet   Oral   apixaban 5 milliGRAM(s) Oral two times a day  aspirin  chewable 81 milliGRAM(s) Oral daily  atorvastatin 80 milliGRAM(s) Oral at bedtime  bisacodyl Suppository 10 milliGRAM(s) Rectal once  insulin glargine Injectable (LANTUS) 12 Unit(s) SubCutaneous at bedtime  insulin lispro (ADMELOG) corrective regimen sliding scale   SubCutaneous <User Schedule>  insulin lispro (ADMELOG) corrective regimen sliding scale   SubCutaneous three times a day before meals  insulin lispro Injectable (ADMELOG) 6 Unit(s) SubCutaneous three times a day before meals  metoprolol succinate ER 25 milliGRAM(s) Oral daily  nystatin    Suspension 075403 Unit(s) Oral every 6 hours  pantoprazole    Tablet 40 milliGRAM(s) Oral before breakfast  polyethylene glycol 3350 17 Gram(s) Oral daily  senna 2 Tablet(s) Oral at bedtime  sodium chloride 0.9% lock flush 3 milliLiter(s) IV Push every 8 hours  sodium chloride 0.9% lock flush 3 milliLiter(s) IV Push every 8 hours    MEDICATIONS  (PRN):  acetaminophen     Tablet .. 650 milliGRAM(s) Oral every 6 hours PRN Mild Pain (1 - 3)        Vitals:      Vital Signs Last 24 Hrs  T(C): 36.6 (01-11-23 @ 12:16), Max: 36.8 (01-11-23 @ 06:16)  T(F): 97.9 (01-11-23 @ 12:16), Max: 98.2 (01-11-23 @ 06:16)  HR: 87 (01-11-23 @ 12:16) (75 - 87)  BP: 118/68 (01-11-23 @ 12:16) (108/68 - 118/68)  BP(mean): 79 (01-10-23 @ 17:00) (79 - 79)  RR: 18 (01-11-23 @ 12:16) (18 - 18)  SpO2: 100% (01-11-23 @ 12:16) (94% - 100%)            General Exam:   General Appearance: Appropriately dressed and in no acute distress       Head: Normocephalic, atraumatic and no dysmorphic features  Ear, Nose, and Throat: Moist mucous membranes  CVS: S1S2+  Resp: No SOB, no wheeze or rhonchi  Abd: soft NTND  Extremities: No edema, no cyanosis  Skin: No bruises, no rashes     Neurological Exam:  Mental Status: Awake, alert and oriented x 2.  Able to follow simple verbal commands. Able to name and repeat. fluent speech. No obvious aphasia but mild to mod dysarthria noted.   Cranial Nerves: PERRL, R gaze pref, L HHA , sensation V1-V3 intact,  no obvious facial asymmetry , equal elevation of palate, scm/trap 5/5, tongue is midline on protrusion. no obvious papilledema on fundoscopic exam. Hearing is grossly intact.   Motor: mild LUE nad LLE drift but significant improvement in strength.   Sensation: Intact to light touch and pinprick throughout. no right/left confusion. no extinction to tactile on DSS.    Reflexes: 1+ throughout at biceps, brachioradialis, triceps, patellars and ankles bilaterally and equal. No clonus. R toe and L toe were both downgoing.  Coordination: No dysmetria on FNF   Gait: deferred     I personally reviewed the below data/images/labs:  CBC Full  -  ( 11 Jan 2023 05:46 )  WBC Count : 4.96 K/uL  RBC Count : 3.35 M/uL  Hemoglobin : 9.8 g/dL  Hematocrit : 29.9 %  Platelet Count - Automated : 265 K/uL  Mean Cell Volume : 89.3 fl  Mean Cell Hemoglobin : 29.3 pg  Mean Cell Hemoglobin Concentration : 32.8 gm/dL  Auto Neutrophil # : x  Auto Lymphocyte # : x  Auto Monocyte # : x  Auto Eosinophil # : x  Auto Basophil # : x  Auto Neutrophil % : x  Auto Lymphocyte % : x  Auto Monocyte % : x  Auto Eosinophil % : x  Auto Basophil % : x    01-11    135  |  99  |  18  ----------------------------<  128<H>  4.0   |  25  |  0.87    Ca    10.1      11 Jan 2023 05:46    TPro  7.4  /  Alb  3.9  /  TBili  1.2  /  DBili  x   /  AST  18  /  ALT  18  /  AlkPhos  104  01-11    < from: CT Head No Cont (01.04.23 @ 08:57) >  IMPRESSION: Small right frontal and temporal opercular acute infarct   local sulcal effacement and mass effect. No hemorrhage. CTA ofthe neck   demonstrates postoperative changes at the right carotid bifurcation with   a patent endarterectomy graft. No large vessel occlusion intracranially   although a small distal branch occlusion is not excluded.       < from: CT Angio/Perfusion Head w/ IV Cont (01.04.23 @ 08:59) >  CBF<30% volume; 0 ml  Tmax> 6.0s volume: 47 ml  Mismatch volume: 47 ml  Mismatch ratio: infinite  < from: MR Head No Cont (01.05.23 @ 10:04) >    ACC: [id number] EXAM:  MR BRAIN                          PROCEDURE DATE:  01/05/2023          INTERPRETATION:  MRI BRAIN WITHOUT AND WITH IV CONTRAST    INDICATION: CVA. Additional history per EMR: now s/p R CEA 1/3, post-op   course c/b CODE STROKE on POD1  1/4/23 patient found to have right frontal/temporal opercular acute   infarct  with local sulcal effacement and mass effect.    COMPARISON: CT/CTA/CTP head neck 1/4/2023    TECHNIQUE: Multisequential multiplanar MRI of the brain is performed   without the administration of intravenous gadolinium contrast.    FINDINGS:    Examination is slightly degraded due to patient motion.    Cortical/subcortical restricted diffusion (DWI hyperintense/ADC   hypointense signal) is centered within the right frontal opercular and   insular regions, with slight involvement of the right temporal operculum.   At least two additional punctate foci of restricted diffusion within the   right frontal centrum semiovale and lentiform nucleus (images 17-22,   series 3). Corresponding FLAIR hyperintensity is noted. The infarct   appears grossly similar compared to hypodensity on recent noncontrast CT.    No abnormal SWI signal loss to suggest hemorrhagic transformation of   infarct.    ADC values less than 620 on rapid postprocessed sequence in the region of   diffusion and about the, is consistent with acute core ischemic infarct   (volume 52 mL).    There is no signal mass effect or midline shift. The basal cisterns are   patent.  There is no hydrocephalus.  No extra-axial collection is seen.  Generalized parenchymal volume loss is noted.    Large retrocerebellar CSF intensity extra-axial space, likely reflects an   incidental arachnoid cyst. Mild mass effect on the adjacent cerebellum.   Scalloping of the adjacent calvarium and thin internal septation are   noted.    The corpus callosum, optic chiasm, pituitary gland and pineal gland are   normal in configuration.  No cerebellar tonsillar herniation/ectopia. No Chiari malformation.    The visualized orbits are unremarkable. Mild mucosal thickening within   the paranasal sinuses. Bilateral mastoid/middle ear cavities are clear.    Preserved T2 flow-voids along the dural venous sinuses.      IMPRESSION:    Acute right MCA territory infarct (centered within right frontal   operculum-insular regions). This is grossly stable compared to 1/4/2023   CT. No hemorrhagic transformation of infarct or midline shift. Consider   imaging follow-up as clinically indicated.      Findings were discussedwith covering provider (Karla Gallegos NP) via   telephone on 1/5/2023 and 10:33 AM with verbal read back    --- End of Report ---            SORAYA PEDRAZA MD; Attending Radiologist  This document has been electronically signed. Jan 5 2023 10:44AM    < end of copied text >

## 2023-01-11 NOTE — PROGRESS NOTE ADULT - PROVIDER SPECIALTY LIST ADULT
Critical Care
Electrophysiology
Neurology
Vascular Surgery
CT Surgery
Critical Care
Electrophysiology
Neurology
Vascular Surgery
CT Surgery
Critical Care
Electrophysiology
Vascular Surgery
CT Surgery
CT Surgery
Electrophysiology
Electrophysiology
Endocrinology
Neurology
Neurology
Vascular Surgery
Endocrinology
Neurology
CT Surgery
Endocrinology
Neurology
Neurology
CT Surgery
Endocrinology
CT Surgery
Endocrinology
Endocrinology
CT Surgery
CT Surgery
Endocrinology
Endocrinology
CT Surgery
CT Surgery

## 2023-01-11 NOTE — SWALLOW VFSS/MBS ASSESSMENT ADULT - DIAGNOSTIC IMPRESSIONS
Pt presents with a mild oropharyngeal dysphagia. Swallow function characterized by trace premature spillage and trace laryngeal penetration with thin liquids which is completely retrieved during the swallow. No aspiration, no significant pharyngea residue. There is no indication for diet modification at this time. Pt presents with a mild oropharyngeal dysphagia. Swallow function characterized by trace premature spillage and laryngeal penetration with thin liquids which is completely retrieved during the swallow. No aspiration, no significant pharyngea residue. There is no indication for diet modification at this time.

## 2023-01-11 NOTE — PROGRESS NOTE ADULT - ASSESSMENT
77 M with recently diagnosed T2D (A1C 9.4%) on Metformin PTA. DM complicated by CAD s/p [facility] 12/4 with prox LAD 70% stenosis, aortic stenosis. Also + CHF now s/p  CABG/SAVR. 12/22 and s/p carotid endarterectomy 1/3/23 with R frontal infarct s/p procedure.  Tolerating POs with BG mosty at goal but requiring higher premeal doses. Per primary team pt is going home today.  BG goal 100 to 180s.    Discussed discharge plan with pt/relative//pharmacist and ACP. Pt getting Basaglar insulin from [facility] as well as home care.    Spoke to staff about the need to get teach back from pt and family regarding insulin administration and use of insulin pen.

## 2023-01-12 ENCOUNTER — TRANSCRIPTION ENCOUNTER (OUTPATIENT)
Age: 78
End: 2023-01-12

## 2023-01-12 ENCOUNTER — APPOINTMENT (OUTPATIENT)
Dept: INTERNAL MEDICINE | Facility: CLINIC | Age: 78
End: 2023-01-12

## 2023-01-12 ENCOUNTER — APPOINTMENT (OUTPATIENT)
Dept: CARE COORDINATION | Facility: HOME HEALTH | Age: 78
End: 2023-01-12
Payer: SELF-PAY

## 2023-01-12 ENCOUNTER — NON-APPOINTMENT (OUTPATIENT)
Age: 78
End: 2023-01-12

## 2023-01-12 PROCEDURE — 99024 POSTOP FOLLOW-UP VISIT: CPT

## 2023-01-12 RX ORDER — ACETAMINOPHEN 325 MG/1
325 TABLET ORAL EVERY 6 HOURS
Refills: 0 | Status: ACTIVE | COMMUNITY
Start: 2023-01-12

## 2023-01-12 RX ORDER — INSULIN GLARGINE 100 [IU]/ML
100 INJECTION, SOLUTION SUBCUTANEOUS AT BEDTIME
Refills: 0 | Status: ACTIVE | COMMUNITY
Start: 2023-01-12

## 2023-01-12 RX ORDER — APIXABAN 5 MG/1
5 TABLET, FILM COATED ORAL
Refills: 0 | Status: ACTIVE | COMMUNITY
Start: 2023-01-12

## 2023-01-14 ENCOUNTER — TRANSCRIPTION ENCOUNTER (OUTPATIENT)
Age: 78
End: 2023-01-14

## 2023-01-14 NOTE — REASON FOR VISIT
[Other Location: e.g. Home (Enter Location, City,State)___] : at [unfilled] [Interpreters_IDNumber] : [id number] [Interpreters_FullName] : [facility]ge [FreeTextEntry4] : Kristen ([email or web address]) [TWNoteComboBox1] : Vincentian

## 2023-01-14 NOTE — HISTORY OF PRESENT ILLNESS
[FreeTextEntry1] : FOLLOW YOUR HEART HOME VISIT-[facility]\par Telephonic Home Visit made to  Mr. Lopez for post discharge transitional care management and post follow up.  Patient of Dr. Bowles s/p AVR/C3L  on 12/22/22.        Discharge on 1/11/23 from [facility]\par \par Mr. Lopez is a 77 year old Albanian speaking  male with a PMH T2DM, CHF, CAD, aortic stenosis, atrial fibrillation, COVID+ presented with chest pain on 12/1/22.  Pt s/p AVR and CABG on 12/22/22. Pt had R CEA for asymptomatic carotid stenosis on 1/3/23.  Pt. discharged to home on 1/11/22.  Spoke with pt. & family with [place]  Ange [phone] for follow up s/p hospitalization. Denies any new complaints/issues at this time..  Compliant with all medications as per d/c order with +teach back.  Patient has scheduled follow up appointment. Reminded of CN role and contact number was provided to the patient.  Advised to call with any questions/concerns, verbalized understanding.\par  Passed  MBS  DC home this afternoon\par \par

## 2023-01-17 ENCOUNTER — APPOINTMENT (OUTPATIENT)
Dept: CARDIOTHORACIC SURGERY | Facility: CLINIC | Age: 78
End: 2023-01-17
Payer: SELF-PAY

## 2023-01-17 VITALS
RESPIRATION RATE: 14 BRPM | OXYGEN SATURATION: 99 % | DIASTOLIC BLOOD PRESSURE: 70 MMHG | BODY MASS INDEX: 26.16 KG/M2 | WEIGHT: 157 LBS | SYSTOLIC BLOOD PRESSURE: 121 MMHG | HEART RATE: 74 BPM | HEIGHT: 65 IN | TEMPERATURE: 98.2 F

## 2023-01-17 PROCEDURE — 99024 POSTOP FOLLOW-UP VISIT: CPT

## 2023-01-17 RX ORDER — CHLORHEXIDINE GLUCONATE 4 %
5 LIQUID (ML) TOPICAL
Qty: 20 | Refills: 0 | Status: ACTIVE | COMMUNITY
Start: 2023-01-17 | End: 1900-01-01

## 2023-01-17 NOTE — DISCUSSION/SUMMARY
[Doing Well] : is doing well [0] : 0 [Remove Sutures/Staples] : removed sutures/staples [Coumadin] : the patient is not on Coumadin

## 2023-01-17 NOTE — CONSULT LETTER
[Dear  ___] : Dear  [unfilled], [Please see my note below.] : Please see my note below. [Sincerely,] : Sincerely, [FreeTextEntry2] : Dr. Pablo Fung [FreeTextEntry3] : Ranjeet Bowles MD\par Attending Surgeon\par Cardiovascular & Thoracic Surgery

## 2023-01-17 NOTE — REASON FOR VISIT
[de-identified] : AVR and CABG on 12/22/22 [de-identified] : Mr. Lopez is a 77 year old Macedonian speaking male with a PMH T2DM, CHF, CAD, aortic stenosis, atrial fibrillation, COVID+ presented with chest pain on 12/1/22. Pt s/p AVR and CABG on 12/22/22. Pt had R CEA for asymptomatic carotid stenosis on 1/3/23. Pt. discharged to home on 1/11/22. Passed MBS DC home. \par \par He presents today with his son and grandson who he asked to have translate for him. He reports feeling better since DC but still tired and having trouble sleeping.  Sugars stable .  Walking more, SOB improving, eating with +BM, Tylenol for pain.  Difficultly sleeping at night.  Denies CP, swelling, weight gain, palpitations, cough, fever or chills. Cathed by Dr. Fung\par \par

## 2023-01-17 NOTE — PHYSICAL EXAM
[] : no respiratory distress [Respiration, Rhythm And Depth] : normal respiratory rhythm and effort [Exaggerated Use Of Accessory Muscles For Inspiration] : no accessory muscle use [Auscultation Breath Sounds / Voice Sounds] : lungs were clear to auscultation bilaterally [Apical Impulse] : the apical impulse was normal [Heart Rate And Rhythm] : heart rate was normal and rhythm regular [Heart Sounds] : normal S1 and S2 [Murmurs] : no murmurs [Clean] : clean [Dry] : dry [Healing Well] : healing well [No Edema] : no edema [Bleeding] : no active bleeding [Foul Odor] : no foul smell [Purulent Drainage] : no purulent drainage [Serosanguinous Drainage] : no serosanguinous drainage [Erythema] : not erythematous [Warm] : not warm [Tender] : not tender

## 2023-01-17 NOTE — ASSESSMENT
[FreeTextEntry1] : Today on exam patient's lungs clear bilaterally, normal sinus rhythm, sternum stable, incision clean, dry and intact. . No peripheral edema noted. Instructed patient on importance of optimal glycemic control, daily showering, daily weights, incentive spirometer use, and increase ambulation as tolerated. Instructed to call office with any signs or symptoms of infection or weight gain of 2 or more pounds in 1 day or 3 or more pounds in 1 week. \par \par Plan:\par \par - Melatonin HS for sleep, continue rest of current medication regimen\par - Follow up with cardiologist Dr. Fung\par - Follow up with vascular and neuro vascular\par - Continue to walk and increase as tolerated, IS 10X hour when awake\par - SBE antibiotic prophylaxis discussed at length \par - Low salt diet and fluids discussed in detail\par - Return as needed\par - Call with any questions or concerns\par \par

## 2023-01-25 ENCOUNTER — NON-APPOINTMENT (OUTPATIENT)
Age: 78
End: 2023-01-25

## 2023-01-25 ENCOUNTER — APPOINTMENT (OUTPATIENT)
Dept: CARDIOTHORACIC SURGERY | Facility: CLINIC | Age: 78
End: 2023-01-25
Payer: SELF-PAY

## 2023-01-25 VITALS
RESPIRATION RATE: 14 BRPM | BODY MASS INDEX: 25.83 KG/M2 | OXYGEN SATURATION: 100 % | WEIGHT: 155 LBS | HEIGHT: 65 IN | DIASTOLIC BLOOD PRESSURE: 76 MMHG | TEMPERATURE: 98.1 F | HEART RATE: 72 BPM | SYSTOLIC BLOOD PRESSURE: 132 MMHG

## 2023-01-25 DIAGNOSIS — Z95.2 PRESENCE OF PROSTHETIC HEART VALVE: ICD-10-CM

## 2023-01-25 PROCEDURE — 99024 POSTOP FOLLOW-UP VISIT: CPT

## 2023-01-25 RX ORDER — SENNOSIDES 8.6 MG TABLETS 8.6 MG/1
8.6 TABLET ORAL
Qty: 20 | Refills: 0 | Status: ACTIVE | COMMUNITY
Start: 2023-01-25 | End: 1900-01-01

## 2023-01-25 RX ORDER — AMIODARONE HYDROCHLORIDE 200 MG/1
200 TABLET ORAL DAILY
Refills: 0 | Status: COMPLETED | COMMUNITY
Start: 2023-01-12 | End: 2023-01-25

## 2023-01-25 RX ORDER — OXYCODONE 5 MG/1
5 TABLET ORAL
Qty: 7 | Refills: 0 | Status: ACTIVE | COMMUNITY
Start: 2023-01-25 | End: 1900-01-01

## 2023-01-25 NOTE — REASON FOR VISIT
[de-identified] : AVR and CABG on 12/22/22 [de-identified] : 77 year old Hebrew speaking male with a PMH T2DM, CHF, CAD, aortic stenosis, atrial fibrillation, COVID+ presented with chest pain on 12/1/22. Pt s/p AVR and CABG on 12/22/22. Pt had R CEA for asymptomatic carotid stenosis on 1/3/23. Pt. discharged to home on 1/11/22. Passed MBS DC home. \par \par He was seen last week and feeling better since DC but still tired and having trouble sleeping. Sugars stable . Walking more, SOB improving, eating with +BM, Tylenol for pain. Difficultly sleeping at night. Denies CP, swelling, weight gain, palpitations, cough, fever or chills. Cathed by Dr. Fung.\par \par He was referred to cardiology clinic and [facility] medicine clinic however has not yet made appointments with either.  At last visit he was doing well with stable vitals and unremarkable exam.  He was started on melatonin due to difficulty sleeping at night.  \par \par He returns today after his granddaughter called the office with co inability to sleep at night.  Pt reports the melatonin is not helping.  He reports difficulty sleeping at night due to MSI pain and will fall asleep for 3 hours then wake up feeling like he cant breath.  He was brought in to r/o effusion.  Reports he does not get this feeling much during the day mainly when sleeping.  He denies palpitations, swelling, dizziness, weights stable.  Eating and drinking with +BM, though has not had one in 2-3 days (has hx of constipation). Came from [facility] in December.  Planning to return in next month. \par \par \par  [[employer] ] : provided by [employer]   [Interpreters_IDNumber] : [id number] [Interpreters_FullName] : Fabrice

## 2023-01-25 NOTE — PHYSICAL EXAM
[] : no respiratory distress [Respiration, Rhythm And Depth] : normal respiratory rhythm and effort [Exaggerated Use Of Accessory Muscles For Inspiration] : no accessory muscle use [Auscultation Breath Sounds / Voice Sounds] : lungs were clear to auscultation bilaterally [Apical Impulse] : the apical impulse was normal [Heart Rate And Rhythm] : heart rate was normal and rhythm regular [Heart Sounds] : normal S1 and S2 [Clean] : clean [Dry] : dry [Healing Well] : healing well [Bleeding] : no active bleeding [Foul Odor] : no foul smell [Purulent Drainage] : no purulent drainage [Serosanguinous Drainage] : no serosanguinous drainage [Erythema] : not erythematous [Warm] : not warm [Tender] : not tender [No Edema] : no edema

## 2023-01-26 ENCOUNTER — APPOINTMENT (OUTPATIENT)
Dept: VASCULAR SURGERY | Facility: CLINIC | Age: 78
End: 2023-01-26
Payer: MEDICAID

## 2023-01-26 VITALS
DIASTOLIC BLOOD PRESSURE: 74 MMHG | HEART RATE: 73 BPM | SYSTOLIC BLOOD PRESSURE: 123 MMHG | TEMPERATURE: 98.5 F | WEIGHT: 155 LBS | HEIGHT: 65 IN | BODY MASS INDEX: 25.83 KG/M2

## 2023-01-26 DIAGNOSIS — I65.21 OCCLUSION AND STENOSIS OF RIGHT CAROTID ARTERY: ICD-10-CM

## 2023-01-26 DIAGNOSIS — Z95.1 PRESENCE OF AORTOCORONARY BYPASS GRAFT: ICD-10-CM

## 2023-01-26 PROCEDURE — 99024 POSTOP FOLLOW-UP VISIT: CPT

## 2023-01-26 NOTE — REASON FOR VISIT
[de-identified] : 1/3/2023 [de-identified] : Right carotid endarterectomy [Family Member] : family member

## 2023-01-26 NOTE — PHYSICAL EXAM
[Abdomen Tenderness] : ~T ~M No abdominal tenderness [Calm] : calm [de-identified] : Alert and oriented [de-identified] : Right neck incision completely healed.  Cranial nerves grossly intact [de-identified] : Clear to auscultation bilaterally [de-identified] : Regular rate and rhythm [FreeTextEntry1] : Palpable bilateral radial and femoral pulses [de-identified] : No motor or sensory deficit, [de-identified] : Muscle strength equal bilaterally

## 2023-01-31 ENCOUNTER — APPOINTMENT (OUTPATIENT)
Dept: CARDIOTHORACIC SURGERY | Facility: CLINIC | Age: 78
End: 2023-01-31

## 2023-02-08 NOTE — PHYSICAL EXAM
[] : no respiratory distress [Respiration, Rhythm And Depth] : normal respiratory rhythm and effort [Exaggerated Use Of Accessory Muscles For Inspiration] : no accessory muscle use [Auscultation Breath Sounds / Voice Sounds] : lungs were clear to auscultation bilaterally [Apical Impulse] : the apical impulse was normal [Heart Rate And Rhythm] : heart rate was normal and rhythm regular [Heart Sounds] : normal S1 and S2 [Clean] : clean [Dry] : dry [Healing Well] : healing well [No Edema] : no edema [Bleeding] : no active bleeding [Foul Odor] : no foul smell [Purulent Drainage] : no purulent drainage [Serosanguinous Drainage] : no serosanguinous drainage [Erythema] : not erythematous [Warm] : not warm [Tender] : not tender

## 2023-02-08 NOTE — REASON FOR VISIT
[[employer] ] : provided by [employer]   [FreeTextEntry1] : History of Present Illness\par 77 year old Faroese speaking male with a PMH T2DM, CHF, CAD, aortic stenosis, atrial fibrillation, COVID+ presented with chest pain on 12/1/22. Pt s/p AVR and CABG on 12/22/22. Pt had R CEA for asymptomatic carotid stenosis on 1/3/23. Pt. discharged to home on 1/11/22. Passed MBS DC home. \par \par He was seen recently ast week and feeling better since DC but still tired and having trouble sleeping. Sugars stable . Walking more, SOB improving, eating with +BM, Tylenol for pain. Difficultly sleeping at night. Denies CP, swelling, weight gain, palpitations, cough, fever or chills. Cathed by Dr. Fung.\par \par He was referred to cardiology clinic and [facility] medicine clinic however has not yet made appointments with either.  At last visit he was doing well with stable vitals and unremarkable exam.  He was started on melatonin due to difficulty sleeping at night.  Pt reports the melatonin is not helping.  He reports difficulty sleeping at night due to MSI pain and will fall asleep for 3 hours then wake up feeling like he cant breath.  He was brought into cardiac surgery office recently to rule outeffusion.  Reports he does not get this feeling much during the day mainly when sleeping.  He denies palpitations, swelling, dizziness, weights stable.  Eating and drinking with +BM, though has not had one in 2-3 days (has hx of constipation). Came from [facility] in December.  Planning to return in next month. \par \par \par \par Assessment/Plan:\par 77-year-old man with a past medical history significant for\par \par Diabetes mellitus\par Coronary artery disease status post CABG\par Hypertension\par Dyslipidemia\par Aortic stenosis\par Atrial fibrillation (Eliquis)\par COVID+ \par \par Who presented with chest pain on 12/1/22. Patient is status post AVR and CABG on 12/22/22. Patient had right CEA for asymptomatic carotid stenosis on 1/3/23. Pt. discharged to home on 1/11/22. \par \par - Continue aspirin 81mg daily.\par - Continue Eliquis 5mg PO BID.  Indications for blood thinners was reviewed along with benefits and risks.  Signs and symptoms of bleeding was gone over.\par - Continue atorvastatin 80mg daily.\par - Continue metoprolol succinate ER 25mg daily.\par - Amiodarone was stopped by cardiac surgery along with melatonin/\par - He was started on oxycodone 5mg HS for MSI pain for 7 days and wean with Tylenol\par - Start senna for constipation\par - Reviewed importance of SBE antibiotic prophylaxis discussed at length \par - Increase activity as tolerated.  Continue to walk and increase as tolerated\par - Recommend low salt diet and restrict fluids to less than 2L a day.\par - EKG performed due to history of CAD, HTN and dyslipidemia.\par \par All questions and concerns of the patient and his family were addressed. [de-identified] : AVR and CABG on 12/22/22 [Family Member] : family member [Interpreters_IDNumber] : [id number] [Interpreters_FullName] : Fabrice

## 2023-02-09 ENCOUNTER — APPOINTMENT (OUTPATIENT)
Dept: CARDIOLOGY | Facility: CLINIC | Age: 78
End: 2023-02-09

## 2023-02-13 RX ORDER — ATORVASTATIN CALCIUM 80 MG/1
80 TABLET, FILM COATED ORAL
Qty: 90 | Refills: 0 | Status: ACTIVE | COMMUNITY
Start: 2023-01-12 | End: 1900-01-01

## 2023-02-13 RX ORDER — METOPROLOL SUCCINATE 25 MG/1
25 TABLET, EXTENDED RELEASE ORAL DAILY
Qty: 90 | Refills: 0 | Status: ACTIVE | COMMUNITY
Start: 2023-01-12 | End: 1900-01-01

## 2023-02-13 RX ORDER — ASPIRIN 81 MG/1
81 TABLET, CHEWABLE ORAL
Qty: 90 | Refills: 0 | Status: ACTIVE | COMMUNITY
Start: 2023-01-12 | End: 1900-01-01

## 2023-02-16 RX ORDER — REPAGLINIDE 0.5 MG/1
0.5 TABLET ORAL 3 TIMES DAILY
Qty: 270 | Refills: 0 | Status: ACTIVE | COMMUNITY
Start: 2023-01-12 | End: 1900-01-01

## 2023-03-08 DIAGNOSIS — Z87.898 PERSONAL HISTORY OF OTHER SPECIFIED CONDITIONS: ICD-10-CM

## 2023-03-08 DIAGNOSIS — I25.10 ATHEROSCLEROTIC HEART DISEASE OF NATIVE CORONARY ARTERY W/OUT ANGINA PECTORIS: ICD-10-CM

## 2023-03-08 DIAGNOSIS — U07.1 COVID-19: ICD-10-CM

## 2023-03-08 DIAGNOSIS — E11.9 TYPE 2 DIABETES MELLITUS W/OUT COMPLICATIONS: ICD-10-CM

## 2023-03-08 DIAGNOSIS — I10 ESSENTIAL (PRIMARY) HYPERTENSION: ICD-10-CM

## 2023-03-08 DIAGNOSIS — E78.5 HYPERLIPIDEMIA, UNSPECIFIED: ICD-10-CM

## 2023-03-08 DIAGNOSIS — I50.9 HEART FAILURE, UNSPECIFIED: ICD-10-CM

## 2023-03-08 DIAGNOSIS — I35.0 NONRHEUMATIC AORTIC (VALVE) STENOSIS: ICD-10-CM

## 2023-03-08 DIAGNOSIS — I48.91 UNSPECIFIED ATRIAL FIBRILLATION: ICD-10-CM

## 2023-03-09 ENCOUNTER — APPOINTMENT (OUTPATIENT)
Dept: CARDIOLOGY | Facility: CLINIC | Age: 78
End: 2023-03-09

## 2023-03-09 NOTE — COUNSELING
[Hygeine (Including Daily Shower)] : hygeine (including daily shower) [Importance of Regular Medical Follow-Up] : the importance of regular medical follow-up [No Heavy Lifting] : no heavy lifting (>15-20 lb. for 1 month or 25 lb. for 3 months from date of surgery) [Blood Pressure Control] : blood pressure control [Weight Management] : weight management [S/S of infection] : signs and symptoms of infection (and to whom it should be reported) [Medication/Vitamin/Herb/Food Interaction] : medication/vitamin/herb/food interaction [Progressive Ambulation/Activity] : progressive ambulation/activity [SBE antibiotic prophylaxis] : SBE antibiotic prophylaxis was recommended [Low Fat/Low Cholesterol Diet] : low fat/low cholesterol diet [Blood Sugar Control] : blood sugar control [Stress Management] : stress management

## 2023-03-09 NOTE — REASON FOR VISIT
[FreeTextEntry1] : History of Present Illness\par 77 year old Persian speaking male with a PMH T2DM, CHF, CAD, aortic stenosis, atrial fibrillation, COVID+ presented with chest pain on 12/1/22. Pt s/p AVR and CABG on 12/22/22. Pt had R CEA for asymptomatic carotid stenosis on 1/3/23. Pt. discharged to home on 1/11/22. Passed MBS DC home. \par \par He was seen recently ast week and feeling better since DC but still tired and having trouble sleeping. Sugars stable . Walking more, SOB improving, eating with +BM, Tylenol for pain. Difficultly sleeping at night. Denies CP, swelling, weight gain, palpitations, cough, fever or chills. Cathed by Dr. Fung.\par \par He was referred to cardiology clinic and [facility] however has not yet made appointments with either.  At last visit he was doing well with stable vitals and unremarkable exam.  He was started on melatonin due to difficulty sleeping at night.  Pt reports the melatonin is not helping.  He reports difficulty sleeping at night due to MSI pain and will fall asleep for 3 hours then wake up feeling like he cant breath.  He was brought into cardiac surgery office recently to rule outeffusion.  Reports he does not get this feeling much during the day mainly when sleeping.  He denies palpitations, swelling, dizziness, weights stable.  Eating and drinking with +BM, though has not had one in 2-3 days (has hx of constipation). Came from [facility] in December.  Planning to return in next month. \par \par \par \par Assessment/Plan:\par 77-year-old man with a past medical history significant for\par \par Diabetes mellitus\par Coronary artery disease status post CABG\par Hypertension\par Dyslipidemia\par Aortic stenosis\par Atrial fibrillation (Eliquis)\par COVID+ \par \par Who presented with chest pain on 12/1/22. Patient is status post AVR and CABG on 12/22/22. Patient had right CEA for asymptomatic carotid stenosis on 1/3/23. Pt. discharged to home on 1/11/22. \par \par - Continue aspirin 81mg daily.\par - Continue Eliquis 5mg PO BID.  Indications for blood thinners was reviewed along with benefits and risks.  Signs and symptoms of bleeding was gone over.\par - Continue atorvastatin 80mg daily.\par - Continue metoprolol succinate ER 25mg daily.\par - Amiodarone was stopped by cardiac surgery along with melatonin/\par - He was started on oxycodone 5mg HS for MSI pain for 7 days and wean with Tylenol\par - Start senna for constipation\par - Reviewed importance of SBE antibiotic prophylaxis discussed at length \par - Increase activity as tolerated.  Continue to walk and increase as tolerated\par - Recommend low salt diet and restrict fluids to less than 2L a day.\par - EKG performed due to history of CAD, HTN and dyslipidemia.\par \par All questions and concerns of the patient and his family were addressed. [de-identified] : AVR and CABG on 12/22/22 [Family Member] : family member [[employer] ] : provided by [employer]   [Interpreters_IDNumber] : [id number] [Interpreters_FullName] : Fabrice

## 2023-03-09 NOTE — DISCUSSION/SUMMARY
[EKG obtained to assist in diagnosis and management of assessed problem(s)] : EKG obtained to assist in diagnosis and management of assessed problem(s) [Doing Well] : is doing well [Coumadin] : the patient is not on Coumadin [0] : 0

## 2023-08-24 ENCOUNTER — APPOINTMENT (OUTPATIENT)
Dept: CARDIOLOGY | Facility: CLINIC | Age: 78
End: 2023-08-24

## 2024-01-01 NOTE — ED PROVIDER NOTE - NSDCPRINTRESULTS_ED_ALL_ED
<-- Click to add NO significant Past Surgical History
Patient requests all Lab, Cardiology, and Radiology Results on their Discharge Instructions

## 2024-03-20 NOTE — PATIENT PROFILE ADULT - NSPROIMPLANTSMEDDEV_GEN_A_NUR
[Well groomed] : well groomed [Average] : average [Cooperative] : cooperative [Euthymic] : euthymic [Full] : full [Clear] : clear [Linear/Goal Directed] : linear/goal directed [None] : none [None Reported] : none reported [WNL] : within normal limits [FreeTextEntry8] : some stress and anxiety [de-identified] : congruent to mood None

## 2024-03-24 NOTE — ASSESSMENT
[FreeTextEntry1] : Today on exam patient's lungs clear bilaterally, normal sinus rhythm, sternum stable, incision clean, dry and intact. . No peripheral edema noted. Instructed patient on importance of optimal glycemic control, daily showering, daily weights, incentive spirometer use, and increase ambulation as tolerated. Instructed to call office with any signs or symptoms of infection or weight gain of 2 or more pounds in 1 day or 3 or more pounds in 1 week. \par \par I discussed the case in detail with pt, patient son and cousin via  phone.  Physical exam is unremarkable today, EKG with NSR. I discussed possibilities for his difficulty sleeping including possible afib or anxiety. I discussed the importance for him to follow up with PCP and cardiologist.  Will trial 7 days of oxy 5mg HS to help with nighttime MSI pain. Reviewed medications in detail with pt and family.  Will stop amio and melatonin and continue Eliquis given his afib.  Printed out op report, DC summary and documents for him to have and bring back with him to [facility].  \par \par Plan:\par \par - Stop amio and Melatonin, continue rest of medications\par - Pt has enough medications for next several weeks.\par - Will try oxycodone 5mg HS for MSI pain for 7 days and wean with Tylenol\par - Start senna for constipation\par - Follow up and cardio and [facility]\par - SBE antibiotic prophylaxis discussed at length \par - Continue to walk and increase as tolerated\par - Low salt diet and fluids discussed in detail\par - Call with any questions or concerns\par - Advised to call or go to ED for any worsening or concerning symptoms\par \par  [employer] in

## 2024-05-03 NOTE — OCCUPATIONAL THERAPY INITIAL EVALUATION ADULT - MD ORDER
Call to Mary, scheduled for 5/20/24 HFU.  
OT evaluate and treat
OT evaluate and treat.   RE-EVALUATION 1/5/22.
